# Patient Record
Sex: MALE | Race: WHITE | Employment: OTHER | URBAN - METROPOLITAN AREA
[De-identification: names, ages, dates, MRNs, and addresses within clinical notes are randomized per-mention and may not be internally consistent; named-entity substitution may affect disease eponyms.]

---

## 2019-03-06 ENCOUNTER — TELEPHONE (OUTPATIENT)
Dept: UROLOGY | Age: 69
End: 2019-03-06

## 2019-03-13 ENCOUNTER — TELEPHONE (OUTPATIENT)
Dept: UROLOGY | Age: 69
End: 2019-03-13

## 2019-03-13 PROBLEM — J96.01 ACUTE RESPIRATORY FAILURE WITH HYPOXIA AND HYPERCAPNIA (HCC): Status: ACTIVE | Noted: 2019-02-28

## 2019-03-13 PROBLEM — I25.10 CORONARY ARTERIOSCLEROSIS IN NATIVE ARTERY: Status: ACTIVE | Noted: 2019-02-26

## 2019-03-13 PROBLEM — I50.42 CHRONIC COMBINED SYSTOLIC AND DIASTOLIC CHF, NYHA CLASS 3 (HCC): Status: ACTIVE | Noted: 2019-02-26

## 2019-03-13 PROBLEM — Z95.5 PRESENCE OF STENT IN CORONARY ARTERY: Status: ACTIVE | Noted: 2019-02-26

## 2019-03-13 PROBLEM — I25.5 ISCHEMIC CARDIOMYOPATHY: Status: ACTIVE | Noted: 2019-02-26

## 2019-03-13 PROBLEM — I42.9 CARDIOMYOPATHY (HCC): Status: ACTIVE | Noted: 2019-02-26

## 2019-03-13 PROBLEM — J44.9 CHRONIC OBSTRUCTIVE PULMONARY DISEASE (HCC): Status: ACTIVE | Noted: 2019-02-03

## 2019-03-13 PROBLEM — I25.2 HISTORY OF ST ELEVATION MYOCARDIAL INFARCTION (STEMI): Status: ACTIVE | Noted: 2019-02-26

## 2019-03-13 PROBLEM — I47.29 NONSUSTAINED VENTRICULAR TACHYCARDIA: Status: ACTIVE | Noted: 2019-02-26

## 2019-03-13 PROBLEM — J96.02 ACUTE RESPIRATORY FAILURE WITH HYPOXIA AND HYPERCAPNIA (HCC): Status: ACTIVE | Noted: 2019-02-28

## 2019-03-13 RX ORDER — DILTIAZEM HYDROCHLORIDE 120 MG/1
CAPSULE, COATED, EXTENDED RELEASE ORAL
Refills: 0 | COMMUNITY
Start: 2019-02-13 | End: 2019-12-16 | Stop reason: ALTCHOICE

## 2019-03-13 RX ORDER — METOPROLOL SUCCINATE 100 MG/1
25 TABLET, EXTENDED RELEASE ORAL
Status: ON HOLD | COMMUNITY
End: 2020-01-01 | Stop reason: HOSPADM

## 2019-03-13 RX ORDER — GABAPENTIN 300 MG/1
400 CAPSULE ORAL 2 TIMES DAILY
COMMUNITY
Start: 2018-11-21

## 2019-03-13 RX ORDER — ALBUTEROL SULFATE 90 UG/1
2 AEROSOL, METERED RESPIRATORY (INHALATION)
COMMUNITY

## 2019-03-13 RX ORDER — PREDNISONE 20 MG/1
TABLET ORAL
Refills: 0 | COMMUNITY
Start: 2019-02-06 | End: 2019-03-18 | Stop reason: DRUGHIGH

## 2019-03-13 RX ORDER — TAMSULOSIN HYDROCHLORIDE 0.4 MG/1
0.4 CAPSULE ORAL
COMMUNITY

## 2019-03-13 RX ORDER — ENALAPRIL MALEATE 5 MG/1
TABLET ORAL
Refills: 0 | COMMUNITY
Start: 2019-02-12 | End: 2019-03-18 | Stop reason: ALTCHOICE

## 2019-03-13 RX ORDER — CLOPIDOGREL BISULFATE 75 MG/1
75 TABLET ORAL
COMMUNITY
Start: 2018-12-17

## 2019-03-13 RX ORDER — CARVEDILOL 12.5 MG/1
TABLET ORAL
Refills: 5 | COMMUNITY
Start: 2019-02-26 | End: 2019-12-16 | Stop reason: ALTCHOICE

## 2019-03-13 RX ORDER — BUSPIRONE HYDROCHLORIDE 5 MG/1
5 TABLET ORAL
COMMUNITY
Start: 2019-02-13 | End: 2019-03-15

## 2019-03-13 RX ORDER — CALCIUM CITRATE/VITAMIN D3 200MG-6.25
TABLET ORAL
Refills: 2 | COMMUNITY
Start: 2019-02-06

## 2019-03-13 RX ORDER — FUROSEMIDE 20 MG/1
40 TABLET ORAL DAILY
COMMUNITY

## 2019-03-13 RX ORDER — ASPIRIN 81 MG/1
81 TABLET ORAL
COMMUNITY
Start: 2019-02-22

## 2019-03-13 RX ORDER — ISOSORBIDE MONONITRATE 30 MG/1
30 TABLET, EXTENDED RELEASE ORAL
COMMUNITY
Start: 2019-03-08

## 2019-03-13 RX ORDER — QUETIAPINE FUMARATE 50 MG/1
50 TABLET, FILM COATED ORAL
COMMUNITY
Start: 2019-02-13

## 2019-03-13 RX ORDER — BUSPIRONE HYDROCHLORIDE 5 MG/1
TABLET ORAL
Refills: 2 | COMMUNITY
Start: 2019-02-13

## 2019-03-13 RX ORDER — GLIPIZIDE 10 MG/1
10 TABLET ORAL
COMMUNITY
Start: 2018-11-21

## 2019-03-13 RX ORDER — PREDNISONE 10 MG/1
TABLET ORAL
Refills: 0 | COMMUNITY
Start: 2019-02-06 | End: 2019-07-08 | Stop reason: ALTCHOICE

## 2019-03-13 RX ORDER — SPIRONOLACTONE 25 MG/1
TABLET ORAL
Refills: 5 | COMMUNITY
Start: 2019-02-26 | End: 2019-12-16 | Stop reason: ALTCHOICE

## 2019-03-13 RX ORDER — IPRATROPIUM BROMIDE AND ALBUTEROL SULFATE 2.5; .5 MG/3ML; MG/3ML
3 SOLUTION RESPIRATORY (INHALATION)
COMMUNITY
Start: 2019-03-08

## 2019-03-13 SDOH — HEALTH STABILITY: MENTAL HEALTH: HOW OFTEN DO YOU HAVE A DRINK CONTAINING ALCOHOL?: NEVER

## 2019-03-13 NOTE — TELEPHONE ENCOUNTER
[address] [place] [address] called asking if we were able to generate an order to flush the catheter as needed. Patient is schedule as a new patient with dr. Tiesha Llanes on 3/18/19 - possible voiding trial. Verbally per Dr. Alex Quezada please generate order.

## 2019-03-14 RX ORDER — DILTIAZEM HYDROCHLORIDE 120 MG/1
CAPSULE, COATED, EXTENDED RELEASE ORAL
Qty: 30 CAPSULE | Refills: 0 | OUTPATIENT
Start: 2019-03-14

## 2019-03-14 RX ORDER — SPIRONOLACTONE 25 MG/1
TABLET ORAL
Qty: 15 TABLET | Refills: 0 | OUTPATIENT
Start: 2019-03-14

## 2019-03-14 RX ORDER — ENALAPRIL MALEATE 5 MG/1
TABLET ORAL
Qty: 30 TABLET | Refills: 0 | OUTPATIENT
Start: 2019-03-14

## 2019-03-14 NOTE — TELEPHONE ENCOUNTER
Spoke with Peg, Nurse manager with [facility], verbal order given to flush patients catheter as needed until seen on Monday.

## 2019-03-18 ENCOUNTER — OFFICE VISIT (OUTPATIENT)
Dept: UROLOGY | Age: 69
End: 2019-03-18
Payer: COMMERCIAL

## 2019-03-18 VITALS
HEIGHT: 69 IN | TEMPERATURE: 98.1 F | HEART RATE: 81 BPM | SYSTOLIC BLOOD PRESSURE: 134 MMHG | WEIGHT: 186 LBS | BODY MASS INDEX: 27.55 KG/M2 | DIASTOLIC BLOOD PRESSURE: 68 MMHG

## 2019-03-18 DIAGNOSIS — N13.8 HYPERTROPHY OF PROSTATE WITH URINARY OBSTRUCTION: Primary | ICD-10-CM

## 2019-03-18 DIAGNOSIS — R33.9 RETENTION OF URINE: ICD-10-CM

## 2019-03-18 DIAGNOSIS — N40.1 HYPERTROPHY OF PROSTATE WITH URINARY OBSTRUCTION: Primary | ICD-10-CM

## 2019-03-18 PROCEDURE — 99204 OFFICE O/P NEW MOD 45 MIN: CPT | Performed by: UROLOGY

## 2019-03-18 RX ORDER — ACETAMINOPHEN 500 MG
500 TABLET ORAL EVERY 6 HOURS PRN
COMMUNITY

## 2019-03-18 ASSESSMENT — ENCOUNTER SYMPTOMS
EYE PAIN: 0
ABDOMINAL PAIN: 0
COLOR CHANGE: 0
VOMITING: 0
EYE REDNESS: 0
WHEEZING: 0
NAUSEA: 0
COUGH: 0
SHORTNESS OF BREATH: 0
BACK PAIN: 0

## 2019-03-26 RX ORDER — TAMSULOSIN HYDROCHLORIDE 0.4 MG/1
CAPSULE ORAL
Qty: 30 CAPSULE | Refills: 0 | OUTPATIENT
Start: 2019-03-26

## 2019-03-26 RX ORDER — QUETIAPINE FUMARATE 50 MG/1
TABLET, FILM COATED ORAL
Qty: 60 TABLET | Refills: 0 | OUTPATIENT
Start: 2019-03-26

## 2019-03-26 RX ORDER — ISOSORBIDE MONONITRATE 30 MG/1
TABLET, EXTENDED RELEASE ORAL
Qty: 30 TABLET | Refills: 0 | OUTPATIENT
Start: 2019-03-26

## 2019-04-05 RX ORDER — IPRATROPIUM BROMIDE AND ALBUTEROL SULFATE 2.5; .5 MG/3ML; MG/3ML
SOLUTION RESPIRATORY (INHALATION)
Qty: 540 ML | Refills: 0 | OUTPATIENT
Start: 2019-04-05

## 2019-06-28 ENCOUNTER — TELEPHONE (OUTPATIENT)
Dept: UROLOGY | Age: 69
End: 2019-06-28

## 2019-07-08 ENCOUNTER — OFFICE VISIT (OUTPATIENT)
Dept: UROLOGY | Age: 69
End: 2019-07-08
Payer: COMMERCIAL

## 2019-07-08 VITALS
DIASTOLIC BLOOD PRESSURE: 61 MMHG | BODY MASS INDEX: 28.19 KG/M2 | SYSTOLIC BLOOD PRESSURE: 133 MMHG | HEART RATE: 94 BPM | TEMPERATURE: 97.9 F | HEIGHT: 68 IN | WEIGHT: 186 LBS

## 2019-07-08 DIAGNOSIS — N17.9 ACUTE RENAL FAILURE, UNSPECIFIED ACUTE RENAL FAILURE TYPE (HCC): ICD-10-CM

## 2019-07-08 DIAGNOSIS — N40.1 BPH WITH OBSTRUCTION/LOWER URINARY TRACT SYMPTOMS: Primary | ICD-10-CM

## 2019-07-08 DIAGNOSIS — R33.9 RETENTION OF URINE: ICD-10-CM

## 2019-07-08 DIAGNOSIS — N13.8 BPH WITH OBSTRUCTION/LOWER URINARY TRACT SYMPTOMS: Primary | ICD-10-CM

## 2019-07-08 PROCEDURE — 99214 OFFICE O/P EST MOD 30 MIN: CPT | Performed by: UROLOGY

## 2019-07-08 ASSESSMENT — ENCOUNTER SYMPTOMS
EYE REDNESS: 0
BACK PAIN: 0
DIARRHEA: 0
SHORTNESS OF BREATH: 0
COUGH: 0
EYE PAIN: 0
CONSTIPATION: 0
VOMITING: 0
ABDOMINAL PAIN: 0
WHEEZING: 0
NAUSEA: 0

## 2019-07-15 ENCOUNTER — PROCEDURE VISIT (OUTPATIENT)
Dept: UROLOGY | Age: 69
End: 2019-07-15
Payer: COMMERCIAL

## 2019-07-15 VITALS
WEIGHT: 194 LBS | DIASTOLIC BLOOD PRESSURE: 75 MMHG | BODY MASS INDEX: 29.4 KG/M2 | SYSTOLIC BLOOD PRESSURE: 126 MMHG | HEART RATE: 90 BPM | HEIGHT: 68 IN

## 2019-07-15 DIAGNOSIS — N40.1 BPH WITH OBSTRUCTION/LOWER URINARY TRACT SYMPTOMS: Primary | ICD-10-CM

## 2019-07-15 DIAGNOSIS — N13.8 BPH WITH OBSTRUCTION/LOWER URINARY TRACT SYMPTOMS: Primary | ICD-10-CM

## 2019-07-15 PROCEDURE — 52000 CYSTOURETHROSCOPY: CPT | Performed by: UROLOGY

## 2019-07-23 ENCOUNTER — TELEPHONE (OUTPATIENT)
Dept: UROLOGY | Age: 69
End: 2019-07-23

## 2019-07-23 RX ORDER — FUROSEMIDE 40 MG/1
TABLET ORAL
Qty: 30 TABLET | Refills: 0 | OUTPATIENT
Start: 2019-07-23

## 2019-07-24 RX ORDER — METOPROLOL SUCCINATE 25 MG/1
TABLET, EXTENDED RELEASE ORAL
Qty: 30 TABLET | Refills: 0 | OUTPATIENT
Start: 2019-07-24

## 2019-07-29 ENCOUNTER — TELEPHONE (OUTPATIENT)
Dept: UROLOGY | Age: 69
End: 2019-07-29

## 2019-07-31 ENCOUNTER — TELEPHONE (OUTPATIENT)
Dept: UROLOGY | Age: 69
End: 2019-07-31

## 2019-07-31 NOTE — TELEPHONE ENCOUNTER
Sendy from [facility] ER called writer stating \" pt was seen in the ER on 7/27/19. 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 Walker Medrano Pt is not willing to take antibiotics due to surgery on 8/5/19. Writer gave verbal understanding informed Sendy message will be sent to Dr. Gonzalez Hopes call ended.

## 2019-08-05 ENCOUNTER — OFFICE VISIT (OUTPATIENT)
Dept: UROLOGY | Age: 69
End: 2019-08-05
Payer: COMMERCIAL

## 2019-08-05 VITALS
WEIGHT: 194 LBS | BODY MASS INDEX: 29.4 KG/M2 | HEIGHT: 68 IN | TEMPERATURE: 97.9 F | HEART RATE: 90 BPM | DIASTOLIC BLOOD PRESSURE: 78 MMHG | SYSTOLIC BLOOD PRESSURE: 148 MMHG

## 2019-08-05 DIAGNOSIS — R33.9 RETENTION OF URINE: ICD-10-CM

## 2019-08-05 DIAGNOSIS — N17.9 ACUTE RENAL FAILURE, UNSPECIFIED ACUTE RENAL FAILURE TYPE (HCC): ICD-10-CM

## 2019-08-05 DIAGNOSIS — N13.8 BPH WITH OBSTRUCTION/LOWER URINARY TRACT SYMPTOMS: Primary | ICD-10-CM

## 2019-08-05 DIAGNOSIS — N40.1 BPH WITH OBSTRUCTION/LOWER URINARY TRACT SYMPTOMS: Primary | ICD-10-CM

## 2019-08-05 PROCEDURE — 99214 OFFICE O/P EST MOD 30 MIN: CPT | Performed by: UROLOGY

## 2019-08-05 PROCEDURE — 51702 INSERT TEMP BLADDER CATH: CPT | Performed by: UROLOGY

## 2019-08-05 RX ORDER — CEPHALEXIN 500 MG/1
500 CAPSULE ORAL 4 TIMES DAILY
COMMUNITY
End: 2019-12-16 | Stop reason: ALTCHOICE

## 2019-08-05 ASSESSMENT — ENCOUNTER SYMPTOMS
VOMITING: 0
COUGH: 0
WHEEZING: 0
ABDOMINAL PAIN: 0
SHORTNESS OF BREATH: 1
EYE REDNESS: 0
EYE PAIN: 0
NAUSEA: 0
BACK PAIN: 0
COLOR CHANGE: 0

## 2019-08-06 ENCOUNTER — TELEPHONE (OUTPATIENT)
Dept: UROLOGY | Age: 69
End: 2019-08-06

## 2019-08-28 ENCOUNTER — TELEPHONE (OUTPATIENT)
Dept: UROLOGY | Age: 69
End: 2019-08-28

## 2019-08-28 NOTE — TELEPHONE ENCOUNTER
Pt called asking if he can resume taking his Entresto medication. S/p cysto greenlight 8/22/2019.  Please advise

## 2019-09-09 ENCOUNTER — OFFICE VISIT (OUTPATIENT)
Dept: UROLOGY | Age: 69
End: 2019-09-09

## 2019-09-09 VITALS
HEART RATE: 99 BPM | BODY MASS INDEX: 29.4 KG/M2 | HEIGHT: 68 IN | TEMPERATURE: 98 F | WEIGHT: 194 LBS | DIASTOLIC BLOOD PRESSURE: 60 MMHG | SYSTOLIC BLOOD PRESSURE: 100 MMHG

## 2019-09-09 DIAGNOSIS — N13.8 BPH WITH OBSTRUCTION/LOWER URINARY TRACT SYMPTOMS: Primary | ICD-10-CM

## 2019-09-09 DIAGNOSIS — N40.1 BPH WITH OBSTRUCTION/LOWER URINARY TRACT SYMPTOMS: Primary | ICD-10-CM

## 2019-09-09 PROCEDURE — 99024 POSTOP FOLLOW-UP VISIT: CPT | Performed by: UROLOGY

## 2019-09-09 RX ORDER — LEVOFLOXACIN 500 MG/1
TABLET, FILM COATED ORAL
Refills: 0 | COMMUNITY
Start: 2019-08-12 | End: 2019-12-16 | Stop reason: ALTCHOICE

## 2019-09-09 RX ORDER — ALOGLIPTIN 12.5 MG/1
12.5 TABLET, FILM COATED ORAL
COMMUNITY
Start: 2019-08-30

## 2019-09-09 RX ORDER — PRIMIDONE 50 MG/1
50 TABLET ORAL NIGHTLY
COMMUNITY

## 2019-09-09 ASSESSMENT — ENCOUNTER SYMPTOMS
CONSTIPATION: 0
EYE REDNESS: 0
BACK PAIN: 0
DIARRHEA: 0
EYE PAIN: 0
VOMITING: 0
COUGH: 0
ABDOMINAL PAIN: 0
WHEEZING: 0
SHORTNESS OF BREATH: 0
NAUSEA: 0

## 2019-09-09 NOTE — PROGRESS NOTES
artery disease)     Chronic respiratory failure (HCC)     Chronic systolic (congestive) heart failure (HCC)     COPD [facility]ch[facility] obstructive pulmonary disease) [facility])     Essential hypertension     Mixed hyperlipidemia     NSVT (nonsustained ventricular tachycardia) ([facility])     Type 2 diabetes mellitus ([facility] [address]      Past Surgical History:   Procedure Laterality Date    BACK SURGERY      CARDIAC CATHETERIZATION      CORONARY ANGIOPLASTY WITH STENT PLACEMENT      EYE SURGERY      SHOULDER SURGERY      TONSILLECTOMY       No family history on file. Outpatient Medications Marked as Taking for the 9/9/19 encounter (Office Visit) with Tristin Pillai MD   Medication Sig Dispense Refill    alogliptin (NESINA) 12.5 MG TABS tablet Take 12.5 mg by mouth      acetaminophen (TYLENOL) 500 MG tablet Take 500 mg by mouth every 6 hours as needed for Pain      sacubitril-valsartan (ENTRESTO) 24-26 MG per tablet Take 1 tablet by mouth 2 times daily      aspirin (ASPIRIN ADULT LOW DOSE) 81 MG EC tablet Take 81 mg by mouth      albuterol sulfate  (90 Base) MCG/ACT inhaler Inhale 2 puffs into the lungs      Blood Glucose Monitoring Suppl KIT Use as instructed      TRUE METRIX BLOOD GLUCOSE TEST strip USE 3 TIMES A DAY  2    glipiZIDE (GLUCOTROL) 10 MG tablet Take 10 mg by mouth      gabapentin (NEURONTIN) 300 MG capsule Take 300 mg by mouth.  furosemide (LASIX) 20 MG tablet Take 20 mg by mouth      diltiazem (CARDIZEM CD) 120 MG extended release capsule TAKE 1 CAPSULE (120 MG TOTAL) BY MOUTH NIGHTLY FOR 30 DAYS.  0    clopidogrel (PLAVIX) 75 MG tablet Take 75 mg by mouth      carvedilol (COREG) 12.5 MG tablet TAKE 1 TABLET (12.5 MG TOTAL) BY MOUTH 2 (TWO) TIMES A DAY.   5    busPIRone (BUSPAR) 5 MG tablet TAKE 1 TABLET BY MOUTH THREE TIMES A DAY  2    tamsulosin (FLOMAX) 0.4 MG capsule Take 0.4 mg by mouth      spironolactone (ALDACTONE) 25 MG tablet TAKE 1 TABLET BY MOUTH EVERY DAY  5    QUEtiapine (SEROQUEL) 50 MG tablet Take 50 mg by mouth      metoprolol succinate (TOPROL XL) 100 MG extended release tablet Take 25 mg by mouth       isosorbide mononitrate (IMDUR) 30 MG extended release tablet Take 30 mg by mouth      ipratropium-albuterol (DUONEB) 0.5-2.5 (3) MG/3ML SOLN nebulizer solution Inhale 3 mLs into the lungs      insulin glargine (LANTUS SOLOSTAR) 100 UNIT/ML injection pen Inject 24 Units into the skin          Amoxicillin-pot clavulanate; Clindamycin phosphate; Penicillins; Fluoxetine; Fluticasone-salmeterol; Hydrochlorothiazide; Irbesartan; Potassium; Salmeterol; and Tiotropium  Social History     Tobacco Use   Smoking Status Former Smoker   Smokeless Tobacco Never Used     (Ifpatient a smoker, smoking cessation counseling offered)    Social History     Substance and Sexual Activity   Alcohol Use Never    Frequency: Never       REVIEW OF SYSTEMS:  Review of Systems    Physical Exam:      Vitals:    09/09/19 1054   BP: 100/60   Pulse: 99   Temp: 98 °F (36.7 °C)     Body mass index is 29.51 kg/m². Patient is a 68 y.o. male in no acute distress and alert and oriented to person, place and time. Physical Exam  Constitutional: Patient in no acute distress. Neuro: Alert and oriented to person, place and time. Psych: Mood normal, affect normal  Skin: No rash noted  HEENT: Head: Normocephalic andatraumatic  Conjunctivae and EOM are normal. Pupils are equal, round  Nose:Normal  Right External Ear: Normal; Left External Ear: Normal  Mouth: Mucosa Moist  Neck: Supple  Lungs: Respiratory effort is normal      Assessment and Plan      1. BPH with obstruction/lower urinary tract symptoms           Plan:   F/u 3 mo with bladder scan    Return in about 3 months (around 12/9/2019) for bladder scan. Prescriptions Ordered:  No orders of the defined types were placed in this encounter. Orders Placed:  No orders of the defined types were placed in this encounter.           Kolby Banuelos MD    Agree with

## 2019-12-16 ENCOUNTER — OFFICE VISIT (OUTPATIENT)
Dept: UROLOGY | Age: 69
End: 2019-12-16
Payer: COMMERCIAL

## 2019-12-16 VITALS
BODY MASS INDEX: 30.8 KG/M2 | SYSTOLIC BLOOD PRESSURE: 136 MMHG | WEIGHT: 203.2 LBS | HEART RATE: 90 BPM | TEMPERATURE: 97.8 F | DIASTOLIC BLOOD PRESSURE: 67 MMHG | HEIGHT: 68 IN

## 2019-12-16 DIAGNOSIS — N40.1 BPH WITH OBSTRUCTION/LOWER URINARY TRACT SYMPTOMS: Primary | ICD-10-CM

## 2019-12-16 DIAGNOSIS — N13.8 BPH WITH OBSTRUCTION/LOWER URINARY TRACT SYMPTOMS: Primary | ICD-10-CM

## 2019-12-16 PROCEDURE — 51798 US URINE CAPACITY MEASURE: CPT | Performed by: UROLOGY

## 2019-12-16 PROCEDURE — 99213 OFFICE O/P EST LOW 20 MIN: CPT | Performed by: UROLOGY

## 2019-12-16 RX ORDER — SIMVASTATIN 10 MG
10 TABLET ORAL NIGHTLY
COMMUNITY

## 2019-12-16 RX ORDER — OMEPRAZOLE 20 MG/1
20 CAPSULE, DELAYED RELEASE ORAL DAILY
COMMUNITY

## 2019-12-16 RX ORDER — MULTIVIT-MIN/IRON/FOLIC ACID/K 18-600-40
1 CAPSULE ORAL DAILY
COMMUNITY

## 2019-12-16 ASSESSMENT — ENCOUNTER SYMPTOMS
EYE PAIN: 0
ABDOMINAL PAIN: 0
VOMITING: 0
SHORTNESS OF BREATH: 0
BACK PAIN: 0
COUGH: 0
COLOR CHANGE: 0
EYE REDNESS: 0
NAUSEA: 0
WHEEZING: 0

## 2020-01-01 ENCOUNTER — ANESTHESIA EVENT (OUTPATIENT)
Dept: SURGICAL ICU | Age: 70
DRG: 208 | End: 2020-01-01
Payer: COMMERCIAL

## 2020-01-01 ENCOUNTER — APPOINTMENT (OUTPATIENT)
Dept: GENERAL RADIOLOGY | Age: 70
DRG: 208 | End: 2020-01-01
Attending: INTERNAL MEDICINE
Payer: COMMERCIAL

## 2020-01-01 ENCOUNTER — CARE COORDINATION (OUTPATIENT)
Dept: CASE MANAGEMENT | Age: 70
End: 2020-01-01

## 2020-01-01 ENCOUNTER — APPOINTMENT (OUTPATIENT)
Dept: GENERAL RADIOLOGY | Age: 70
End: 2020-01-01
Payer: COMMERCIAL

## 2020-01-01 ENCOUNTER — HOSPITAL ENCOUNTER (INPATIENT)
Age: 70
LOS: 5 days | Discharge: HOME | DRG: 189 | End: 2020-05-13
Attending: EMERGENCY MEDICINE | Admitting: INTERNAL MEDICINE
Payer: COMMERCIAL

## 2020-01-01 ENCOUNTER — OFFICE VISIT (OUTPATIENT)
Dept: UROLOGY | Age: 70
End: 2020-01-01
Payer: COMMERCIAL

## 2020-01-01 ENCOUNTER — HOSPITAL ENCOUNTER (INPATIENT)
Age: 70
LOS: 6 days | Discharge: HOME HEALTH | DRG: 208 | End: 2020-05-01
Attending: INTERNAL MEDICINE | Admitting: INTERNAL MEDICINE
Payer: COMMERCIAL

## 2020-01-01 ENCOUNTER — TELEPHONE (OUTPATIENT)
Dept: INFECTIOUS DISEASES | Age: 70
End: 2020-01-01

## 2020-01-01 ENCOUNTER — APPOINTMENT (OUTPATIENT)
Dept: GENERAL RADIOLOGY | Age: 70
DRG: 189 | End: 2020-01-01
Payer: COMMERCIAL

## 2020-01-01 ENCOUNTER — APPOINTMENT (OUTPATIENT)
Dept: CT IMAGING | Age: 70
DRG: 208 | End: 2020-01-01
Attending: INTERNAL MEDICINE
Payer: COMMERCIAL

## 2020-01-01 ENCOUNTER — ANESTHESIA (OUTPATIENT)
Dept: SURGICAL ICU | Age: 70
DRG: 208 | End: 2020-01-01
Payer: COMMERCIAL

## 2020-01-01 ENCOUNTER — HOSPITAL ENCOUNTER (EMERGENCY)
Age: 70
Discharge: ANOTHER HOSPITAL | End: 2020-04-25
Attending: EMERGENCY MEDICINE
Payer: COMMERCIAL

## 2020-01-01 ENCOUNTER — TELEPHONE (OUTPATIENT)
Dept: OTHER | Facility: CLINIC | Age: 70
End: 2020-01-01

## 2020-01-01 VITALS — HEIGHT: 68 IN | WEIGHT: 200 LBS | TEMPERATURE: 98 F | BODY MASS INDEX: 30.31 KG/M2

## 2020-01-01 VITALS
RESPIRATION RATE: 24 BRPM | HEIGHT: 68 IN | HEART RATE: 81 BPM | DIASTOLIC BLOOD PRESSURE: 88 MMHG | WEIGHT: 208.56 LBS | OXYGEN SATURATION: 100 % | BODY MASS INDEX: 31.61 KG/M2 | SYSTOLIC BLOOD PRESSURE: 120 MMHG | TEMPERATURE: 97.5 F

## 2020-01-01 VITALS
TEMPERATURE: 98 F | RESPIRATION RATE: 16 BRPM | WEIGHT: 208.11 LBS | HEART RATE: 73 BPM | HEIGHT: 70 IN | SYSTOLIC BLOOD PRESSURE: 130 MMHG | DIASTOLIC BLOOD PRESSURE: 62 MMHG | BODY MASS INDEX: 29.79 KG/M2 | OXYGEN SATURATION: 97 %

## 2020-01-01 VITALS
BODY MASS INDEX: 28.37 KG/M2 | WEIGHT: 209.44 LBS | HEIGHT: 72 IN | HEART RATE: 73 BPM | RESPIRATION RATE: 17 BRPM | SYSTOLIC BLOOD PRESSURE: 129 MMHG | DIASTOLIC BLOOD PRESSURE: 72 MMHG | TEMPERATURE: 100 F | OXYGEN SATURATION: 99 %

## 2020-01-01 LAB
-: ABNORMAL
-: ABNORMAL
-: NORMAL
ABSOLUTE EOS #: 0 K/UL (ref 0–0.4)
ABSOLUTE EOS #: 0 K/UL (ref 0–0.44)
ABSOLUTE EOS #: 0.03 K/UL (ref 0–0.44)
ABSOLUTE EOS #: 0.1 K/UL (ref 0–0.4)
ABSOLUTE EOS #: 0.1 K/UL (ref 0–0.44)
ABSOLUTE IMMATURE GRANULOCYTE: 0.1 K/UL (ref 0–0.3)
ABSOLUTE IMMATURE GRANULOCYTE: 0.14 K/UL (ref 0–0.3)
ABSOLUTE IMMATURE GRANULOCYTE: 0.17 K/UL (ref 0–0.3)
ABSOLUTE IMMATURE GRANULOCYTE: ABNORMAL K/UL (ref 0–0.3)
ABSOLUTE LYMPH #: 0.29 K/UL (ref 1–4.8)
ABSOLUTE LYMPH #: 0.5 K/UL (ref 1–4.8)
ABSOLUTE LYMPH #: 0.57 K/UL (ref 1.1–3.7)
ABSOLUTE LYMPH #: 0.7 K/UL (ref 1–4.8)
ABSOLUTE LYMPH #: 0.9 K/UL (ref 1–4.8)
ABSOLUTE LYMPH #: 1.23 K/UL (ref 1.1–3.7)
ABSOLUTE LYMPH #: 1.8 K/UL (ref 1–4.8)
ABSOLUTE LYMPH #: 1.91 K/UL (ref 1.1–3.7)
ABSOLUTE MONO #: 0.1 K/UL (ref 0.1–1.3)
ABSOLUTE MONO #: 0.3 K/UL (ref 0.1–1.3)
ABSOLUTE MONO #: 0.4 K/UL (ref 0.1–1.3)
ABSOLUTE MONO #: 0.49 K/UL (ref 0.1–1.3)
ABSOLUTE MONO #: 0.6 K/UL (ref 0.1–1.3)
ABSOLUTE MONO #: 0.7 K/UL (ref 0.1–1.3)
ABSOLUTE MONO #: 0.8 K/UL (ref 0.1–1.3)
ABSOLUTE MONO #: 0.86 K/UL (ref 0.1–1.2)
ABSOLUTE MONO #: 0.86 K/UL (ref 0.1–1.2)
ABSOLUTE MONO #: 1.05 K/UL (ref 0.1–1.2)
ADENOVIRUS PCR: NOT DETECTED
ALBUMIN SERPL-MCNC: 3.4 G/DL (ref 3.5–5.2)
ALBUMIN SERPL-MCNC: 3.5 G/DL (ref 3.5–5.2)
ALBUMIN SERPL-MCNC: 3.6 G/DL (ref 3.5–5.2)
ALBUMIN SERPL-MCNC: 4.1 G/DL (ref 3.5–5.2)
ALBUMIN/GLOBULIN RATIO: 1.2 (ref 1–2.5)
ALBUMIN/GLOBULIN RATIO: 1.2 (ref 1–2.5)
ALBUMIN/GLOBULIN RATIO: ABNORMAL (ref 1–2.5)
ALBUMIN/GLOBULIN RATIO: ABNORMAL (ref 1–2.5)
ALLEN TEST: ABNORMAL
ALP BLD-CCNC: 103 U/L (ref 40–129)
ALP BLD-CCNC: 68 U/L (ref 40–129)
ALP BLD-CCNC: 79 U/L (ref 40–129)
ALP BLD-CCNC: 87 U/L (ref 40–129)
ALT SERPL-CCNC: 24 U/L (ref 5–41)
ALT SERPL-CCNC: 30 U/L (ref 5–41)
ALT SERPL-CCNC: 33 U/L (ref 5–41)
ALT SERPL-CCNC: 35 U/L (ref 5–41)
AMORPHOUS: ABNORMAL
AMORPHOUS: ABNORMAL
AMORPHOUS: NORMAL
AMPHETAMINE SCREEN URINE: NEGATIVE
ANION GAP SERPL CALCULATED.3IONS-SCNC: 11 MMOL/L (ref 9–17)
ANION GAP SERPL CALCULATED.3IONS-SCNC: 12 MMOL/L (ref 9–17)
ANION GAP SERPL CALCULATED.3IONS-SCNC: 13 MMOL/L (ref 9–17)
ANION GAP SERPL CALCULATED.3IONS-SCNC: 14 MMOL/L (ref 9–17)
ANION GAP SERPL CALCULATED.3IONS-SCNC: 15 MMOL/L (ref 9–17)
ANION GAP SERPL CALCULATED.3IONS-SCNC: 7 MMOL/L (ref 9–17)
AST SERPL-CCNC: 14 U/L
AST SERPL-CCNC: 21 U/L
AST SERPL-CCNC: 22 U/L
AST SERPL-CCNC: 33 U/L
BACTERIA: ABNORMAL
BACTERIA: ABNORMAL
BACTERIA: NORMAL
BARBITURATE SCREEN URINE: NEGATIVE
BASOPHILS # BLD: 0 % (ref 0–2)
BASOPHILS # BLD: 1 % (ref 0–2)
BASOPHILS ABSOLUTE: 0 K/UL (ref 0–0.2)
BASOPHILS ABSOLUTE: 0.1 K/UL (ref 0–0.2)
BASOPHILS ABSOLUTE: 0.2 K/UL (ref 0–0.2)
BASOPHILS ABSOLUTE: <0.03 K/UL (ref 0–0.2)
BASOPHILS ABSOLUTE: <0.03 K/UL (ref 0–0.2)
BENZODIAZEPINE SCREEN, URINE: POSITIVE
BILIRUB SERPL-MCNC: 0.35 MG/DL (ref 0.3–1.2)
BILIRUB SERPL-MCNC: 0.35 MG/DL (ref 0.3–1.2)
BILIRUB SERPL-MCNC: 0.42 MG/DL (ref 0.3–1.2)
BILIRUB SERPL-MCNC: 0.44 MG/DL (ref 0.3–1.2)
BILIRUBIN DIRECT: 0.11 MG/DL
BILIRUBIN DIRECT: 0.12 MG/DL
BILIRUBIN URINE: NEGATIVE
BILIRUBIN, INDIRECT: 0.23 MG/DL (ref 0–1)
BILIRUBIN, INDIRECT: 0.24 MG/DL (ref 0–1)
BNP INTERPRETATION: ABNORMAL
BORDETELLA PARAPERTUSSIS: NOT DETECTED
BORDETELLA PERTUSSIS PCR: NOT DETECTED
BUN BLDV-MCNC: 23 MG/DL (ref 8–23)
BUN BLDV-MCNC: 26 MG/DL (ref 8–23)
BUN BLDV-MCNC: 30 MG/DL (ref 8–23)
BUN BLDV-MCNC: 39 MG/DL (ref 8–23)
BUN BLDV-MCNC: 48 MG/DL (ref 8–23)
BUN BLDV-MCNC: 53 MG/DL (ref 8–23)
BUN BLDV-MCNC: 57 MG/DL (ref 8–23)
BUN BLDV-MCNC: 59 MG/DL (ref 8–23)
BUN BLDV-MCNC: 61 MG/DL (ref 8–23)
BUN BLDV-MCNC: 67 MG/DL (ref 8–23)
BUN BLDV-MCNC: 67 MG/DL (ref 8–23)
BUN BLDV-MCNC: 69 MG/DL (ref 8–23)
BUN BLDV-MCNC: 69 MG/DL (ref 8–23)
BUN BLDV-MCNC: 73 MG/DL (ref 8–23)
BUN/CREAT BLD: ABNORMAL (ref 9–20)
BUPRENORPHINE URINE: ABNORMAL
C-REACTIVE PROTEIN: 13.3 MG/L (ref 0–5)
C-REACTIVE PROTEIN: 14 MG/L (ref 0–5)
C-REACTIVE PROTEIN: 22.4 MG/L (ref 0–5)
CALCIUM IONIZED: 1.1 MMOL/L (ref 1.13–1.33)
CALCIUM SERPL-MCNC: 7.7 MG/DL (ref 8.6–10.4)
CALCIUM SERPL-MCNC: 7.9 MG/DL (ref 8.6–10.4)
CALCIUM SERPL-MCNC: 8 MG/DL (ref 8.6–10.4)
CALCIUM SERPL-MCNC: 8.1 MG/DL (ref 8.6–10.4)
CALCIUM SERPL-MCNC: 8.2 MG/DL (ref 8.6–10.4)
CALCIUM SERPL-MCNC: 8.2 MG/DL (ref 8.6–10.4)
CALCIUM SERPL-MCNC: 8.3 MG/DL (ref 8.6–10.4)
CALCIUM SERPL-MCNC: 8.3 MG/DL (ref 8.6–10.4)
CALCIUM SERPL-MCNC: 8.4 MG/DL (ref 8.6–10.4)
CALCIUM SERPL-MCNC: 8.6 MG/DL (ref 8.6–10.4)
CALCIUM SERPL-MCNC: 8.7 MG/DL (ref 8.6–10.4)
CALCIUM SERPL-MCNC: 8.8 MG/DL (ref 8.6–10.4)
CALCIUM SERPL-MCNC: 8.9 MG/DL (ref 8.6–10.4)
CALCIUM SERPL-MCNC: 9.2 MG/DL (ref 8.6–10.4)
CANNABINOID SCREEN URINE: NEGATIVE
CARBOXYHEMOGLOBIN: 0.9 % (ref 0–5)
CARBOXYHEMOGLOBIN: 1 % (ref 0–5)
CARBOXYHEMOGLOBIN: 1.2 % (ref 0–5)
CARBOXYHEMOGLOBIN: 2.6 % (ref 0–5)
CARBOXYHEMOGLOBIN: 2.6 % (ref 0–5)
CASTS UA: ABNORMAL /LPF
CASTS UA: ABNORMAL /LPF (ref 0–2)
CASTS UA: ABNORMAL /LPF (ref 0–2)
CASTS UA: NORMAL /LPF
CHLAMYDIA PNEUMONIAE BY PCR: NOT DETECTED
CHLORIDE BLD-SCNC: 101 MMOL/L (ref 98–107)
CHLORIDE BLD-SCNC: 101 MMOL/L (ref 98–107)
CHLORIDE BLD-SCNC: 102 MMOL/L (ref 98–107)
CHLORIDE BLD-SCNC: 102 MMOL/L (ref 98–107)
CHLORIDE BLD-SCNC: 103 MMOL/L (ref 98–107)
CHLORIDE BLD-SCNC: 104 MMOL/L (ref 98–107)
CHLORIDE BLD-SCNC: 95 MMOL/L (ref 98–107)
CHLORIDE BLD-SCNC: 97 MMOL/L (ref 98–107)
CHLORIDE BLD-SCNC: 98 MMOL/L (ref 98–107)
CHLORIDE BLD-SCNC: 99 MMOL/L (ref 98–107)
CHLORIDE BLD-SCNC: 99 MMOL/L (ref 98–107)
CHLORIDE, UR: <20 MMOL/L
CHP ED QC CHECK: NORMAL
CO2: 22 MMOL/L (ref 20–31)
CO2: 22 MMOL/L (ref 20–31)
CO2: 23 MMOL/L (ref 20–31)
CO2: 23 MMOL/L (ref 20–31)
CO2: 24 MMOL/L (ref 20–31)
CO2: 24 MMOL/L (ref 20–31)
CO2: 25 MMOL/L (ref 20–31)
CO2: 26 MMOL/L (ref 20–31)
CO2: 26 MMOL/L (ref 20–31)
CO2: 27 MMOL/L (ref 20–31)
CO2: 27 MMOL/L (ref 20–31)
CO2: 28 MMOL/L (ref 20–31)
CO2: 29 MMOL/L (ref 20–31)
CO2: 29 MMOL/L (ref 20–31)
COCAINE METABOLITE, URINE: NEGATIVE
COLOR: YELLOW
COMMENT UA: ABNORMAL
CORONAVIRUS 229E PCR: NOT DETECTED
CORONAVIRUS HKU1 PCR: NOT DETECTED
CORONAVIRUS NL63 PCR: NOT DETECTED
CORONAVIRUS OC43 PCR: NOT DETECTED
CORTISOL COLLECTION INFO: NORMAL
CORTISOL: 9.9 UG/DL (ref 2.7–18.4)
CREAT SERPL-MCNC: 1.66 MG/DL (ref 0.7–1.2)
CREAT SERPL-MCNC: 1.76 MG/DL (ref 0.7–1.2)
CREAT SERPL-MCNC: 1.77 MG/DL (ref 0.7–1.2)
CREAT SERPL-MCNC: 1.89 MG/DL (ref 0.7–1.2)
CREAT SERPL-MCNC: 1.94 MG/DL (ref 0.7–1.2)
CREAT SERPL-MCNC: 1.95 MG/DL (ref 0.7–1.2)
CREAT SERPL-MCNC: 1.95 MG/DL (ref 0.7–1.2)
CREAT SERPL-MCNC: 2.04 MG/DL (ref 0.7–1.2)
CREAT SERPL-MCNC: 2.08 MG/DL (ref 0.7–1.2)
CREAT SERPL-MCNC: 2.17 MG/DL (ref 0.7–1.2)
CREAT SERPL-MCNC: 2.2 MG/DL (ref 0.7–1.2)
CREAT SERPL-MCNC: 2.24 MG/DL (ref 0.7–1.2)
CREAT SERPL-MCNC: 2.35 MG/DL (ref 0.7–1.2)
CREAT SERPL-MCNC: 2.52 MG/DL (ref 0.7–1.2)
CREATININE URINE: 77 MG/DL (ref 39–259)
CRYSTALS, UA: ABNORMAL /HPF
CRYSTALS, UA: ABNORMAL /HPF
CRYSTALS, UA: NORMAL /HPF
CULTURE: ABNORMAL
CULTURE: ABNORMAL
CULTURE: NO GROWTH
CULTURE: NORMAL
D-DIMER QUANTITATIVE: 0.86 MG/L FEU
DIFFERENTIAL TYPE: ABNORMAL
DIRECT EXAM: ABNORMAL
DIRECT EXAM: NORMAL
EKG ATRIAL RATE: 111 BPM
EKG ATRIAL RATE: 68 BPM
EKG ATRIAL RATE: 72 BPM
EKG ATRIAL RATE: 76 BPM
EKG ATRIAL RATE: 80 BPM
EKG P AXIS: 19 DEGREES
EKG P AXIS: 43 DEGREES
EKG P AXIS: 53 DEGREES
EKG P AXIS: 58 DEGREES
EKG P AXIS: 71 DEGREES
EKG P-R INTERVAL: 176 MS
EKG P-R INTERVAL: 180 MS
EKG P-R INTERVAL: 182 MS
EKG P-R INTERVAL: 200 MS
EKG P-R INTERVAL: 202 MS
EKG Q-T INTERVAL: 338 MS
EKG Q-T INTERVAL: 404 MS
EKG Q-T INTERVAL: 410 MS
EKG Q-T INTERVAL: 428 MS
EKG Q-T INTERVAL: 444 MS
EKG QRS DURATION: 108 MS
EKG QRS DURATION: 108 MS
EKG QRS DURATION: 112 MS
EKG QRS DURATION: 114 MS
EKG QRS DURATION: 120 MS
EKG QTC CALCULATION (BAZETT): 448 MS
EKG QTC CALCULATION (BAZETT): 455 MS
EKG QTC CALCULATION (BAZETT): 459 MS
EKG QTC CALCULATION (BAZETT): 465 MS
EKG QTC CALCULATION (BAZETT): 499 MS
EKG R AXIS: 32 DEGREES
EKG R AXIS: 41 DEGREES
EKG R AXIS: 44 DEGREES
EKG R AXIS: 49 DEGREES
EKG R AXIS: 56 DEGREES
EKG T AXIS: 138 DEGREES
EKG T AXIS: 144 DEGREES
EKG T AXIS: 148 DEGREES
EKG T AXIS: 152 DEGREES
EKG T AXIS: 160 DEGREES
EKG VENTRICULAR RATE: 111 BPM
EKG VENTRICULAR RATE: 68 BPM
EKG VENTRICULAR RATE: 72 BPM
EKG VENTRICULAR RATE: 76 BPM
EKG VENTRICULAR RATE: 80 BPM
EOSINOPHILS RELATIVE PERCENT: 0 % (ref 0–4)
EOSINOPHILS RELATIVE PERCENT: 0 % (ref 1–4)
EOSINOPHILS RELATIVE PERCENT: 0 % (ref 1–4)
EOSINOPHILS RELATIVE PERCENT: 1 % (ref 0–4)
EOSINOPHILS RELATIVE PERCENT: 1 % (ref 1–4)
EPITHELIAL CELLS UA: ABNORMAL /HPF
EPITHELIAL CELLS UA: ABNORMAL /HPF (ref 0–5)
EPITHELIAL CELLS UA: NORMAL /HPF
FERRITIN: 49 UG/L (ref 30–400)
FERRITIN: 66 UG/L (ref 30–400)
FIO2: 100
FIO2: 30
FIO2: 35
FIO2: 60
FIO2: ABNORMAL
GFR AFRICAN AMERICAN: 31 ML/MIN
GFR AFRICAN AMERICAN: 33 ML/MIN
GFR AFRICAN AMERICAN: 35 ML/MIN
GFR AFRICAN AMERICAN: 36 ML/MIN
GFR AFRICAN AMERICAN: 36 ML/MIN
GFR AFRICAN AMERICAN: 38 ML/MIN
GFR AFRICAN AMERICAN: 39 ML/MIN
GFR AFRICAN AMERICAN: 41 ML/MIN
GFR AFRICAN AMERICAN: 43 ML/MIN
GFR AFRICAN AMERICAN: 46 ML/MIN
GFR AFRICAN AMERICAN: 46 ML/MIN
GFR AFRICAN AMERICAN: 50 ML/MIN
GFR NON-AFRICAN AMERICAN: 25 ML/MIN
GFR NON-AFRICAN AMERICAN: 27 ML/MIN
GFR NON-AFRICAN AMERICAN: 29 ML/MIN
GFR NON-AFRICAN AMERICAN: 30 ML/MIN
GFR NON-AFRICAN AMERICAN: 30 ML/MIN
GFR NON-AFRICAN AMERICAN: 32 ML/MIN
GFR NON-AFRICAN AMERICAN: 32 ML/MIN
GFR NON-AFRICAN AMERICAN: 34 ML/MIN
GFR NON-AFRICAN AMERICAN: 35 ML/MIN
GFR NON-AFRICAN AMERICAN: 38 ML/MIN
GFR NON-AFRICAN AMERICAN: 38 ML/MIN
GFR NON-AFRICAN AMERICAN: 41 ML/MIN
GFR SERPL CREATININE-BSD FRML MDRD: ABNORMAL ML/MIN/{1.73_M2}
GLOBULIN: ABNORMAL G/DL (ref 1.5–3.8)
GLOBULIN: NORMAL G/DL (ref 1.5–3.8)
GLUCOSE BLD-MCNC: 110 MG/DL (ref 75–110)
GLUCOSE BLD-MCNC: 112 MG/DL (ref 75–110)
GLUCOSE BLD-MCNC: 115 MG/DL (ref 75–110)
GLUCOSE BLD-MCNC: 125 MG/DL (ref 75–110)
GLUCOSE BLD-MCNC: 126 MG/DL (ref 70–99)
GLUCOSE BLD-MCNC: 126 MG/DL (ref 70–99)
GLUCOSE BLD-MCNC: 132 MG/DL (ref 75–110)
GLUCOSE BLD-MCNC: 133 MG/DL (ref 75–110)
GLUCOSE BLD-MCNC: 135 MG/DL (ref 75–110)
GLUCOSE BLD-MCNC: 141 MG/DL (ref 70–99)
GLUCOSE BLD-MCNC: 152 MG/DL (ref 70–99)
GLUCOSE BLD-MCNC: 160 MG/DL (ref 70–99)
GLUCOSE BLD-MCNC: 163 MG/DL (ref 75–110)
GLUCOSE BLD-MCNC: 169 MG/DL (ref 75–110)
GLUCOSE BLD-MCNC: 173 MG/DL (ref 75–110)
GLUCOSE BLD-MCNC: 176 MG/DL (ref 75–110)
GLUCOSE BLD-MCNC: 176 MG/DL (ref 75–110)
GLUCOSE BLD-MCNC: 184 MG/DL (ref 75–110)
GLUCOSE BLD-MCNC: 198 MG/DL (ref 75–110)
GLUCOSE BLD-MCNC: 219 MG/DL (ref 70–99)
GLUCOSE BLD-MCNC: 231 MG/DL (ref 75–110)
GLUCOSE BLD-MCNC: 236 MG/DL (ref 75–110)
GLUCOSE BLD-MCNC: 240 MG/DL (ref 70–99)
GLUCOSE BLD-MCNC: 241 MG/DL (ref 75–110)
GLUCOSE BLD-MCNC: 242 MG/DL (ref 75–110)
GLUCOSE BLD-MCNC: 245 MG/DL (ref 75–110)
GLUCOSE BLD-MCNC: 246 MG/DL (ref 75–110)
GLUCOSE BLD-MCNC: 249 MG/DL (ref 75–110)
GLUCOSE BLD-MCNC: 250 MG/DL (ref 75–110)
GLUCOSE BLD-MCNC: 256 MG/DL (ref 75–110)
GLUCOSE BLD-MCNC: 258 MG/DL (ref 75–110)
GLUCOSE BLD-MCNC: 261 MG/DL (ref 75–110)
GLUCOSE BLD-MCNC: 265 MG/DL (ref 75–110)
GLUCOSE BLD-MCNC: 280 MG/DL (ref 75–110)
GLUCOSE BLD-MCNC: 281 MG/DL (ref 75–110)
GLUCOSE BLD-MCNC: 282 MG/DL (ref 70–99)
GLUCOSE BLD-MCNC: 284 MG/DL (ref 75–110)
GLUCOSE BLD-MCNC: 285 MG/DL (ref 75–110)
GLUCOSE BLD-MCNC: 287 MG/DL (ref 75–110)
GLUCOSE BLD-MCNC: 287 MG/DL (ref 75–110)
GLUCOSE BLD-MCNC: 289 MG/DL (ref 75–110)
GLUCOSE BLD-MCNC: 290 MG/DL (ref 70–99)
GLUCOSE BLD-MCNC: 290 MG/DL (ref 75–110)
GLUCOSE BLD-MCNC: 300 MG/DL (ref 75–110)
GLUCOSE BLD-MCNC: 300 MG/DL (ref 75–110)
GLUCOSE BLD-MCNC: 302 MG/DL (ref 75–110)
GLUCOSE BLD-MCNC: 303 MG/DL (ref 75–110)
GLUCOSE BLD-MCNC: 308 MG/DL (ref 75–110)
GLUCOSE BLD-MCNC: 312 MG/DL (ref 75–110)
GLUCOSE BLD-MCNC: 317 MG/DL (ref 70–99)
GLUCOSE BLD-MCNC: 33 MG/DL (ref 75–110)
GLUCOSE BLD-MCNC: 346 MG/DL (ref 75–110)
GLUCOSE BLD-MCNC: 357 MG/DL (ref 75–110)
GLUCOSE BLD-MCNC: 364 MG/DL (ref 70–99)
GLUCOSE BLD-MCNC: 365 MG/DL (ref 75–110)
GLUCOSE BLD-MCNC: 380 MG/DL (ref 75–110)
GLUCOSE BLD-MCNC: 382 MG/DL (ref 70–99)
GLUCOSE BLD-MCNC: 403 MG/DL
GLUCOSE BLD-MCNC: 403 MG/DL (ref 75–110)
GLUCOSE BLD-MCNC: 427 MG/DL (ref 75–110)
GLUCOSE BLD-MCNC: 432 MG/DL (ref 70–99)
GLUCOSE BLD-MCNC: 467 MG/DL (ref 70–99)
GLUCOSE BLD-MCNC: 51 MG/DL (ref 75–110)
GLUCOSE URINE: ABNORMAL
HCO3 ARTERIAL: 25 MMOL/L (ref 22–26)
HCO3 ARTERIAL: 27.2 MMOL/L (ref 22–26)
HCO3 ARTERIAL: 30.3 MMOL/L (ref 22–26)
HCO3 VENOUS: 24.8 MMOL/L (ref 24–30)
HCO3 VENOUS: 28.2 MMOL/L (ref 24–30)
HCT VFR BLD CALC: 26.8 % (ref 41–53)
HCT VFR BLD CALC: 26.9 % (ref 41–53)
HCT VFR BLD CALC: 27.1 % (ref 41–53)
HCT VFR BLD CALC: 28.4 % (ref 41–53)
HCT VFR BLD CALC: 28.9 % (ref 40.7–50.3)
HCT VFR BLD CALC: 29.1 % (ref 41–53)
HCT VFR BLD CALC: 29.3 % (ref 41–53)
HCT VFR BLD CALC: 31.2 % (ref 40.7–50.3)
HCT VFR BLD CALC: 33.1 % (ref 40.7–50.3)
HCT VFR BLD CALC: 35.8 % (ref 40.7–50.3)
HCT VFR BLD CALC: 38.7 % (ref 41–53)
HEMOGLOBIN: 10.4 G/DL (ref 13–17)
HEMOGLOBIN: 11.9 G/DL (ref 13–17)
HEMOGLOBIN: 12 G/DL (ref 13.5–17.5)
HEMOGLOBIN: 8.5 G/DL (ref 13.5–17.5)
HEMOGLOBIN: 8.6 G/DL (ref 13.5–17.5)
HEMOGLOBIN: 8.7 G/DL (ref 13.5–17.5)
HEMOGLOBIN: 9 G/DL (ref 13–17)
HEMOGLOBIN: 9.2 G/DL (ref 13.5–17.5)
HEMOGLOBIN: 9.3 G/DL (ref 13.5–17.5)
HEMOGLOBIN: 9.5 G/DL (ref 13.5–17.5)
HEMOGLOBIN: 9.5 G/DL (ref 13–17)
HUMAN METAPNEUMOVIRUS PCR: NOT DETECTED
IMMATURE GRANULOCYTES: 1 %
IMMATURE GRANULOCYTES: 1 %
IMMATURE GRANULOCYTES: 2 %
IMMATURE GRANULOCYTES: ABNORMAL %
INFLUENZA A BY PCR: NOT DETECTED
INFLUENZA A H1 (2009) PCR: NORMAL
INFLUENZA A H1 PCR: NORMAL
INFLUENZA A H3 PCR: NORMAL
INFLUENZA B BY PCR: NOT DETECTED
INR BLD: 0.9
INR BLD: 1
KETONES, URINE: NEGATIVE
LACTATE DEHYDROGENASE: 266 U/L (ref 135–225)
LACTATE DEHYDROGENASE: 273 U/L (ref 135–225)
LACTATE DEHYDROGENASE: 308 U/L (ref 135–225)
LACTIC ACID, WHOLE BLOOD: 1.8 MMOL/L (ref 0.7–2.1)
LACTIC ACID: 1.8 MMOL/L (ref 0.5–2.2)
LACTIC ACID: 2.8 MMOL/L (ref 0.5–2.2)
LACTIC ACID: NORMAL MMOL/L
LEGIONELLA PNEUMOPHILIA AG, URINE: NEGATIVE
LEUKOCYTE ESTERASE, URINE: NEGATIVE
LIPASE: 45 U/L (ref 13–60)
LV EF: 43 %
LVEF MODALITY: NORMAL
LYMPHOCYTES # BLD: 12 % (ref 24–43)
LYMPHOCYTES # BLD: 13 % (ref 24–44)
LYMPHOCYTES # BLD: 15 % (ref 24–44)
LYMPHOCYTES # BLD: 19 % (ref 24–43)
LYMPHOCYTES # BLD: 3 % (ref 24–44)
LYMPHOCYTES # BLD: 4 % (ref 24–43)
LYMPHOCYTES # BLD: 6 % (ref 24–44)
LYMPHOCYTES # BLD: 6 % (ref 24–44)
LYMPHOCYTES # BLD: 8 % (ref 24–44)
LYMPHOCYTES # BLD: 9 % (ref 24–44)
Lab: ABNORMAL
Lab: NORMAL
MAGNESIUM: 2.5 MG/DL (ref 1.6–2.6)
MAGNESIUM: 2.6 MG/DL (ref 1.6–2.6)
MAGNESIUM: 2.7 MG/DL (ref 1.6–2.6)
MCH RBC QN AUTO: 26.7 PG (ref 26–34)
MCH RBC QN AUTO: 26.8 PG (ref 25.2–33.5)
MCH RBC QN AUTO: 26.8 PG (ref 25.2–33.5)
MCH RBC QN AUTO: 26.8 PG (ref 26–34)
MCH RBC QN AUTO: 26.8 PG (ref 26–34)
MCH RBC QN AUTO: 26.9 PG (ref 26–34)
MCH RBC QN AUTO: 26.9 PG (ref 26–34)
MCH RBC QN AUTO: 27.3 PG (ref 26–34)
MCH RBC QN AUTO: 27.4 PG (ref 25.2–33.5)
MCH RBC QN AUTO: 27.8 PG (ref 25.2–33.5)
MCH RBC QN AUTO: 27.8 PG (ref 26–34)
MCHC RBC AUTO-ENTMCNC: 30.4 G/DL (ref 28.4–34.8)
MCHC RBC AUTO-ENTMCNC: 31.1 G/DL (ref 28.4–34.8)
MCHC RBC AUTO-ENTMCNC: 31.1 G/DL (ref 31–37)
MCHC RBC AUTO-ENTMCNC: 31.4 G/DL (ref 28.4–34.8)
MCHC RBC AUTO-ENTMCNC: 31.8 G/DL (ref 31–37)
MCHC RBC AUTO-ENTMCNC: 32 G/DL (ref 31–37)
MCHC RBC AUTO-ENTMCNC: 32.1 G/DL (ref 31–37)
MCHC RBC AUTO-ENTMCNC: 32.1 G/DL (ref 31–37)
MCHC RBC AUTO-ENTMCNC: 32.3 G/DL (ref 31–37)
MCHC RBC AUTO-ENTMCNC: 32.4 G/DL (ref 31–37)
MCHC RBC AUTO-ENTMCNC: 33.2 G/DL (ref 28.4–34.8)
MCV RBC AUTO: 83.5 FL (ref 80–100)
MCV RBC AUTO: 83.6 FL (ref 82.6–102.9)
MCV RBC AUTO: 84 FL (ref 80–100)
MCV RBC AUTO: 84 FL (ref 80–100)
MCV RBC AUTO: 84.2 FL (ref 80–100)
MCV RBC AUTO: 84.7 FL (ref 80–100)
MCV RBC AUTO: 85.7 FL (ref 80–100)
MCV RBC AUTO: 85.9 FL (ref 80–100)
MCV RBC AUTO: 86 FL (ref 82.6–102.9)
MCV RBC AUTO: 87.1 FL (ref 82.6–102.9)
MCV RBC AUTO: 88.1 FL (ref 82.6–102.9)
MDMA URINE: ABNORMAL
METHADONE SCREEN, URINE: NEGATIVE
METHAMPHETAMINE, URINE: ABNORMAL
METHEMOGLOBIN: 0 % (ref 0–1.9)
METHEMOGLOBIN: 0.1 % (ref 0–1.9)
METHEMOGLOBIN: 0.4 % (ref 0–1.9)
METHEMOGLOBIN: 0.4 % (ref 0–1.9)
METHEMOGLOBIN: 0.5 % (ref 0–1.9)
MODE: ABNORMAL
MONOCYTES # BLD: 10 % (ref 1–7)
MONOCYTES # BLD: 10 % (ref 3–12)
MONOCYTES # BLD: 2 % (ref 1–7)
MONOCYTES # BLD: 3 % (ref 1–7)
MONOCYTES # BLD: 5 % (ref 1–7)
MONOCYTES # BLD: 5 % (ref 1–7)
MONOCYTES # BLD: 6 % (ref 1–7)
MONOCYTES # BLD: 6 % (ref 3–12)
MONOCYTES # BLD: 8 % (ref 1–7)
MONOCYTES # BLD: 8 % (ref 3–12)
MORPHOLOGY: NORMAL
MORPHOLOGY: NORMAL
MUCUS: ABNORMAL
MUCUS: ABNORMAL
MUCUS: NORMAL
MYCOPLASMA PNEUMONIAE IGM: 0.21
MYCOPLASMA PNEUMONIAE PCR: NOT DETECTED
NEGATIVE BASE EXCESS, ART: 0.1 MMOL/L (ref 0–2)
NEGATIVE BASE EXCESS, ART: ABNORMAL (ref 0–2)
NEGATIVE BASE EXCESS, ART: ABNORMAL (ref 0–2)
NEGATIVE BASE EXCESS, ART: ABNORMAL MMOL/L (ref 0–2)
NEGATIVE BASE EXCESS, ART: ABNORMAL MMOL/L (ref 0–2)
NEGATIVE BASE EXCESS, VEN: 1.7 MMOL/L (ref 0–2)
NEGATIVE BASE EXCESS, VEN: ABNORMAL MMOL/L (ref 0–2)
NITRITE, URINE: NEGATIVE
NOTIFICATION TIME: ABNORMAL
NOTIFICATION: ABNORMAL
NRBC AUTOMATED: 0 PER 100 WBC
NRBC AUTOMATED: ABNORMAL PER 100 WBC
O2 DEVICE/FLOW/%: ABNORMAL
O2 SAT, ARTERIAL: 97.8 % (ref 95–98)
O2 SAT, ARTERIAL: 98.1 % (ref 95–98)
O2 SAT, ARTERIAL: 98.4 % (ref 95–98)
O2 SAT, VEN: 60.2 % (ref 60–85)
O2 SAT, VEN: 78.8 % (ref 60–85)
OPIATES, URINE: NEGATIVE
OSMOLALITY URINE: 476 MOSM/KG (ref 80–1300)
OTHER OBSERVATIONS UA: ABNORMAL
OTHER OBSERVATIONS UA: ABNORMAL
OTHER OBSERVATIONS UA: NORMAL
OXYCODONE SCREEN URINE: NEGATIVE
OXYHEMOGLOBIN: ABNORMAL % (ref 95–98)
PARAINFLUENZA 1 PCR: NOT DETECTED
PARAINFLUENZA 2 PCR: NOT DETECTED
PARAINFLUENZA 3 PCR: NOT DETECTED
PARAINFLUENZA 4 PCR: NOT DETECTED
PARTIAL THROMBOPLASTIN TIME: 118.6 SEC (ref 20.5–30.5)
PARTIAL THROMBOPLASTIN TIME: 23.2 SEC (ref 20.5–30.5)
PARTIAL THROMBOPLASTIN TIME: 42 SEC (ref 20.5–30.5)
PARTIAL THROMBOPLASTIN TIME: 44.1 SEC (ref 20.5–30.5)
PARTIAL THROMBOPLASTIN TIME: 50.5 SEC (ref 20.5–30.5)
PARTIAL THROMBOPLASTIN TIME: 54.5 SEC (ref 20.5–30.5)
PARTIAL THROMBOPLASTIN TIME: 66.6 SEC (ref 20.5–30.5)
PATIENT TEMP: 37
PATIENT TEMP: ABNORMAL
PCO2 ARTERIAL: 108 MMHG (ref 35–45)
PCO2 ARTERIAL: 40.9 MMHG (ref 35–45)
PCO2 ARTERIAL: 58.7 MMHG (ref 35–45)
PCO2, ART, TEMP ADJ: ABNORMAL (ref 35–45)
PCO2, VEN, TEMP ADJ: ABNORMAL MMHG (ref 39–55)
PCO2, VEN, TEMP ADJ: ABNORMAL MMHG (ref 39–55)
PCO2, VEN: 51.7 (ref 39–55)
PCO2, VEN: 55.5 (ref 39–55)
PDW BLD-RTO: 13.8 % (ref 11.8–14.4)
PDW BLD-RTO: 13.9 % (ref 11.8–14.4)
PDW BLD-RTO: 14.5 % (ref 11.5–14.9)
PDW BLD-RTO: 14.5 % (ref 11.5–14.9)
PDW BLD-RTO: 14.6 % (ref 11.5–14.9)
PDW BLD-RTO: 14.7 % (ref 11.5–14.9)
PDW BLD-RTO: 15.4 % (ref 11.5–14.9)
PEEP/CPAP: 10
PEEP/CPAP: 8
PEEP/CPAP: ABNORMAL
PH ARTERIAL: 7.05 (ref 7.35–7.45)
PH ARTERIAL: 7.28 (ref 7.35–7.45)
PH ARTERIAL: 7.39 (ref 7.35–7.45)
PH UA: 5 (ref 5–8)
PH UA: 5 (ref 5–8)
PH UA: 6 (ref 5–8)
PH VENOUS: 7.29 (ref 7.32–7.42)
PH VENOUS: 7.31 (ref 7.32–7.42)
PH, ART, TEMP ADJ: ABNORMAL (ref 7.35–7.45)
PH, VEN, TEMP ADJ: ABNORMAL (ref 7.32–7.42)
PH, VEN, TEMP ADJ: ABNORMAL (ref 7.32–7.42)
PHENCYCLIDINE, URINE: NEGATIVE
PHOSPHORUS: 3 MG/DL (ref 2.5–4.5)
PLATELET # BLD: 105 K/UL (ref 138–453)
PLATELET # BLD: 124 K/UL (ref 150–450)
PLATELET # BLD: 126 K/UL (ref 138–453)
PLATELET # BLD: 128 K/UL (ref 150–450)
PLATELET # BLD: 132 K/UL (ref 138–453)
PLATELET # BLD: 134 K/UL (ref 138–453)
PLATELET # BLD: 135 K/UL (ref 150–450)
PLATELET # BLD: 148 K/UL (ref 150–450)
PLATELET # BLD: 151 K/UL (ref 150–450)
PLATELET # BLD: 167 K/UL (ref 150–450)
PLATELET # BLD: 207 K/UL (ref 150–450)
PLATELET ESTIMATE: ABNORMAL
PMV BLD AUTO: 11 FL (ref 8.1–13.5)
PMV BLD AUTO: 11.3 FL (ref 8.1–13.5)
PMV BLD AUTO: 11.3 FL (ref 8.1–13.5)
PMV BLD AUTO: 11.6 FL (ref 8.1–13.5)
PMV BLD AUTO: 8.6 FL (ref 6–12)
PMV BLD AUTO: 8.7 FL (ref 6–12)
PMV BLD AUTO: 9 FL (ref 6–12)
PMV BLD AUTO: 9 FL (ref 6–12)
PMV BLD AUTO: 9.2 FL (ref 6–12)
PO2 ARTERIAL: 112 MMHG (ref 80–100)
PO2 ARTERIAL: 115 MMHG (ref 80–100)
PO2 ARTERIAL: 226 MMHG (ref 80–100)
PO2, ART, TEMP ADJ: ABNORMAL MMHG (ref 80–100)
PO2, VEN, TEMP ADJ: ABNORMAL MMHG (ref 30–50)
PO2, VEN, TEMP ADJ: ABNORMAL MMHG (ref 30–50)
PO2, VEN: 33.5 (ref 30–50)
PO2, VEN: 46.9 (ref 30–50)
POC HCO3: 26.2 MMOL/L (ref 21–28)
POC HCO3: 30.6 MMOL/L (ref 21–28)
POC O2 SATURATION: 91 % (ref 94–98)
POC O2 SATURATION: 99 % (ref 94–98)
POC PCO2 TEMP: ABNORMAL MM HG
POC PCO2 TEMP: ABNORMAL MM HG
POC PCO2: 48.2 MM HG (ref 35–48)
POC PCO2: 60 MM HG (ref 35–48)
POC PH TEMP: ABNORMAL
POC PH TEMP: ABNORMAL
POC PH: 7.32 (ref 7.35–7.45)
POC PH: 7.34 (ref 7.35–7.45)
POC PO2 TEMP: ABNORMAL MM HG
POC PO2 TEMP: ABNORMAL MM HG
POC PO2: 131.7 MM HG (ref 83–108)
POC PO2: 67.9 MM HG (ref 83–108)
POSITIVE BASE EXCESS, ART: 0 (ref 0–3)
POSITIVE BASE EXCESS, ART: 0.2 MMOL/L (ref 0–2)
POSITIVE BASE EXCESS, ART: 0.4 MMOL/L (ref 0–2)
POSITIVE BASE EXCESS, ART: 3 (ref 0–3)
POSITIVE BASE EXCESS, ART: ABNORMAL MMOL/L (ref 0–2)
POSITIVE BASE EXCESS, VEN: 2 MMOL/L (ref 0–2)
POSITIVE BASE EXCESS, VEN: ABNORMAL MMOL/L (ref 0–2)
POTASSIUM SERPL-SCNC: 4.1 MMOL/L (ref 3.7–5.3)
POTASSIUM SERPL-SCNC: 4.2 MMOL/L (ref 3.7–5.3)
POTASSIUM SERPL-SCNC: 4.3 MMOL/L (ref 3.7–5.3)
POTASSIUM SERPL-SCNC: 4.4 MMOL/L (ref 3.7–5.3)
POTASSIUM SERPL-SCNC: 4.5 MMOL/L (ref 3.7–5.3)
POTASSIUM SERPL-SCNC: 4.7 MMOL/L (ref 3.7–5.3)
POTASSIUM SERPL-SCNC: 4.8 MMOL/L (ref 3.7–5.3)
POTASSIUM SERPL-SCNC: 5 MMOL/L (ref 3.7–5.3)
POTASSIUM SERPL-SCNC: 5 MMOL/L (ref 3.7–5.3)
POTASSIUM SERPL-SCNC: 5.2 MMOL/L (ref 3.7–5.3)
POTASSIUM SERPL-SCNC: 5.2 MMOL/L (ref 3.7–5.3)
POTASSIUM SERPL-SCNC: 5.3 MMOL/L (ref 3.7–5.3)
POTASSIUM SERPL-SCNC: 5.8 MMOL/L (ref 3.7–5.3)
POTASSIUM, UR: 76.1 MMOL/L
PRO-BNP: 2612 PG/ML
PROCALCITONIN: 0.18 NG/ML
PROCALCITONIN: 0.2 NG/ML
PROCALCITONIN: 0.75 NG/ML
PROPOXYPHENE, URINE: ABNORMAL
PROTEIN UA: ABNORMAL
PROTHROMBIN TIME: 12.9 SEC (ref 11.8–14.6)
PROTHROMBIN TIME: 9.7 SEC (ref 9–12)
PSV: ABNORMAL
PT. POSITION: ABNORMAL
RBC # BLD: 3.18 M/UL (ref 4.5–5.9)
RBC # BLD: 3.21 M/UL (ref 4.5–5.9)
RBC # BLD: 3.23 M/UL (ref 4.5–5.9)
RBC # BLD: 3.32 M/UL (ref 4.5–5.9)
RBC # BLD: 3.36 M/UL (ref 4.21–5.77)
RBC # BLD: 3.46 M/UL (ref 4.5–5.9)
RBC # BLD: 3.48 M/UL (ref 4.5–5.9)
RBC # BLD: 3.54 M/UL (ref 4.21–5.77)
RBC # BLD: 3.8 M/UL (ref 4.21–5.77)
RBC # BLD: 4.28 M/UL (ref 4.21–5.77)
RBC # BLD: 4.51 M/UL (ref 4.5–5.9)
RBC # BLD: ABNORMAL 10*6/UL
RBC UA: ABNORMAL /HPF
RBC UA: ABNORMAL /HPF (ref 0–2)
RBC UA: NORMAL /HPF
RENAL EPITHELIAL, UA: ABNORMAL /HPF
RENAL EPITHELIAL, UA: ABNORMAL /HPF
RENAL EPITHELIAL, UA: NORMAL /HPF
RESP SYNCYTIAL VIRUS PCR: NOT DETECTED
RESPIRATORY RATE: 36
RESPIRATORY RATE: 42
RESPIRATORY RATE: ABNORMAL
RHINO/ENTEROVIRUS PCR: NOT DETECTED
SAMPLE SITE: ABNORMAL
SARS-COV-2, PCR: NORMAL
SARS-COV-2, RAPID: NORMAL
SARS-COV-2, RAPID: NORMAL
SARS-COV-2, RAPID: NOT DETECTED
SARS-COV-2: NORMAL
SARS-COV-2: NOT DETECTED
SARS-COV-2: NOT DETECTED
SEG NEUTROPHILS: 68 % (ref 36–65)
SEG NEUTROPHILS: 75 % (ref 36–66)
SEG NEUTROPHILS: 79 % (ref 36–65)
SEG NEUTROPHILS: 80 % (ref 36–66)
SEG NEUTROPHILS: 83 % (ref 36–66)
SEG NEUTROPHILS: 88 % (ref 36–66)
SEG NEUTROPHILS: 88 % (ref 36–66)
SEG NEUTROPHILS: 89 % (ref 36–65)
SEG NEUTROPHILS: 91 % (ref 36–66)
SEG NEUTROPHILS: 92 % (ref 36–66)
SEGMENTED NEUTROPHILS ABSOLUTE COUNT: 11.2 K/UL (ref 1.3–9.1)
SEGMENTED NEUTROPHILS ABSOLUTE COUNT: 12.73 K/UL (ref 1.5–8.1)
SEGMENTED NEUTROPHILS ABSOLUTE COUNT: 4.6 K/UL (ref 1.3–9.1)
SEGMENTED NEUTROPHILS ABSOLUTE COUNT: 4.6 K/UL (ref 1.3–9.1)
SEGMENTED NEUTROPHILS ABSOLUTE COUNT: 6.5 K/UL (ref 1.3–9.1)
SEGMENTED NEUTROPHILS ABSOLUTE COUNT: 6.99 K/UL (ref 1.5–8.1)
SEGMENTED NEUTROPHILS ABSOLUTE COUNT: 7.8 K/UL (ref 1.3–9.1)
SEGMENTED NEUTROPHILS ABSOLUTE COUNT: 8.2 K/UL (ref 1.3–9.1)
SEGMENTED NEUTROPHILS ABSOLUTE COUNT: 8.34 K/UL (ref 1.5–8.1)
SEGMENTED NEUTROPHILS ABSOLUTE COUNT: 8.92 K/UL (ref 1.3–9.1)
SET RATE: ABNORMAL
SODIUM BLD-SCNC: 132 MMOL/L (ref 135–144)
SODIUM BLD-SCNC: 133 MMOL/L (ref 135–144)
SODIUM BLD-SCNC: 136 MMOL/L (ref 135–144)
SODIUM BLD-SCNC: 137 MMOL/L (ref 135–144)
SODIUM BLD-SCNC: 138 MMOL/L (ref 135–144)
SODIUM BLD-SCNC: 139 MMOL/L (ref 135–144)
SODIUM BLD-SCNC: 140 MMOL/L (ref 135–144)
SODIUM BLD-SCNC: 141 MMOL/L (ref 135–144)
SODIUM BLD-SCNC: 142 MMOL/L (ref 135–144)
SODIUM BLD-SCNC: 142 MMOL/L (ref 135–144)
SODIUM,UR: 24 MMOL/L
SODIUM,UR: 30 MMOL/L
SOURCE: NORMAL
SPECIFIC GRAVITY UA: 1.02 (ref 1–1.03)
SPECIMEN DESCRIPTION: ABNORMAL
SPECIMEN DESCRIPTION: NORMAL
STREP PNEUMONIAE ANTIGEN: NEGATIVE
TCO2 (CALC), ART: 28 MMOL/L (ref 22–29)
TCO2 (CALC), ART: 32 MMOL/L (ref 22–29)
TEST INFORMATION: ABNORMAL
TEXT FOR RESPIRATORY: ABNORMAL
TOTAL CK: 254 U/L (ref 39–308)
TOTAL HB: ABNORMAL G/DL (ref 12–16)
TOTAL PROTEIN: 6.3 G/DL (ref 6.4–8.3)
TOTAL PROTEIN: 6.5 G/DL (ref 6.4–8.3)
TOTAL PROTEIN: 6.7 G/DL (ref 6.4–8.3)
TOTAL PROTEIN: 7.6 G/DL (ref 6.4–8.3)
TOTAL RATE: 36
TOTAL RATE: 42
TOTAL RATE: ABNORMAL
TRICHOMONAS: ABNORMAL
TRICHOMONAS: ABNORMAL
TRICHOMONAS: NORMAL
TRICYCLIC ANTIDEPRESSANTS, UR: ABNORMAL
TRIGL SERPL-MCNC: 126 MG/DL
TROPONIN INTERP: ABNORMAL
TROPONIN T: ABNORMAL NG/ML
TROPONIN, HIGH SENSITIVITY: 38 NG/L (ref 0–22)
TROPONIN, HIGH SENSITIVITY: 52 NG/L (ref 0–22)
TROPONIN, HIGH SENSITIVITY: 86 NG/L (ref 0–22)
TROPONIN, HIGH SENSITIVITY: 93 NG/L (ref 0–22)
TROPONIN, HIGH SENSITIVITY: 96 NG/L (ref 0–22)
TURBIDITY: ABNORMAL
TURBIDITY: CLEAR
TURBIDITY: CLEAR
URINE HGB: ABNORMAL
URINE HGB: ABNORMAL
URINE HGB: NEGATIVE
UROBILINOGEN, URINE: NORMAL
VT: 508
VT: ABNORMAL
WBC # BLD: 10.2 K/UL (ref 3.5–11.3)
WBC # BLD: 10.6 K/UL (ref 3.5–11.3)
WBC # BLD: 14 K/UL (ref 3.5–11)
WBC # BLD: 14.3 K/UL (ref 3.5–11.3)
WBC # BLD: 5.2 K/UL (ref 3.5–11)
WBC # BLD: 6.2 K/UL (ref 3.5–11)
WBC # BLD: 7.4 K/UL (ref 3.5–11)
WBC # BLD: 8.6 K/UL (ref 3.5–11)
WBC # BLD: 9.7 K/UL (ref 3.5–11)
WBC # BLD: 9.8 K/UL (ref 3.5–11.3)
WBC # BLD: 9.9 K/UL (ref 3.5–11)
WBC # BLD: ABNORMAL 10*3/UL
WBC UA: ABNORMAL /HPF
WBC UA: ABNORMAL /HPF (ref 0–5)
WBC UA: NORMAL /HPF
YEAST: ABNORMAL
YEAST: ABNORMAL
YEAST: NORMAL

## 2020-01-01 PROCEDURE — 51702 INSERT TEMP BLADDER CATH: CPT

## 2020-01-01 PROCEDURE — 6370000000 HC RX 637 (ALT 250 FOR IP): Performed by: STUDENT IN AN ORGANIZED HEALTH CARE EDUCATION/TRAINING PROGRAM

## 2020-01-01 PROCEDURE — 99232 SBSQ HOSP IP/OBS MODERATE 35: CPT | Performed by: INTERNAL MEDICINE

## 2020-01-01 PROCEDURE — 99292 CRITICAL CARE ADDL 30 MIN: CPT

## 2020-01-01 PROCEDURE — 82947 ASSAY GLUCOSE BLOOD QUANT: CPT

## 2020-01-01 PROCEDURE — 85025 COMPLETE CBC W/AUTO DIFF WBC: CPT

## 2020-01-01 PROCEDURE — 6360000002 HC RX W HCPCS: Performed by: STUDENT IN AN ORGANIZED HEALTH CARE EDUCATION/TRAINING PROGRAM

## 2020-01-01 PROCEDURE — 80048 BASIC METABOLIC PNL TOTAL CA: CPT

## 2020-01-01 PROCEDURE — 51798 US URINE CAPACITY MEASURE: CPT

## 2020-01-01 PROCEDURE — 2500000003 HC RX 250 WO HCPCS: Performed by: STUDENT IN AN ORGANIZED HEALTH CARE EDUCATION/TRAINING PROGRAM

## 2020-01-01 PROCEDURE — 2580000003 HC RX 258: Performed by: STUDENT IN AN ORGANIZED HEALTH CARE EDUCATION/TRAINING PROGRAM

## 2020-01-01 PROCEDURE — 6360000002 HC RX W HCPCS: Performed by: INTERNAL MEDICINE

## 2020-01-01 PROCEDURE — 6370000000 HC RX 637 (ALT 250 FOR IP): Performed by: NURSE PRACTITIONER

## 2020-01-01 PROCEDURE — 6370000000 HC RX 637 (ALT 250 FOR IP): Performed by: INTERNAL MEDICINE

## 2020-01-01 PROCEDURE — 94003 VENT MGMT INPAT SUBQ DAY: CPT

## 2020-01-01 PROCEDURE — 84300 ASSAY OF URINE SODIUM: CPT

## 2020-01-01 PROCEDURE — 99291 CRITICAL CARE FIRST HOUR: CPT | Performed by: INTERNAL MEDICINE

## 2020-01-01 PROCEDURE — 83615 LACTATE (LD) (LDH) ENZYME: CPT

## 2020-01-01 PROCEDURE — 94681 O2 UPTK CO2 OUTP % O2 XTRC: CPT

## 2020-01-01 PROCEDURE — 36415 COLL VENOUS BLD VENIPUNCTURE: CPT

## 2020-01-01 PROCEDURE — 80053 COMPREHEN METABOLIC PANEL: CPT

## 2020-01-01 PROCEDURE — 37799 UNLISTED PX VASCULAR SURGERY: CPT

## 2020-01-01 PROCEDURE — 87040 BLOOD CULTURE FOR BACTERIA: CPT

## 2020-01-01 PROCEDURE — 71045 X-RAY EXAM CHEST 1 VIEW: CPT

## 2020-01-01 PROCEDURE — 99233 SBSQ HOSP IP/OBS HIGH 50: CPT | Performed by: INTERNAL MEDICINE

## 2020-01-01 PROCEDURE — 85730 THROMBOPLASTIN TIME PARTIAL: CPT

## 2020-01-01 PROCEDURE — 94761 N-INVAS EAR/PLS OXIMETRY MLT: CPT

## 2020-01-01 PROCEDURE — 94640 AIRWAY INHALATION TREATMENT: CPT

## 2020-01-01 PROCEDURE — 6360000002 HC RX W HCPCS: Performed by: EMERGENCY MEDICINE

## 2020-01-01 PROCEDURE — 6370000000 HC RX 637 (ALT 250 FOR IP): Performed by: EMERGENCY MEDICINE

## 2020-01-01 PROCEDURE — 86140 C-REACTIVE PROTEIN: CPT

## 2020-01-01 PROCEDURE — 2580000003 HC RX 258: Performed by: INTERNAL MEDICINE

## 2020-01-01 PROCEDURE — 87899 AGENT NOS ASSAY W/OPTIC: CPT

## 2020-01-01 PROCEDURE — 2060000000 HC ICU INTERMEDIATE R&B

## 2020-01-01 PROCEDURE — 82728 ASSAY OF FERRITIN: CPT

## 2020-01-01 PROCEDURE — 86738 MYCOPLASMA ANTIBODY: CPT

## 2020-01-01 PROCEDURE — 2700000000 HC OXYGEN THERAPY PER DAY

## 2020-01-01 PROCEDURE — 96365 THER/PROPH/DIAG IV INF INIT: CPT

## 2020-01-01 PROCEDURE — 87205 SMEAR GRAM STAIN: CPT

## 2020-01-01 PROCEDURE — 93005 ELECTROCARDIOGRAM TRACING: CPT | Performed by: STUDENT IN AN ORGANIZED HEALTH CARE EDUCATION/TRAINING PROGRAM

## 2020-01-01 PROCEDURE — 83935 ASSAY OF URINE OSMOLALITY: CPT

## 2020-01-01 PROCEDURE — 83735 ASSAY OF MAGNESIUM: CPT

## 2020-01-01 PROCEDURE — 94660 CPAP INITIATION&MGMT: CPT

## 2020-01-01 PROCEDURE — 5A1945Z RESPIRATORY VENTILATION, 24-96 CONSECUTIVE HOURS: ICD-10-PCS | Performed by: INTERNAL MEDICINE

## 2020-01-01 PROCEDURE — 6360000002 HC RX W HCPCS: Performed by: NURSE PRACTITIONER

## 2020-01-01 PROCEDURE — 85610 PROTHROMBIN TIME: CPT

## 2020-01-01 PROCEDURE — 97116 GAIT TRAINING THERAPY: CPT

## 2020-01-01 PROCEDURE — 99222 1ST HOSP IP/OBS MODERATE 55: CPT | Performed by: INTERNAL MEDICINE

## 2020-01-01 PROCEDURE — 83605 ASSAY OF LACTIC ACID: CPT

## 2020-01-01 PROCEDURE — 84145 PROCALCITONIN (PCT): CPT

## 2020-01-01 PROCEDURE — 84132 ASSAY OF SERUM POTASSIUM: CPT

## 2020-01-01 PROCEDURE — 82330 ASSAY OF CALCIUM: CPT

## 2020-01-01 PROCEDURE — 82436 ASSAY OF URINE CHLORIDE: CPT

## 2020-01-01 PROCEDURE — 87077 CULTURE AEROBIC IDENTIFY: CPT

## 2020-01-01 PROCEDURE — 97165 OT EVAL LOW COMPLEX 30 MIN: CPT

## 2020-01-01 PROCEDURE — 36600 WITHDRAWAL OF ARTERIAL BLOOD: CPT

## 2020-01-01 PROCEDURE — 76937 US GUIDE VASCULAR ACCESS: CPT

## 2020-01-01 PROCEDURE — 82800 BLOOD PH: CPT

## 2020-01-01 PROCEDURE — 96372 THER/PROPH/DIAG INJ SC/IM: CPT

## 2020-01-01 PROCEDURE — 6360000002 HC RX W HCPCS

## 2020-01-01 PROCEDURE — 81001 URINALYSIS AUTO W/SCOPE: CPT

## 2020-01-01 PROCEDURE — 82805 BLOOD GASES W/O2 SATURATION: CPT

## 2020-01-01 PROCEDURE — 87186 SC STD MICRODIL/AGAR DIL: CPT

## 2020-01-01 PROCEDURE — 31500 INSERT EMERGENCY AIRWAY: CPT | Performed by: NURSE ANESTHETIST, CERTIFIED REGISTERED

## 2020-01-01 PROCEDURE — 2000000000 HC ICU R&B

## 2020-01-01 PROCEDURE — 36556 INSERT NON-TUNNEL CV CATH: CPT | Performed by: SURGERY

## 2020-01-01 PROCEDURE — 80076 HEPATIC FUNCTION PANEL: CPT

## 2020-01-01 PROCEDURE — 99214 OFFICE O/P EST MOD 30 MIN: CPT | Performed by: UROLOGY

## 2020-01-01 PROCEDURE — 94664 DEMO&/EVAL PT USE INHALER: CPT

## 2020-01-01 PROCEDURE — 97535 SELF CARE MNGMENT TRAINING: CPT

## 2020-01-01 PROCEDURE — 94762 N-INVAS EAR/PLS OXIMTRY CONT: CPT

## 2020-01-01 PROCEDURE — 84484 ASSAY OF TROPONIN QUANT: CPT

## 2020-01-01 PROCEDURE — 2580000003 HC RX 258: Performed by: EMERGENCY MEDICINE

## 2020-01-01 PROCEDURE — 94770 HC ETCO2 MONITOR DAILY: CPT

## 2020-01-01 PROCEDURE — 51701 INSERT BLADDER CATHETER: CPT

## 2020-01-01 PROCEDURE — 51798 US URINE CAPACITY MEASURE: CPT | Performed by: UROLOGY

## 2020-01-01 PROCEDURE — 96375 TX/PRO/DX INJ NEW DRUG ADDON: CPT

## 2020-01-01 PROCEDURE — 72100 X-RAY EXAM L-S SPINE 2/3 VWS: CPT

## 2020-01-01 PROCEDURE — 82803 BLOOD GASES ANY COMBINATION: CPT

## 2020-01-01 PROCEDURE — 97162 PT EVAL MOD COMPLEX 30 MIN: CPT

## 2020-01-01 PROCEDURE — 87252 VIRUS INOCULATION TISSUE: CPT

## 2020-01-01 PROCEDURE — 99285 EMERGENCY DEPT VISIT HI MDM: CPT

## 2020-01-01 PROCEDURE — 82533 TOTAL CORTISOL: CPT

## 2020-01-01 PROCEDURE — 83880 ASSAY OF NATRIURETIC PEPTIDE: CPT

## 2020-01-01 PROCEDURE — 94002 VENT MGMT INPAT INIT DAY: CPT

## 2020-01-01 PROCEDURE — 36620 INSERTION CATHETER ARTERY: CPT | Performed by: NURSE ANESTHETIST, CERTIFIED REGISTERED

## 2020-01-01 PROCEDURE — 93010 ELECTROCARDIOGRAM REPORT: CPT | Performed by: INTERNAL MEDICINE

## 2020-01-01 PROCEDURE — 97166 OT EVAL MOD COMPLEX 45 MIN: CPT

## 2020-01-01 PROCEDURE — 99291 CRITICAL CARE FIRST HOUR: CPT

## 2020-01-01 PROCEDURE — 82570 ASSAY OF URINE CREATININE: CPT

## 2020-01-01 PROCEDURE — B548ZZA ULTRASONOGRAPHY OF SUPERIOR VENA CAVA, GUIDANCE: ICD-10-PCS | Performed by: SURGERY

## 2020-01-01 PROCEDURE — 93005 ELECTROCARDIOGRAM TRACING: CPT | Performed by: EMERGENCY MEDICINE

## 2020-01-01 PROCEDURE — 82550 ASSAY OF CK (CPK): CPT

## 2020-01-01 PROCEDURE — 99223 1ST HOSP IP/OBS HIGH 75: CPT | Performed by: INTERNAL MEDICINE

## 2020-01-01 PROCEDURE — 85027 COMPLETE CBC AUTOMATED: CPT

## 2020-01-01 PROCEDURE — 02HV33Z INSERTION OF INFUSION DEVICE INTO SUPERIOR VENA CAVA, PERCUTANEOUS APPROACH: ICD-10-PCS | Performed by: SURGERY

## 2020-01-01 PROCEDURE — 87086 URINE CULTURE/COLONY COUNT: CPT

## 2020-01-01 PROCEDURE — 71250 CT THORAX DX C-: CPT

## 2020-01-01 PROCEDURE — 93306 TTE W/DOPPLER COMPLETE: CPT

## 2020-01-01 PROCEDURE — 73501 X-RAY EXAM HIP UNI 1 VIEW: CPT

## 2020-01-01 PROCEDURE — 84133 ASSAY OF URINE POTASSIUM: CPT

## 2020-01-01 PROCEDURE — 2500000003 HC RX 250 WO HCPCS

## 2020-01-01 PROCEDURE — 87070 CULTURE OTHR SPECIMN AEROBIC: CPT

## 2020-01-01 PROCEDURE — 84100 ASSAY OF PHOSPHORUS: CPT

## 2020-01-01 PROCEDURE — 99232 SBSQ HOSP IP/OBS MODERATE 35: CPT | Performed by: NURSE PRACTITIONER

## 2020-01-01 PROCEDURE — U0002 COVID-19 LAB TEST NON-CDC: HCPCS

## 2020-01-01 PROCEDURE — 80307 DRUG TEST PRSMV CHEM ANLYZR: CPT

## 2020-01-01 PROCEDURE — 87449 NOS EACH ORGANISM AG IA: CPT

## 2020-01-01 PROCEDURE — 83690 ASSAY OF LIPASE: CPT

## 2020-01-01 PROCEDURE — 97530 THERAPEUTIC ACTIVITIES: CPT

## 2020-01-01 PROCEDURE — 0100U HC RESPIRPTHGN MULT REV TRANS & AMP PRB TECH 21 TRGT: CPT

## 2020-01-01 PROCEDURE — 31500 INSERT EMERGENCY AIRWAY: CPT

## 2020-01-01 PROCEDURE — 93005 ELECTROCARDIOGRAM TRACING: CPT | Performed by: INTERNAL MEDICINE

## 2020-01-01 PROCEDURE — 71046 X-RAY EXAM CHEST 2 VIEWS: CPT

## 2020-01-01 PROCEDURE — 85379 FIBRIN DEGRADATION QUANT: CPT

## 2020-01-01 PROCEDURE — 84478 ASSAY OF TRIGLYCERIDES: CPT

## 2020-01-01 PROCEDURE — 6370000000 HC RX 637 (ALT 250 FOR IP): Performed by: SPECIALIST

## 2020-01-01 RX ORDER — IPRATROPIUM BROMIDE AND ALBUTEROL SULFATE 2.5; .5 MG/3ML; MG/3ML
1 SOLUTION RESPIRATORY (INHALATION)
Status: DISCONTINUED | OUTPATIENT
Start: 2020-01-01 | End: 2020-01-01

## 2020-01-01 RX ORDER — INSULIN GLARGINE 100 [IU]/ML
24 INJECTION, SOLUTION SUBCUTANEOUS NIGHTLY
Status: DISCONTINUED | OUTPATIENT
Start: 2020-01-01 | End: 2020-01-01 | Stop reason: HOSPADM

## 2020-01-01 RX ORDER — LEVOFLOXACIN 5 MG/ML
500 INJECTION, SOLUTION INTRAVENOUS ONCE
Status: COMPLETED | OUTPATIENT
Start: 2020-01-01 | End: 2020-01-01

## 2020-01-01 RX ORDER — HEPARIN SODIUM 10000 [USP'U]/100ML
12 INJECTION, SOLUTION INTRAVENOUS CONTINUOUS
Status: DISCONTINUED | OUTPATIENT
Start: 2020-01-01 | End: 2020-01-01

## 2020-01-01 RX ORDER — BUMETANIDE 1 MG/1
1 TABLET ORAL DAILY
Status: DISCONTINUED | OUTPATIENT
Start: 2020-01-01 | End: 2020-01-01 | Stop reason: HOSPADM

## 2020-01-01 RX ORDER — PRIMIDONE 50 MG/1
50 TABLET ORAL NIGHTLY
Status: DISCONTINUED | OUTPATIENT
Start: 2020-01-01 | End: 2020-01-01 | Stop reason: HOSPADM

## 2020-01-01 RX ORDER — FUROSEMIDE 10 MG/ML
40 INJECTION INTRAMUSCULAR; INTRAVENOUS 2 TIMES DAILY
Status: DISCONTINUED | OUTPATIENT
Start: 2020-01-01 | End: 2020-01-01

## 2020-01-01 RX ORDER — ASPIRIN 81 MG/1
81 TABLET, CHEWABLE ORAL DAILY
Status: DISCONTINUED | OUTPATIENT
Start: 2020-01-01 | End: 2020-01-01 | Stop reason: HOSPADM

## 2020-01-01 RX ORDER — FENTANYL CITRATE 50 UG/ML
100 INJECTION, SOLUTION INTRAMUSCULAR; INTRAVENOUS ONCE
Status: COMPLETED | OUTPATIENT
Start: 2020-01-01 | End: 2020-01-01

## 2020-01-01 RX ORDER — LEVOFLOXACIN 5 MG/ML
250 INJECTION, SOLUTION INTRAVENOUS EVERY 24 HOURS
Status: DISCONTINUED | OUTPATIENT
Start: 2020-01-01 | End: 2020-01-01

## 2020-01-01 RX ORDER — SODIUM CHLORIDE 0.9 % (FLUSH) 0.9 %
10 SYRINGE (ML) INJECTION PRN
Status: DISCONTINUED | OUTPATIENT
Start: 2020-01-01 | End: 2020-01-01 | Stop reason: HOSPADM

## 2020-01-01 RX ORDER — INSULIN GLARGINE 100 [IU]/ML
50 INJECTION, SOLUTION SUBCUTANEOUS DAILY
Status: DISCONTINUED | OUTPATIENT
Start: 2020-01-01 | End: 2020-01-01

## 2020-01-01 RX ORDER — HYDRALAZINE HYDROCHLORIDE 25 MG/1
25 TABLET, FILM COATED ORAL EVERY 8 HOURS SCHEDULED
Status: DISCONTINUED | OUTPATIENT
Start: 2020-01-01 | End: 2020-01-01 | Stop reason: HOSPADM

## 2020-01-01 RX ORDER — NICOTINE POLACRILEX 4 MG
15 LOZENGE BUCCAL PRN
Status: DISCONTINUED | OUTPATIENT
Start: 2020-01-01 | End: 2020-01-01 | Stop reason: HOSPADM

## 2020-01-01 RX ORDER — FUROSEMIDE 10 MG/ML
40 INJECTION INTRAMUSCULAR; INTRAVENOUS DAILY
Status: DISCONTINUED | OUTPATIENT
Start: 2020-01-01 | End: 2020-01-01

## 2020-01-01 RX ORDER — ACETAMINOPHEN 325 MG/1
650 TABLET ORAL EVERY 6 HOURS PRN
Status: DISCONTINUED | OUTPATIENT
Start: 2020-01-01 | End: 2020-01-01 | Stop reason: HOSPADM

## 2020-01-01 RX ORDER — HYDRALAZINE HYDROCHLORIDE 25 MG/1
25 TABLET, FILM COATED ORAL EVERY 8 HOURS SCHEDULED
Status: DISCONTINUED | OUTPATIENT
Start: 2020-01-01 | End: 2020-01-01

## 2020-01-01 RX ORDER — CLOPIDOGREL BISULFATE 75 MG/1
75 TABLET ORAL DAILY
Status: DISCONTINUED | OUTPATIENT
Start: 2020-01-01 | End: 2020-01-01 | Stop reason: HOSPADM

## 2020-01-01 RX ORDER — CIPROFLOXACIN 500 MG/1
500 TABLET, FILM COATED ORAL
Qty: 5 TABLET | Refills: 0 | Status: SHIPPED | OUTPATIENT
Start: 2020-01-01 | End: 2020-01-01

## 2020-01-01 RX ORDER — GUAIFENESIN 600 MG/1
1200 TABLET, EXTENDED RELEASE ORAL 2 TIMES DAILY
Status: DISCONTINUED | OUTPATIENT
Start: 2020-01-01 | End: 2020-01-01 | Stop reason: HOSPADM

## 2020-01-01 RX ORDER — ATORVASTATIN CALCIUM 10 MG/1
10 TABLET, FILM COATED ORAL DAILY
Status: DISCONTINUED | OUTPATIENT
Start: 2020-01-01 | End: 2020-01-01 | Stop reason: HOSPADM

## 2020-01-01 RX ORDER — LORAZEPAM 2 MG/ML
0.5 INJECTION INTRAMUSCULAR ONCE
Status: COMPLETED | OUTPATIENT
Start: 2020-01-01 | End: 2020-01-01

## 2020-01-01 RX ORDER — MAGNESIUM SULFATE 1 G/100ML
1 INJECTION INTRAVENOUS PRN
Status: DISCONTINUED | OUTPATIENT
Start: 2020-01-01 | End: 2020-01-01 | Stop reason: HOSPADM

## 2020-01-01 RX ORDER — QUETIAPINE FUMARATE 25 MG/1
50 TABLET, FILM COATED ORAL NIGHTLY PRN
Status: DISCONTINUED | OUTPATIENT
Start: 2020-01-01 | End: 2020-01-01

## 2020-01-01 RX ORDER — FUROSEMIDE 10 MG/ML
40 INJECTION INTRAMUSCULAR; INTRAVENOUS ONCE
Status: COMPLETED | OUTPATIENT
Start: 2020-01-01 | End: 2020-01-01

## 2020-01-01 RX ORDER — PREDNISONE 20 MG/1
40 TABLET ORAL DAILY
Status: DISCONTINUED | OUTPATIENT
Start: 2020-01-01 | End: 2020-01-01

## 2020-01-01 RX ORDER — POTASSIUM CHLORIDE 20 MEQ/1
40 TABLET, EXTENDED RELEASE ORAL PRN
Status: DISCONTINUED | OUTPATIENT
Start: 2020-01-01 | End: 2020-01-01 | Stop reason: HOSPADM

## 2020-01-01 RX ORDER — INSULIN GLARGINE 100 [IU]/ML
40 INJECTION, SOLUTION SUBCUTANEOUS DAILY
Status: DISCONTINUED | OUTPATIENT
Start: 2020-01-01 | End: 2020-01-01

## 2020-01-01 RX ORDER — GABAPENTIN 400 MG/1
400 CAPSULE ORAL 2 TIMES DAILY
Status: DISCONTINUED | OUTPATIENT
Start: 2020-01-01 | End: 2020-01-01

## 2020-01-01 RX ORDER — INSULIN GLARGINE 100 [IU]/ML
45 INJECTION, SOLUTION SUBCUTANEOUS DAILY
Status: DISCONTINUED | OUTPATIENT
Start: 2020-01-01 | End: 2020-01-01

## 2020-01-01 RX ORDER — SODIUM POLYSTYRENE SULFONATE 4.1 MEQ/G
30 POWDER, FOR SUSPENSION ORAL; RECTAL ONCE
Status: DISCONTINUED | OUTPATIENT
Start: 2020-01-01 | End: 2020-01-01

## 2020-01-01 RX ORDER — PREDNISONE 20 MG/1
20 TABLET ORAL DAILY
Qty: 10 TABLET | Refills: 0 | Status: SHIPPED | OUTPATIENT
Start: 2020-01-01 | End: 2020-01-01

## 2020-01-01 RX ORDER — ALBUTEROL SULFATE 2.5 MG/3ML
2.5 SOLUTION RESPIRATORY (INHALATION) 2 TIMES DAILY
Status: DISCONTINUED | OUTPATIENT
Start: 2020-01-01 | End: 2020-01-01 | Stop reason: HOSPADM

## 2020-01-01 RX ORDER — DEXTROSE MONOHYDRATE 50 MG/ML
100 INJECTION, SOLUTION INTRAVENOUS PRN
Status: DISCONTINUED | OUTPATIENT
Start: 2020-01-01 | End: 2020-01-01 | Stop reason: HOSPADM

## 2020-01-01 RX ORDER — PROMETHAZINE HYDROCHLORIDE 25 MG/1
12.5 TABLET ORAL EVERY 6 HOURS PRN
Status: DISCONTINUED | OUTPATIENT
Start: 2020-01-01 | End: 2020-01-01 | Stop reason: HOSPADM

## 2020-01-01 RX ORDER — METHYLPREDNISOLONE SODIUM SUCCINATE 40 MG/ML
40 INJECTION, POWDER, LYOPHILIZED, FOR SOLUTION INTRAMUSCULAR; INTRAVENOUS EVERY 12 HOURS
Status: COMPLETED | OUTPATIENT
Start: 2020-01-01 | End: 2020-01-01

## 2020-01-01 RX ORDER — GABAPENTIN 400 MG/1
400 CAPSULE ORAL DAILY
Status: DISCONTINUED | OUTPATIENT
Start: 2020-01-01 | End: 2020-01-01 | Stop reason: HOSPADM

## 2020-01-01 RX ORDER — FENTANYL CITRATE 50 UG/ML
INJECTION, SOLUTION INTRAMUSCULAR; INTRAVENOUS
Status: COMPLETED
Start: 2020-01-01 | End: 2020-01-01

## 2020-01-01 RX ORDER — CARVEDILOL 6.25 MG/1
6.25 TABLET ORAL 2 TIMES DAILY WITH MEALS
Status: DISCONTINUED | OUTPATIENT
Start: 2020-01-01 | End: 2020-01-01

## 2020-01-01 RX ORDER — BUSPIRONE HYDROCHLORIDE 5 MG/1
5 TABLET ORAL 2 TIMES DAILY
Status: DISCONTINUED | OUTPATIENT
Start: 2020-01-01 | End: 2020-01-01 | Stop reason: HOSPADM

## 2020-01-01 RX ORDER — FENTANYL CITRATE 50 UG/ML
50 INJECTION, SOLUTION INTRAMUSCULAR; INTRAVENOUS
Status: DISCONTINUED | OUTPATIENT
Start: 2020-01-01 | End: 2020-01-01 | Stop reason: HOSPADM

## 2020-01-01 RX ORDER — IPRATROPIUM BROMIDE AND ALBUTEROL SULFATE 2.5; .5 MG/3ML; MG/3ML
1 SOLUTION RESPIRATORY (INHALATION) EVERY 4 HOURS
Status: DISCONTINUED | OUTPATIENT
Start: 2020-01-01 | End: 2020-01-01

## 2020-01-01 RX ORDER — POTASSIUM CHLORIDE 29.8 MG/ML
20 INJECTION INTRAVENOUS PRN
Status: DISCONTINUED | OUTPATIENT
Start: 2020-01-01 | End: 2020-01-01 | Stop reason: HOSPADM

## 2020-01-01 RX ORDER — FUROSEMIDE 10 MG/ML
20 INJECTION INTRAMUSCULAR; INTRAVENOUS ONCE
Status: COMPLETED | OUTPATIENT
Start: 2020-01-01 | End: 2020-01-01

## 2020-01-01 RX ORDER — NITROGLYCERIN 20 MG/100ML
5 INJECTION INTRAVENOUS CONTINUOUS
Status: DISCONTINUED | OUTPATIENT
Start: 2020-01-01 | End: 2020-01-01

## 2020-01-01 RX ORDER — POTASSIUM CHLORIDE 29.8 MG/ML
20 INJECTION INTRAVENOUS PRN
Status: DISCONTINUED | OUTPATIENT
Start: 2020-01-01 | End: 2020-01-01

## 2020-01-01 RX ORDER — DEXTROSE MONOHYDRATE 25 G/50ML
12.5 INJECTION, SOLUTION INTRAVENOUS PRN
Status: DISCONTINUED | OUTPATIENT
Start: 2020-01-01 | End: 2020-01-01 | Stop reason: HOSPADM

## 2020-01-01 RX ORDER — ISOSORBIDE MONONITRATE 30 MG/1
30 TABLET, EXTENDED RELEASE ORAL DAILY
Status: DISCONTINUED | OUTPATIENT
Start: 2020-01-01 | End: 2020-01-01 | Stop reason: HOSPADM

## 2020-01-01 RX ORDER — BUSPIRONE HYDROCHLORIDE 5 MG/1
5 TABLET ORAL 3 TIMES DAILY
Status: DISCONTINUED | OUTPATIENT
Start: 2020-01-01 | End: 2020-01-01 | Stop reason: HOSPADM

## 2020-01-01 RX ORDER — METHYLPREDNISOLONE SODIUM SUCCINATE 40 MG/ML
40 INJECTION, POWDER, LYOPHILIZED, FOR SOLUTION INTRAMUSCULAR; INTRAVENOUS EVERY 8 HOURS
Status: DISCONTINUED | OUTPATIENT
Start: 2020-01-01 | End: 2020-01-01

## 2020-01-01 RX ORDER — ECHINACEA PURPUREA EXTRACT 125 MG
1 TABLET ORAL PRN
Status: DISCONTINUED | OUTPATIENT
Start: 2020-01-01 | End: 2020-01-01 | Stop reason: HOSPADM

## 2020-01-01 RX ORDER — IPRATROPIUM BROMIDE AND ALBUTEROL SULFATE 2.5; .5 MG/3ML; MG/3ML
1 SOLUTION RESPIRATORY (INHALATION) 4 TIMES DAILY
Status: DISCONTINUED | OUTPATIENT
Start: 2020-01-01 | End: 2020-01-01 | Stop reason: HOSPADM

## 2020-01-01 RX ORDER — INSULIN GLARGINE 100 [IU]/ML
25 INJECTION, SOLUTION SUBCUTANEOUS DAILY
Status: DISCONTINUED | OUTPATIENT
Start: 2020-01-01 | End: 2020-01-01 | Stop reason: HOSPADM

## 2020-01-01 RX ORDER — HEPARIN SODIUM 1000 [USP'U]/ML
2000 INJECTION, SOLUTION INTRAVENOUS; SUBCUTANEOUS PRN
Status: DISCONTINUED | OUTPATIENT
Start: 2020-01-01 | End: 2020-01-01

## 2020-01-01 RX ORDER — HEPARIN SODIUM 1000 [USP'U]/ML
4000 INJECTION, SOLUTION INTRAVENOUS; SUBCUTANEOUS ONCE
Status: COMPLETED | OUTPATIENT
Start: 2020-01-01 | End: 2020-01-01

## 2020-01-01 RX ORDER — POTASSIUM CHLORIDE 7.45 MG/ML
10 INJECTION INTRAVENOUS PRN
Status: DISCONTINUED | OUTPATIENT
Start: 2020-01-01 | End: 2020-01-01 | Stop reason: HOSPADM

## 2020-01-01 RX ORDER — FUROSEMIDE 40 MG/1
40 TABLET ORAL DAILY
Status: DISCONTINUED | OUTPATIENT
Start: 2020-01-01 | End: 2020-01-01

## 2020-01-01 RX ORDER — SODIUM CHLORIDE 9 MG/ML
INJECTION, SOLUTION INTRAVENOUS CONTINUOUS
Status: DISCONTINUED | OUTPATIENT
Start: 2020-01-01 | End: 2020-01-01 | Stop reason: HOSPADM

## 2020-01-01 RX ORDER — ASPIRIN 81 MG/1
81 TABLET ORAL DAILY
Status: DISCONTINUED | OUTPATIENT
Start: 2020-01-01 | End: 2020-01-01

## 2020-01-01 RX ORDER — LEVOFLOXACIN 500 MG/1
250 TABLET, FILM COATED ORAL DAILY
Status: DISCONTINUED | OUTPATIENT
Start: 2020-01-01 | End: 2020-01-01 | Stop reason: HOSPADM

## 2020-01-01 RX ORDER — MINERAL OIL AND WHITE PETROLATUM 150; 830 MG/G; MG/G
OINTMENT OPHTHALMIC EVERY 4 HOURS
Status: DISCONTINUED | OUTPATIENT
Start: 2020-01-01 | End: 2020-01-01

## 2020-01-01 RX ORDER — HEPARIN SODIUM 1000 [USP'U]/ML
60 INJECTION, SOLUTION INTRAVENOUS; SUBCUTANEOUS ONCE
Status: DISCONTINUED | OUTPATIENT
Start: 2020-01-01 | End: 2020-01-01

## 2020-01-01 RX ORDER — QUETIAPINE FUMARATE 25 MG/1
25 TABLET, FILM COATED ORAL 2 TIMES DAILY
Status: DISCONTINUED | OUTPATIENT
Start: 2020-01-01 | End: 2020-01-01

## 2020-01-01 RX ORDER — CHLORHEXIDINE GLUCONATE 0.12 MG/ML
15 RINSE ORAL 2 TIMES DAILY
Status: DISCONTINUED | OUTPATIENT
Start: 2020-01-01 | End: 2020-01-01

## 2020-01-01 RX ORDER — PREDNISONE 20 MG/1
20 TABLET ORAL DAILY
Status: DISCONTINUED | OUTPATIENT
Start: 2020-01-01 | End: 2020-01-01 | Stop reason: HOSPADM

## 2020-01-01 RX ORDER — ACETAMINOPHEN 325 MG/1
650 TABLET ORAL EVERY 4 HOURS PRN
Status: DISCONTINUED | OUTPATIENT
Start: 2020-01-01 | End: 2020-01-01 | Stop reason: HOSPADM

## 2020-01-01 RX ORDER — ALBUTEROL SULFATE 2.5 MG/3ML
2.5 SOLUTION RESPIRATORY (INHALATION)
Status: DISCONTINUED | OUTPATIENT
Start: 2020-01-01 | End: 2020-01-01 | Stop reason: HOSPADM

## 2020-01-01 RX ORDER — CIPROFLOXACIN 500 MG/1
500 TABLET, FILM COATED ORAL
Status: DISCONTINUED | OUTPATIENT
Start: 2020-01-01 | End: 2020-01-01 | Stop reason: HOSPADM

## 2020-01-01 RX ORDER — ROCURONIUM BROMIDE 10 MG/ML
INJECTION, SOLUTION INTRAVENOUS
Status: COMPLETED
Start: 2020-01-01 | End: 2020-01-01

## 2020-01-01 RX ORDER — MIDAZOLAM HYDROCHLORIDE 1 MG/ML
2 INJECTION INTRAMUSCULAR; INTRAVENOUS ONCE
Status: COMPLETED | OUTPATIENT
Start: 2020-01-01 | End: 2020-01-01

## 2020-01-01 RX ORDER — SODIUM CHLORIDE 0.9 % (FLUSH) 0.9 %
10 SYRINGE (ML) INJECTION EVERY 12 HOURS SCHEDULED
Status: DISCONTINUED | OUTPATIENT
Start: 2020-01-01 | End: 2020-01-01 | Stop reason: HOSPADM

## 2020-01-01 RX ORDER — HEPARIN SODIUM 5000 [USP'U]/ML
5000 INJECTION, SOLUTION INTRAVENOUS; SUBCUTANEOUS EVERY 8 HOURS SCHEDULED
Status: DISCONTINUED | OUTPATIENT
Start: 2020-01-01 | End: 2020-01-01 | Stop reason: HOSPADM

## 2020-01-01 RX ORDER — GLIPIZIDE 5 MG/1
10 TABLET ORAL
Status: DISCONTINUED | OUTPATIENT
Start: 2020-01-01 | End: 2020-01-01 | Stop reason: HOSPADM

## 2020-01-01 RX ORDER — PROPOFOL 10 MG/ML
10 INJECTION, EMULSION INTRAVENOUS CONTINUOUS
Status: DISCONTINUED | OUTPATIENT
Start: 2020-01-01 | End: 2020-01-01

## 2020-01-01 RX ORDER — POLYETHYLENE GLYCOL 3350 17 G/17G
17 POWDER, FOR SOLUTION ORAL DAILY PRN
Status: DISCONTINUED | OUTPATIENT
Start: 2020-01-01 | End: 2020-01-01 | Stop reason: HOSPADM

## 2020-01-01 RX ORDER — IPRATROPIUM BROMIDE AND ALBUTEROL SULFATE 2.5; .5 MG/3ML; MG/3ML
1 SOLUTION RESPIRATORY (INHALATION) 4 TIMES DAILY
Status: DISCONTINUED | OUTPATIENT
Start: 2020-01-01 | End: 2020-01-01

## 2020-01-01 RX ORDER — LABETALOL HYDROCHLORIDE 5 MG/ML
10 INJECTION, SOLUTION INTRAVENOUS EVERY 4 HOURS PRN
Status: DISCONTINUED | OUTPATIENT
Start: 2020-01-01 | End: 2020-01-01 | Stop reason: HOSPADM

## 2020-01-01 RX ORDER — PANTOPRAZOLE SODIUM 40 MG/1
40 TABLET, DELAYED RELEASE ORAL
Status: DISCONTINUED | OUTPATIENT
Start: 2020-01-01 | End: 2020-01-01

## 2020-01-01 RX ORDER — FAMOTIDINE 20 MG/1
20 TABLET, FILM COATED ORAL DAILY
Status: DISCONTINUED | OUTPATIENT
Start: 2020-01-01 | End: 2020-01-01 | Stop reason: HOSPADM

## 2020-01-01 RX ORDER — BUMETANIDE 1 MG/1
1 TABLET ORAL DAILY
Qty: 30 TABLET | Refills: 3 | Status: CANCELLED | OUTPATIENT
Start: 2020-01-01

## 2020-01-01 RX ORDER — FENTANYL CITRATE 50 UG/ML
25 INJECTION, SOLUTION INTRAMUSCULAR; INTRAVENOUS
Status: DISCONTINUED | OUTPATIENT
Start: 2020-01-01 | End: 2020-01-01 | Stop reason: HOSPADM

## 2020-01-01 RX ORDER — ACETAMINOPHEN 650 MG/1
650 SUPPOSITORY RECTAL EVERY 6 HOURS PRN
Status: DISCONTINUED | OUTPATIENT
Start: 2020-01-01 | End: 2020-01-01 | Stop reason: HOSPADM

## 2020-01-01 RX ORDER — CARVEDILOL 12.5 MG/1
12.5 TABLET ORAL 2 TIMES DAILY WITH MEALS
Qty: 60 TABLET | Refills: 3 | Status: SHIPPED | OUTPATIENT
Start: 2020-01-01

## 2020-01-01 RX ORDER — ETOMIDATE 2 MG/ML
INJECTION INTRAVENOUS
Status: COMPLETED
Start: 2020-01-01 | End: 2020-01-01

## 2020-01-01 RX ORDER — GABAPENTIN 250 MG/5ML
400 SOLUTION ORAL 2 TIMES DAILY
Status: DISCONTINUED | OUTPATIENT
Start: 2020-01-01 | End: 2020-01-01

## 2020-01-01 RX ORDER — TAMSULOSIN HYDROCHLORIDE 0.4 MG/1
0.4 CAPSULE ORAL DAILY
Status: DISCONTINUED | OUTPATIENT
Start: 2020-01-01 | End: 2020-01-01

## 2020-01-01 RX ORDER — GABAPENTIN 400 MG/1
400 CAPSULE ORAL 2 TIMES DAILY
Status: DISCONTINUED | OUTPATIENT
Start: 2020-01-01 | End: 2020-01-01 | Stop reason: HOSPADM

## 2020-01-01 RX ORDER — FUROSEMIDE 10 MG/ML
20 INJECTION INTRAMUSCULAR; INTRAVENOUS DAILY
Status: DISCONTINUED | OUTPATIENT
Start: 2020-01-01 | End: 2020-01-01

## 2020-01-01 RX ORDER — PANTOPRAZOLE SODIUM 40 MG/1
40 TABLET, DELAYED RELEASE ORAL
Status: DISCONTINUED | OUTPATIENT
Start: 2020-01-01 | End: 2020-01-01 | Stop reason: HOSPADM

## 2020-01-01 RX ORDER — CARVEDILOL 6.25 MG/1
6.25 TABLET ORAL ONCE
Status: COMPLETED | OUTPATIENT
Start: 2020-01-01 | End: 2020-01-01

## 2020-01-01 RX ORDER — ONDANSETRON 2 MG/ML
4 INJECTION INTRAMUSCULAR; INTRAVENOUS EVERY 6 HOURS PRN
Status: DISCONTINUED | OUTPATIENT
Start: 2020-01-01 | End: 2020-01-01 | Stop reason: HOSPADM

## 2020-01-01 RX ORDER — SUCCINYLCHOLINE CHLORIDE 20 MG/ML
INJECTION INTRAMUSCULAR; INTRAVENOUS
Status: DISCONTINUED
Start: 2020-01-01 | End: 2020-01-01 | Stop reason: WASHOUT

## 2020-01-01 RX ORDER — LEVOFLOXACIN 5 MG/ML
500 INJECTION, SOLUTION INTRAVENOUS EVERY 24 HOURS
Status: DISCONTINUED | OUTPATIENT
Start: 2020-01-01 | End: 2020-01-01

## 2020-01-01 RX ORDER — METHYLPREDNISOLONE SODIUM SUCCINATE 40 MG/ML
20 INJECTION, POWDER, LYOPHILIZED, FOR SOLUTION INTRAMUSCULAR; INTRAVENOUS DAILY
Status: COMPLETED | OUTPATIENT
Start: 2020-01-01 | End: 2020-01-01

## 2020-01-01 RX ORDER — IPRATROPIUM BROMIDE AND ALBUTEROL SULFATE 2.5; .5 MG/3ML; MG/3ML
1 SOLUTION RESPIRATORY (INHALATION) EVERY 4 HOURS
Status: DISCONTINUED | OUTPATIENT
Start: 2020-01-01 | End: 2020-01-01 | Stop reason: HOSPADM

## 2020-01-01 RX ORDER — INSULIN GLARGINE 100 [IU]/ML
5 INJECTION, SOLUTION SUBCUTANEOUS ONCE
Status: COMPLETED | OUTPATIENT
Start: 2020-01-01 | End: 2020-01-01

## 2020-01-01 RX ORDER — FAMOTIDINE 20 MG/1
20 TABLET, FILM COATED ORAL 2 TIMES DAILY
Status: DISCONTINUED | OUTPATIENT
Start: 2020-01-01 | End: 2020-01-01

## 2020-01-01 RX ORDER — QUETIAPINE FUMARATE 25 MG/1
50 TABLET, FILM COATED ORAL EVERY 24 HOURS
Status: DISCONTINUED | OUTPATIENT
Start: 2020-01-01 | End: 2020-01-01

## 2020-01-01 RX ORDER — HEPARIN SODIUM 5000 [USP'U]/ML
5000 INJECTION, SOLUTION INTRAVENOUS; SUBCUTANEOUS EVERY 8 HOURS SCHEDULED
Status: DISCONTINUED | OUTPATIENT
Start: 2020-01-01 | End: 2020-01-01

## 2020-01-01 RX ORDER — POTASSIUM CHLORIDE 7.45 MG/ML
10 INJECTION INTRAVENOUS PRN
Status: DISCONTINUED | OUTPATIENT
Start: 2020-01-01 | End: 2020-01-01

## 2020-01-01 RX ORDER — HYDRALAZINE HYDROCHLORIDE 25 MG/1
25 TABLET, FILM COATED ORAL EVERY 8 HOURS SCHEDULED
Qty: 90 TABLET | Refills: 3 | Status: SHIPPED | OUTPATIENT
Start: 2020-01-01

## 2020-01-01 RX ORDER — ACETAMINOPHEN 325 MG/1
650 TABLET ORAL EVERY 4 HOURS PRN
Status: DISCONTINUED | OUTPATIENT
Start: 2020-01-01 | End: 2020-01-01 | Stop reason: SDUPTHER

## 2020-01-01 RX ORDER — CIPROFLOXACIN 500 MG/1
500 TABLET, FILM COATED ORAL DAILY
Qty: 5 TABLET | Refills: 0 | Status: SHIPPED | OUTPATIENT
Start: 2020-01-01 | End: 2020-01-01

## 2020-01-01 RX ORDER — PROPOFOL 10 MG/ML
INJECTION, EMULSION INTRAVENOUS
Status: COMPLETED
Start: 2020-01-01 | End: 2020-01-01

## 2020-01-01 RX ORDER — POLYVINYL ALCOHOL 14 MG/ML
1 SOLUTION/ DROPS OPHTHALMIC EVERY 4 HOURS
Status: DISCONTINUED | OUTPATIENT
Start: 2020-01-01 | End: 2020-01-01

## 2020-01-01 RX ORDER — CARVEDILOL 12.5 MG/1
12.5 TABLET ORAL 2 TIMES DAILY WITH MEALS
Status: DISCONTINUED | OUTPATIENT
Start: 2020-01-01 | End: 2020-01-01 | Stop reason: HOSPADM

## 2020-01-01 RX ORDER — PROPOFOL 10 MG/ML
INJECTION, EMULSION INTRAVENOUS
Status: DISCONTINUED
Start: 2020-01-01 | End: 2020-01-01 | Stop reason: HOSPADM

## 2020-01-01 RX ORDER — QUETIAPINE FUMARATE 25 MG/1
25 TABLET, FILM COATED ORAL 2 TIMES DAILY
Status: DISCONTINUED | OUTPATIENT
Start: 2020-01-01 | End: 2020-01-01 | Stop reason: HOSPADM

## 2020-01-01 RX ORDER — QUETIAPINE FUMARATE 25 MG/1
25 TABLET, FILM COATED ORAL NIGHTLY PRN
Status: DISCONTINUED | OUTPATIENT
Start: 2020-01-01 | End: 2020-01-01

## 2020-01-01 RX ORDER — INSULIN GLARGINE 100 [IU]/ML
20 INJECTION, SOLUTION SUBCUTANEOUS DAILY
Status: DISCONTINUED | OUTPATIENT
Start: 2020-01-01 | End: 2020-01-01

## 2020-01-01 RX ORDER — AZITHROMYCIN 250 MG/1
500 TABLET, FILM COATED ORAL DAILY
Status: DISCONTINUED | OUTPATIENT
Start: 2020-01-01 | End: 2020-01-01

## 2020-01-01 RX ORDER — FUROSEMIDE 40 MG/1
40 TABLET ORAL 2 TIMES DAILY
Status: DISCONTINUED | OUTPATIENT
Start: 2020-01-01 | End: 2020-01-01

## 2020-01-01 RX ORDER — METHYLPREDNISOLONE SODIUM SUCCINATE 125 MG/2ML
125 INJECTION, POWDER, LYOPHILIZED, FOR SOLUTION INTRAMUSCULAR; INTRAVENOUS ONCE
Status: COMPLETED | OUTPATIENT
Start: 2020-01-01 | End: 2020-01-01

## 2020-01-01 RX ORDER — HEPARIN SODIUM 1000 [USP'U]/ML
4000 INJECTION, SOLUTION INTRAVENOUS; SUBCUTANEOUS PRN
Status: DISCONTINUED | OUTPATIENT
Start: 2020-01-01 | End: 2020-01-01

## 2020-01-01 RX ORDER — ALOGLIPTIN 12.5 MG/1
12.5 TABLET, FILM COATED ORAL DAILY
Status: DISCONTINUED | OUTPATIENT
Start: 2020-01-01 | End: 2020-01-01 | Stop reason: HOSPADM

## 2020-01-01 RX ORDER — QUETIAPINE FUMARATE 50 MG/1
50 TABLET, FILM COATED ORAL 2 TIMES DAILY
Status: DISCONTINUED | OUTPATIENT
Start: 2020-01-01 | End: 2020-01-01 | Stop reason: HOSPADM

## 2020-01-01 RX ORDER — PROPOFOL 10 MG/ML
10 INJECTION, EMULSION INTRAVENOUS
Status: DISCONTINUED | OUTPATIENT
Start: 2020-01-01 | End: 2020-01-01 | Stop reason: HOSPADM

## 2020-01-01 RX ORDER — METOPROLOL SUCCINATE 25 MG/1
25 TABLET, EXTENDED RELEASE ORAL DAILY
Status: DISCONTINUED | OUTPATIENT
Start: 2020-01-01 | End: 2020-01-01

## 2020-01-01 RX ORDER — BUSPIRONE HYDROCHLORIDE 5 MG/1
5 TABLET ORAL 3 TIMES DAILY
Status: DISCONTINUED | OUTPATIENT
Start: 2020-01-01 | End: 2020-01-01

## 2020-01-01 RX ORDER — TAMSULOSIN HYDROCHLORIDE 0.4 MG/1
0.8 CAPSULE ORAL DAILY
Status: DISCONTINUED | OUTPATIENT
Start: 2020-01-01 | End: 2020-01-01 | Stop reason: HOSPADM

## 2020-01-01 RX ADMIN — CEFEPIME 2 G: 2 INJECTION, POWDER, FOR SOLUTION INTRAVENOUS at 10:48

## 2020-01-01 RX ADMIN — GABAPENTIN 400 MG: 400 CAPSULE ORAL at 09:14

## 2020-01-01 RX ADMIN — CARVEDILOL 12.5 MG: 12.5 TABLET, FILM COATED ORAL at 16:43

## 2020-01-01 RX ADMIN — INSULIN LISPRO 2 UNITS: 100 INJECTION, SOLUTION INTRAVENOUS; SUBCUTANEOUS at 16:53

## 2020-01-01 RX ADMIN — IPRATROPIUM BROMIDE AND ALBUTEROL SULFATE 1 AMPULE: 2.5; .5 SOLUTION RESPIRATORY (INHALATION) at 23:34

## 2020-01-01 RX ADMIN — GABAPENTIN 400 MG: 250 SOLUTION ORAL at 12:51

## 2020-01-01 RX ADMIN — FENTANYL CITRATE 50 MCG: 50 INJECTION, SOLUTION INTRAMUSCULAR; INTRAVENOUS at 07:04

## 2020-01-01 RX ADMIN — GUAIFENESIN 1200 MG: 600 TABLET, EXTENDED RELEASE ORAL at 22:31

## 2020-01-01 RX ADMIN — FAMOTIDINE 20 MG: 10 INJECTION INTRAVENOUS at 08:31

## 2020-01-01 RX ADMIN — IPRATROPIUM BROMIDE AND ALBUTEROL SULFATE 1 AMPULE: .5; 3 SOLUTION RESPIRATORY (INHALATION) at 19:43

## 2020-01-01 RX ADMIN — METHYLPREDNISOLONE SODIUM SUCCINATE 40 MG: 40 INJECTION, POWDER, LYOPHILIZED, FOR SOLUTION INTRAMUSCULAR; INTRAVENOUS at 05:40

## 2020-01-01 RX ADMIN — PROPOFOL 50 MCG/KG/MIN: 10 INJECTION, EMULSION INTRAVENOUS at 10:59

## 2020-01-01 RX ADMIN — HEPARIN SODIUM 16 UNITS/KG/HR: 10000 INJECTION, SOLUTION INTRAVENOUS at 22:08

## 2020-01-01 RX ADMIN — GABAPENTIN 400 MG: 400 CAPSULE ORAL at 07:56

## 2020-01-01 RX ADMIN — HEPARIN SODIUM 12 UNITS/KG/HR: 10000 INJECTION, SOLUTION INTRAVENOUS at 12:45

## 2020-01-01 RX ADMIN — IPRATROPIUM BROMIDE AND ALBUTEROL SULFATE 1 AMPULE: 2.5; .5 SOLUTION RESPIRATORY (INHALATION) at 16:12

## 2020-01-01 RX ADMIN — Medication 15 ML: at 08:49

## 2020-01-01 RX ADMIN — SACUBITRIL AND VALSARTAN 1 TABLET: 24; 26 TABLET, FILM COATED ORAL at 11:57

## 2020-01-01 RX ADMIN — HYDRALAZINE HYDROCHLORIDE 25 MG: 25 TABLET, FILM COATED ORAL at 21:50

## 2020-01-01 RX ADMIN — INSULIN LISPRO 6 UNITS: 100 INJECTION, SOLUTION INTRAVENOUS; SUBCUTANEOUS at 08:42

## 2020-01-01 RX ADMIN — Medication 25 MCG/HR: at 07:10

## 2020-01-01 RX ADMIN — BUSPIRONE HYDROCHLORIDE 5 MG: 5 TABLET ORAL at 21:50

## 2020-01-01 RX ADMIN — LEVOFLOXACIN 250 MG: 500 TABLET, FILM COATED ORAL at 07:26

## 2020-01-01 RX ADMIN — QUETIAPINE FUMARATE 50 MG: 25 TABLET ORAL at 16:41

## 2020-01-01 RX ADMIN — FUROSEMIDE 40 MG: 10 INJECTION, SOLUTION INTRAMUSCULAR; INTRAVENOUS at 08:26

## 2020-01-01 RX ADMIN — CARVEDILOL 12.5 MG: 12.5 TABLET, FILM COATED ORAL at 16:49

## 2020-01-01 RX ADMIN — GUAIFENESIN 1200 MG: 600 TABLET, EXTENDED RELEASE ORAL at 07:25

## 2020-01-01 RX ADMIN — Medication 3 MG/HR: at 14:30

## 2020-01-01 RX ADMIN — GLIPIZIDE 10 MG: 5 TABLET ORAL at 16:54

## 2020-01-01 RX ADMIN — INSULIN LISPRO 4 UNITS: 100 INJECTION, SOLUTION INTRAVENOUS; SUBCUTANEOUS at 11:52

## 2020-01-01 RX ADMIN — BUSPIRONE HYDROCHLORIDE 5 MG: 5 TABLET ORAL at 20:18

## 2020-01-01 RX ADMIN — GLIPIZIDE 10 MG: 5 TABLET ORAL at 16:53

## 2020-01-01 RX ADMIN — ALBUTEROL SULFATE 2.5 MG: 2.5 SOLUTION RESPIRATORY (INHALATION) at 03:49

## 2020-01-01 RX ADMIN — POLYVINYL ALCOHOL 1 DROP: 14 SOLUTION/ DROPS OPHTHALMIC at 04:14

## 2020-01-01 RX ADMIN — SODIUM CHLORIDE, PRESERVATIVE FREE 10 ML: 5 INJECTION INTRAVENOUS at 20:01

## 2020-01-01 RX ADMIN — PANTOPRAZOLE SODIUM 40 MG: 40 TABLET, DELAYED RELEASE ORAL at 06:15

## 2020-01-01 RX ADMIN — GLIPIZIDE 10 MG: 5 TABLET ORAL at 16:43

## 2020-01-01 RX ADMIN — INSULIN GLARGINE 40 UNITS: 100 INJECTION, SOLUTION SUBCUTANEOUS at 08:48

## 2020-01-01 RX ADMIN — INSULIN LISPRO 2 UNITS: 100 INJECTION, SOLUTION INTRAVENOUS; SUBCUTANEOUS at 07:55

## 2020-01-01 RX ADMIN — INSULIN LISPRO 9 UNITS: 100 INJECTION, SOLUTION INTRAVENOUS; SUBCUTANEOUS at 15:23

## 2020-01-01 RX ADMIN — Medication 10 MG: at 07:37

## 2020-01-01 RX ADMIN — FENTANYL CITRATE 50 MCG: 50 INJECTION, SOLUTION INTRAMUSCULAR; INTRAVENOUS at 23:20

## 2020-01-01 RX ADMIN — AZITHROMYCIN MONOHYDRATE 500 MG: 250 TABLET ORAL at 08:34

## 2020-01-01 RX ADMIN — HYDRALAZINE HYDROCHLORIDE 25 MG: 25 TABLET, FILM COATED ORAL at 06:15

## 2020-01-01 RX ADMIN — BUSPIRONE HYDROCHLORIDE 5 MG: 5 TABLET ORAL at 16:49

## 2020-01-01 RX ADMIN — FENTANYL CITRATE 50 MCG: 50 INJECTION, SOLUTION INTRAMUSCULAR; INTRAVENOUS at 13:06

## 2020-01-01 RX ADMIN — METHYLPREDNISOLONE SODIUM SUCCINATE 20 MG: 40 INJECTION, POWDER, FOR SOLUTION INTRAMUSCULAR; INTRAVENOUS at 08:35

## 2020-01-01 RX ADMIN — INSULIN LISPRO 7 UNITS: 100 INJECTION, SOLUTION INTRAVENOUS; SUBCUTANEOUS at 21:02

## 2020-01-01 RX ADMIN — Medication 10 ML: at 20:48

## 2020-01-01 RX ADMIN — FENTANYL CITRATE 50 MCG: 50 INJECTION, SOLUTION INTRAMUSCULAR; INTRAVENOUS at 21:49

## 2020-01-01 RX ADMIN — CLOPIDOGREL 75 MG: 75 TABLET, FILM COATED ORAL at 09:15

## 2020-01-01 RX ADMIN — PREDNISONE 20 MG: 20 TABLET ORAL at 08:25

## 2020-01-01 RX ADMIN — FUROSEMIDE 40 MG: 10 INJECTION, SOLUTION INTRAMUSCULAR; INTRAVENOUS at 09:16

## 2020-01-01 RX ADMIN — IPRATROPIUM BROMIDE AND ALBUTEROL SULFATE 1 AMPULE: .5; 3 SOLUTION RESPIRATORY (INHALATION) at 11:28

## 2020-01-01 RX ADMIN — CLOPIDOGREL BISULFATE 75 MG: 75 TABLET ORAL at 11:42

## 2020-01-01 RX ADMIN — INSULIN LISPRO 8 UNITS: 100 INJECTION, SOLUTION INTRAVENOUS; SUBCUTANEOUS at 11:21

## 2020-01-01 RX ADMIN — METHYLPREDNISOLONE SODIUM SUCCINATE 40 MG: 40 INJECTION, POWDER, LYOPHILIZED, FOR SOLUTION INTRAMUSCULAR; INTRAVENOUS at 20:47

## 2020-01-01 RX ADMIN — IPRATROPIUM BROMIDE AND ALBUTEROL SULFATE 1 AMPULE: 2.5; .5 SOLUTION RESPIRATORY (INHALATION) at 15:26

## 2020-01-01 RX ADMIN — FENTANYL CITRATE 50 MCG: 50 INJECTION, SOLUTION INTRAMUSCULAR; INTRAVENOUS at 04:38

## 2020-01-01 RX ADMIN — GUAIFENESIN 1200 MG: 600 TABLET, EXTENDED RELEASE ORAL at 08:24

## 2020-01-01 RX ADMIN — INSULIN LISPRO 2 UNITS: 100 INJECTION, SOLUTION INTRAVENOUS; SUBCUTANEOUS at 08:29

## 2020-01-01 RX ADMIN — GLIPIZIDE 10 MG: 5 TABLET ORAL at 14:58

## 2020-01-01 RX ADMIN — INSULIN GLARGINE 45 UNITS: 100 INJECTION, SOLUTION SUBCUTANEOUS at 07:55

## 2020-01-01 RX ADMIN — SODIUM CHLORIDE: 9 INJECTION, SOLUTION INTRAVENOUS at 07:08

## 2020-01-01 RX ADMIN — BUSPIRONE HYDROCHLORIDE 5 MG: 5 TABLET ORAL at 21:14

## 2020-01-01 RX ADMIN — METHYLPREDNISOLONE SODIUM SUCCINATE 20 MG: 40 INJECTION, POWDER, FOR SOLUTION INTRAMUSCULAR; INTRAVENOUS at 08:38

## 2020-01-01 RX ADMIN — FAMOTIDINE 20 MG: 20 TABLET, FILM COATED ORAL at 08:20

## 2020-01-01 RX ADMIN — IPRATROPIUM BROMIDE AND ALBUTEROL SULFATE 1 AMPULE: 2.5; .5 SOLUTION RESPIRATORY (INHALATION) at 10:40

## 2020-01-01 RX ADMIN — METHYLPREDNISOLONE SODIUM SUCCINATE 40 MG: 40 INJECTION, POWDER, LYOPHILIZED, FOR SOLUTION INTRAMUSCULAR; INTRAVENOUS at 20:31

## 2020-01-01 RX ADMIN — ISOSORBIDE MONONITRATE 30 MG: 30 TABLET, EXTENDED RELEASE ORAL at 09:21

## 2020-01-01 RX ADMIN — BUSPIRONE HYDROCHLORIDE 5 MG: 5 TABLET ORAL at 11:11

## 2020-01-01 RX ADMIN — GLIPIZIDE 10 MG: 5 TABLET ORAL at 05:40

## 2020-01-01 RX ADMIN — HEPARIN SODIUM 5000 UNITS: 5000 INJECTION INTRAVENOUS; SUBCUTANEOUS at 05:46

## 2020-01-01 RX ADMIN — IPRATROPIUM BROMIDE AND ALBUTEROL SULFATE 1 AMPULE: 2.5; .5 SOLUTION RESPIRATORY (INHALATION) at 19:33

## 2020-01-01 RX ADMIN — PROPOFOL 10 MCG/KG/MIN: 10 INJECTION, EMULSION INTRAVENOUS at 00:27

## 2020-01-01 RX ADMIN — SODIUM CHLORIDE, PRESERVATIVE FREE 10 ML: 5 INJECTION INTRAVENOUS at 09:05

## 2020-01-01 RX ADMIN — INSULIN GLARGINE 45 UNITS: 100 INJECTION, SOLUTION SUBCUTANEOUS at 09:15

## 2020-01-01 RX ADMIN — CEFTRIAXONE SODIUM 1 G: 1 INJECTION, POWDER, FOR SOLUTION INTRAMUSCULAR; INTRAVENOUS at 01:06

## 2020-01-01 RX ADMIN — LANSOPRAZOLE 30 MG: 15 TABLET, ORALLY DISINTEGRATING, DELAYED RELEASE ORAL at 12:48

## 2020-01-01 RX ADMIN — LEVOFLOXACIN 250 MG: 500 TABLET, FILM COATED ORAL at 11:21

## 2020-01-01 RX ADMIN — GLIPIZIDE 10 MG: 5 TABLET ORAL at 07:27

## 2020-01-01 RX ADMIN — BUSPIRONE HYDROCHLORIDE 5 MG: 5 TABLET ORAL at 11:57

## 2020-01-01 RX ADMIN — HYDRALAZINE HYDROCHLORIDE 25 MG: 25 TABLET, FILM COATED ORAL at 21:14

## 2020-01-01 RX ADMIN — MINERAL OIL AND WHITE PETROLATUM: 150; 830 OINTMENT OPHTHALMIC at 12:50

## 2020-01-01 RX ADMIN — CARVEDILOL 12.5 MG: 12.5 TABLET, FILM COATED ORAL at 16:27

## 2020-01-01 RX ADMIN — ISOSORBIDE MONONITRATE 30 MG: 30 TABLET ORAL at 07:57

## 2020-01-01 RX ADMIN — HEPARIN SODIUM 5000 UNITS: 5000 INJECTION INTRAVENOUS; SUBCUTANEOUS at 14:24

## 2020-01-01 RX ADMIN — INSULIN LISPRO 3 UNITS: 100 INJECTION, SOLUTION INTRAVENOUS; SUBCUTANEOUS at 21:14

## 2020-01-01 RX ADMIN — FUROSEMIDE 40 MG: 40 TABLET ORAL at 16:43

## 2020-01-01 RX ADMIN — QUETIAPINE FUMARATE 25 MG: 25 TABLET ORAL at 08:27

## 2020-01-01 RX ADMIN — INSULIN LISPRO 8 UNITS: 100 INJECTION, SOLUTION INTRAVENOUS; SUBCUTANEOUS at 08:48

## 2020-01-01 RX ADMIN — METHYLPREDNISOLONE SODIUM SUCCINATE 40 MG: 40 INJECTION, POWDER, LYOPHILIZED, FOR SOLUTION INTRAMUSCULAR; INTRAVENOUS at 21:21

## 2020-01-01 RX ADMIN — AZITHROMYCIN MONOHYDRATE 500 MG: 500 INJECTION, POWDER, LYOPHILIZED, FOR SOLUTION INTRAVENOUS at 02:32

## 2020-01-01 RX ADMIN — IPRATROPIUM BROMIDE AND ALBUTEROL SULFATE 1 AMPULE: 2.5; .5 SOLUTION RESPIRATORY (INHALATION) at 23:24

## 2020-01-01 RX ADMIN — HYDRALAZINE HYDROCHLORIDE 25 MG: 25 TABLET, FILM COATED ORAL at 14:34

## 2020-01-01 RX ADMIN — FUROSEMIDE 40 MG: 40 TABLET ORAL at 08:34

## 2020-01-01 RX ADMIN — IPRATROPIUM BROMIDE AND ALBUTEROL SULFATE 1 AMPULE: 2.5; .5 SOLUTION RESPIRATORY (INHALATION) at 03:52

## 2020-01-01 RX ADMIN — ENOXAPARIN SODIUM 40 MG: 40 INJECTION SUBCUTANEOUS at 08:44

## 2020-01-01 RX ADMIN — SODIUM CHLORIDE, PRESERVATIVE FREE 10 ML: 5 INJECTION INTRAVENOUS at 08:38

## 2020-01-01 RX ADMIN — QUETIAPINE FUMARATE 25 MG: 25 TABLET ORAL at 09:29

## 2020-01-01 RX ADMIN — BUSPIRONE HYDROCHLORIDE 5 MG: 5 TABLET ORAL at 20:46

## 2020-01-01 RX ADMIN — MINERAL OIL AND WHITE PETROLATUM: 150; 830 OINTMENT OPHTHALMIC at 08:33

## 2020-01-01 RX ADMIN — AZITHROMYCIN 500 MG: 500 INJECTION, POWDER, LYOPHILIZED, FOR SOLUTION INTRAVENOUS at 02:21

## 2020-01-01 RX ADMIN — LANSOPRAZOLE 30 MG: 15 TABLET, ORALLY DISINTEGRATING, DELAYED RELEASE ORAL at 06:59

## 2020-01-01 RX ADMIN — INSULIN GLARGINE 24 UNITS: 100 INJECTION, SOLUTION SUBCUTANEOUS at 21:13

## 2020-01-01 RX ADMIN — MIDAZOLAM 2 MG: 1 INJECTION INTRAMUSCULAR; INTRAVENOUS at 23:50

## 2020-01-01 RX ADMIN — METHYLPREDNISOLONE SODIUM SUCCINATE 40 MG: 40 INJECTION, POWDER, LYOPHILIZED, FOR SOLUTION INTRAMUSCULAR; INTRAVENOUS at 11:19

## 2020-01-01 RX ADMIN — PROPOFOL 50 MCG/KG/MIN: 10 INJECTION, EMULSION INTRAVENOUS at 04:40

## 2020-01-01 RX ADMIN — IPRATROPIUM BROMIDE AND ALBUTEROL SULFATE 1 AMPULE: .5; 3 SOLUTION RESPIRATORY (INHALATION) at 11:44

## 2020-01-01 RX ADMIN — ALBUTEROL SULFATE 2.5 MG: 2.5 SOLUTION RESPIRATORY (INHALATION) at 03:25

## 2020-01-01 RX ADMIN — BUSPIRONE HYDROCHLORIDE 5 MG: 5 TABLET ORAL at 20:00

## 2020-01-01 RX ADMIN — SODIUM CHLORIDE, PRESERVATIVE FREE 10 ML: 5 INJECTION INTRAVENOUS at 08:43

## 2020-01-01 RX ADMIN — FAMOTIDINE 20 MG: 10 INJECTION INTRAVENOUS at 20:18

## 2020-01-01 RX ADMIN — FUROSEMIDE 40 MG: 10 INJECTION, SOLUTION INTRAMUSCULAR; INTRAVENOUS at 19:24

## 2020-01-01 RX ADMIN — IPRATROPIUM BROMIDE AND ALBUTEROL SULFATE 1 AMPULE: .5; 3 SOLUTION RESPIRATORY (INHALATION) at 12:16

## 2020-01-01 RX ADMIN — ASPIRIN 81 MG: 81 TABLET, CHEWABLE ORAL at 08:41

## 2020-01-01 RX ADMIN — BUSPIRONE HYDROCHLORIDE 5 MG: 5 TABLET ORAL at 14:04

## 2020-01-01 RX ADMIN — BUSPIRONE HYDROCHLORIDE 5 MG: 5 TABLET ORAL at 09:21

## 2020-01-01 RX ADMIN — Medication 15 ML: at 08:44

## 2020-01-01 RX ADMIN — PREDNISONE 20 MG: 20 TABLET ORAL at 07:27

## 2020-01-01 RX ADMIN — INSULIN GLARGINE 5 UNITS: 100 INJECTION, SOLUTION SUBCUTANEOUS at 11:35

## 2020-01-01 RX ADMIN — BUSPIRONE HYDROCHLORIDE 5 MG: 5 TABLET ORAL at 14:10

## 2020-01-01 RX ADMIN — METHYLPREDNISOLONE SODIUM SUCCINATE 40 MG: 40 INJECTION, POWDER, LYOPHILIZED, FOR SOLUTION INTRAMUSCULAR; INTRAVENOUS at 03:29

## 2020-01-01 RX ADMIN — INSULIN LISPRO 3 UNITS: 100 INJECTION, SOLUTION INTRAVENOUS; SUBCUTANEOUS at 20:52

## 2020-01-01 RX ADMIN — HYDRALAZINE HYDROCHLORIDE 25 MG: 25 TABLET, FILM COATED ORAL at 20:00

## 2020-01-01 RX ADMIN — FUROSEMIDE 20 MG: 10 INJECTION, SOLUTION INTRAMUSCULAR; INTRAVENOUS at 11:08

## 2020-01-01 RX ADMIN — TAMSULOSIN HYDROCHLORIDE 0.8 MG: 0.4 CAPSULE ORAL at 09:21

## 2020-01-01 RX ADMIN — CLOPIDOGREL 75 MG: 75 TABLET, FILM COATED ORAL at 10:06

## 2020-01-01 RX ADMIN — HYDRALAZINE HYDROCHLORIDE 25 MG: 25 TABLET, FILM COATED ORAL at 05:12

## 2020-01-01 RX ADMIN — BUSPIRONE HYDROCHLORIDE 5 MG: 5 TABLET ORAL at 20:30

## 2020-01-01 RX ADMIN — METHYLPREDNISOLONE SODIUM SUCCINATE 40 MG: 40 INJECTION, POWDER, FOR SOLUTION INTRAMUSCULAR; INTRAVENOUS at 16:53

## 2020-01-01 RX ADMIN — Medication 10 ML: at 07:31

## 2020-01-01 RX ADMIN — POLYVINYL ALCOHOL 1 DROP: 14 SOLUTION/ DROPS OPHTHALMIC at 08:43

## 2020-01-01 RX ADMIN — SALINE NASAL SPRAY 1 SPRAY: 1.5 SOLUTION NASAL at 13:21

## 2020-01-01 RX ADMIN — ALOGLIPTIN 12.5 MG: 12.5 TABLET, FILM COATED ORAL at 08:34

## 2020-01-01 RX ADMIN — IPRATROPIUM BROMIDE AND ALBUTEROL SULFATE 1 AMPULE: .5; 3 SOLUTION RESPIRATORY (INHALATION) at 09:02

## 2020-01-01 RX ADMIN — PRIMIDONE 50 MG: 50 TABLET ORAL at 23:10

## 2020-01-01 RX ADMIN — HYDRALAZINE HYDROCHLORIDE 25 MG: 25 TABLET, FILM COATED ORAL at 16:49

## 2020-01-01 RX ADMIN — SODIUM CHLORIDE, PRESERVATIVE FREE 10 ML: 5 INJECTION INTRAVENOUS at 22:30

## 2020-01-01 RX ADMIN — Medication 10 ML: at 07:37

## 2020-01-01 RX ADMIN — INSULIN LISPRO 6 UNITS: 100 INJECTION, SOLUTION INTRAVENOUS; SUBCUTANEOUS at 17:51

## 2020-01-01 RX ADMIN — QUETIAPINE FUMARATE 25 MG: 25 TABLET ORAL at 21:26

## 2020-01-01 RX ADMIN — BUMETANIDE 1 MG: 1 TABLET ORAL at 07:25

## 2020-01-01 RX ADMIN — SACUBITRIL AND VALSARTAN 1 TABLET: 24; 26 TABLET, FILM COATED ORAL at 21:55

## 2020-01-01 RX ADMIN — METOPROLOL TARTRATE 12.5 MG: 25 TABLET ORAL at 22:30

## 2020-01-01 RX ADMIN — QUETIAPINE FUMARATE 50 MG: 50 TABLET ORAL at 21:25

## 2020-01-01 RX ADMIN — HEPARIN SODIUM 5000 UNITS: 5000 INJECTION INTRAVENOUS; SUBCUTANEOUS at 07:10

## 2020-01-01 RX ADMIN — PRIMIDONE 50 MG: 50 TABLET ORAL at 20:50

## 2020-01-01 RX ADMIN — IPRATROPIUM BROMIDE AND ALBUTEROL SULFATE 1 AMPULE: 2.5; .5 SOLUTION RESPIRATORY (INHALATION) at 07:00

## 2020-01-01 RX ADMIN — IPRATROPIUM BROMIDE AND ALBUTEROL SULFATE 1 AMPULE: .5; 3 SOLUTION RESPIRATORY (INHALATION) at 21:25

## 2020-01-01 RX ADMIN — NITROGLYCERIN 20 MCG/MIN: 20 INJECTION INTRAVENOUS at 12:15

## 2020-01-01 RX ADMIN — Medication 5 MG/HR: at 04:38

## 2020-01-01 RX ADMIN — HYDRALAZINE HYDROCHLORIDE 25 MG: 25 TABLET, FILM COATED ORAL at 14:24

## 2020-01-01 RX ADMIN — CARVEDILOL 6.25 MG: 6.25 TABLET, FILM COATED ORAL at 09:47

## 2020-01-01 RX ADMIN — HYDRALAZINE HYDROCHLORIDE 25 MG: 25 TABLET, FILM COATED ORAL at 05:17

## 2020-01-01 RX ADMIN — INSULIN LISPRO 3 UNITS: 100 INJECTION, SOLUTION INTRAVENOUS; SUBCUTANEOUS at 20:10

## 2020-01-01 RX ADMIN — QUETIAPINE FUMARATE 25 MG: 25 TABLET ORAL at 08:19

## 2020-01-01 RX ADMIN — INSULIN GLARGINE 40 UNITS: 100 INJECTION, SOLUTION SUBCUTANEOUS at 08:37

## 2020-01-01 RX ADMIN — ALBUTEROL SULFATE 2.5 MG: 2.5 SOLUTION RESPIRATORY (INHALATION) at 03:55

## 2020-01-01 RX ADMIN — GLIPIZIDE 10 MG: 5 TABLET ORAL at 06:15

## 2020-01-01 RX ADMIN — BUSPIRONE HYDROCHLORIDE 5 MG: 5 TABLET ORAL at 12:25

## 2020-01-01 RX ADMIN — IPRATROPIUM BROMIDE AND ALBUTEROL SULFATE 1 AMPULE: .5; 3 SOLUTION RESPIRATORY (INHALATION) at 08:10

## 2020-01-01 RX ADMIN — FUROSEMIDE 40 MG: 10 INJECTION, SOLUTION INTRAMUSCULAR; INTRAVENOUS at 17:15

## 2020-01-01 RX ADMIN — HEPARIN SODIUM 5000 UNITS: 5000 INJECTION INTRAVENOUS; SUBCUTANEOUS at 17:19

## 2020-01-01 RX ADMIN — IPRATROPIUM BROMIDE AND ALBUTEROL SULFATE 1 AMPULE: 2.5; .5 SOLUTION RESPIRATORY (INHALATION) at 04:01

## 2020-01-01 RX ADMIN — GUAIFENESIN 1200 MG: 600 TABLET, EXTENDED RELEASE ORAL at 11:57

## 2020-01-01 RX ADMIN — IPRATROPIUM BROMIDE AND ALBUTEROL SULFATE 1 AMPULE: .5; 3 SOLUTION RESPIRATORY (INHALATION) at 07:47

## 2020-01-01 RX ADMIN — BUSPIRONE HYDROCHLORIDE 5 MG: 5 TABLET ORAL at 21:25

## 2020-01-01 RX ADMIN — FENTANYL CITRATE 50 MCG: 50 INJECTION, SOLUTION INTRAMUSCULAR; INTRAVENOUS at 00:46

## 2020-01-01 RX ADMIN — METHYLPREDNISOLONE SODIUM SUCCINATE 40 MG: 40 INJECTION, POWDER, LYOPHILIZED, FOR SOLUTION INTRAMUSCULAR; INTRAVENOUS at 11:46

## 2020-01-01 RX ADMIN — QUETIAPINE FUMARATE 25 MG: 50 TABLET ORAL at 08:41

## 2020-01-01 RX ADMIN — SODIUM CHLORIDE, PRESERVATIVE FREE 10 ML: 5 INJECTION INTRAVENOUS at 20:18

## 2020-01-01 RX ADMIN — DOCUSATE SODIUM 100 MG: 50 LIQUID ORAL at 08:32

## 2020-01-01 RX ADMIN — IPRATROPIUM BROMIDE AND ALBUTEROL SULFATE 1 AMPULE: 2.5; .5 SOLUTION RESPIRATORY (INHALATION) at 03:06

## 2020-01-01 RX ADMIN — GABAPENTIN 400 MG: 250 SOLUTION ORAL at 22:30

## 2020-01-01 RX ADMIN — Medication 10 ML: at 21:25

## 2020-01-01 RX ADMIN — INSULIN LISPRO 18 UNITS: 100 INJECTION, SOLUTION INTRAVENOUS; SUBCUTANEOUS at 05:36

## 2020-01-01 RX ADMIN — FAMOTIDINE 20 MG: 10 INJECTION INTRAVENOUS at 20:13

## 2020-01-01 RX ADMIN — INSULIN GLARGINE 45 UNITS: 100 INJECTION, SOLUTION SUBCUTANEOUS at 09:16

## 2020-01-01 RX ADMIN — ENOXAPARIN SODIUM 40 MG: 40 INJECTION SUBCUTANEOUS at 08:24

## 2020-01-01 RX ADMIN — QUETIAPINE FUMARATE 25 MG: 25 TABLET ORAL at 20:15

## 2020-01-01 RX ADMIN — TAMSULOSIN HYDROCHLORIDE 0.4 MG: 0.4 CAPSULE ORAL at 08:35

## 2020-01-01 RX ADMIN — IPRATROPIUM BROMIDE AND ALBUTEROL SULFATE 1 AMPULE: .5; 3 SOLUTION RESPIRATORY (INHALATION) at 16:28

## 2020-01-01 RX ADMIN — ATORVASTATIN CALCIUM 10 MG: 10 TABLET, FILM COATED ORAL at 09:21

## 2020-01-01 RX ADMIN — GABAPENTIN 400 MG: 400 CAPSULE ORAL at 21:50

## 2020-01-01 RX ADMIN — ATORVASTATIN CALCIUM 10 MG: 10 TABLET, FILM COATED ORAL at 07:25

## 2020-01-01 RX ADMIN — CEFEPIME 2 G: 2 INJECTION, POWDER, FOR SOLUTION INTRAVENOUS at 11:39

## 2020-01-01 RX ADMIN — Medication 81 MG: at 08:32

## 2020-01-01 RX ADMIN — IPRATROPIUM BROMIDE AND ALBUTEROL SULFATE 1 AMPULE: 2.5; .5 SOLUTION RESPIRATORY (INHALATION) at 11:11

## 2020-01-01 RX ADMIN — PROPOFOL 50 MCG/KG/MIN: 10 INJECTION, EMULSION INTRAVENOUS at 07:17

## 2020-01-01 RX ADMIN — SODIUM CHLORIDE, PRESERVATIVE FREE 10 ML: 5 INJECTION INTRAVENOUS at 09:13

## 2020-01-01 RX ADMIN — QUETIAPINE FUMARATE 50 MG: 50 TABLET ORAL at 09:21

## 2020-01-01 RX ADMIN — BUSPIRONE HYDROCHLORIDE 5 MG: 5 TABLET ORAL at 14:34

## 2020-01-01 RX ADMIN — IPRATROPIUM BROMIDE AND ALBUTEROL SULFATE 1 AMPULE: .5; 3 SOLUTION RESPIRATORY (INHALATION) at 15:14

## 2020-01-01 RX ADMIN — QUETIAPINE FUMARATE 25 MG: 25 TABLET ORAL at 07:56

## 2020-01-01 RX ADMIN — DOCUSATE SODIUM 100 MG: 50 LIQUID ORAL at 08:40

## 2020-01-01 RX ADMIN — INSULIN LISPRO 2 UNITS: 100 INJECTION, SOLUTION INTRAVENOUS; SUBCUTANEOUS at 20:43

## 2020-01-01 RX ADMIN — HEPARIN SODIUM 5000 UNITS: 5000 INJECTION INTRAVENOUS; SUBCUTANEOUS at 05:17

## 2020-01-01 RX ADMIN — IPRATROPIUM BROMIDE AND ALBUTEROL SULFATE 1 AMPULE: .5; 3 SOLUTION RESPIRATORY (INHALATION) at 12:06

## 2020-01-01 RX ADMIN — IPRATROPIUM BROMIDE AND ALBUTEROL SULFATE 1 AMPULE: 2.5; .5 SOLUTION RESPIRATORY (INHALATION) at 14:45

## 2020-01-01 RX ADMIN — GABAPENTIN 400 MG: 400 CAPSULE ORAL at 09:22

## 2020-01-01 RX ADMIN — INSULIN LISPRO 18 UNITS: 100 INJECTION, SOLUTION INTRAVENOUS; SUBCUTANEOUS at 17:04

## 2020-01-01 RX ADMIN — IPRATROPIUM BROMIDE AND ALBUTEROL SULFATE 1 AMPULE: .5; 3 SOLUTION RESPIRATORY (INHALATION) at 19:49

## 2020-01-01 RX ADMIN — FAMOTIDINE 20 MG: 10 INJECTION INTRAVENOUS at 08:53

## 2020-01-01 RX ADMIN — PANTOPRAZOLE SODIUM 40 MG: 40 TABLET, DELAYED RELEASE ORAL at 05:40

## 2020-01-01 RX ADMIN — CARVEDILOL 12.5 MG: 12.5 TABLET, FILM COATED ORAL at 09:21

## 2020-01-01 RX ADMIN — GLIPIZIDE 10 MG: 5 TABLET ORAL at 16:49

## 2020-01-01 RX ADMIN — FAMOTIDINE 20 MG: 20 TABLET, FILM COATED ORAL at 07:57

## 2020-01-01 RX ADMIN — GABAPENTIN 400 MG: 250 SOLUTION ORAL at 08:32

## 2020-01-01 RX ADMIN — ISOSORBIDE MONONITRATE 30 MG: 30 TABLET, EXTENDED RELEASE ORAL at 07:25

## 2020-01-01 RX ADMIN — CARVEDILOL 6.25 MG: 6.25 TABLET, FILM COATED ORAL at 08:24

## 2020-01-01 RX ADMIN — SACUBITRIL AND VALSARTAN 1 TABLET: 24; 26 TABLET, FILM COATED ORAL at 13:09

## 2020-01-01 RX ADMIN — ALBUTEROL SULFATE 2.5 MG: 2.5 SOLUTION RESPIRATORY (INHALATION) at 23:27

## 2020-01-01 RX ADMIN — FENTANYL CITRATE 50 MCG: 50 INJECTION, SOLUTION INTRAMUSCULAR; INTRAVENOUS at 08:57

## 2020-01-01 RX ADMIN — HEPARIN SODIUM 5000 UNITS: 5000 INJECTION INTRAVENOUS; SUBCUTANEOUS at 14:17

## 2020-01-01 RX ADMIN — MINERAL OIL AND WHITE PETROLATUM: 150; 830 OINTMENT OPHTHALMIC at 16:21

## 2020-01-01 RX ADMIN — IPRATROPIUM BROMIDE AND ALBUTEROL SULFATE 1 AMPULE: 2.5; .5 SOLUTION RESPIRATORY (INHALATION) at 19:41

## 2020-01-01 RX ADMIN — GABAPENTIN 400 MG: 400 CAPSULE ORAL at 21:14

## 2020-01-01 RX ADMIN — ALBUTEROL SULFATE 2.5 MG: 2.5 SOLUTION RESPIRATORY (INHALATION) at 03:30

## 2020-01-01 RX ADMIN — PRIMIDONE 50 MG: 50 TABLET ORAL at 21:55

## 2020-01-01 RX ADMIN — ENOXAPARIN SODIUM 40 MG: 40 INJECTION SUBCUTANEOUS at 08:48

## 2020-01-01 RX ADMIN — Medication 10 ML: at 09:25

## 2020-01-01 RX ADMIN — CARVEDILOL 12.5 MG: 12.5 TABLET, FILM COATED ORAL at 07:57

## 2020-01-01 RX ADMIN — CARVEDILOL 12.5 MG: 12.5 TABLET, FILM COATED ORAL at 08:42

## 2020-01-01 RX ADMIN — IPRATROPIUM BROMIDE AND ALBUTEROL SULFATE 1 AMPULE: .5; 3 SOLUTION RESPIRATORY (INHALATION) at 15:45

## 2020-01-01 RX ADMIN — SODIUM CHLORIDE, PRESERVATIVE FREE 10 ML: 5 INJECTION INTRAVENOUS at 21:01

## 2020-01-01 RX ADMIN — INSULIN LISPRO 6 UNITS: 100 INJECTION, SOLUTION INTRAVENOUS; SUBCUTANEOUS at 11:53

## 2020-01-01 RX ADMIN — HYDRALAZINE HYDROCHLORIDE 25 MG: 25 TABLET, FILM COATED ORAL at 21:25

## 2020-01-01 RX ADMIN — LORAZEPAM 0.5 MG: 2 INJECTION INTRAMUSCULAR; INTRAVENOUS at 17:45

## 2020-01-01 RX ADMIN — INSULIN LISPRO 8 UNITS: 100 INJECTION, SOLUTION INTRAVENOUS; SUBCUTANEOUS at 16:27

## 2020-01-01 RX ADMIN — METOPROLOL SUCCINATE 25 MG: 25 TABLET, FILM COATED, EXTENDED RELEASE ORAL at 08:32

## 2020-01-01 RX ADMIN — ISOSORBIDE MONONITRATE 30 MG: 30 TABLET, EXTENDED RELEASE ORAL at 08:41

## 2020-01-01 RX ADMIN — IPRATROPIUM BROMIDE AND ALBUTEROL SULFATE 1 AMPULE: 2.5; .5 SOLUTION RESPIRATORY (INHALATION) at 11:18

## 2020-01-01 RX ADMIN — CARVEDILOL 6.25 MG: 6.25 TABLET, FILM COATED ORAL at 10:26

## 2020-01-01 RX ADMIN — CARVEDILOL 12.5 MG: 12.5 TABLET, FILM COATED ORAL at 09:15

## 2020-01-01 RX ADMIN — INSULIN LISPRO 8 UNITS: 100 INJECTION, SOLUTION INTRAVENOUS; SUBCUTANEOUS at 11:31

## 2020-01-01 RX ADMIN — SACUBITRIL AND VALSARTAN 1 TABLET: 24; 26 TABLET, FILM COATED ORAL at 21:25

## 2020-01-01 RX ADMIN — HEPARIN SODIUM 5000 UNITS: 5000 INJECTION INTRAVENOUS; SUBCUTANEOUS at 21:14

## 2020-01-01 RX ADMIN — ISOSORBIDE MONONITRATE 30 MG: 30 TABLET, EXTENDED RELEASE ORAL at 08:25

## 2020-01-01 RX ADMIN — BUSPIRONE HYDROCHLORIDE 5 MG: 5 TABLET ORAL at 12:01

## 2020-01-01 RX ADMIN — CEFEPIME 2 G: 2 INJECTION, POWDER, FOR SOLUTION INTRAVENOUS at 23:48

## 2020-01-01 RX ADMIN — CARVEDILOL 6.25 MG: 6.25 TABLET, FILM COATED ORAL at 08:19

## 2020-01-01 RX ADMIN — FENTANYL CITRATE 50 MCG: 50 INJECTION, SOLUTION INTRAMUSCULAR; INTRAVENOUS at 18:41

## 2020-01-01 RX ADMIN — INSULIN LISPRO 10 UNITS: 100 INJECTION, SOLUTION INTRAVENOUS; SUBCUTANEOUS at 12:25

## 2020-01-01 RX ADMIN — IPRATROPIUM BROMIDE AND ALBUTEROL SULFATE 1 AMPULE: .5; 3 SOLUTION RESPIRATORY (INHALATION) at 12:32

## 2020-01-01 RX ADMIN — IPRATROPIUM BROMIDE AND ALBUTEROL SULFATE 1 AMPULE: .5; 3 SOLUTION RESPIRATORY (INHALATION) at 19:23

## 2020-01-01 RX ADMIN — GLIPIZIDE 10 MG: 5 TABLET ORAL at 08:26

## 2020-01-01 RX ADMIN — POLYVINYL ALCOHOL 1 DROP: 14 SOLUTION/ DROPS OPHTHALMIC at 09:26

## 2020-01-01 RX ADMIN — CLOPIDOGREL 75 MG: 75 TABLET, FILM COATED ORAL at 08:32

## 2020-01-01 RX ADMIN — IPRATROPIUM BROMIDE AND ALBUTEROL SULFATE 1 AMPULE: 2.5; .5 SOLUTION RESPIRATORY (INHALATION) at 19:04

## 2020-01-01 RX ADMIN — SACUBITRIL AND VALSARTAN 1 TABLET: 24; 26 TABLET, FILM COATED ORAL at 12:25

## 2020-01-01 RX ADMIN — ASPIRIN 81 MG: 81 TABLET, CHEWABLE ORAL at 07:56

## 2020-01-01 RX ADMIN — INSULIN LISPRO 4 UNITS: 100 INJECTION, SOLUTION INTRAVENOUS; SUBCUTANEOUS at 11:58

## 2020-01-01 RX ADMIN — ASPIRIN 81 MG: 81 TABLET, CHEWABLE ORAL at 08:32

## 2020-01-01 RX ADMIN — CARVEDILOL 12.5 MG: 12.5 TABLET, FILM COATED ORAL at 17:48

## 2020-01-01 RX ADMIN — INSULIN LISPRO 1 UNITS: 100 INJECTION, SOLUTION INTRAVENOUS; SUBCUTANEOUS at 09:24

## 2020-01-01 RX ADMIN — HEPARIN SODIUM 16 UNITS/KG/HR: 10000 INJECTION, SOLUTION INTRAVENOUS at 04:53

## 2020-01-01 RX ADMIN — QUETIAPINE FUMARATE 50 MG: 50 TABLET ORAL at 20:30

## 2020-01-01 RX ADMIN — CARVEDILOL 12.5 MG: 12.5 TABLET, FILM COATED ORAL at 07:26

## 2020-01-01 RX ADMIN — SODIUM CHLORIDE, PRESERVATIVE FREE 10 ML: 5 INJECTION INTRAVENOUS at 07:58

## 2020-01-01 RX ADMIN — ALBUTEROL SULFATE 2.5 MG: 2.5 SOLUTION RESPIRATORY (INHALATION) at 23:00

## 2020-01-01 RX ADMIN — HEPARIN SODIUM 5000 UNITS: 5000 INJECTION INTRAVENOUS; SUBCUTANEOUS at 21:55

## 2020-01-01 RX ADMIN — CLOPIDOGREL BISULFATE 75 MG: 75 TABLET ORAL at 11:33

## 2020-01-01 RX ADMIN — GLIPIZIDE 10 MG: 5 TABLET ORAL at 05:12

## 2020-01-01 RX ADMIN — Medication 10 ML: at 20:31

## 2020-01-01 RX ADMIN — CEFEPIME 2 G: 2 INJECTION, POWDER, FOR SOLUTION INTRAVENOUS at 00:15

## 2020-01-01 RX ADMIN — FUROSEMIDE 40 MG: 10 INJECTION, SOLUTION INTRAMUSCULAR; INTRAVENOUS at 07:56

## 2020-01-01 RX ADMIN — HEPARIN SODIUM 4000 UNITS: 1000 INJECTION INTRAVENOUS; SUBCUTANEOUS at 12:45

## 2020-01-01 RX ADMIN — IPRATROPIUM BROMIDE AND ALBUTEROL SULFATE 1 AMPULE: .5; 3 SOLUTION RESPIRATORY (INHALATION) at 09:05

## 2020-01-01 RX ADMIN — QUETIAPINE FUMARATE 50 MG: 50 TABLET ORAL at 21:51

## 2020-01-01 RX ADMIN — IPRATROPIUM BROMIDE AND ALBUTEROL SULFATE 1 AMPULE: 2.5; .5 SOLUTION RESPIRATORY (INHALATION) at 11:09

## 2020-01-01 RX ADMIN — IPRATROPIUM BROMIDE AND ALBUTEROL SULFATE 1 AMPULE: 2.5; .5 SOLUTION RESPIRATORY (INHALATION) at 15:22

## 2020-01-01 RX ADMIN — IPRATROPIUM BROMIDE AND ALBUTEROL SULFATE 1 AMPULE: 2.5; .5 SOLUTION RESPIRATORY (INHALATION) at 14:36

## 2020-01-01 RX ADMIN — SODIUM ZIRCONIUM CYCLOSILICATE 10 G: 5 POWDER, FOR SUSPENSION ORAL at 20:47

## 2020-01-01 RX ADMIN — BUSPIRONE HYDROCHLORIDE 5 MG: 5 TABLET ORAL at 08:24

## 2020-01-01 RX ADMIN — FENTANYL CITRATE 50 MCG: 50 INJECTION, SOLUTION INTRAMUSCULAR; INTRAVENOUS at 09:07

## 2020-01-01 RX ADMIN — ATORVASTATIN CALCIUM 10 MG: 10 TABLET, FILM COATED ORAL at 08:32

## 2020-01-01 RX ADMIN — QUETIAPINE FUMARATE 50 MG: 50 TABLET ORAL at 08:34

## 2020-01-01 RX ADMIN — ASPIRIN 81 MG: 81 TABLET, CHEWABLE ORAL at 08:37

## 2020-01-01 RX ADMIN — GLIPIZIDE 10 MG: 5 TABLET ORAL at 16:00

## 2020-01-01 RX ADMIN — IPRATROPIUM BROMIDE AND ALBUTEROL SULFATE 1 AMPULE: .5; 3 SOLUTION RESPIRATORY (INHALATION) at 15:24

## 2020-01-01 RX ADMIN — IPRATROPIUM BROMIDE AND ALBUTEROL SULFATE 1 AMPULE: 2.5; .5 SOLUTION RESPIRATORY (INHALATION) at 15:04

## 2020-01-01 RX ADMIN — TAMSULOSIN HYDROCHLORIDE 0.4 MG: 0.4 CAPSULE ORAL at 08:41

## 2020-01-01 RX ADMIN — FENTANYL CITRATE 50 MCG: 50 INJECTION, SOLUTION INTRAMUSCULAR; INTRAVENOUS at 16:14

## 2020-01-01 RX ADMIN — FUROSEMIDE 40 MG: 10 INJECTION, SOLUTION INTRAMUSCULAR; INTRAVENOUS at 18:17

## 2020-01-01 RX ADMIN — HEPARIN SODIUM 5000 UNITS: 5000 INJECTION INTRAVENOUS; SUBCUTANEOUS at 14:02

## 2020-01-01 RX ADMIN — CEFEPIME 2 G: 2 INJECTION, POWDER, FOR SOLUTION INTRAVENOUS at 12:21

## 2020-01-01 RX ADMIN — PANTOPRAZOLE SODIUM 40 MG: 40 TABLET, DELAYED RELEASE ORAL at 06:05

## 2020-01-01 RX ADMIN — INSULIN LISPRO 5 UNITS: 100 INJECTION, SOLUTION INTRAVENOUS; SUBCUTANEOUS at 20:32

## 2020-01-01 RX ADMIN — BUSPIRONE HYDROCHLORIDE 5 MG: 5 TABLET ORAL at 07:27

## 2020-01-01 RX ADMIN — FENTANYL CITRATE 50 MCG: 50 INJECTION, SOLUTION INTRAMUSCULAR; INTRAVENOUS at 00:35

## 2020-01-01 RX ADMIN — POLYVINYL ALCOHOL 1 DROP: 14 SOLUTION/ DROPS OPHTHALMIC at 14:23

## 2020-01-01 RX ADMIN — CLOPIDOGREL BISULFATE 75 MG: 75 TABLET ORAL at 12:25

## 2020-01-01 RX ADMIN — IPRATROPIUM BROMIDE AND ALBUTEROL SULFATE 1 AMPULE: .5; 3 SOLUTION RESPIRATORY (INHALATION) at 20:22

## 2020-01-01 RX ADMIN — IPRATROPIUM BROMIDE AND ALBUTEROL SULFATE 1 AMPULE: .5; 3 SOLUTION RESPIRATORY (INHALATION) at 16:23

## 2020-01-01 RX ADMIN — ACETAMINOPHEN 650 MG: 325 TABLET, FILM COATED ORAL at 11:53

## 2020-01-01 RX ADMIN — FENTANYL CITRATE 50 MCG: 50 INJECTION, SOLUTION INTRAMUSCULAR; INTRAVENOUS at 19:56

## 2020-01-01 RX ADMIN — HEPARIN SODIUM 16 UNITS/KG/HR: 10000 INJECTION, SOLUTION INTRAVENOUS at 11:36

## 2020-01-01 RX ADMIN — PRIMIDONE 50 MG: 50 TABLET ORAL at 21:25

## 2020-01-01 RX ADMIN — INSULIN LISPRO 12 UNITS: 100 INJECTION, SOLUTION INTRAVENOUS; SUBCUTANEOUS at 12:45

## 2020-01-01 RX ADMIN — GABAPENTIN 400 MG: 400 CAPSULE ORAL at 08:32

## 2020-01-01 RX ADMIN — GABAPENTIN 400 MG: 400 CAPSULE ORAL at 20:00

## 2020-01-01 RX ADMIN — NITROGLYCERIN 90 MCG/MIN: 20 INJECTION INTRAVENOUS at 09:35

## 2020-01-01 RX ADMIN — INSULIN LISPRO 8 UNITS: 100 INJECTION, SOLUTION INTRAVENOUS; SUBCUTANEOUS at 11:53

## 2020-01-01 RX ADMIN — CARVEDILOL 12.5 MG: 12.5 TABLET, FILM COATED ORAL at 08:34

## 2020-01-01 RX ADMIN — ETOMIDATE: 2 INJECTION, SOLUTION INTRAVENOUS at 05:20

## 2020-01-01 RX ADMIN — POLYVINYL ALCOHOL 1 DROP: 14 SOLUTION/ DROPS OPHTHALMIC at 17:53

## 2020-01-01 RX ADMIN — INSULIN GLARGINE 24 UNITS: 100 INJECTION, SOLUTION SUBCUTANEOUS at 20:42

## 2020-01-01 RX ADMIN — PANTOPRAZOLE SODIUM 40 MG: 40 TABLET, DELAYED RELEASE ORAL at 05:12

## 2020-01-01 RX ADMIN — HYDRALAZINE HYDROCHLORIDE 25 MG: 25 TABLET, FILM COATED ORAL at 14:02

## 2020-01-01 RX ADMIN — ALBUTEROL SULFATE 2.5 MG: 2.5 SOLUTION RESPIRATORY (INHALATION) at 00:03

## 2020-01-01 RX ADMIN — CARVEDILOL 12.5 MG: 12.5 TABLET, FILM COATED ORAL at 16:53

## 2020-01-01 RX ADMIN — Medication 10 ML: at 08:30

## 2020-01-01 RX ADMIN — INSULIN LISPRO 15 UNITS: 100 INJECTION, SOLUTION INTRAVENOUS; SUBCUTANEOUS at 09:32

## 2020-01-01 RX ADMIN — CLOPIDOGREL 75 MG: 75 TABLET, FILM COATED ORAL at 16:19

## 2020-01-01 RX ADMIN — INSULIN LISPRO 6 UNITS: 100 INJECTION, SOLUTION INTRAVENOUS; SUBCUTANEOUS at 16:43

## 2020-01-01 RX ADMIN — AZITHROMYCIN MONOHYDRATE 500 MG: 250 TABLET ORAL at 11:19

## 2020-01-01 RX ADMIN — ROCURONIUM BROMIDE: 10 INJECTION INTRAVENOUS at 05:20

## 2020-01-01 RX ADMIN — QUETIAPINE FUMARATE 50 MG: 50 TABLET ORAL at 07:27

## 2020-01-01 RX ADMIN — INSULIN LISPRO 9 UNITS: 100 INJECTION, SOLUTION INTRAVENOUS; SUBCUTANEOUS at 18:08

## 2020-01-01 RX ADMIN — GABAPENTIN 400 MG: 400 CAPSULE ORAL at 08:25

## 2020-01-01 RX ADMIN — ASPIRIN 81 MG: 81 TABLET, CHEWABLE ORAL at 09:15

## 2020-01-01 RX ADMIN — ISOSORBIDE MONONITRATE 30 MG: 30 TABLET ORAL at 09:17

## 2020-01-01 RX ADMIN — INSULIN LISPRO 6 UNITS: 100 INJECTION, SOLUTION INTRAVENOUS; SUBCUTANEOUS at 01:10

## 2020-01-01 RX ADMIN — ENOXAPARIN SODIUM 40 MG: 40 INJECTION SUBCUTANEOUS at 09:14

## 2020-01-01 RX ADMIN — HEPARIN SODIUM 5000 UNITS: 5000 INJECTION INTRAVENOUS; SUBCUTANEOUS at 06:20

## 2020-01-01 RX ADMIN — CLOPIDOGREL BISULFATE 75 MG: 75 TABLET ORAL at 11:21

## 2020-01-01 RX ADMIN — PRIMIDONE 50 MG: 50 TABLET ORAL at 20:31

## 2020-01-01 RX ADMIN — INSULIN GLARGINE 24 UNITS: 100 INJECTION, SOLUTION SUBCUTANEOUS at 22:30

## 2020-01-01 RX ADMIN — FENTANYL CITRATE 50 MCG: 50 INJECTION, SOLUTION INTRAMUSCULAR; INTRAVENOUS at 06:54

## 2020-01-01 RX ADMIN — HYDRALAZINE HYDROCHLORIDE 25 MG: 25 TABLET, FILM COATED ORAL at 20:31

## 2020-01-01 RX ADMIN — FUROSEMIDE 40 MG: 40 TABLET ORAL at 08:41

## 2020-01-01 RX ADMIN — HYDRALAZINE HYDROCHLORIDE 25 MG: 25 TABLET, FILM COATED ORAL at 22:31

## 2020-01-01 RX ADMIN — FENTANYL CITRATE 50 MCG: 50 INJECTION, SOLUTION INTRAMUSCULAR; INTRAVENOUS at 13:14

## 2020-01-01 RX ADMIN — DOCUSATE SODIUM 100 MG: 50 LIQUID ORAL at 10:06

## 2020-01-01 RX ADMIN — CARVEDILOL 12.5 MG: 12.5 TABLET, FILM COATED ORAL at 15:44

## 2020-01-01 RX ADMIN — ASPIRIN 81 MG 81 MG: 81 TABLET ORAL at 21:25

## 2020-01-01 RX ADMIN — DEXTROSE MONOHYDRATE 12.5 G: 25 INJECTION, SOLUTION INTRAVENOUS at 11:53

## 2020-01-01 RX ADMIN — LORAZEPAM 0.5 MG: 2 INJECTION INTRAMUSCULAR; INTRAVENOUS at 08:35

## 2020-01-01 RX ADMIN — CARVEDILOL 6.25 MG: 6.25 TABLET, FILM COATED ORAL at 17:14

## 2020-01-01 RX ADMIN — INSULIN LISPRO 8 UNITS: 100 INJECTION, SOLUTION INTRAVENOUS; SUBCUTANEOUS at 09:20

## 2020-01-01 RX ADMIN — SACUBITRIL AND VALSARTAN 1 TABLET: 24; 26 TABLET, FILM COATED ORAL at 11:44

## 2020-01-01 RX ADMIN — HYDRALAZINE HYDROCHLORIDE 25 MG: 25 TABLET, FILM COATED ORAL at 05:40

## 2020-01-01 RX ADMIN — GABAPENTIN 400 MG: 400 CAPSULE ORAL at 20:18

## 2020-01-01 RX ADMIN — PREDNISONE 40 MG: 20 TABLET ORAL at 08:19

## 2020-01-01 RX ADMIN — ACETAMINOPHEN 650 MG: 650 SUPPOSITORY RECTAL at 00:05

## 2020-01-01 RX ADMIN — INSULIN GLARGINE 20 UNITS: 100 INJECTION, SOLUTION SUBCUTANEOUS at 09:34

## 2020-01-01 RX ADMIN — ACETAMINOPHEN 650 MG: 325 TABLET ORAL at 11:57

## 2020-01-01 RX ADMIN — CEFEPIME 2 G: 2 INJECTION, POWDER, FOR SOLUTION INTRAVENOUS at 11:36

## 2020-01-01 RX ADMIN — SODIUM ZIRCONIUM CYCLOSILICATE 10 G: 5 POWDER, FOR SUSPENSION ORAL at 15:24

## 2020-01-01 RX ADMIN — FENTANYL CITRATE 100 MCG: 50 INJECTION INTRAMUSCULAR; INTRAVENOUS at 01:09

## 2020-01-01 RX ADMIN — INSULIN LISPRO 6 UNITS: 100 INJECTION, SOLUTION INTRAVENOUS; SUBCUTANEOUS at 18:00

## 2020-01-01 RX ADMIN — FUROSEMIDE 40 MG: 10 INJECTION, SOLUTION INTRAMUSCULAR; INTRAVENOUS at 08:35

## 2020-01-01 RX ADMIN — FUROSEMIDE 20 MG: 10 INJECTION, SOLUTION INTRAMUSCULAR; INTRAVENOUS at 09:08

## 2020-01-01 RX ADMIN — CLOPIDOGREL 75 MG: 75 TABLET, FILM COATED ORAL at 07:57

## 2020-01-01 RX ADMIN — BUSPIRONE HYDROCHLORIDE 5 MG: 5 TABLET ORAL at 22:31

## 2020-01-01 RX ADMIN — QUETIAPINE FUMARATE 25 MG: 25 TABLET ORAL at 20:00

## 2020-01-01 RX ADMIN — IPRATROPIUM BROMIDE AND ALBUTEROL SULFATE 1 AMPULE: 2.5; .5 SOLUTION RESPIRATORY (INHALATION) at 23:31

## 2020-01-01 RX ADMIN — INSULIN LISPRO 8 UNITS: 100 INJECTION, SOLUTION INTRAVENOUS; SUBCUTANEOUS at 16:50

## 2020-01-01 RX ADMIN — ALBUTEROL SULFATE 2.5 MG: 2.5 SOLUTION RESPIRATORY (INHALATION) at 03:21

## 2020-01-01 RX ADMIN — IPRATROPIUM BROMIDE AND ALBUTEROL SULFATE 1 AMPULE: .5; 3 SOLUTION RESPIRATORY (INHALATION) at 08:47

## 2020-01-01 RX ADMIN — FENTANYL CITRATE 50 MCG: 50 INJECTION, SOLUTION INTRAMUSCULAR; INTRAVENOUS at 09:59

## 2020-01-01 RX ADMIN — METHYLPREDNISOLONE SODIUM SUCCINATE 125 MG: 125 INJECTION, POWDER, FOR SOLUTION INTRAMUSCULAR; INTRAVENOUS at 11:58

## 2020-01-01 RX ADMIN — FAMOTIDINE 20 MG: 20 TABLET, FILM COATED ORAL at 17:11

## 2020-01-01 RX ADMIN — ISOSORBIDE MONONITRATE 30 MG: 30 TABLET, EXTENDED RELEASE ORAL at 08:34

## 2020-01-01 RX ADMIN — QUETIAPINE FUMARATE 25 MG: 25 TABLET ORAL at 20:18

## 2020-01-01 RX ADMIN — SACUBITRIL AND VALSARTAN 1 TABLET: 24; 26 TABLET, FILM COATED ORAL at 20:31

## 2020-01-01 RX ADMIN — IPRATROPIUM BROMIDE AND ALBUTEROL SULFATE 1 AMPULE: 2.5; .5 SOLUTION RESPIRATORY (INHALATION) at 07:05

## 2020-01-01 RX ADMIN — BUSPIRONE HYDROCHLORIDE 5 MG: 5 TABLET ORAL at 09:00

## 2020-01-01 RX ADMIN — TAMSULOSIN HYDROCHLORIDE 0.8 MG: 0.4 CAPSULE ORAL at 08:25

## 2020-01-01 RX ADMIN — CLOPIDOGREL 75 MG: 75 TABLET, FILM COATED ORAL at 12:04

## 2020-01-01 RX ADMIN — QUETIAPINE FUMARATE 25 MG: 25 TABLET ORAL at 09:14

## 2020-01-01 RX ADMIN — CLOPIDOGREL BISULFATE 75 MG: 75 TABLET ORAL at 11:53

## 2020-01-01 RX ADMIN — ALOGLIPTIN 12.5 MG: 12.5 TABLET, FILM COATED ORAL at 08:25

## 2020-01-01 RX ADMIN — CEFEPIME 2 G: 2 INJECTION, POWDER, FOR SOLUTION INTRAVENOUS at 23:22

## 2020-01-01 RX ADMIN — METOPROLOL TARTRATE 12.5 MG: 25 TABLET ORAL at 08:32

## 2020-01-01 RX ADMIN — DOCUSATE SODIUM 100 MG: 50 LIQUID ORAL at 08:33

## 2020-01-01 RX ADMIN — IPRATROPIUM BROMIDE AND ALBUTEROL SULFATE 1 AMPULE: 2.5; .5 SOLUTION RESPIRATORY (INHALATION) at 04:08

## 2020-01-01 RX ADMIN — ALOGLIPTIN 12.5 MG: 12.5 TABLET, FILM COATED ORAL at 08:41

## 2020-01-01 RX ADMIN — ACETAMINOPHEN 650 MG: 325 TABLET ORAL at 07:00

## 2020-01-01 RX ADMIN — INSULIN LISPRO 1 UNITS: 100 INJECTION, SOLUTION INTRAVENOUS; SUBCUTANEOUS at 16:45

## 2020-01-01 RX ADMIN — HEPARIN SODIUM 2000 UNITS: 1000 INJECTION, SOLUTION INTRAVENOUS; SUBCUTANEOUS at 01:38

## 2020-01-01 RX ADMIN — LORAZEPAM 0.5 MG: 2 INJECTION INTRAMUSCULAR; INTRAVENOUS at 09:08

## 2020-01-01 RX ADMIN — SODIUM ZIRCONIUM CYCLOSILICATE 10 G: 5 POWDER, FOR SUSPENSION ORAL at 10:36

## 2020-01-01 RX ADMIN — INSULIN LISPRO 2 UNITS: 100 INJECTION, SOLUTION INTRAVENOUS; SUBCUTANEOUS at 07:28

## 2020-01-01 RX ADMIN — FENTANYL CITRATE 50 MCG: 50 INJECTION, SOLUTION INTRAMUSCULAR; INTRAVENOUS at 03:09

## 2020-01-01 RX ADMIN — INSULIN LISPRO 4 UNITS: 100 INJECTION, SOLUTION INTRAVENOUS; SUBCUTANEOUS at 16:55

## 2020-01-01 RX ADMIN — IPRATROPIUM BROMIDE AND ALBUTEROL SULFATE 1 AMPULE: .5; 3 SOLUTION RESPIRATORY (INHALATION) at 07:56

## 2020-01-01 RX ADMIN — IPRATROPIUM BROMIDE AND ALBUTEROL SULFATE 1 AMPULE: .5; 3 SOLUTION RESPIRATORY (INHALATION) at 12:40

## 2020-01-01 RX ADMIN — METOPROLOL TARTRATE 12.5 MG: 25 TABLET ORAL at 21:01

## 2020-01-01 RX ADMIN — GABAPENTIN 400 MG: 400 CAPSULE ORAL at 07:26

## 2020-01-01 RX ADMIN — ATORVASTATIN CALCIUM 10 MG: 10 TABLET, FILM COATED ORAL at 08:24

## 2020-01-01 RX ADMIN — SODIUM CHLORIDE, PRESERVATIVE FREE 10 ML: 5 INJECTION INTRAVENOUS at 21:14

## 2020-01-01 RX ADMIN — PANTOPRAZOLE SODIUM 40 MG: 40 TABLET, DELAYED RELEASE ORAL at 05:33

## 2020-01-01 RX ADMIN — SACUBITRIL AND VALSARTAN 1 TABLET: 24; 26 TABLET, FILM COATED ORAL at 11:33

## 2020-01-01 RX ADMIN — GUAIFENESIN 1200 MG: 600 TABLET, EXTENDED RELEASE ORAL at 21:25

## 2020-01-01 RX ADMIN — IPRATROPIUM BROMIDE AND ALBUTEROL SULFATE 1 AMPULE: .5; 3 SOLUTION RESPIRATORY (INHALATION) at 16:47

## 2020-01-01 RX ADMIN — IPRATROPIUM BROMIDE AND ALBUTEROL SULFATE 1 AMPULE: .5; 3 SOLUTION RESPIRATORY (INHALATION) at 19:56

## 2020-01-01 RX ADMIN — METHYLPREDNISOLONE SODIUM SUCCINATE 40 MG: 40 INJECTION, POWDER, LYOPHILIZED, FOR SOLUTION INTRAMUSCULAR; INTRAVENOUS at 04:27

## 2020-01-01 RX ADMIN — TAMSULOSIN HYDROCHLORIDE 0.8 MG: 0.4 CAPSULE ORAL at 07:27

## 2020-01-01 RX ADMIN — CARVEDILOL 12.5 MG: 12.5 TABLET, FILM COATED ORAL at 08:25

## 2020-01-01 RX ADMIN — IPRATROPIUM BROMIDE AND ALBUTEROL SULFATE 1 AMPULE: 2.5; .5 SOLUTION RESPIRATORY (INHALATION) at 03:41

## 2020-01-01 RX ADMIN — ALBUTEROL SULFATE 2.5 MG: 2.5 SOLUTION RESPIRATORY (INHALATION) at 00:38

## 2020-01-01 RX ADMIN — CEFEPIME 2 G: 2 INJECTION, POWDER, FOR SOLUTION INTRAVENOUS at 11:11

## 2020-01-01 RX ADMIN — FENTANYL CITRATE 50 MCG: 50 INJECTION, SOLUTION INTRAMUSCULAR; INTRAVENOUS at 04:00

## 2020-01-01 RX ADMIN — INSULIN LISPRO 6 UNITS: 100 INJECTION, SOLUTION INTRAVENOUS; SUBCUTANEOUS at 08:25

## 2020-01-01 RX ADMIN — IPRATROPIUM BROMIDE AND ALBUTEROL SULFATE 1 AMPULE: 2.5; .5 SOLUTION RESPIRATORY (INHALATION) at 00:32

## 2020-01-01 RX ADMIN — IPRATROPIUM BROMIDE AND ALBUTEROL SULFATE 1 AMPULE: 2.5; .5 SOLUTION RESPIRATORY (INHALATION) at 20:04

## 2020-01-01 RX ADMIN — CEFEPIME 2 G: 2 INJECTION, POWDER, FOR SOLUTION INTRAVENOUS at 23:11

## 2020-01-01 RX ADMIN — IPRATROPIUM BROMIDE AND ALBUTEROL SULFATE 1 AMPULE: .5; 3 SOLUTION RESPIRATORY (INHALATION) at 08:11

## 2020-01-01 RX ADMIN — QUETIAPINE FUMARATE 50 MG: 50 TABLET ORAL at 08:25

## 2020-01-01 RX ADMIN — ALOGLIPTIN 12.5 MG: 12.5 TABLET, FILM COATED ORAL at 09:21

## 2020-01-01 RX ADMIN — PREDNISONE 40 MG: 20 TABLET ORAL at 09:26

## 2020-01-01 RX ADMIN — ASPIRIN 81 MG 81 MG: 81 TABLET ORAL at 20:29

## 2020-01-01 RX ADMIN — ASPIRIN 81 MG: 81 TABLET, CHEWABLE ORAL at 08:20

## 2020-01-01 RX ADMIN — IPRATROPIUM BROMIDE AND ALBUTEROL SULFATE 1 AMPULE: .5; 3 SOLUTION RESPIRATORY (INHALATION) at 13:45

## 2020-01-01 RX ADMIN — HYDRALAZINE HYDROCHLORIDE 25 MG: 25 TABLET, FILM COATED ORAL at 14:17

## 2020-01-01 RX ADMIN — Medication 10 ML: at 22:31

## 2020-01-01 RX ADMIN — ENOXAPARIN SODIUM 40 MG: 40 INJECTION SUBCUTANEOUS at 07:28

## 2020-01-01 RX ADMIN — INSULIN GLARGINE 24 UNITS: 100 INJECTION, SOLUTION SUBCUTANEOUS at 20:32

## 2020-01-01 RX ADMIN — IPRATROPIUM BROMIDE AND ALBUTEROL SULFATE 1 AMPULE: 2.5; .5 SOLUTION RESPIRATORY (INHALATION) at 08:15

## 2020-01-01 RX ADMIN — QUETIAPINE FUMARATE 50 MG: 50 TABLET ORAL at 22:31

## 2020-01-01 RX ADMIN — INSULIN LISPRO 1 UNITS: 100 INJECTION, SOLUTION INTRAVENOUS; SUBCUTANEOUS at 22:30

## 2020-01-01 RX ADMIN — ASPIRIN 81 MG 81 MG: 81 TABLET ORAL at 22:31

## 2020-01-01 RX ADMIN — BUSPIRONE HYDROCHLORIDE 5 MG: 5 TABLET ORAL at 14:58

## 2020-01-01 RX ADMIN — FUROSEMIDE 40 MG: 10 INJECTION, SOLUTION INTRAMUSCULAR; INTRAVENOUS at 08:53

## 2020-01-01 RX ADMIN — Medication 10 ML: at 08:44

## 2020-01-01 RX ADMIN — FAMOTIDINE 20 MG: 20 TABLET, FILM COATED ORAL at 09:14

## 2020-01-01 RX ADMIN — INSULIN GLARGINE 40 UNITS: 100 INJECTION, SOLUTION SUBCUTANEOUS at 09:08

## 2020-01-01 RX ADMIN — IPRATROPIUM BROMIDE AND ALBUTEROL SULFATE 1 AMPULE: 2.5; .5 SOLUTION RESPIRATORY (INHALATION) at 07:10

## 2020-01-01 RX ADMIN — GABAPENTIN 400 MG: 400 CAPSULE ORAL at 08:40

## 2020-01-01 RX ADMIN — INSULIN LISPRO 4 UNITS: 100 INJECTION, SOLUTION INTRAVENOUS; SUBCUTANEOUS at 16:27

## 2020-01-01 RX ADMIN — ALOGLIPTIN 12.5 MG: 12.5 TABLET, FILM COATED ORAL at 07:25

## 2020-01-01 RX ADMIN — GABAPENTIN 400 MG: 400 CAPSULE ORAL at 08:34

## 2020-01-01 RX ADMIN — GABAPENTIN 400 MG: 400 CAPSULE ORAL at 20:46

## 2020-01-01 RX ADMIN — FAMOTIDINE 20 MG: 10 INJECTION INTRAVENOUS at 12:24

## 2020-01-01 RX ADMIN — BUSPIRONE HYDROCHLORIDE 5 MG: 5 TABLET ORAL at 11:39

## 2020-01-01 RX ADMIN — METHYLPREDNISOLONE SODIUM SUCCINATE 40 MG: 40 INJECTION, POWDER, FOR SOLUTION INTRAMUSCULAR; INTRAVENOUS at 04:36

## 2020-01-01 RX ADMIN — ALBUTEROL SULFATE 2.5 MG: 2.5 SOLUTION RESPIRATORY (INHALATION) at 23:32

## 2020-01-01 RX ADMIN — SODIUM CHLORIDE, PRESERVATIVE FREE 10 ML: 5 INJECTION INTRAVENOUS at 09:25

## 2020-01-01 RX ADMIN — PREDNISONE 40 MG: 20 TABLET ORAL at 08:32

## 2020-01-01 RX ADMIN — NITROGLYCERIN 80 MCG/MIN: 20 INJECTION INTRAVENOUS at 22:26

## 2020-01-01 RX ADMIN — INSULIN GLARGINE 24 UNITS: 100 INJECTION, SOLUTION SUBCUTANEOUS at 20:52

## 2020-01-01 RX ADMIN — INSULIN LISPRO 2 UNITS: 100 INJECTION, SOLUTION INTRAVENOUS; SUBCUTANEOUS at 20:39

## 2020-01-01 RX ADMIN — HYDRALAZINE HYDROCHLORIDE 25 MG: 25 TABLET, FILM COATED ORAL at 05:45

## 2020-01-01 RX ADMIN — HYDRALAZINE HYDROCHLORIDE 25 MG: 25 TABLET, FILM COATED ORAL at 14:58

## 2020-01-01 RX ADMIN — ASPIRIN 81 MG 81 MG: 81 TABLET ORAL at 20:50

## 2020-01-01 RX ADMIN — Medication 10 ML: at 21:17

## 2020-01-01 RX ADMIN — HYDRALAZINE HYDROCHLORIDE 25 MG: 25 TABLET, FILM COATED ORAL at 05:33

## 2020-01-01 RX ADMIN — ISOSORBIDE MONONITRATE 30 MG: 30 TABLET ORAL at 10:44

## 2020-01-01 RX ADMIN — LEVOFLOXACIN 500 MG: 5 INJECTION, SOLUTION INTRAVENOUS at 11:39

## 2020-01-01 RX ADMIN — HYDRALAZINE HYDROCHLORIDE 25 MG: 25 TABLET, FILM COATED ORAL at 14:04

## 2020-01-01 RX ADMIN — Medication 10 ML: at 11:29

## 2020-01-01 RX ADMIN — HEPARIN SODIUM 5000 UNITS: 5000 INJECTION INTRAVENOUS; SUBCUTANEOUS at 20:00

## 2020-01-01 RX ADMIN — IPRATROPIUM BROMIDE AND ALBUTEROL SULFATE 1 AMPULE: 2.5; .5 SOLUTION RESPIRATORY (INHALATION) at 10:38

## 2020-01-01 RX ADMIN — HEPARIN SODIUM 16 UNITS/KG/HR: 10000 INJECTION, SOLUTION INTRAVENOUS at 01:57

## 2020-01-01 RX ADMIN — ENOXAPARIN SODIUM 40 MG: 40 INJECTION SUBCUTANEOUS at 16:50

## 2020-01-01 RX ADMIN — SACUBITRIL AND VALSARTAN 1 TABLET: 24; 26 TABLET, FILM COATED ORAL at 20:50

## 2020-01-01 RX ADMIN — IPRATROPIUM BROMIDE AND ALBUTEROL SULFATE 1 AMPULE: 2.5; .5 SOLUTION RESPIRATORY (INHALATION) at 21:20

## 2020-01-01 RX ADMIN — SACUBITRIL AND VALSARTAN 1 TABLET: 24; 26 TABLET, FILM COATED ORAL at 23:10

## 2020-01-01 RX ADMIN — AZITHROMYCIN MONOHYDRATE 500 MG: 500 INJECTION, POWDER, LYOPHILIZED, FOR SOLUTION INTRAVENOUS at 03:39

## 2020-01-01 RX ADMIN — CARVEDILOL 12.5 MG: 12.5 TABLET, FILM COATED ORAL at 16:54

## 2020-01-01 RX ADMIN — IPRATROPIUM BROMIDE AND ALBUTEROL SULFATE 1 AMPULE: 2.5; .5 SOLUTION RESPIRATORY (INHALATION) at 23:48

## 2020-01-01 RX ADMIN — QUETIAPINE FUMARATE 50 MG: 50 TABLET ORAL at 20:46

## 2020-01-01 ASSESSMENT — PAIN SCALES - GENERAL
PAINLEVEL_OUTOF10: 0
PAINLEVEL_OUTOF10: 0
PAINLEVEL_OUTOF10: 7
PAINLEVEL_OUTOF10: 0
PAINLEVEL_OUTOF10: 6
PAINLEVEL_OUTOF10: 0
PAINLEVEL_OUTOF10: 3
PAINLEVEL_OUTOF10: 0
PAINLEVEL_OUTOF10: 5
PAINLEVEL_OUTOF10: 0
PAINLEVEL_OUTOF10: 0
PAINLEVEL_OUTOF10: 7
PAINLEVEL_OUTOF10: 7
PAINLEVEL_OUTOF10: 2
PAINLEVEL_OUTOF10: 5
PAINLEVEL_OUTOF10: 0
PAINLEVEL_OUTOF10: 7
PAINLEVEL_OUTOF10: 0
PAINLEVEL_OUTOF10: 7
PAINLEVEL_OUTOF10: 0
PAINLEVEL_OUTOF10: 8
PAINLEVEL_OUTOF10: 7
PAINLEVEL_OUTOF10: 7
PAINLEVEL_OUTOF10: 0
PAINLEVEL_OUTOF10: 7
PAINLEVEL_OUTOF10: 0
PAINLEVEL_OUTOF10: 7

## 2020-01-01 ASSESSMENT — PULMONARY FUNCTION TESTS
PIF_VALUE: 19
PIF_VALUE: 21
PIF_VALUE: 25
PIF_VALUE: 12
PIF_VALUE: 13
PIF_VALUE: 24
PIF_VALUE: 8
PIF_VALUE: 25
PIF_VALUE: 33
PIF_VALUE: 13
PIF_VALUE: 23
PIF_VALUE: 12
PIF_VALUE: 31
PIF_VALUE: 12
PIF_VALUE: 15
PIF_VALUE: 24
PIF_VALUE: 26
PIF_VALUE: 25
PIF_VALUE: 6
PIF_VALUE: 38
PIF_VALUE: 24
PIF_VALUE: 25
PIF_VALUE: 14
PIF_VALUE: 30
PIF_VALUE: 23
PIF_VALUE: 23
PIF_VALUE: 36
PIF_VALUE: 20
PIF_VALUE: 15

## 2020-01-01 ASSESSMENT — ENCOUNTER SYMPTOMS
SHORTNESS OF BREATH: 1
RHINORRHEA: 0
SHORTNESS OF BREATH: 1
GASTROINTESTINAL NEGATIVE: 1
COUGH: 0
NAUSEA: 0
VOMITING: 0
SORE THROAT: 0
ABDOMINAL PAIN: 0
COLOR CHANGE: 0
SHORTNESS OF BREATH: 1
EYE DISCHARGE: 0
EYE PAIN: 0
CHEST TIGHTNESS: 0
SORE THROAT: 0
GASTROINTESTINAL NEGATIVE: 1
EYE PAIN: 0
VOICE CHANGE: 1
WHEEZING: 0
SHORTNESS OF BREATH: 1
WHEEZING: 0
ABDOMINAL PAIN: 0
NAUSEA: 0
VOMITING: 0
DIARRHEA: 0
NAUSEA: 0
EYE REDNESS: 0
ALLERGIC/IMMUNOLOGIC NEGATIVE: 1
SHORTNESS OF BREATH: 1
EYE PAIN: 0
CONSTIPATION: 0
BACK PAIN: 0
GASTROINTESTINAL NEGATIVE: 1
GASTROINTESTINAL NEGATIVE: 1
COLOR CHANGE: 0
DIARRHEA: 0
FACIAL SWELLING: 0
TROUBLE SWALLOWING: 0
RHINORRHEA: 0
SHORTNESS OF BREATH: 1
EYE REDNESS: 0
BACK PAIN: 0
SINUS PRESSURE: 0
EYE ITCHING: 0
ABDOMINAL PAIN: 0
CONSTIPATION: 1
RESPIRATORY NEGATIVE: 1
COUGH: 1
SHORTNESS OF BREATH: 1
BLOOD IN STOOL: 0
EYE DISCHARGE: 0
GASTROINTESTINAL NEGATIVE: 1
VOMITING: 0

## 2020-04-25 PROBLEM — Z20.822 SUSPECTED COVID-19 VIRUS INFECTION: Status: ACTIVE | Noted: 2020-01-01

## 2020-04-25 PROBLEM — J96.00 ACUTE RESPIRATORY FAILURE (HCC): Status: ACTIVE | Noted: 2020-01-01

## 2020-04-25 NOTE — PROGRESS NOTES
65 yom arrived ER via ambulance nasally intubated with unknown size tube and position >28cm. Vent setting initially APRV Phigh 30, Plow 10, Thigh and low 2.0, FiO2 100%, Ppeak 33, Pmean 22, Vt 513, RR 42, EtCO2 70 - 80. BS: extremely dimished. ABG's      Ref. Range 4/25/2020 00:00   Methemoglobin Latest Ref Range: 0.0 - 1.9 % 0.4   Carboxyhemoglobin Latest Ref Range: 0 - 5 % 0.9   Rito Test Unknown PASS   FIO2 Unknown 100   Oxyhemoglobin Latest Ref Range: 95.0 - 98.0 % NOT REPORTED   Total Hb Latest Ref Range: 12.0 - 16.0 g/dl NOT REPORTED   NOTIFICATION Unknown NOT REPORTED   pH, Arterial Latest Ref Range: 7.350 - 7.450  7.055 (LL)   pH, Art, Temp Adj Latest Ref Range: 7.350 - 7.450  NOT REPORTED   pCO2, Art, Temp Adj Latest Ref Range: 35.0 - 45.0  NOT REPORTED   pCO2, Arterial Latest Ref Range: 35.0 - 45.0 mmHg 108.0 (HH)   pO2, Art, Temp Adj Latest Ref Range: 80.0 - 100.0 mmHg NOT REPORTED   pO2, Arterial Latest Ref Range: 80.0 - 100.0 mmHg 226.0 (H)   HCO3, Arterial Latest Ref Range: 22.0 - 26.0 mmol/L 30.3 (H)   Positive Base Excess, Art Latest Ref Range: 0.0 - 2.0 mmol/L NOT REPORTED   Negative Base Excess, Art Latest Ref Range: 0.0 - 2.0 mmol/L 0.1   O2 Sat, Arterial Latest Ref Range: 95 - 98 % 98.4 (H)   Pt Temp Unknown 37   Sample Site Unknown Left Radial Artery   Set Rate Unknown N/A   O2 Device/Flow/% Unknown VENTILATOR   Mode Unknown APRV   Peep/Cpap Unknown 10   VT Unknown PENDING   PSV Unknown NOT REPORTED   Text for Respiratory Unknown SEE PROGRESS NOTES FOR VENT SETTINGS. RESULTS GIVEN TO DR. Eron Castillo. Respiratory Rate Unknown 42   Total Rate Unknown 42     CXR showed Nasotracheal tube below the clavicles:   Endotracheal tube in place, the tip is 9 cm above the annie, 3.5 cm below   thoracic inlet.  Enteric tube in place, tip in stomach.  Extensive airspace   opacity right lower lobe.  Cardiomediastinal silhouette within normal limits.    Implanted cardiac device in place at the left chest wall.  No

## 2020-04-25 NOTE — CONSULTS
outpatient wound care:No    Chief complaint/reason for consultation:   · Concern for COVID infection      History of Present Illness:   Amaury Domingo is a 65y.o.-year-old  male who was initially admitted on 4/25/2020. Patient seen at the request of Dr. Marianne Garcia:    Rene Navarrete has multiple medical problems including multivessel coronary artery disease not amenable to revascularization, LV systolic dysfunction, ischemic cardiomyopathy [ejection fraction 25%] status post ICD placement, essential hypertension, chronic kidney disease, hypertension, BPH with chronic indwelling William catheter, diabetes mellitus type 2 and COPD among other medical problems  The patient has chronic respiratory dysfunction and is on chronic steroids as well as low-dose erythromycin. He presented at [facility] and he was intubated nasally. It was not clear what events had preceded his respiratory distress at home. When EMS arrived the patient was reportedly tripoding in severe respiratory distress and he was given magnesium Solu-Medrol and terbutaline. He did not improve and he was nasally intubated in route. He was later taken to [facility] where a chest x-ray suggested right lower lobe pneumonia. He was started on Rocephin and azithromycin and there was concern for coronavirus infection and the patient had COVID testing sent and the patient was transferred here to the [facility] ICU. Patient admitted because of concerns with COVID 19.    CURRENT EVALUATION : 4/25/2020  Vitals:    04/25/20 1200 04/25/20 1300 04/25/20 1349 04/25/20 1400   BP: (!) 90/50 (!) 103/54  (!) 106/55   Pulse: 66 67 67 71   Temp: 97.7 °F (36.5 °C)      TempSrc: Axillary      SpO2: 96% 98% 98%    Weight:       Height:           Afebrile  VS stable    Patient receiving supplemental oxygen.   02 sat 96%  RR      % FIO2: 35  PEEP:  8    QTc:     (Definition of High Risk Comorbid Conditions: Asthma, COPD, Heart Failure of any GLUCOSE 382 04/25/2020     SARS-CoV-2 Not Detected  Not Detected Final 04/25/2020  5:45  [address]         Cultures:  Urine:  ·   Blood:  Culture, Blood 1 [[id number]]    Order Status: Sent Specimen: Blood    Culture, Blood 2 [[id number]]    Order Status: No result Specimen: Blood      ·   Sputum :  Culture, Respiratory [[id number]] (Abnormal) Collected: 04/25/20 1016   Order Status: Completed Specimen: Tracheal Aspirate Updated: 04/25/20 1221    Specimen Description . TRACHEAL ASPIRATE    Special Requests NOT REPORTED    Direct Exam < 10 EPITHELIAL CELLS/LPF     <10 NEUTROPHILS/LPF     RARE BUDDING YEAST CELLS SEENAbnormal     Culture PENDING   Culture, Virus, Respiratory [[id number]] Collected: 04/25/20 1015   Order Status: Completed Specimen: Tracheal Aspirate Updated: 04/25/20 1022    Specimen Description . TRACHEAL ASPIRATE    Special Requests NOT REPORTED    Culture TEST NOT PERFORMED   RACHAEL V 1020 04/25/2020      ·   Wound:       Imaging Studies:   ONE XRAY VIEW OF THE CHEST 4/25/2020 6:57 am     FINDINGS:   Improving aeration of the lungs compared to the prior exam, specifically in the right lower lung. Supporting devices as above. New right IJ line in good position. ONE XRAY VIEW OF THE CHEST 4/25/2020 1:14 am     FINDINGS:   Support devices as described. Extensive airspace opacity right lower lobe. Medical Decision Making-Other:     Note:  · Labs, medications, radiologic studies were reviewed with personal review of films  · Moderate Large amounts of data were reviewed  · Discussed with nursing Staff, Discharge planner  · Infection Control and Prevention measures reviewed  · All prior entries were reviewed  · Administer medications as ordered  · Prognosis: Guarded  · Discharge planning reviewed  · Follow up as outpatient. Thank you for allowing us to participate in the care of this patient. Please call with questions.     Capri Samayoa MD  Pager: [phone] - Office: [phone]

## 2020-04-25 NOTE — H&P
home, will hold Lasix for now. Will repeat CXR in am, and start lasix if needed - aim to keep the patient dry. Will start on normal saline at 10-30 mL/h, with total fluids including IV fluids, tube feeds and all drips to a total of 75 mL/h.  10. Tube feeds (bolus feeds) started, dietitian consulted. Appreciate recs  11. Given extensive cardiac history and severe systolic and diastolic dysfunction and uptrending trops, will consult cardiology. 12. Patient's Toprol-XL 25, Neurontin 400 mg, ASA 81, Plavix 75 resumed. 13. Heparin SC 5000 units 3 times daily. The critical care team assigned to the patient will be following up the patient in the intensive care unit. I have discussed the current plan with the critical care attending. The above mentioned assessment and plan will be reviewed again in detail by the critical care attending at bedside, and can be further changed or modified accordingly by the attending physician.          Isaac Ayon MD  PGY-3, Internal Medicine  [facility]

## 2020-04-25 NOTE — PROGRESS NOTES
Pt to unit, and placed on vent as charted, suctioned pt , no secretions , BS diminished. Ambu at bedside, pt tolerated transport.

## 2020-04-25 NOTE — ED PROVIDER NOTES
[facility] ED  Emergency Department Encounter  Emergency Medicine Attending Note     Pt Name: Mami Cabello  MRN: [id number]  Gabrielgfchalo 1950   Date of evaluation: 4/24/2020  PCP:  Michael Guevara MD    CHIEF COMPLAINT       Chief Complaint   Patient presents with    Respiratory Distress       HISTORY OF PRESENT ILLNESS  (Location/Symptom, Timing/Onset, Context/Setting, Quality, Duration, Modifying Factors, Severity.)      Mami Cabello is a 65 y.o. male who presents with respiratory distress. History is limited as patient was intubated prior to arrival.  Nursing staff did speak to family, but they stated that he has a long history and significant lung dysfunction and heart dysfunction. They do not know if he is been ill over the last few days. However they did state that he has been on long-term steroids and low-dose erythromycin and was found to have high blood sugars, and they thought it was related to that. However EMS states the patient was initially tripoding. In severe respiratory distress. They had given him magnesium, Solu-Medrol, and terbutaline. However patient did not improve, therefore they did nasally intubate patient in route, they did not give any sedative medications for the nasal intubation. PAST MEDICAL / SURGICAL / SOCIAL / FAMILY HISTORY     Past Medical History:  has a past medical history of Anxiety, CAD (coronary artery disease), Chronic respiratory failure (HCC), Chronic systolic (congestive) heart failure (HCC), COPD (chronic obstructive pulmonary disease) ([address] Essential hypertension, Mixed hyperlipidemia, NSVT (nonsustained ventricular tachycardia) ([address] and Type 2 diabetes mellitus ([address] Past Surgical History:  has a past surgical history that includes back surgery; Tonsillectomy; Cardiac catheterization; Coronary angioplasty with stent; shoulder surgery; and Eye surgery.      Allergies:  Amoxicillin-pot clavulanate; Clindamycin phosphate; Penicillins; Fluoxetine; Fluticasone-salmeterol; Hydrochlorothiazide; Irbesartan; Potassium; Salmeterol; and Tiotropium     Home Meds:   Prior to Visit Medications    Medication Sig Taking? Authorizing Provider   Vitamin D, Cholecalciferol, 50 MCG (2000 UT) CAPS Take 1 capsule by mouth daily  Historical Provider, MD   omeprazole (PRILOSEC) 20 MG delayed release capsule Take 20 mg by mouth daily  Historical Provider, MD   simvastatin (ZOCOR) 10 MG tablet Take 10 mg by mouth nightly  Historical Provider, MD   alogliptin (NESINA) 12.5 MG TABS tablet Take 12.5 mg by mouth  Historical Provider, MD   primidone (MYSOLINE) 50 MG tablet Take 50 mg by mouth nightly   Historical Provider, MD   acetaminophen (TYLENOL) 500 MG tablet Take 500 mg by mouth every 6 hours as needed for Pain  Historical Provider, MD   sacubitril-valsartan (ENTRESTO) 24-26 MG per tablet Take 1 tablet by mouth 2 times daily  Historical Provider, MD   aspirin (ASPIRIN ADULT LOW DOSE) 81 MG EC tablet Take 81 mg by mouth  Historical Provider, MD   albuterol sulfate  (90 Base) MCG/ACT inhaler Inhale 2 puffs into the lungs  Historical Provider, MD   Blood Glucose Monitoring Suppl KIT Use as instructed  Historical Provider, MD   TRUE METRIX BLOOD GLUCOSE TEST strip USE 3 TIMES A DAY  Historical Provider, MD   glipiZIDE (GLUCOTROL) 10 MG tablet Take 10 mg by mouth 2 times daily (before meals)   Historical Provider, MD   gabapentin (NEURONTIN) 300 MG capsule Take 400 mg by mouth 2 times daily.    Historical Provider, MD   furosemide (LASIX) 20 MG tablet Take 40 mg by mouth daily   Historical Provider, MD   clopidogrel (PLAVIX) 75 MG tablet Take 75 mg by mouth  Historical Provider, MD   busPIRone (BUSPAR) 5 MG tablet TAKE 1 TABLET BY MOUTH THREE TIMES A DAY  Historical Provider, MD   tamsulosin (FLOMAX) 0.4 MG capsule Take 0.4 mg by mouth  Historical Provider, MD   QUEtiapine (SEROQUEL) 50 MG tablet Take 50 mg by mouth  Historical Provider, MD   metoprolol succinate (TOPROL XL) 100 MG extended release tablet Take 25 mg by mouth   Historical Provider, MD   isosorbide mononitrate (IMDUR) 30 MG extended release tablet Take 30 mg by mouth  Historical Provider, MD   ipratropium-albuterol (DUONEB) 0.5-2.5 (3) MG/3ML SOLN nebulizer solution Inhale 3 mLs into the lungs  Historical Provider, MD   insulin glargine (LANTUS SOLOSTAR) 100 UNIT/ML injection pen Inject 24 Units into the skin   Historical Provider, MD     Please note that medications prescribed at discharge will auto-populate into this medication list when note is refreshed. Please look at prescription date andprescriber to clarify. Family History:family history is not on file. Social History: He reports that he has quit smoking. He has never used smokeless tobacco. He reports that he does not drink alcohol or use drugs. He has no history on file for sexual activity. REVIEW OF SYSTEMS    (2-9 systems for level 4, 10 or more for level 5)      Review of Systems   Unable to perform ROS: Intubated       PHYSICAL EXAM   (up to 7 for level 4, 8 or more for level 5)      Initial Vitals   ED Triage Vitals   BP Temp Temp Source Pulse Resp SpO2 Height Weight   04/25/20 0000 04/25/20 0004 04/25/20 0004 04/25/20 0000 04/25/20 0000 04/25/20 0000 04/25/20 0004 04/24/20 2353   (!) 187/73 100 °F (37.8 °C) Rectal 113 28 100 % 6' (1.829 m) 210 lb (95.3 kg)       Physical Exam  Vitals signs and nursing note reviewed. Constitutional:       Comments: Patient is nasally intubated, he is obtunded and grabbing at things, but eyes are open   HENT:      Head: Normocephalic and atraumatic. Nose:      Comments: Nasal tube in place in the nares     Mouth/Throat:      Comments: Surgical mask   Eyes:      Conjunctiva/sclera: Conjunctivae normal.      Pupils: Pupils are equal, round, and reactive to light. Cardiovascular:      Rate and Rhythm: Normal rate and regular rhythm. Heart sounds: No murmur. No friction rub. No gallop. Pulmonary:      Comments: Patient is nasally intubated, has decreased air entry and tight lung sounds throughout. Does have some crackles on the right side with junky lung sounds. Abdominal:      Comments: Patient has a protuberant abdomen, but does not appear to have any tenderness. No palpable mass. There is a ventral wall defect on the right. Musculoskeletal:         General: No swelling, tenderness or deformity. Skin:     General: Skin is warm. Capillary Refill: Capillary refill takes less than 2 seconds. Findings: No bruising or erythema. Neurological:      Comments: Patient has nasal ET tube in place he has eyes open, is grasping around with no purposeful movement but is moving all of his extremities initially. DIFFERENTIAL DIAGNOSIS/IMPRESSION     DDX: Respiratory distress, concern for possible pneumonia versus CoVID-19    Impression: 65 y.o. male who presents with acute respiratory distress. History is limited, however per chart review does have a history of CHF and some form of pulmonary dysfunction. He was tripoding and in severe respiratory distress per EMS. He initially was on CPAP did improve, and was nasally intubated without any sedation prior to arrival.  He did receive magnesium, terbutaline, and Solu-Medrol. My exam, patient is still mildly awake. He is hypertensive. Has decreased tight lung sounds, but does have some crackles in the right side. Patient has a temp of 100 Fahrenheit rectally. No signs of trauma. Patient was initially on ET tube with viral filter in line with bag-valve-mask. Viral filter was given to EMS in the ambulance bay and placed prior to rolling through department to the room. Patient was immediately switched to ventilator in the room. Given his decreased lung compliance, he was placed on APRV mode. High suspicion of possible CoVID-19 given patient's initial presentation however limited history.   Also concern for possible sepsis with (>2)   Temp > 38.3C or < 36C   HR > 90   RR > 20   WBC > 12 or < 4 or >10% bands  SIRS (>2) and confirmed or suspected source of infection = Sepsis  Is Sepsis due to likely bacterial infection?: likely viral with possible superimposed bacterial pneumonia, SUSPECT CoVID        Sepsis Identified:  Date: 4/25/20 Time: 0100  Sepsis Orders:  ·  CBC: Yes  ·  CMP: Yes  ·  PT/PTT: Yes  ·  Blood Cultures x2: Yes  ·  Urinalysis and Urine Culture: Yes  ·  Lactate: Yes  ·  Broad Spectrum Antibiotics Given (within 3 hours of sepsis identification,  after blood cultures):  Yes    (If unable to obtain IV access for IV antibiotics within 3 hours of  identification of sepsis, IM antibiotics is acceptable.)  ·             If lactate >2.0 MUST repeat within 6 hours    If elevated, is elevated lactate from a likely infectious source?: No it is secondary to respiratory distress. IV Fluid Bolus:  Is lactate > 4.0:  No    PROCEDURES:  None   Intubation performed by EMS prior to arrival.      CONSULTS:  None    CRITICAL CARE:  There was significant risk of life threatening deterioration of patient's condition requiring my direct management. Critical care time 90 minutes, excluding any documented procedures. FINAL IMPRESSION      1. Acute respiratory distress    2. Pneumonia due to organism    3. Suspected COVID-19 virus infection    4. Respiratory acidosis        DISPOSITION / PLAN     DISPOSITION Decision To Transfer 04/25/2020 01:29:50 AM      PATIENT REFERRED TO:  No follow-up provider specified.     DISCHARGE MEDICATIONS:  New Prescriptions    No medications on file       Gisella Tello MD  Emergency Medicine Attending      (Please note that portions of this note were completed with a voice recognition program.  Efforts were made to edit the dictations but occasionallywords are mis-transcribed.)          Gisella Tello MD  04/25/20 5015

## 2020-04-25 NOTE — ED NOTES
Mode of arrival (squad #, walk in, police, etc) : [facility] complaint(s): Respiratory Distress        Arrival Note (brief scenario, treatment PTA, etc). : Pt brought in by [facility] after receiving a call for respiratory distress. Family states pt only has 20 % of lung function. Squad report pt was initally placed on CPAP. Squad report pt not responding to CPAP. Pt was intubated en route by Squad. Nasally intubated in the right nare. Squad report pt was given 125 Solu-Medrol, magnesium, and terbutaline. C= \"Have you ever felt that you should Cut down on your drinking? \"  No  A= \"Have people Annoyed you by criticizing your drinking? \"  No  G= \"Have you ever felt bad or Guilty about your drinking? \"  No  E= \"Have you ever had a drink as an Eye-opener first thing in the morning to steady your nerves or to help a hangover? \"  No      Deferred []      Reason for deferring: N/A    *If yes to two or more: probable alcohol abuse. Suellen Mckay RN  04/25/20 0953

## 2020-04-25 NOTE — PROGRESS NOTES
Patient handoff was done at 1750. Patient was transferred smoothly at [address].      Electronically signed by Lluvia Canas RN on 4/25/2020 at 6:59 PM

## 2020-04-25 NOTE — PROGRESS NOTES
Critical care team - Resident sign-out to Critical Care MICU      Date and time: 4/25/2020 3:49 PM  Patient's name:  Sol Chambers Record Number: [id number]  Patient's account/billing number: [de-identified]  Patient's YOB: 1950  Age: 65 y.o. Date of Admission: 4/25/2020  4:51 AM  Length of stay during current admission: 0    Primary Care Physician: Yomi Wallace MD    Code Status: Full Code    Mode of physician to physician communication:        [x] Via telephone   [] In person     Date and time of sign-out: 4/25/2020 3:49 PM    Accepting MICU resident: Dr. Lucila Fish    Accepting team's attending: Dr. Lucila Fish    Patient's current Covid ICU Bed:  1026     Patient's assigned bed on MICU:  126        [] Med-Surg Monitored [] Step-down       [] MICU       [] Psych floor     Reason for ICU admission:     Covid suspect, respiratory failure requiring mechanical ventilation. ICU course summary:     History was obtained from chart review and unobtainable from patient due to mental status and intubated and sedated. Dale Dumont is a 65 y.o. M w/ Past medical history significant for ischemic cardiomyopathy s/p ICD placement 9/25/19, essential hypertension, NSVT, CKD, COPD, hypertension, BPH with prior history of indwelling William's catheter, severe multivessel coronary artery disease not amendable to revascularization, severe left ventricular systolic dysfunction due to ischemic cardiomyopathy-LVEF 25%-NYHA class II at baseline-s/p implantable cardioverter-defibrillator 9/25/19, type 2 diabetes on long-term insulin takes 35 units twice daily and oral hypoglycemic agents, CKD baseline creatinine 1.5- 1.8.     It seems that the patient initially presented to [facility], intubated nasally prior to arrival to [facility] by EMS. It appears that the family stated that patient has a long and significant history of lung dysfunction and heart dysfunction/disease.   It is not known if the patient had been ill for the past 3 days or not. However they did report the patient has been on long-term steroids and low-dose erythromycin and was running high blood sugars which they thought were related to that.     Per EMS report on the chart review it appears the patient was initially tripoding, and in severe respiratory distress, and EMS gave him magnesium, Solu-Medrol, terbutaline. However his condition did not improve and thereafter he was nasally intubated in route. In the ED at [facility], patient did not receive any fluids, however did receive 5 units of subcu insulin. Had a chest x-ray done that showed concerns for right lower lobe pneumonia. Received a dose of Rocephin and azithromycin, and met sepsis criteria. Lactic acid initially was 2.8- but came down to 1.8 per charts. He had COVID test sent and patient was sent to [facility] ICU. On arrival to [facility] ER, patient was responsive and was grasping around with nonpurposeful movements but was able to move all of his extremities initially     The echo from 6/15/19 showed severely reduced LVEF, global hypokinesis severe, septal akinesis. Estimated LVEF 25-30%. Grade 1 diastolic dysfunction. Mildly reduced right ventricular systolic function. Left atrium mildly dilated, right atrium mildly dilated. Mild mitral regurg mild tricuspid valve regurg. Procedures during patient's ICU stay:     Right Internal Jugular Line placement.       Current Vitals:     /67   Pulse 79   Temp 97.7 °F (36.5 °C) (Axillary)   Ht 5' 10\" (1.778 m)   Wt 224 lb 13.9 oz (102 kg)   SpO2 100%   BMI 32.27 kg/m²       Cultures:     Blood cultures:                 [] None drawn      [x] Negative             []  Positive (Details:  )  Urine Culture:                   [] None drawn      [x] Negative             []  Positive (Details:  )  Sputum Culture:               [] None drawn       [] Negative             [x]  Positive (Details: Rare Yeast ) Consults:     1. Gen Surgery  2. Infectious Disease    Assessment:     Patient Active Problem List    Diagnosis Date Noted    Acute respiratory failure ([facility] 04/25/2020    Suspected COVID-19 virus infection 04/25/2020    Acute respiratory failure with hypoxia and hypercapnia (HCC) 02/28/2019    Cardiomyopathy ([facility] 02/26/2019    Chronic combined systolic and diastolic CHF, NYHA class 3 ([facility] 02/26/2019    Coronary arteriosclerosis in native artery 02/26/2019    History of ST elevation myocardial infarction (STEMI) 02/26/2019    Ischemic cardiomyopathy 02/26/2019    History of Nonsustained ventricular tachycardia ([facility] 02/26/2019    Presence of stent in coronary artery 02/26/2019    Chronic obstructive pulmonary disease ([facility] 02/03/2019     Plan:    1. S/P Endotrach intubation in [facility] ICU for acute resp failure due to COPD and Right lobe pneumonia. Continue zithromax and rocephin. Covid-19 negative. 2. NSTEMI with cardiology on board. Continue heparin, cardiology on board. 3. Lantus 20 units daily . And will start on high-dose insulin sliding scale for hyperglycemia. POCT blood glucose checks every 6 hourly. Takes 35 units twice daily at home.   mg, Toprol-XL 25 with holding parameters. 4. Will repeat an EKG, and follow-up on troponin every 4 hourly x2.  5. Patient has history of combined systolic and diastolic dysfunction, and takes Lasix 40 mg p.o. daily at home, will hold Lasix for now. Will repeat CXR in am, and start lasix if needed - aim to keep the patient dry. Will start on normal saline at 10-30 mL/h, with total fluids including IV fluids, tube feeds and all drips to a total of 75 mL/h.  6. Tube feeds (bolus feeds) started, dietitian consulted. Appreciate recs  7. Given extensive cardiac history and severe systolic and diastolic dysfunction and uptrending trops, will consult cardiology. 8. Patient's Toprol-XL 25, Neurontin 400 mg, ASA 81, Plavix 75 resumed.     13.  Heparin SC 5000

## 2020-04-25 NOTE — CARE COORDINATION
Case Management Initial Discharge Plan  Katelyn Dwyer,             Met with:family member brother Abel Bernard over the phone to discuss discharge plans. Information verified: address, contacts, phone number, , insurance Yes  Emergency Contact/Next of Kin name & number: Monalisa Jeffries [phone]  PCP: Any Guzman MD  Date of last visit:     Insurance Provider: Mallie Elite    Discharge Planning    Living Arrangements:  Family Members(brother)   Support Systems:  Family Members    Home has 2 stories  2 stairs to climb to get into front door, 1 flight stairs to climb to reach second floor  Location of bedroom/bathroom in home main    Patient able to perform ADL's:Independent    Current Services (outpatient & in home) none  DME equipment: O2  DME provider:       Potential Assistance Needed:  [address], Durable Medical Equipment    Patient agreeable to home care: Yes or No  Freedom of choice provided:  n/a    Prior SNF/Rehab Placement and Facility: no  Agreeable to SNF/Rehab: Yes or No  [place] of choice provided: n/a   Evaluation: no    Expected Discharge date:     Patient expects to be discharged to: Follow Up Appointment: Best Day/ Time:      Transportation provider: family  Transportation arrangements needed for discharge: No    Readmission Risk              Risk of Unplanned Readmission:        31             Does patient have a readmission risk score greater than 14?: Yes  If yes, follow-up appointment must be made within 7 days of discharge.      Goals of Care: return to prior level of function      Discharge Plan: Covid R/O, intubated, FiO2 35%, fentanyl/propofol, from home with brother, has home O2          Electronically signed by Migel Kwon RN on 20 at 10:59 AM EDT

## 2020-04-26 PROBLEM — J96.21 ACUTE ON CHRONIC RESPIRATORY FAILURE WITH HYPOXIA AND HYPERCAPNIA (HCC): Status: ACTIVE | Noted: 2020-01-01

## 2020-04-26 PROBLEM — Z20.822 COVID-19 VIRUS NOT DETECTED: Status: ACTIVE | Noted: 2020-01-01

## 2020-04-26 PROBLEM — N18.9 ACUTE KIDNEY INJURY SUPERIMPOSED ON CKD (HCC): Status: ACTIVE | Noted: 2020-01-01

## 2020-04-26 PROBLEM — J96.22 ACUTE ON CHRONIC RESPIRATORY FAILURE WITH HYPOXIA AND HYPERCAPNIA (HCC): Status: ACTIVE | Noted: 2020-01-01

## 2020-04-26 PROBLEM — N17.9 ACUTE KIDNEY INJURY SUPERIMPOSED ON CKD (HCC): Status: ACTIVE | Noted: 2020-01-01

## 2020-04-26 NOTE — PROGRESS NOTES
[facility]  Progress Note    Marycarmen Dasilva  MRN: [id number]  Date: 4/26/2020    Reason for F/U :   Acute respiratory failure, pneumonia    Impression :   1. Acute respiratory failure status post extubation 4/26/2020  2. COVID-19 testing negative  3. Chronic obstructive pulmonary disease with concern for acute exacerbation  4. Elevated troponin concerning for NSTEMI  5. Multivessel coronary artery disease  6. Ischemic cardiomyopathy  7. Combined systolic and diastolic heart failure  8. Right lower lobe atelectasis  9. Acute kidney injury on chronic kidney disease    Recommendations:   · Legionella and pneumococcal antigen testing negative. · The patient remains on azithromycin for possible exacerbation of COPD  · The patient is medical treatment for the NSTEMI including heparin drip  · The patient is getting diuretic therapy  · The renal parameters are worsening today  · The patient will have an echocardiogram done    Infection Control Recommendations:   Universal precautions    Discharge Planning:   Estimated Length of IV antimicrobials: 5 days of azithromycin  Patient will need Midline Catheter Insertion/ PICC line Insertion: No  [facility],  LTAC: Undetermined  Patient willneed outpatient wound care: No    MedicalDecision making / Summary of Stay:   Venessa Luu has multiple medical problems including multivessel coronary artery disease not amenable to revascularization, LV systolic dysfunction, ischemic cardiomyopathy [ejection fraction 25%] status post ICD placement, essential hypertension, chronic kidney disease, hypertension, BPH with chronic indwelling William catheter, diabetes mellitus type 2 and COPD among other medical problems  The patient has chronic respiratory dysfunction and is on chronic steroids as well as low-dose erythromycin.     He presented at [facility] and he was intubated nasally.   It was not clear what events had preceded his Lymphadenopathy:      Cervical: No cervical adenopathy. Skin:     General: Skin is warm and dry. Neurological:      Mental Status: He is alert and oriented to person, place, and time. Laboratory data:   I have independently reviewed the followinglabs:  CBC with Differential:   Recent Labs     04/24/20  2351 04/25/20  1248 04/26/20  0426   WBC 14.0* 9.8 14.3*   HGB 12.0* 9.5* 10.4*   HCT 38.7* 31.2* 33.1*    126* 134*   LYMPHOPCT 13*  --  4*   MONOPCT 5  --  6     BMP:   Recent Labs     04/25/20  0731 04/26/20  0425    132*   K 5.3 4.7   CL 97* 95*   CO2 26 23   BUN 30* 53*   CREATININE 1.95* 2.24*   MG 2.7* 2.6     Hepatic Function Panel:   Recent Labs     04/25/20  0731 04/26/20  0425   PROT 6.7 6.3*   LABALBU 3.6 3.4*   BILIDIR 0.12 0.11   IBILI 0.23 0.24   BILITOT 0.35 0.35   ALKPHOS 87 79   ALT 33 30   AST 21 22     No results for input(s): [facility] in the last 72 hours. Lab Results   Component Value Date    CRP 14.0 (H) 04/25/2020     No results found for: Jacquelyn Blackmonodi 1237     04/24/20  2351 04/25/20  0731   PROCAL 0.18* 0.75*       Imaging Studies:   No new imaging    Cultures:     Culture, Respiratory [[id number]] (Abnormal) Collected: 04/25/20 1016   Order Status: Completed Specimen: Tracheal Aspirate Updated: 04/26/20 1153    Specimen Description . TRACHEAL ASPIRATE    Special Requests NOT REPORTED    Direct Exam < 10 EPITHELIAL CELLS/LPF     <10 NEUTROPHILS/LPF     RARE BUDDING YEAST CELLS SEENAbnormal     Culture CULTURE IN PROGRESS     NORMAL RESPIRATORY AJIT MODERATE GROWTH     Respiratory Virus PCR Panel [[id number]] Collected: 04/26/20 0920   Order Status: Completed Specimen: Nasopharyngeal Swab Updated: 04/26/20 1039    Specimen Description . NASOPHARYNGEAL SWAB    Adenovirus PCR Not Detected    Coronavirus 229E PCR Not Detected    Comment: Coronoviruses detected by this panel are those associated with the clinical common cold.    This test will not detect 2019-SARS-COV-2 or other novel coronaviruses. Coronavirus HKU1 PCR Not Detected    Comment: Coronoviruses detected by this panel are those associated with the clinical common cold. This test will not detect 2019-SARS-COV-2 or other novel coronaviruses. Coronavirus NL63 PCR Not Detected    Comment: Coronoviruses detected by this panel are those associated with the clinical common cold. This test will not detect 2019-SARS-COV-2 or other novel coronaviruses. Coronavirus OC43 PCR Not Detected    Comment: Coronoviruses detected by this panel are those associated with the clinical common cold. This test will not detect 2019-SARS-COV-2 or other novel coronaviruses. Human Metapneumovirus PCR Not Detected    Rhino/Enterovirus PCR Not Detected    Influenza A by PCR Not Detected    Influenza A H1 PCR NOT REPORTED    Influenza A H1 (2009) PCR NOT REPORTED    Influenza A H3 PCR NOT REPORTED    Influenza B by PCR Not Detected    Parainfluenza 1 PCR Not Detected    Parainfluenza 2 PCR Not Detected    Parainfluenza 3 PCR Not Detected    Parainfluenza 4 PCR Not Detected    Resp Syncytial Virus PCR Not Detected    Bordetella Parapertussis Not Detected    B Pertussis by PCR Not Detected    Chlamydia pneumoniae By PCR Not Detected    Mycoplasma pneumo by PCR Not Detected    Comment: Performed by multiplexed nucleic acid assay. Strep Pneumoniae Antigen [[id number]] Collected: 04/25/20 0919   Order Status: Completed Updated: 04/25/20 2256    Source . URINE    Strep pneumo Ag NEGATIVE    Comment: Strep pneumoniae antigen not detected      Legionella antigen, urine [[id number]] Collected: 04/25/20 0919   Order Status: Completed Updated: 04/25/20 2255    Legionella Pneumophilia Ag, Urine NEGATIVE    Comment: L. pneumophila serogroup 1 antigen not detected.    A negative result does not exclude infection with Leginella pnemophila serogroup 1 nor does   it rule out other microbial-caused respiratory

## 2020-04-26 NOTE — CONSULTS
Medications:    Prior to Admission medications    Medication Sig Start Date End Date Taking? Authorizing Provider   Vitamin D, Cholecalciferol, 50 MCG (2000 UT) CAPS Take 1 capsule by mouth daily    Historical Provider, MD   omeprazole (PRILOSEC) 20 MG delayed release capsule Take 20 mg by mouth daily    Historical Provider, MD   simvastatin (ZOCOR) 10 MG tablet Take 10 mg by mouth nightly    Historical Provider, MD   alogliptin (NESINA) 12.5 MG TABS tablet Take 12.5 mg by mouth 8/30/19   Historical Provider, MD   primidone (MYSOLINE) 50 MG tablet Take 50 mg by mouth nightly     Historical Provider, MD   acetaminophen (TYLENOL) 500 MG tablet Take 500 mg by mouth every 6 hours as needed for Pain    Historical Provider, MD   sacubitril-valsartan (ENTRESTO) 24-26 MG per tablet Take 1 tablet by mouth 2 times daily    Historical Provider, MD   aspirin (ASPIRIN ADULT LOW DOSE) 81 MG EC tablet Take 81 mg by mouth 2/22/19   Historical Provider, MD   albuterol sulfate  (90 Base) MCG/ACT inhaler Inhale 2 puffs into the lungs    Historical Provider, MD   Blood Glucose Monitoring Suppl KIT Use as instructed 2/6/19   Historical Provider, MD   TRUE METRIX BLOOD GLUCOSE TEST strip USE 3 TIMES A DAY 2/6/19   Historical Provider, MD   glipiZIDE (GLUCOTROL) 10 MG tablet Take 10 mg by mouth 2 times daily (before meals)  11/21/18   Historical Provider, MD   gabapentin (NEURONTIN) 300 MG capsule Take 400 mg by mouth 2 times daily.   11/21/18   Historical Provider, MD   furosemide (LASIX) 20 MG tablet Take 40 mg by mouth daily     Historical Provider, MD   clopidogrel (PLAVIX) 75 MG tablet Take 75 mg by mouth 12/17/18   Historical Provider, MD   busPIRone (BUSPAR) 5 MG tablet TAKE 1 TABLET BY MOUTH THREE TIMES A DAY 2/13/19   Historical Provider, MD   tamsulosin (FLOMAX) 0.4 MG capsule Take 0.4 mg by mouth    Historical Provider, MD   QUEtiapine (SEROQUEL) 50 MG tablet Take 50 mg by mouth 2/13/19   Historical Provider, MD mcg/kg/min, Intravenous, Continuous  polyethylene glycol (GLYCOLAX) packet 17 g, 17 g, Oral, Daily PRN  docusate (COLACE) 50 MG/5ML liquid 100 mg, 100 mg, Oral, Daily  magnesium sulfate 1 g in dextrose 5% 100 mL IVPB, 1 g, Intravenous, PRN  potassium chloride (KLOR-CON M) extended release tablet 40 mEq, 40 mEq, Oral, PRN **OR** potassium bicarb-citric acid (EFFER-K) effervescent tablet 40 mEq, 40 mEq, Oral, PRN **OR** [DISCONTINUED] potassium chloride 10 mEq/100 mL IVPB (Peripheral Line), 10 mEq, Intravenous, PRN  acetaminophen (TYLENOL) tablet 650 mg, 650 mg, Oral, Q6H PRN **OR** acetaminophen (TYLENOL) suppository 650 mg, 650 mg, Rectal, Q6H PRN  polyvinyl alcohol (LIQUIFILM TEARS) 1.4 % ophthalmic solution 1 drop, 1 drop, Both Eyes, Q4H **AND** lubrifresh P.M. (artificial tears) ophthalmic ointment, , Both Eyes, Q4H  azithromycin (ZITHROMAX) 500 mg in dextrose 5% 250 mL IVPB, 500 mg, Intravenous, Q24H  predniSONE (DELTASONE) tablet 40 mg, 40 mg, Oral, Daily  aspirin chewable tablet 81 mg, 81 mg, Oral, Daily  metoprolol tartrate (LOPRESSOR) tablet 12.5 mg, 12.5 mg, Per NG tube, BID  lansoprazole (PREVACID SOLUTAB) disintegrating tablet 30 mg, 30 mg, Per NG tube, QAM AC  heparin (porcine) injection 4,000 Units, 4,000 Units, Intravenous, PRN  heparin (porcine) injection 2,000 Units, 2,000 Units, Intravenous, PRN  heparin 25,000 units in dextrose 5% 250 mL infusion, 12 Units/kg/hr, Intravenous, Continuous    Allergies:  Amoxicillin-pot clavulanate; Clindamycin phosphate; Penicillins; Fluoxetine; Fluticasone-salmeterol; Hydrochlorothiazide; Irbesartan; Potassium; Salmeterol; and Tiotropium    Social History:   reports that he has quit smoking. He has never used smokeless tobacco. He reports that he does not drink alcohol or use drugs. Family History: family history is not on file.      REVIEW OF SYSTEMS:      Unable to obtain because  the patient is intubated    PHYSICAL EXAM:      BP (!) 114/56   Pulse 76   Temp cardiomyopathy    History of Nonsustained ventricular tachycardia (HCC)    Acute respiratory failure (HCC)  Resolved Problems:    * No resolved hospital problems. *        IMPRESSION:    1. Acute respiratory failure with hypoxemia/hypercarbia due to COPD exacerbation/right lower lobe pneumonia and acute on chronic systolic heart failure. 2. Acute on chronic systolic heart failure. 3. Non-STEMI likely type II. Cannot rule out type I  4. Covid 19 is ruled out  5. Acute kidney injury on chronic kidney disease  Chronic systolic heart failure s/p AICD (implantable cardioverter-defibrillator), single, in [facility] for primary prevention. 6. Coronary artery disease s/p stents in the past  7. History of Severe multivessel CAD not amenable to revascularization prior patient cardiologist notes  8. Anemia  9. Hyponatremia  10. Leukocytosis    RECOMMENDATIONS:  1. Continue aspirin/statin/Plavix/beta-blocker and heparin GTT   2. Will obtain an echocardiogram  3. Interrogate the AICD  4. Recommend increasing IV Lasix to 40 mg twice a day. Monitor his I&O and kidney function. Recommend consulting nephrology service. 5. Medical management per primary  6. Further recommendation after echocardiogram.    Discussed with patient and Nurse. Anil Henning M.D. Fellow, [address] [place] [address]      Attending Cardiologist Addendum: I have reviewed and performed the history, physical, subjective, objective, assessment, and plan with the resident/fellow and agree with the note. I performed the history and physical personally. I have made changes to the note above as needed. Thank you for allowing me to participate in the care of this patient, please do not hesitate to call if you have any questions. Nate Orr, [id number] [facility]  [facility]  [phone]

## 2020-04-26 NOTE — PLAN OF CARE
Problem: Restraint Use - Nonviolent/Non-Self-Destructive Behavior:  Goal: Absence of restraint-related injury  Description: Absence of restraint-related injury  Outcome: Met This Shift     Problem: OXYGENATION/RESPIRATORY FUNCTION  Goal: Patient will maintain patent airway  4/26/2020 0703 by Jovita Farias RN  Outcome: Ongoing  4/25/2020 2033 by Hortencia Ring RCP  Outcome: Ongoing  Goal: Patient will achieve/maintain normal respiratory rate/effort  Description: Respiratory rate and effort will be within normal limits for the patient  4/26/2020 0703 by Jovita Farias RN  Outcome: Ongoing  4/25/2020 2033 by Hortencia Ring RCP  Outcome: Ongoing     Problem: MECHANICAL VENTILATION  Goal: Patient will maintain patent airway  4/26/2020 0703 by Jovita Farias RN  Outcome: Ongoing  4/25/2020 2033 by Hortencia Ring RCP  Outcome: Ongoing  Goal: Oral health is maintained or improved  4/26/2020 0703 by Jovita Farias RN  Outcome: Ongoing  4/25/2020 2033 by Hortencia Ring RCP  Outcome: Ongoing  Goal: ET tube will be managed safely  4/26/2020 0703 by Jovita Farias RN  Outcome: Ongoing  4/25/2020 2033 by Hortencia Ring RCP  Outcome: Ongoing  Goal: Ability to express needs and understand communication  4/26/2020 0703 by Jovita Farias RN  Outcome: Ongoing  4/25/2020 2033 by Hortencia Ring RCP  Outcome: Ongoing  Goal: Mobility/activity is maintained at optimum level for patient  4/26/2020 0703 by Jovita Farias RN  Outcome: Ongoing  4/25/2020 2033 by Hortencia Ring RCP  Outcome: Ongoing     Problem: SKIN INTEGRITY  Goal: Skin integrity is maintained or improved  4/26/2020 0703 by Jovita Farias RN  Outcome: Ongoing  4/25/2020 2033 by Hortencia Ring RCP  Outcome: Ongoing     Problem: Nutrition  Goal: Optimal nutrition therapy  Outcome: Ongoing     Problem: Falls - Risk of:  Goal: Will remain free from falls  Description: Will remain free from falls  Outcome: Ongoing  Goal: Absence of physical injury  Description: Absence of physical injury  Outcome: Ongoing     Problem: Restraint Use - Nonviolent/Non-Self-Destructive Behavior:  Goal: Absence of restraint indications  Description: Absence of restraint indications  Outcome: Not Met This Shift

## 2020-04-26 NOTE — PROGRESS NOTES
symmetrical, trachea midline, no adenopathy, thyroid symmetric, no jvd skin normal  Respiratory: Bilateral breath sounds present. Mild crackles present. Cardiovascular: regular rate and rhythm, normal S1, S2, no murmur noted and 2+ pulses throughout  Abdomen: soft, nontender, nondistended, no masses or organomegaly  NEUROLOGIC: Intubated. Following commands. Moves all extremities. Extremities:  peripheral pulses normal, no clubbing or cyanosis. Bilateral pedal edema present.   SKIN: normal coloration and turgor    MEDICATIONS:  Scheduled Meds:   insulin lispro  0-18 Units Subcutaneous Q4H    insulin glargine  40 Units Subcutaneous Daily    [START ON 4/27/2020] furosemide  40 mg Intravenous Daily    furosemide  20 mg Intravenous Once    clopidogrel  75 mg Oral Daily    atorvastatin  10 mg Oral Daily    sodium chloride flush  10 mL Intravenous 2 times per day    ipratropium-albuterol  1 ampule Inhalation 4x daily    chlorhexidine  15 mL Mouth/Throat BID    docusate  100 mg Oral Daily    polyvinyl alcohol  1 drop Both Eyes Q4H    And    artificial tears   Both Eyes Q4H    azithromycin  500 mg Intravenous Q24H    predniSONE  40 mg Oral Daily    aspirin  81 mg Oral Daily    metoprolol tartrate  12.5 mg Per NG tube BID    lansoprazole  30 mg Per NG tube QAM AC     Continuous Infusions:   dextrose      sodium chloride 10 mL/hr at 04/26/20 0849    propofol Stopped (04/25/20 1554)    heparin (porcine) 16 Units/kg/hr (04/26/20 0935)     PRN Meds:   fentanNYL, 25 mcg, Q1H PRN    Or  fentanNYL, 50 mcg, Q1H PRN  glucose, 15 g, PRN  dextrose, 12.5 g, PRN  glucagon (rDNA), 1 mg, PRN  dextrose, 100 mL/hr, PRN  sodium chloride flush, 10 mL, PRN  potassium chloride, 10 mEq, PRN  potassium chloride, 20 mEq, PRN  promethazine, 12.5 mg, Q6H PRN    Or  ondansetron, 4 mg, Q6H PRN  polyethylene glycol, 17 g, Daily PRN  magnesium sulfate, 1 g, PRN  potassium chloride, 40 mEq, PRN    Or  potassium alternative oral replacement, 40 mEq, PRN  acetaminophen, 650 mg, Q6H PRN    Or  acetaminophen, 650 mg, Q6H PRN  heparin (porcine), 4,000 Units, PRN  heparin (porcine), 2,000 Units, PRN        SUPPORT DEVICES: [x] Ventilator [] BIPAP  [] Nasal Cannula [] Room Air    VENT SETTINGS (Comprehensive) (if applicable): PRVC mode, FiO2 30%, PEEP 5, Respiratory Rate 20, Tidal Volume 550    Lab Results   Component Value Date    PHART 7.275 04/25/2020    PDD3HHO 58.7 04/25/2020    PO2ART 115.0 04/25/2020    GEW1LGA 27.2 04/25/2020    SBX9MNX 28 04/26/2020    P0MAJTVF 98.1 04/25/2020    FIO2 35.0 04/26/2020     Lactic Acid:   Lab Results   Component Value Date    LACTA NOT REPORTED 04/25/2020    LACTA 1.8 04/25/2020    LACTA 2.8 04/24/2020     DATA:  Complete Blood Count:   Recent Labs     04/24/20  2351 04/25/20  1248 04/26/20  0426   WBC 14.0* 9.8 14.3*   HGB 12.0* 9.5* 10.4*   MCV 85.9 88.1 87.1    126* 134*   RBC 4.51 3.54* 3.80*   HCT 38.7* 31.2* 33.1*   MCH 26.7 26.8 27.4   MCHC 31.1 30.4 31.4   RDW 15.4* 13.9 13.9   MPV 9.0 11.3 11.3        PT/INR:    Lab Results   Component Value Date    PROTIME 9.7 04/25/2020    INR 0.9 04/25/2020     PTT:    Lab Results   Component Value Date    APTT 54.5 04/26/2020       Basal Metabolic Profile:   Recent Labs     04/24/20  2351 04/25/20  0731 04/26/20  0425    136 132*   K 5.2 5.3 4.7   BUN 23 30* 53*   CREATININE 1.66* 1.95* 2.24*   CL 97* 97* 95*   CO2 27 26 23      Magnesium:   Lab Results   Component Value Date    MG 2.6 04/26/2020    MG 2.7 04/25/2020     Phosphorus:   Lab Results   Component Value Date    PHOS 3.0 04/25/2020     S. Calcium:  Recent Labs     04/26/20  0425   CALCIUM 8.2*     S. Ionized Calcium:No results for input(s): IONCA in the last 72 hours.       Urinalysis:   Lab Results   Component Value Date    NITRU NEGATIVE 04/25/2020    COLORU YELLOW 04/25/2020    PHUR 5.0 04/25/2020    WBCUA 0 TO 2 04/25/2020    RBCUA 2 TO 5 04/25/2020    MUCUS NOT REPORTED 04/25/2020

## 2020-04-26 NOTE — PLAN OF CARE
Ongoing     Problem: Nutrition  Goal: Optimal nutrition therapy  4/26/2020 1037 by Tao Duong RN  Outcome: Ongoing  4/26/2020 0703 by Renu Foss RN  Outcome: Ongoing     Problem: Restraint Use - Nonviolent/Non-Self-Destructive Behavior:  Goal: Absence of restraint-related injury  Description: Absence of restraint-related injury  4/26/2020 1037 by Tao Duong RN  Outcome: Ongoing  4/26/2020 0703 by Renu Foss RN  Outcome: Met This Shift     Problem: Falls - Risk of:  Goal: Will remain free from falls  Description: Will remain free from falls  4/26/2020 1037 by Tao Duong RN  Outcome: Ongoing  4/26/2020 0703 by Renu Foss RN  Outcome: Ongoing  Goal: Absence of physical injury  Description: Absence of physical injury  4/26/2020 1037 by Tao Duong RN  Outcome: Ongoing  4/26/2020 0703 by Reun Foss RN  Outcome: Ongoing

## 2020-04-26 NOTE — PLAN OF CARE
INTEGRITY  Goal: Skin integrity is maintained or improved  4/26/2020 0852 by Shree Harrison RCP  Outcome: Ongoing  4/26/2020 0703 by Haresh Maki RN  Outcome: Ongoing  4/25/2020 2033 by Brittany Alegria RCP  Outcome: Ongoing

## 2020-04-27 NOTE — PROGRESS NOTES
Critical Care Team - Daily Progress Note      Date and time: 4/27/2020 12:47 PM  Patient's name:  Juancho Bhagat Record Number: [id number]  Patient's account/billing number: [de-identified]  Patient's YOB: 1950  Age: 65 y.o.   Date of Admission: 4/25/2020  4:51 AM  Length of stay during current admission: 2      Primary Care Physician: Alessandro Brown MD  ICU Attending Physician: Dr. Beverly Samuels    Code Status: Full Code    Reason for ICU admission: Acute hypoxic respiratory failure    SUBJECTIVE:     OVERNIGHT EVENTS:         Pt was extubated yesterday  Was on NIV since extubation except for a short period on 3L    This AM, patient is tachycardic and tachypneic with BP in 180s  Increased requirement of FiO2 to 60% from 40%  Appears anxious and improved with fentanyl and Ativan  UO 2080 last 24 hrs (improved from 800 mL the previous day)  Will continue to get diuresis    AWAKE & FOLLOWING COMMANDS:  [] No   [x] Yes    CURRENT VENTILATION STATUS:     [] Ventilator  [x] BIPAP  [] Nasal Cannula [] Room Air      DIARRHEA:                [x] No                [] Yes  (C. Difficile status: [] positive                                                                                                                       [] negative                                                                                                                     [] pending)    VASOPRESSORS:  [x] No    [] Yes    If yes -   [] Levophed       [] Dopamine     [] Vasopressin       [] Dobutamine  [] Phenylephrine         [] Epinephrine    CENTRAL LINES:     [] No   [x] Yes   (Date of Insertion: 4/25/2020)           If yes -     [x] Right IJ     [] Left IJ [] Right Femoral [] Left Femoral                   [] Right Subclavian [] Left Subclavian       VELIZ'S CATHETER:   [] No   [x] Yes  (Date of Insertion: 4/25/2020)     URINE OUTPUT:            [x] Good   [] Low              [] Anuric    OBJECTIVE:     VITAL SIGNS:  BP (!) 169/98 UROBILINOGEN Normal 04/25/2020    BILIRUBINUR NEGATIVE 04/25/2020    GLUCOSEU 3+ 04/25/2020    KETUA NEGATIVE 04/25/2020    AMORPHOUS NOT REPORTED 04/25/2020       CARDIAC ENZYMES: No results for input(s): CKMB, CKMBINDEX, TROPONINI in the last 72 hours. Invalid input(s): CKTOTAL;3  BNP: No results for input(s): BNP in the last 72 hours. LFTS  Recent Labs     04/24/20  2351 04/25/20  0731 04/26/20  0425   ALKPHOS 103 87 79   ALT 35 33 30   AST 33 21 22   BILITOT 0.42 0.35 0.35   BILIDIR  --  0.12 0.11   LABALBU 4.1 3.6 3.4*       AMYLASE/LIPASE/AMMONIA  Recent Labs     04/25/20  0731   LIPASE 45       Last 3 Blood Glucose:   Recent Labs     04/24/20  2351 04/25/20  0219 04/25/20  0731 04/26/20  0425 04/27/20  0455   GLUCOSE 467* 403 382* 432* 240*      HgBA1c:  No results found for: LABA1C      TSH:  No results found for: TSH  ANEMIA STUDIES  Recent Labs     04/25/20  0731   FERRITIN 49     Cultures during this admission:     Blood cultures:                 [] None drawn      [] Negative             []  Positive (Details:  )  Urine Culture:                   [] None drawn      [] Negative             []  Positive (Details:  )  Sputum Culture:               [] None drawn       [] Negative             []  Positive (Details:  )   Endotracheal aspirate:     [] None drawn       [] Negative             []  Positive (Details:  )     ASSESSMENT:     Principal Problem:    Acute on chronic respiratory failure with hypoxia and hypercapnia (HCC)  Active Problems:    Chronic obstructive pulmonary disease (HCC)    Cardiomyopathy (HCC)    Chronic combined systolic and diastolic CHF, NYHA class 3 (formerly [facility])    History of ST elevation myocardial infarction (STEMI)    Ischemic cardiomyopathy    History of Nonsustained ventricular tachycardia ([facility] [address]    COVID-19 virus not detected    Acute kidney injury superimposed on CKD ([facility] [address]    Acute respiratory failure with hypoxia ([facility] [address]  Resolved Problems:    * No resolved hospital problems.

## 2020-04-27 NOTE — PROGRESS NOTES
Physical Therapy  DATE: 2020    NAME: Mary Jane Workman  MRN: [id number]   : 1950    Patient not seen this date for Physical Therapy due to:  [] Blood transfusion in progress  [] Hemodialysis  []  Patient Declined  [] Spine Precautions   [] Strict Bedrest  [] Surgery/ Procedure  [] Testing      [x] Other NIV, very anxious d/t resp issues. Will check . [] PT being discontinued at this time. Patient independent. No further needs. [] PT being discontinued at this time as the patient has been transferred to palliative care. No further needs.     Lonia Fraction, PT

## 2020-04-27 NOTE — PLAN OF CARE
Problem: Respiratory  Intervention: Respiratory assessment  Note: BRONCHOSPASM/BRONCHOCONSTRICTION     [x]         IMPROVE AERATION/BREATH SOUNDS  [x]   ADMINISTER BRONCHODILATOR THERAPY AS APPROPRIATE  [x]   ASSESS BREATH SOUNDS  []   IMPLEMENT AEROSOL/MDI PROTOCOL  [x]   PATIENT EDUCATION AS NEEDED

## 2020-04-27 NOTE — CARE COORDINATION
Extubated yesterday on Bipap continuous, fio2 60%, on heparin and ntg gtt, aicd interrogated, goal is home with brother, has home O2, monitor for SNF needs

## 2020-04-27 NOTE — PLAN OF CARE
Problem: OXYGENATION/RESPIRATORY FUNCTION  Goal: Patient will maintain patent airway  Outcome: Ongoing  Goal: Patient will achieve/maintain normal respiratory rate/effort  Description: Respiratory rate and effort will be within normal limits for the patient  Outcome: Ongoing     Problem: SKIN INTEGRITY  Goal: Skin integrity is maintained or improved  Outcome: Ongoing     Problem: Nutrition  Goal: Optimal nutrition therapy  Outcome: Ongoing     Problem: Falls - Risk of:  Goal: Will remain free from falls  Description: Will remain free from falls  Outcome: Ongoing  Goal: Absence of physical injury  Description: Absence of physical injury  Outcome: Ongoing

## 2020-04-27 NOTE — PROGRESS NOTES
well as low-dose erythromycin.     He presented at [facility] and he was intubated nasally. It was not clear what events had preceded his respiratory distress at home. When EMS arrived the patient was reportedly tripoding in severe respiratory distress and he was given magnesium Solu-Medrol and terbutaline. He did not improve and he was nasally intubated in route. He was later taken to [facility] where a chest x-ray suggested right lower lobe pneumonia. He was started on Rocephin and azithromycin and there was concern for coronavirus infection and the patient had COVID testing negative    2020-extubated    2020-patient has been agitated and has had uncontrolled high blood pressure started on nitroglycerin drip. Current evaluation:2020    BP (!) 169/98   Pulse 105   Temp 98.5 °F (36.9 °C) (Axillary)   Resp 25   Ht 5' 10\" (1.778 m)   Wt 231 lb 11.3 oz (105.1 kg)   SpO2 99%   BMI 33.25 kg/m²     Temperature Range: Temp: 98.5 °F (36.9 °C) Temp  Av.2 °F (36.8 °C)  Min: 97.9 °F (36.6 °C)  Max: 98.5 °F (36.9 °C)  The patient is seen and evaluated at bedside he is awake and alert and he does appear somewhat anxious. He reports some shortness of breath and he is tachypneic at the time of my evaluation. The care was discussed with the nurse at bedside who does reports that the patient was even more anxious earlier in the day and is currently improving    Review of Systems   Constitutional: Negative. Respiratory: Positive for shortness of breath. Cardiovascular: Negative. Gastrointestinal: Negative. Genitourinary: Negative. Musculoskeletal: Negative. Neurological: Negative. Psychiatric/Behavioral: The patient is nervous/anxious. Physical Examination :     Physical Exam  Constitutional:       Appearance: He is well-developed. HENT:      Head: Normocephalic and atraumatic.    Neck:      Musculoskeletal: Normal range of motion and neck supple. Cardiovascular:      Rate and Rhythm: Normal rate. Heart sounds: Normal heart sounds. Pulmonary:      Effort: Pulmonary effort is normal.      Breath sounds: Rales present. Abdominal:      General: Bowel sounds are normal.      Palpations: Abdomen is soft. There is no mass. Tenderness: There is no abdominal tenderness. Musculoskeletal: Normal range of motion. Skin:     General: Skin is warm and dry. Neurological:      Mental Status: He is alert and oriented to person, place, and time. Laboratory data:   I have independently reviewed the followinglabs:  CBC with Differential:   Recent Labs     04/24/20  2351 04/25/20  1248 04/26/20  0426   WBC 14.0* 9.8 14.3*   HGB 12.0* 9.5* 10.4*   HCT 38.7* 31.2* 33.1*    126* 134*   LYMPHOPCT 13*  --  4*   MONOPCT 5  --  6     BMP:   Recent Labs     04/26/20  0425 04/27/20  0455   * 133*   K 4.7 4.3   CL 95* 97*   CO2 23 24   BUN 53* 48*   CREATININE 2.24* 1.77*   MG 2.6 2.6     Hepatic Function Panel:   Recent Labs     04/25/20  0731 04/26/20  0425   PROT 6.7 6.3*   LABALBU 3.6 3.4*   BILIDIR 0.12 0.11   IBILI 0.23 0.24   BILITOT 0.35 0.35   ALKPHOS 87 79   ALT 33 30   AST 21 22     No results for input(s): [facility] in the last 72 hours. Lab Results   Component Value Date    CRP 14.0 (H) 04/25/2020     No results found for: Jacquelyn Blackmonodi 1237     04/24/20  2351 04/25/20  0731   PROCAL 0.18* 0.75*       Imaging Studies:   CXR pending    Cultures:     Culture, Respiratory [[id number]] (Abnormal) Collected: 04/25/20 1016   Order Status: Completed Specimen: Tracheal Aspirate Updated: 04/26/20 1153    Specimen Description . TRACHEAL ASPIRATE    Special Requests NOT REPORTED    Direct Exam < 10 EPITHELIAL CELLS/LPF     <10 NEUTROPHILS/LPF     RARE BUDDING YEAST CELLS SEENAbnormal     Culture CULTURE IN PROGRESS     NORMAL RESPIRATORY AJIT MODERATE GROWTH     Respiratory Virus PCR Panel [[id number]] Collected: 04/26/20 0920 Order Status: Completed Specimen: Nasopharyngeal Swab Updated: 04/26/20 1039    Specimen Description . NASOPHARYNGEAL SWAB    Adenovirus PCR Not Detected    Coronavirus 229E PCR Not Detected    Comment: Coronoviruses detected by this panel are those associated with the clinical common cold. This test will not detect 2019-SARS-COV-2 or other novel coronaviruses. Coronavirus HKU1 PCR Not Detected    Comment: Coronoviruses detected by this panel are those associated with the clinical common cold. This test will not detect 2019-SARS-COV-2 or other novel coronaviruses. Coronavirus NL63 PCR Not Detected    Comment: Coronoviruses detected by this panel are those associated with the clinical common cold. This test will not detect 2019-SARS-COV-2 or other novel coronaviruses. Coronavirus OC43 PCR Not Detected    Comment: Coronoviruses detected by this panel are those associated with the clinical common cold. This test will not detect 2019-SARS-COV-2 or other novel coronaviruses. Human Metapneumovirus PCR Not Detected    Rhino/Enterovirus PCR Not Detected    Influenza A by PCR Not Detected    Influenza A H1 PCR NOT REPORTED    Influenza A H1 (2009) PCR NOT REPORTED    Influenza A H3 PCR NOT REPORTED    Influenza B by PCR Not Detected    Parainfluenza 1 PCR Not Detected    Parainfluenza 2 PCR Not Detected    Parainfluenza 3 PCR Not Detected    Parainfluenza 4 PCR Not Detected    Resp Syncytial Virus PCR Not Detected    Bordetella Parapertussis Not Detected    B Pertussis by PCR Not Detected    Chlamydia pneumoniae By PCR Not Detected    Mycoplasma pneumo by PCR Not Detected    Comment: Performed by multiplexed nucleic acid assay. Strep Pneumoniae Antigen [[id number]] Collected: 04/25/20 0919   Order Status: Completed Updated: 04/25/20 5356    Source . URINE    Strep pneumo Ag NEGATIVE    Comment: Strep pneumoniae antigen not detected      Legionella antigen, urine [[id number]] Collected: 04/25/20 0919   Order Status: Completed Updated: 04/25/20 1074    Legionella Pneumophilia Ag, Urine NEGATIVE    Comment: L. pneumophila serogroup 1 antigen not detected. A negative result does not exclude infection with Leginella pnemophila serogroup 1 nor does   it rule out other microbial-caused respiratory infections of disease caused by other   serogroups of Legionella pneumophila. Medications:      [Held by provider] carvedilol  6.25 mg Oral BID WC    insulin lispro  0-12 Units Subcutaneous TID WC    insulin lispro  0-6 Units Subcutaneous Nightly    cefepime  2 g Intravenous Q12H    furosemide  40 mg Intravenous BID    famotidine (PEPCID) injection  20 mg Intravenous BID    [START ON 4/28/2020] methylPREDNISolone  20 mg Intravenous Daily    insulin glargine  40 Units Subcutaneous Daily    albuterol  2.5 mg Nebulization BID    clopidogrel  75 mg Oral Daily    atorvastatin  10 mg Oral Daily    sodium chloride flush  10 mL Intravenous 2 times per day    ipratropium-albuterol  1 ampule Inhalation 4x daily    docusate  100 mg Oral Daily    aspirin  81 mg Oral Daily           [facility]  Danyell Avila MD  [employer] messaging  OFFICE: [phone]      Electronically signed by Danyell Avila MD on 4/27/2020 at 5:56 PM  Thank you for allowing us to participate in the care of this patient. Please call with questions. This note iscreated with the assistance of a speech recognition program.  While intending to generate a document that actually reflects the content of the visit, the document can still have some errors including those of syntax andsound a like substitutions which may escape proof reading. In such instances, actual meaning can be extrapolated by contextual diversion.

## 2020-04-27 NOTE — PROGRESS NOTES
30.0 - 34.9 Obese Class I    Nutrition Interventions:   Add ONS to meal trays. Encourage oral intake as tolerated/as appropriate. Monitor need for nutrition support.    Continued Inpatient Monitoring, Education Not Indicated    Nutrition Evaluation:   · Evaluation: Progressing toward goals   · Goals: meet greater than 75% of nutrition needs    · Monitoring: Meal Intake, Supplement Intake, Diet Tolerance, I&O, Weight, Pertinent Labs      Electronically signed by Dominique Alonzo RD, LD on 4/27/20 at 3:18 PM EDT    Contact Number: [phone]

## 2020-04-28 NOTE — FLOWSHEET NOTE
Kate Garcia has been awake throughout the day, gradually stronger and breathing with less effort, he was able to use the [facility] with min. Assist. And he has been able to speak with his sister Kassandra Hdz, and also I updated her by phone at the bedside.

## 2020-04-28 NOTE — PROGRESS NOTES
Critical Care Team - Daily Progress Note      Date and time: 4/28/2020 9:28 AM  Patient's name:  Sanjay Myles Record Number: [id number]  Patient's account/billing number: [de-identified]  Patient's YOB: 1950  Age: 65 y.o. Date of Admission: 4/25/2020  4:51 AM  Length of stay during current admission: 3      Primary Care Physician: Tommie Lynn MD  ICU Attending Physician: Dr. Kb Marroquin    Code Status: Full Code    Reason for ICU admission: Acute hypoxic respiratory failure    SUBJECTIVE:     OVERNIGHT EVENTS:         Pt seen and examined bedside  Looks better than yesterday  Was able to sustain on 3L NC oxygen for 3-4 hours last night  On BiPAP since 1 AM. FiO2 50%  UO 3400 mL. Creat and BUN slightly uptrending  Arterial BP in 170s-180s  HR in 80s.  RR 20  Requesting for anxiety medications    AWAKE & FOLLOWING COMMANDS:  [] No   [x] Yes    CURRENT VENTILATION STATUS:     [] Ventilator  [x] BIPAP  [] Nasal Cannula [] Room Air      DIARRHEA:                [x] No                [] Yes  (C. Difficile status: [] positive                                                                                                                       [] negative                                                                                                                     [] pending)    VASOPRESSORS:  [x] No    [] Yes    If yes -   [] Levophed       [] Dopamine     [] Vasopressin       [] Dobutamine  [] Phenylephrine         [] Epinephrine    CENTRAL LINES:     [] No   [x] Yes   (Date of Insertion: 4/25/2020)           If yes -     [x] Right IJ     [] Left IJ [] Right Femoral [] Left Femoral                   [] Right Subclavian [] Left Subclavian       VELIZ'S CATHETER:   [] No   [x] Yes  (Date of Insertion: 4/25/2020)     URINE OUTPUT:            [x] Good   [] Low              [] Anuric    OBJECTIVE:     VITAL SIGNS:  /73   Pulse 74   Temp 97.9 °F (36.6 °C) (Axillary)   Resp 20   Ht 5' 10\" (1.778 m) Meds:   busPIRone  5 mg Oral BID    QUEtiapine  25 mg Oral BID    insulin glargine  5 Units Subcutaneous Once    [START ON 4/29/2020] insulin glargine  45 Units Subcutaneous Daily    carvedilol  6.25 mg Oral BID     insulin lispro  0-12 Units Subcutaneous TID     insulin lispro  0-6 Units Subcutaneous Nightly    cefepime  2 g Intravenous Q12H    furosemide  40 mg Intravenous BID    famotidine (PEPCID) injection  20 mg Intravenous BID    methylPREDNISolone  20 mg Intravenous Daily    albuterol  2.5 mg Nebulization BID    clopidogrel  75 mg Oral Daily    atorvastatin  10 mg Oral Daily    sodium chloride flush  10 mL Intravenous 2 times per day    ipratropium-albuterol  1 ampule Inhalation 4x daily    docusate  100 mg Oral Daily    aspirin  81 mg Oral Daily     Continuous Infusions:   nitroGLYCERIN 90 mcg/min (04/28/20 0914)    dextrose      sodium chloride 10 mL/hr at 04/26/20 0849     PRN Meds:   labetalol, 10 mg, Q4H PRN  fentanNYL, 25 mcg, Q1H PRN    Or  fentanNYL, 50 mcg, Q1H PRN  albuterol, 2.5 mg, As Directed RT PRN  glucose, 15 g, PRN  dextrose, 12.5 g, PRN  glucagon (rDNA), 1 mg, PRN  dextrose, 100 mL/hr, PRN  sodium chloride flush, 10 mL, PRN  potassium chloride, 10 mEq, PRN  potassium chloride, 20 mEq, PRN  promethazine, 12.5 mg, Q6H PRN    Or  ondansetron, 4 mg, Q6H PRN  polyethylene glycol, 17 g, Daily PRN  magnesium sulfate, 1 g, PRN  potassium chloride, 40 mEq, PRN    Or  potassium alternative oral replacement, 40 mEq, PRN  acetaminophen, 650 mg, Q6H PRN    Or  acetaminophen, 650 mg, Q6H PRN        SUPPORT DEVICES: [] Ventilator [x] BIPAP  [x] Nasal Cannula [] Room Air    Lab Results   Component Value Date    PHART 7.275 04/25/2020    DUV7RSW 58.7 04/25/2020    PO2ART 115.0 04/25/2020    DWZ1ZSG 27.2 04/25/2020    ZWB4KML 28 04/26/2020    W7VZRCZZ 98.1 04/25/2020    FIO2 35.0 04/26/2020     Lactic Acid:   Lab Results   Component Value Date    LACTA NOT REPORTED 04/25/2020 LACTA 1.8 04/25/2020    LACTA 2.8 04/24/2020     DATA:  Complete Blood Count:   Recent Labs     04/25/20  1248 04/26/20  0426 04/28/20  0518   WBC 9.8 14.3* 10.6   HGB 9.5* 10.4* 9.0*   MCV 88.1 87.1 86.0   * 134* 132*   RBC 3.54* 3.80* 3.36*   HCT 31.2* 33.1* 28.9*   MCH 26.8 27.4 26.8   MCHC 30.4 31.4 31.1   RDW 13.9 13.9 13.9   MPV 11.3 11.3 11.0        PT/INR:    Lab Results   Component Value Date    PROTIME 9.7 04/25/2020    INR 0.9 04/25/2020     PTT:    Lab Results   Component Value Date    APTT 118.6 04/28/2020       Basal Metabolic Profile:   Recent Labs     04/26/20  0425 04/27/20  0455 04/28/20  0518   * 133* 137   K 4.7 4.3 4.7   BUN 53* 48* 57*   CREATININE 2.24* 1.77* 2.04*   CL 95* 97* 99   CO2 23 24 27      Magnesium:   Lab Results   Component Value Date    MG 2.5 04/28/2020    MG 2.6 04/27/2020    MG 2.6 04/26/2020     Phosphorus:   Lab Results   Component Value Date    PHOS 3.0 04/25/2020     S. Calcium:  Recent Labs     04/28/20  0518   CALCIUM 8.4*     S. Ionized Calcium:No results for input(s): IONCA in the last 72 hours. Urinalysis:   Lab Results   Component Value Date    NITRU NEGATIVE 04/25/2020    COLORU YELLOW 04/25/2020    PHUR 5.0 04/25/2020    WBCUA 0 TO 2 04/25/2020    RBCUA 2 TO 5 04/25/2020    MUCUS NOT REPORTED 04/25/2020    TRICHOMONAS NOT REPORTED 04/25/2020    YEAST NOT REPORTED 04/25/2020    BACTERIA FEW 04/25/2020    SPECGRAV 1.025 04/25/2020    LEUKOCYTESUR NEGATIVE 04/25/2020    UROBILINOGEN Normal 04/25/2020    BILIRUBINUR NEGATIVE 04/25/2020    GLUCOSEU 3+ 04/25/2020    KETUA NEGATIVE 04/25/2020    AMORPHOUS NOT REPORTED 04/25/2020       CARDIAC ENZYMES: No results for input(s): CKMB, CKMBINDEX, TROPONINI in the last 72 hours. Invalid input(s): CKTOTAL;3  BNP: No results for input(s): BNP in the last 72 hours.     LFTS  Recent Labs     04/26/20  0425   ALKPHOS 79   ALT 30   AST 22   BILITOT 0.35   BILIDIR 0.11   LABALBU 3.4* patient contact, management of life support systems, review of data including imaging and labs, discussions with other team members and physicians at least 27   Min so far today, excluding procedures. Treatment plan Discussed with nursing staff in detail , all questions answered . Electronically signed by Ava Ren MD on   4/28/20 at 1:25 PM EDT    Please note that this chart was generated using voice recognition Dragon dictation software. Although every effort was made to ensure the accuracy of this automated transcription, some errors in transcription may have occurred.

## 2020-04-28 NOTE — PROGRESS NOTES
Physical Therapy  DATE: 2020    NAME: Dirk Barrera  MRN: [id number]   : 1950    Patient not seen this date for Physical Therapy due to:  [] Blood transfusion in progress  [] Hemodialysis  []  Patient Declined  [] Spine Precautions   [] Strict Bedrest  [] Surgery/ Procedure  [] Testing      [x] Other RESP issues. On NIV, elevated BP       [] PT being discontinued at this time. Patient independent. No further needs. [] PT being discontinued at this time as the patient has been transferred to palliative care. No further needs.     Rigo Neumann, PT

## 2020-04-28 NOTE — PROGRESS NOTES
Occupational Therapy    Occupational Therapy Not Seen Note    DATE: 2020  Name: Loni Schlatter  : 1950  MRN: [id number]    Patient not available for Occupational Therapy due to:    Pt not appropriate for OT eval this date due to respiratory issues, pt on NIV, and has elevated BP       Next Scheduled Treatment: 2020    Electronically signed by JACOBO Davis on 2020 at 11:52 AM

## 2020-04-28 NOTE — PROGRESS NOTES
[facility]  Progress Note    Haley Sanchez  MRN: [id number]  Date: 4/28/2020    Reason for F/U :   Acute respiratory failure, pneumonia    Impression :   1. Acute respiratory failure status post extubation 4/26/2020  · COVID-19 testing negative  2. Chronic obstructive pulmonary disease with concern for acute exacerbation  · Pseudomonas aeruginosa isolated from the sputum  3. Elevated troponin concerning for NSTEMI  4. Multivessel coronary artery disease  5. Ischemic cardiomyopathy  6. Combined systolic and diastolic heart failure  7. Right lower lobe atelectasis  8. Acute kidney injury on chronic kidney disease  · Improving  9. Uncontrolled hypertension  10. Thrombocytopenia    Recommendations:   · Continue intravenous antimicrobial therapy with cefepime for the pseudomonas aeruginosa isolated in the sputum   · The patient's overall clinical status has improved today though he is still requiring high flow O2 by nasal cannula.   · Chest x-ray seems to suggest continued CHF though the left-sided effusions are improved  · Continue diuretic therapy and will follow his clinical progress    Infection Control Recommendations:   Universal precautions    Discharge Planning:   Estimated Length of IV antimicrobials: 5 days of cefepime through May 1, 2020  Patient will need Midline Catheter Insertion/ PICC line Insertion: No  Patient will need: Home IV , [facility],  SNF,  LTAC: Undetermined  Patient willneed outpatient wound care: No    MedicalDecision making / Summary of Stay:   Candido Escobedo has multiple medical problems including multivessel coronary artery disease not amenable to revascularization, LV systolic dysfunction, ischemic cardiomyopathy [ejection fraction 25%] status post ICD placement, essential hypertension, chronic kidney disease, hypertension, BPH with chronic indwelling William catheter, diabetes mellitus type 2 and COPD among other medical problems  The patient has chronic respiratory dysfunction Rosmery Ny TRACHEAL ASPIRATE    Special Requests NOT REPORTED    Direct Exam < 10 EPITHELIAL CELLS/LPF     <10 NEUTROPHILS/LPF     RARE BUDDING YEAST CELLS SEENAbnormal     Culture PSEUDOMONAS AERUGINOSA LIGHT GROWTHAbnormal      NORMAL RESPIRATORY AJIT MODERATE GROWTH   Pseudomonas aeruginosa (1)     Antibiotic Interpretation NIC Status    amikacin   Final     NOT REPORTED   ceFAZolin   Final     NOT REPORTED   cefepime Sensitive  Final     2  SUSCEPTIBLE   ciprofloxacin Sensitive  Final     <=0.25  SUSCEPTIBLE   gentamicin Sensitive  Final     <=1  SUSCEPTIBLE   meropenem   Final     NOT REPORTED   tigecycline   Final     NOT REPORTED   tobramycin Sensitive  Final     <=1  SUSCEPTIBLE   piperacillin-tazobactam Sensitive  Final     8  SUSCEPTIBLE       Respiratory Virus PCR Panel [[id number]] Collected: 04/26/20 0920   Order Status: Completed Specimen: Nasopharyngeal Swab Updated: 04/26/20 1039    Specimen Description . NASOPHARYNGEAL SWAB    Adenovirus PCR Not Detected    Coronavirus 229E PCR Not Detected    Comment: Coronoviruses detected by this panel are those associated with the clinical common cold. This test will not detect 2019-SARS-COV-2 or other novel coronaviruses. Coronavirus HKU1 PCR Not Detected    Comment: Coronoviruses detected by this panel are those associated with the clinical common cold. This test will not detect 2019-SARS-COV-2 or other novel coronaviruses. Coronavirus NL63 PCR Not Detected    Comment: Coronoviruses detected by this panel are those associated with the clinical common cold. This test will not detect 2019-SARS-COV-2 or other novel coronaviruses. Coronavirus OC43 PCR Not Detected    Comment: Coronoviruses detected by this panel are those associated with the clinical common cold. This test will not detect 2019-SARS-COV-2 or other novel coronaviruses.         Human Metapneumovirus PCR Not Detected    Rhino/Enterovirus PCR Not Detected    Influenza A by PCR Not

## 2020-04-29 NOTE — PROGRESS NOTES
Critical care team - Resident sign-out to medicine service      Date and time: 4/29/2020 1:59 PM  Patient's name:  Nohelia Lino Record Number: [id number]  Patient's account/billing number: [de-identified]  Patient's YOB: 1950  Age: 65 y.o. Date of Admission: 4/25/2020  4:51 AM  Length of stay during current admission: 4    Primary Care Physician: Francesco Mac MD    Code Status: Full Code    Mode of physician to physician communication:        [] Via telephone   [x] In person     Date and time of sign-out: 4/29/2020 1:59 PM    Accepting Internal Medicine resident: Dr. Saulo Owens    Accepting Medicine team: IM Team     Accepting team's attending: Dr. Arti Chadwick    Patient's current ICU Bed:  126     Patient's assigned bed on floor:  403        [] Med-Surg Monitored [] Step-down       [] Psychiatry ICU       [] Psych floor     Reason for ICU admission:     Acute hypoxic respiratory failure    ICU course summary:     Asher Newton is a 65 y.o. M w/ Past medical history significant for ischemic cardiomyopathy s/p ICD placement 9/25/19, essential hypertension, NSVT, CKD, COPD, hypertension, BPH with prior history of indwelling William's catheter, severe multivessel coronary artery disease not amendable to revascularization, severe left ventricular systolic dysfunction due to ischemic cardiomyopathy-LVEF 25%-NYHA class II at baseline-s/p implantable cardioverter-defibrillator 9/25/19, type 2 diabetes on long-term insulin takes 35 units twice daily and oral hypoglycemic agents, CKD baseline creatinine 1.5- 1.8.     Per EMS report on the chart review it appears the patient was initially tripoding, and in severe respiratory distress, and EMS gave him magnesium, Solu-Medrol, terbutaline. However his condition did not improve and thereafter he was nasally intubated in route. In the ED at [facility], patient did not receive any fluids, however did receive 5 units of subcu insulin.   Had a chest x-ray done that sugars TID AC & HS  6. Hypertension. Increase Coreg 12.5 BID. DC nitro drip and resume Imdur 30 mg  7. Buspar 5 mg BID and Seroquel 25 mg BID for anxiety  8. Heparin SC for DVT prophylaxis  9. Pepcid for GI prophylaxis  10. Diabetic diet as tolerated. 11. Central line removed. Will remove baig today          Above mentioned assessment and plan was discussed by me with the admitting medicine resident. The medicine team assigned to the patient by medicine admitting resident will be following up the patient from now onwards on the floor. Casandra Leslie M.D.   Critical care resident,  Department of Internal Medicine/ Critical care  [facility]             4/29/2020, 1:59 PM

## 2020-04-29 NOTE — PROGRESS NOTES
ICU PATIENT TRANSFER NOTE        Patient:  Isabelle Al  YOB: 1950    MRN: [id number]     Acct: [de-identified]     Admit date: 4/25/2020    Code Status:-   Full code    Reason for ICU Admission:-   Acute hypoxic respiratory failure    SUPPORT DEVICES: [] Ventilator [] BIPAP  [x] Nasal Cannula (high flow) [] Room Air    Consultations:- [x] Cardiology [] Nephrology  [] Hemo onco  [] GI                               [x] ID [] ENT  [] Rheum [] Endo    NUTRITION:  [] NPO [] Tube Feeding [] TPN  [x] PO    Central Lines:- [x] No   [] Yes      Pt seen and Chart reviewed. ICU COURSE :-     Isabelle Al is a 75-year-old male with PMH of   · essential HTN  · NSVT  · CKD (baseline 1.5-1.8)  · COPD  · DM type II  · severe multivessel CAD not amenable to revascularization  · severe left ventricular systolic dysfunction due to ICM with EF 20 to 25% s/p ICD placement 9/25/2019    presented with severe respiratory distress. Per EMS report, it appears the patient was initially tripoding and in severe respiratory distress. Was given magnesium, Solu-Medrol, terbutaline. However his condition did not improve and thereafter he was nasally intubated enroute. In [facility] ED, chest x-ray showed concerns for RLL pneumonia. Received 1 dose each of Rocephin and azithromycin. Lactic acid 2.8. With concern for COVID-19, transferred to [place] ICU. COVID 19 tested negative. Patient extubated on 4/26/2020, currently on 3 L nasal cannula which is his home O2. Respiratory culture showed mild Pseudomonas growth, was started on cefepime for 5 days per ID, though with low suspicion for pneumonia. Patient vitally and hemodynamically stable enough to be transferred out of ICU to stepdown unit.       Physical Exam:  Vitals: BP (!) 144/65   Pulse 73   Temp 97.9 °F (36.6 °C) (Oral)   Resp 20   Ht 5' 10\" (1.778 m)   Wt 195 lb 8.8 oz (88.7 kg)   SpO2 95%   BMI 28.06 kg/m²   24 hour Continuous Infusions:   dextrose      sodium chloride 10 mL/hr at 04/26/20 0849     PRN Meds:sodium chloride, labetalol, fentanNYL **OR** fentanNYL, albuterol, glucose, dextrose, glucagon (rDNA), dextrose, sodium chloride flush, potassium chloride, potassium chloride, promethazine **OR** ondansetron, polyethylene glycol, magnesium sulfate, potassium chloride **OR** potassium alternative oral replacement **OR** [DISCONTINUED] potassium chloride, acetaminophen **OR** acetaminophen    Objective:    CBC:   Recent Labs     04/28/20  0518   WBC 10.6   HGB 9.0*   *     BMP:    Recent Labs     04/27/20  0455 04/28/20  0518 04/29/20  0407   * 137 141   K 4.3 4.7 4.2   CL 97* 99 99   CO2 24 27 29   BUN 48* 57* 59*   CREATININE 1.77* 2.04* 1.89*   GLUCOSE 240* 219* 126*     Calcium:  Recent Labs     04/29/20  0407   CALCIUM 8.8     Magnesium:  Recent Labs     04/29/20  0407   MG 2.6     Glucose:  Recent Labs     04/29/20  0831 04/29/20  1146 04/29/20  1620   POCGLU 112* 246* 249*       Assessment:  Patient Active Problem List   Diagnosis    [facility]    Acute respiratory failure with hypoxia and hypercapnia (HCC)    Cardiomyopathy ([facility]    Chronic combined systolic and diastolic CHF, NYHA class 3 ([facility]    Coronary arteriosclerosis in native artery    History of ST elevation myocardial infarction (STEMI)    Ischemic cardiomyopathy    History of Nonsustained ventricular tachycardia (HCC)    Presence of stent in coronary artery    Acute on chronic respiratory failure with hypoxia and hypercapnia (HCC)    Suspected COVID-19 virus infection    COVID-19 virus not detected    Acute kidney injury superimposed on CKD ([facility]    Acute respiratory failure with hypoxia (HCC)    Acute on chronic systolic congestive heart failure (HCC)    Acute cardiogenic pulmonary edema (HCC)         Plan:    1.  Acute on chronic hypoxic hypercapnic respiratory failure due to #2 and #3: s/p and taken ros and hpi , including pertinent history and exam findings,  with the resident. I have reviewed the key elements of all parts of the encounter with the resident. I agree with the assessment, plan and orders as documented by the resident.       Electronically signed by Yani Stein MD

## 2020-04-29 NOTE — PROGRESS NOTES
Pharmacy Note     Renal Dose Adjustment    Jorge Luis Ricketts is a 65 y.o. male. Pharmacist assessment of renally cleared medications. Recent Labs     04/28/20  0518 04/29/20  0407   BUN 57* 59*       Recent Labs     04/28/20  0518 04/29/20  0407   CREATININE 2.04* 1.89*       Estimated Creatinine Clearance: 40 mL/min (A) (based on SCr of 1.89 mg/dL (H)).   Estimated CrCl using Ideal Body Weight: 36.5 mL/min (based on IBW 73 kg)    Height:   Ht Readings from Last 1 Encounters:   04/25/20 5' 10\" (1.778 m)     Weight:  Wt Readings from Last 1 Encounters:   04/29/20 195 lb 8.8 oz (88.7 kg)       The following medication dose has been adjusted based upon renal function per P&T Guidelines:             Pepcid IV BID to PO Daily    Lucinda Bhardwaj PharmD BCPS  4/29/2020 10:09 AM

## 2020-04-29 NOTE — PROGRESS NOTES
Critical Care Team - Daily Progress Note      Date and time: 4/29/2020 8:04 AM  Patient's name:  Abby Najera Record Number: [id number]  Patient's account/billing number: [de-identified]  Patient's YOB: 1950  Age: 65 y.o. Date of Admission: 4/25/2020  4:51 AM  Length of stay during current admission: 4      Primary Care Physician: Jena Astorga MD  ICU Attending Physician: Dr. Jeanie Connell    Code Status: Full Code    Reason for ICU admission: Acute hypoxic respiratory failure    SUBJECTIVE:     OVERNIGHT EVENTS:         Pt seen and examined bedside  Appears comfortable on NC oxygen 3L  Used HF oxygen overnight until 4 AM and transitioned to 3L NC  He got up to use bathroom yesterday, felt fine  Tolerating diet  SBP 150s-160s  HR and RR stable  UO 3890 last 224 hrs.  On Nitro drip weaning off, currently on 15  BUN stable and creat improving  CXR markedly improved    AWAKE & FOLLOWING COMMANDS:  [] No   [x] Yes    CURRENT VENTILATION STATUS:     [] Ventilator  [] BIPAP  [x] Nasal Cannula [] Room Air      DIARRHEA:                [x] No                [] Yes  (C. Difficile status: [] positive                                                                                                                       [] negative                                                                                                                     [] pending)    VASOPRESSORS:  [x] No    [] Yes    If yes -   [] Levophed       [] Dopamine     [] Vasopressin       [] Dobutamine  [] Phenylephrine         [] Epinephrine    CENTRAL LINES:     [x] No   [] Yes   (Date of Insertion:)        Removed 4/28/20   If yes -     [] Right IJ     [] Left IJ [] Right Femoral [] Left Femoral                   [] Right Subclavian [] Left Subclavian       VELIZ'S CATHETER:   [] No   [x] Yes  (Date of Insertion: 4/25/2020)     URINE OUTPUT:            [x] Good   [] Low              [] Anuric    OBJECTIVE:     VITAL SIGNS:  /81   Pulse peripheral pulses normal, no clubbing or cyanosis. Bilateral pedal edema present.   SKIN: normal coloration and turgor    MEDICATIONS:  Scheduled Meds:   busPIRone  5 mg Oral BID    QUEtiapine  25 mg Oral BID    insulin glargine  45 Units Subcutaneous Daily    gabapentin  400 mg Oral BID    heparin (porcine)  5,000 Units Subcutaneous 3 times per day    carvedilol  6.25 mg Oral BID WC    insulin lispro  0-12 Units Subcutaneous TID WC    insulin lispro  0-6 Units Subcutaneous Nightly    cefepime  2 g Intravenous Q12H    furosemide  40 mg Intravenous BID    famotidine (PEPCID) injection  20 mg Intravenous BID    methylPREDNISolone  20 mg Intravenous Daily    albuterol  2.5 mg Nebulization BID    clopidogrel  75 mg Oral Daily    atorvastatin  10 mg Oral Daily    sodium chloride flush  10 mL Intravenous 2 times per day    ipratropium-albuterol  1 ampule Inhalation 4x daily    docusate  100 mg Oral Daily    aspirin  81 mg Oral Daily     Continuous Infusions:   nitroGLYCERIN 15 mcg/min (04/29/20 0700)    dextrose      sodium chloride 10 mL/hr at 04/26/20 0849     PRN Meds:   sodium chloride, 1 spray, PRN  labetalol, 10 mg, Q4H PRN  fentanNYL, 25 mcg, Q1H PRN    Or  fentanNYL, 50 mcg, Q1H PRN  albuterol, 2.5 mg, As Directed RT PRN  glucose, 15 g, PRN  dextrose, 12.5 g, PRN  glucagon (rDNA), 1 mg, PRN  dextrose, 100 mL/hr, PRN  sodium chloride flush, 10 mL, PRN  potassium chloride, 10 mEq, PRN  potassium chloride, 20 mEq, PRN  promethazine, 12.5 mg, Q6H PRN    Or  ondansetron, 4 mg, Q6H PRN  polyethylene glycol, 17 g, Daily PRN  magnesium sulfate, 1 g, PRN  potassium chloride, 40 mEq, PRN    Or  potassium alternative oral replacement, 40 mEq, PRN  acetaminophen, 650 mg, Q6H PRN    Or  acetaminophen, 650 mg, Q6H PRN       SUPPORT DEVICES: [] Ventilator [] BIPAP  [x] Nasal Cannula [] Room Air    Lab Results   Component Value Date    PHART 7.275 04/25/2020    FQK0ALI 58.7 04/25/2020    PO2ART 115.0 in the last 72 hours. AMYLASE/LIPASE/AMMONIA  No results for input(s): AMYLASE, LIPASE, AMMONIA in the last 72 hours. Last 3 Blood Glucose:   Recent Labs     04/27/20  0455 04/28/20  0518 04/29/20  0407   GLUCOSE 240* 219* 126*      HgBA1c:  No results found for: LABA1C      TSH:  No results found for: TSH  ANEMIA STUDIES  No results for input(s): LABIRON, TIBC, FERRITIN, CTXKPNRL17, FOLATE, OCCULTBLD in the last 72 hours. Cultures during this admission:     Blood cultures:                 [] None drawn      [] Negative             []  Positive (Details:  )  Urine Culture:                   [] None drawn      [] Negative             []  Positive (Details:  )  Sputum Culture:               [] None drawn       [] Negative             [x]  Positive (Details: Pseudomonas light growth)   Endotracheal aspirate:     [] None drawn       [] Negative             []  Positive (Details:  )     ASSESSMENT:     Principal Problem:    Acute on chronic respiratory failure with hypoxia and hypercapnia (HCC)  Active Problems:    Chronic obstructive pulmonary disease (HCC)    Cardiomyopathy (HCC)    Chronic combined systolic and diastolic CHF, NYHA class 3 (HCC)    History of ST elevation myocardial infarction (STEMI)    Ischemic cardiomyopathy    History of Nonsustained ventricular tachycardia ([facility] [address]    COVID-19 virus not detected    Acute kidney injury superimposed on CKD ([facility] [address]    Acute respiratory failure with hypoxia (HCC)    Acute on chronic systolic congestive heart failure ([facility] [address]    Acute cardiogenic pulmonary edema (HCC)  Resolved Problems:    * No resolved hospital problems. *    PLAN:     1. Acute on chronic hypoxic hypercapnic respiratory failure  Secondary to acute on chronic systolic and diastolic CHF  COPD with RLL infiltrate. Low suspicion for pneumonia    Extubated 4/26/2020 1 PM. Currently on 3L NC oxygen, uses the same at home  CXR markedly improved. Stop Nitro drip and continue Lasix 40 mg IV BID today.  BID PO time caring for this patient with life threatening, unstable organ failure, including direct patient contact, management of life support systems, review of data including imaging and labs, discussions with other team members and physicians at least 27   Min so far today, excluding procedures. Treatment plan Discussed with nursing staff in detail , all questions answered . Electronically signed by Kate Barboza MD on   4/29/20 at 5:13 PM EDT    Please note that this chart was generated using voice recognition Dragon dictation software. Although every effort was made to ensure the accuracy of this automated transcription, some errors in transcription may have occurred.

## 2020-04-29 NOTE — PROGRESS NOTES
4. 22 4.47   46 - 49 2.40 2.57 2.76 2.94 3.14 3.33 3.54 3.75 46 - 49 2.70 2.92 3.14 3.37 3.61 3.85 4.10 4.36   50 - 53 2.31 2.48 2.66 2.85 3.04 3.24 3.45 3.66 50 - 53 2.58 2.80 3.02 3.25 3.49 3.73 3.98 4.24   54 - 57 2.21 2.38 2.57 2.75 2.95 3.14 3.35 3.56 54 - 57 2.46 2.67 2.89 3.12 3.36 3.60 3.85 4.11   58 - 61 2.10 2.28 2.46 2.65 2.84 3.04 3.24 3.45 58 - 61 2.32 2.54 2.76 2.99 3.23 3.47 3.72 3.98   62 - 65 1.99 2.17 2.35 2.54 2.73 2.93 3.13 3.34 62 - 65 2.19 2.40 2.62 2.85 3.09 3.33 3.58 3.84   66 - 69 1.88 2.05 2.23 2.42 2.61 2.81 3.02 3.23 66 - 69 2.04 2.26 2.48 2.71 2.95 3.19 3.44 3.70   70+ 1.82 1.99 2.17 2.36 2.55 2.75 2.95 3.16 70+ 1.97 2.19 2.41 2.64 2.87 3.12 3.37 3.62             Predicted Peak Expiratory Flow Rate                                       Height (in)  Female       Height (in) Male           Age 64 63 56 61 58 73 78 74 Age            21 344 357 372 387 402 417 432 446  60 62 64 66 68 70 72 74 76   25 337 352 366 381 396 411 426 441 25 447 476 [phone] [phone]   30 329 344 359 374 389 404 419 434 30 437 466 494 523 [phone] 667   35 322 337 351 366 381 396 411 426 35 426 455 484 512 541 [phone]   40 314 329 344 359 374 389 404 419 40 416 445 473 [phone] 617 647   45 307 322 336 351 366 381 396 411 45 405 434 463 491 520 549 577 606 636   50 299 314 329 344 359 374 389 404 50 395 424 452 481 510 538 567 596 625   55 292 307 321 336 351 366 381 396 55 384 413 442 470 499 528 556 585 615   60 284 299 314 329 344 359 374 389 60 374 403 431 460 489 517 546 575 605   65 277 292 306 321 336 351 366 381 65 363 392 421 449 478 507 535 564 594   70 269 284 299 314 329 344 359 374 70 353 382 410 439 468 [phone] 583   75 261 274 289 305 319 334 348 364 75 344 372 400 429 458 487 515 544 573   80 253 266 282 296 312 327 342 356 80 335 362 390 419 448 476 [phone]           [x]   PATIENT EDUCATION AS NEEDED

## 2020-04-29 NOTE — PROGRESS NOTES
Occupational Therapy Not Seen Note    DATE: 2020  Name: Katie Madera  : 1950  MRN: [id number]    Patient not available for Occupational Therapy due to: Other: eating breakfast on first attempt, pt coughing. On return, pt reports not feeling well.     Next Scheduled Treatment: Check PM as able or 2020    Electronically signed by JACOBO Garcia on 2020 at 10:02 AM

## 2020-04-30 NOTE — PLAN OF CARE
[]  Infiltrates and/or consolidation  []  Unavailable []  Mucus Plugging and or lobar consolidation  []  Unavailable   Cough []  Strong, productive cough []  Weak productive cough []  No cough or weak non-productive cough   WINSOME PARK  10:28 AM                            FEMALE                                  MALE                            FEV1 Predicted Normal Values                        FEV1 Predicted Normal Values          Age                                     Height in Feet and Inches       Age                                     Height in Feet and Inches       4' 11\" 5' 1\" 5' 3\" 5' 5\" 5' 7\" 5' 9\" 5' 11\" 6' 1\"  4' 11\" 5' 1\" 5' 3\" 5' 5\" 5' 7\" 5' 9\" 5' 11\" 6' 1\"   42 - 45 2.49 2.66 2.84 3.03 3.22 3.42 3.62 3.83 42 - 45 2.82 3.03 3.26 3.49 3.72 3.96 4.22 4.47   46 - 49 2.40 2.57 2.76 2.94 3.14 3.33 3.54 3.75 46 - 49 2.70 2.92 3.14 3.37 3.61 3.85 4.10 4.36   50 - 53 2.31 2.48 2.66 2.85 3.04 3.24 3.45 3.66 50 - 53 2.58 2.80 3.02 3.25 3.49 3.73 3.98 4.24   54 - 57 2.21 2.38 2.57 2.75 2.95 3.14 3.35 3.56 54 - 57 2.46 2.67 2.89 3.12 3.36 3.60 3.85 4.11   58 - 61 2.10 2.28 2.46 2.65 2.84 3.04 3.24 3.45 58 - 61 2.32 2.54 2.76 2.99 3.23 3.47 3.72 3.98   62 - 65 1.99 2.17 2.35 2.54 2.73 2.93 3.13 3.34 62 - 65 2.19 2.40 2.62 2.85 3.09 3.33 3.58 3.84   66 - 69 1.88 2.05 2.23 2.42 2.61 2.81 3.02 3.23 66 - 69 2.04 2.26 2.48 2.71 2.95 3.19 3.44 3.70   70+ 1.82 1.99 2.17 2.36 2.55 2.75 2.95 3.16 70+ 1.97 2.19 2.41 2.64 2.87 3.12 3.37 3.62             Predicted Peak Expiratory Flow Rate                                       Height (in)  Female       Height (in) Male           Age 58 63 61 63 56 77 78 74 Age            20 985 788 656 660 047 619 959 275  13 46 04 49 65 70 72 74 76   25 337 352 366 381 396 411 426 441 25 447 476 505 533 562 [phone] 677   30 329 344 359 374 389 404 419 434 30 437 466 494 523 552 580 609 638 667   35 322 337 351 366 381 396 411 426 35 426 455 484 512 541 [phone]   40 314 329 344

## 2020-04-30 NOTE — PROGRESS NOTES
BRONCHOSPASM/BRONCHOCONSTRICTION     [x]         IMPROVE AERATION/BREATH SOUNDS  [x]   ADMINISTER BRONCHODILATOR THERAPY AS APPROPRIATE  [x]   ASSESS BREATH SOUNDS  [x]   IMPLEMENT AEROSOL/MDI PROTOCOL  [x]   PATIENT EDUCATION AS NEEDED    NON INVASIVE VENTILATION  PROVIDE OPTIMAL VENTILATION/ACCEPTABLE SP02  IMPLEMENT NON INVASIVE VENTILATION PROTOCOL  ASSESSMENT SKIN INTEGRITY  PATIENT EDUCATION AS NEEDED  BIPAP AS NEEDED    PROVIDE ADEQUATE OXYGENATION WITH ACCEPTABLE SP02/ABG'S    [x]  IDENTIFY APPROPRIATE OXYGEN THERAPY  [x]   MONITOR SP02/ABG'S AS NEEDED   [x]   PATIENT EDUCATION AS NEEDED

## 2020-04-30 NOTE — PROGRESS NOTES
[facility]  Internal Medicine Teaching Residency Program  Inpatient Daily Progress Note  ______________________________________________________________________________    Patient: Juma Duong  YOB: 1950   [place] Lights: [de-identified]     Room: [address] [place] [address]  Admit date: 4/25/2020  Today's date: 04/30/20  Number of days in the hospital: 5    SUBJECTIVE     Admitting Diagnosis: Acute on chronic respiratory failure with hypoxia and hypercapnia (HCC)    CC: Severe respiratory distress    Patient seen and examined bedside. Labs and chart reviewed. No acute overnight events. Sleeping comfortably in bed. Arousable on verbal commands. Oriented x3. Cooperative with exam.  Follows commands. Saturating well on high flow nasal cannula. Intermittently using BiPAP. He uses home oxygen 3 L. On IV 40 Mg Lasix twice daily. Excellent diuresis. Creatinine slightly worsened 1.94. Afebrile. No leukocytosis. Continuing on IV cefepime. Vital and hemodynamically stable. 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     Per EMS report, it appears the patient was initially tripoding and in severe respiratory distress.   Was given magnesium, Solu-Medrol, Cooperative. Follows commands.     Medications:  Scheduled Medications:    ipratropium-albuterol  1 ampule Inhalation Q4H    isosorbide mononitrate  30 mg Oral Daily    carvedilol  12.5 mg Oral BID     famotidine  20 mg Oral Daily    hydrALAZINE  25 mg Oral 3 times per day    busPIRone  5 mg Oral BID    QUEtiapine  25 mg Oral BID    insulin glargine  45 Units Subcutaneous Daily    gabapentin  400 mg Oral BID    heparin (porcine)  5,000 Units Subcutaneous 3 times per day    insulin lispro  0-12 Units Subcutaneous TID     insulin lispro  0-6 Units Subcutaneous Nightly    cefepime  2 g Intravenous Q12H    furosemide  40 mg Intravenous BID    albuterol  2.5 mg Nebulization BID    clopidogrel  75 mg Oral Daily    atorvastatin  10 mg Oral Daily    sodium chloride flush  10 mL Intravenous 2 times per day    docusate  100 mg Oral Daily    aspirin  81 mg Oral Daily     Continuous Infusions:    dextrose      sodium chloride 10 mL/hr at 04/26/20 0849     PRN Medicationssodium chloride, 1 spray, PRN  labetalol, 10 mg, Q4H PRN  fentanNYL, 25 mcg, Q1H PRN    Or  fentanNYL, 50 mcg, Q1H PRN  albuterol, 2.5 mg, As Directed RT PRN  glucose, 15 g, PRN  dextrose, 12.5 g, PRN  glucagon (rDNA), 1 mg, PRN  dextrose, 100 mL/hr, PRN  sodium chloride flush, 10 mL, PRN  potassium chloride, 10 mEq, PRN  potassium chloride, 20 mEq, PRN  promethazine, 12.5 mg, Q6H PRN    Or  ondansetron, 4 mg, Q6H PRN  polyethylene glycol, 17 g, Daily PRN  magnesium sulfate, 1 g, PRN  potassium chloride, 40 mEq, PRN    Or  potassium alternative oral replacement, 40 mEq, PRN  acetaminophen, 650 mg, Q6H PRN    Or  acetaminophen, 650 mg, Q6H PRN        Diagnostic Labs:  CBC:   Recent Labs     04/28/20 0518 04/30/20  0519   WBC 10.6 10.2   RBC 3.36* 4.28   HGB 9.0* 11.9*   HCT 28.9* 35.8*   MCV 86.0 83.6   RDW 13.9 13.8   * 105*     BMP:   Recent Labs     04/28/20 0518 04/29/20  0407 04/30/20  0519    141 140   K 4.7 4.2 4.4 ([facility] [address]    Acute cardiogenic pulmonary edema (HCC)  Resolved Problems:    * No resolved hospital problems. *    1. Acute on chronic hypoxic hypercapnic respiratory failure due to #2 and #3: s/p intubation on 4/25/2020, extubated on 4/26/2020. This a.m., patient is still requiring high flow nasal cannula O2 25 L. Wean off high flow as tolerated. Goal to wean down to his baseline requirement, 3 L nasal cannula O2.  2. Acute on chronic systolic diastolic CHF: Off nitro drip. Switch IV Lasix to oral 40 mg twice daily. Continue Imdur, Coreg 12.5 mg twice daily. Daily weights. Strict I's and O's. Fluid restriction. 3. COPD with RLL infiltrate: Low suspicion for pneumonia. Respiratory cultures showed mild Pseudomonas growth. Continue cefepime for total 5 days, stop date 5/1/2020.  s/p 5 days of steroids. Breathing treatments. Increase frequency of DuoNeb to every 4 hours. RT aerosol protocol. 4. COVID-19 tested negative. 5. s/p AICD for primary prevention: AICD interrogation. 6. CAD s/p stents: Continue aspirin, Plavix and statin. 7. NSTEMI: s/p heparin drip for 48 hours. Continue aspirin, Plavix, statin, beta-blocker. Cardiology following. 8. LILIANA on CKD: Likely cardiorenal.  Improving. Baseline creatinine 1.5-1.8. Creatinine today 1.94. Follow-up BMP tomorrow a.m. avoid ACEi's, ARB's, Entresto. 9. Diabetes mellitus type 2: Blood glucose this a.m. 152. Continue Lantus 45 units daily and change medium dose to low-dose ICS. Hypoglycemia protocol. 10. Essential hypertension: Continue Coreg 12.5 mg twice daily, Imdur 30 mg daily, Lasix 40 mg oral twice daily, hydralazine 25 mg oral 3 times daily. 11. h/o anxiety, claustrophobia: Continue home dose BuSpar 5 mg twice daily and Seroquel 25 mg twice daily.     DVT prophylaxis: Heparin 5000 units subcutaneous 3 times daily. GI prophylaxis: None indicated. Diet: Diabetic diet     PT/OT/SW: PT and OT consulted. Disposition: Stepdown unit. Remains inpatient.

## 2020-04-30 NOTE — PROGRESS NOTES
[facility]  Progress Note                   Date:   4/30/2020  Patient name: Constantino Rossi  Date of admission:  4/25/2020  4:51 AM  MRN:   [id number]  YOB: 1950  PCP: Angelic Gonzalez MD    Reason for Admission: Acute respiratory failure ([facility] [address] [J96.00]    Subjective:       Clinical Changes /Abnormalities:  Patient seen and examined in bed room after receiving a respiratory treatment. Patient was switch from high flow oxygen to NC 4 L. Able to care on conversation. Denies chest pain.   SR on tele HR 70  Awaiting results of echocardiogram.    Review of Systems    Medications:   Scheduled Meds:   ipratropium-albuterol  1 ampule Inhalation Q4H    furosemide  40 mg Oral BID    [START ON 5/1/2020] insulin glargine  45 Units Subcutaneous Daily    insulin lispro  0-6 Units Subcutaneous TID WC    insulin lispro  0-3 Units Subcutaneous Nightly    isosorbide mononitrate  30 mg Oral Daily    carvedilol  12.5 mg Oral BID WC    famotidine  20 mg Oral Daily    hydrALAZINE  25 mg Oral 3 times per day    busPIRone  5 mg Oral BID    QUEtiapine  25 mg Oral BID    gabapentin  400 mg Oral BID    heparin (porcine)  5,000 Units Subcutaneous 3 times per day    cefepime  2 g Intravenous Q12H    albuterol  2.5 mg Nebulization BID    clopidogrel  75 mg Oral Daily    atorvastatin  10 mg Oral Daily    sodium chloride flush  10 mL Intravenous 2 times per day    docusate  100 mg Oral Daily    aspirin  81 mg Oral Daily     Continuous Infusions:   dextrose      sodium chloride 10 mL/hr at 04/26/20 0849     CBC:   Recent Labs     04/28/20  0518 04/30/20  0519   WBC 10.6 10.2   HGB 9.0* 11.9*   * 105*     BMP:    Recent Labs     04/28/20  0518 04/29/20  0407 04/30/20  0519    141 140   K 4.7 4.2 4.4   CL 99 99 97*   CO2 27 29 28   BUN 57* 59* 67*   CREATININE 2.04* 1.89* 1.94*   GLUCOSE 219* 126* 152*     Hepatic:No results for input(s): AST, ALT, ALB, BILITOT, ALKPHOS in the last 72

## 2020-04-30 NOTE — PROGRESS NOTES
intake/output:    Intake/Output Summary (Last 24 hours) at 4/30/2020 1344  Last data filed at 4/30/2020 1128  Gross per 24 hour   Intake 807 ml   Output 3075 ml   Net -2268 ml     Last 3 weights: Wt Readings from Last 3 Encounters:   04/30/20 207 lb 0.2 oz (93.9 kg)   04/25/20 209 lb 7 oz (95 kg)   12/16/19 203 lb 3.2 oz (92.2 kg)       General appearance: alert and cooperative with exam  Physical Examination:   General appearance - alert, well appearing, and in no distress, overweight and acyanotic, in no respiratory distress  Mental status - alert, oriented to person, place, and time  Eyes - pupils equal and reactive, extraocular eye movements intact, sclera anicteric  Ears - right ear normal, left ear normal  Nose - normal and patent, no erythema, discharge or polyps  Mouth - mucous membranes moist, pharynx normal without lesions and large tongue  Neck - supple, no significant adenopathy, short and thick neck  Chest -mild tachypnea, no retractions no cyanosis, chest movement is bilaterally symmetrical, normal resonance on percussion, bilateral air entry is distant and reduced no expiratory wheezing no rhonchi and no crackles  Heart - normal rate, regular rhythm, normal S1, S2, no murmurs, rubs, clicks or gallops  Abdomen - soft, nontender, nondistended, no masses or organomegaly  Neurological - alert, oriented, normal speech, no focal findings or movement disorder noted}  Extremities - peripheral pulses normal, no pedal edema, no clubbing or cyanosis  Skin - normal coloration and turgor, no rashes, no suspicious skin lesions noted     Diet:  DIET DENTAL SOFT; Carb Control: 4 carb choices (60 gms)/meal; Low Sodium (2 GM);  Daily Fluid Restriction: 1800 ml    Medications:Current Inpatient    Scheduled Meds:   furosemide  40 mg Oral BID    [START ON 5/1/2020] insulin glargine  45 Units Subcutaneous Daily    insulin lispro  0-6 Units Subcutaneous TID WC    insulin lispro  0-3 Units Subcutaneous Nightly   

## 2020-05-01 NOTE — PROGRESS NOTES
98   CO2 29 28 29   BUN 59* 67* 69*   CREATININE 1.89* 1.94* 2.35*   GLUCOSE 126* 152* 141*     Hepatic:No results for input(s): AST, ALT, ALB, BILITOT, ALKPHOS in the last 72 hours. Troponin: No results for input(s): TROPHS in the last 72 hours. BNP: No results for input(s): BNP in the last 72 hours. Lipids: No results for input(s): CHOL, HDL in the last 72 hours. Invalid input(s): LDLCALCU  INR: No results for input(s): INR in the last 72 hours. DIAGNOSTIC DATA  ECHO 4/27/2020  Summary  Left ventricle is normal in size. Global left ventricular systolic function  is mildly reduced. Calculated ejection fraction is 43 % by Mandujano's method  Mild mitral regurgitation. Left atrium is mildly dilated. ECHO: 6/2019  · Severely reduced left ventricular ejection fraction. · Global hypokinesis, severe. Septal akinesis. · The estimated left ventricular ejection fraction is 25 - 30%  · Grade I (impaired relaxation) left ventricular diastolic dysfunction  · Mildly reduced right ventricular systolic function  · Left atrium is mildly dilated, Right atrium is mildly dilated  · Mild mitral regurgitation. · Mild tricuspid valve regurgitation     CATH: 3/4/19  · 1. Severe multivessel coronary artery disease that is not amenable to   percutaneous revascularization. The patient does not have disease   satisfactory for surgical revascularization. · 2. Severe left ventricular systolic dysfunction in a global fashion with   an estimated ejection fraction of 25%. · 3. Mild mitral regurgitation. Recommendations:   1.  Recommend continued medical therapy. Tatianna Quintana will be transferred back   to [facility] so that he may be under the care of his   pulmonologist.  2.  Optimization of cardiac medications for treatment of underlying   coronary disease and congestive heart failure.   3.  Given extent of patient's underlying pulmonary disease and cardiac   issues Palliative  care and/or hospice would be a

## 2020-05-01 NOTE — CARE COORDINATION
Discharge order noted. Call to [address] [place] [address], spoke to [place], notified of discharge. Awaiting attending signature. 1 - AVS with completed JACINTO and home care order faxed to [address] [place] [address].     Discharge [address] Case Management Department  Written by: Mily Albert RN    Patient Name: Katelyn Dwyer  Attending Provider: Charo Rey MD  Admit Date: 2020  4:51 AM  MRN: [id number]  Account: [de-identified]                     : 1950  Discharge Date:  20       Disposition: home with Garth Allen RN

## 2020-05-01 NOTE — PROGRESS NOTES
Nightly    ipratropium-albuterol  1 ampule Inhalation 4x daily    isosorbide mononitrate  30 mg Oral Daily    carvedilol  12.5 mg Oral BID WC    famotidine  20 mg Oral Daily    hydrALAZINE  25 mg Oral 3 times per day    busPIRone  5 mg Oral BID    QUEtiapine  25 mg Oral BID    gabapentin  400 mg Oral BID    heparin (porcine)  5,000 Units Subcutaneous 3 times per day    cefepime  2 g Intravenous Q12H    albuterol  2.5 mg Nebulization BID    clopidogrel  75 mg Oral Daily    atorvastatin  10 mg Oral Daily    sodium chloride flush  10 mL Intravenous 2 times per day    docusate  100 mg Oral Daily    aspirin  81 mg Oral Daily     Continuous Infusions:    dextrose      sodium chloride 10 mL/hr at 04/26/20 0849     PRN Medicationssodium chloride, 1 spray, PRN  labetalol, 10 mg, Q4H PRN  fentanNYL, 25 mcg, Q1H PRN    Or  fentanNYL, 50 mcg, Q1H PRN  albuterol, 2.5 mg, As Directed RT PRN  glucose, 15 g, PRN  dextrose, 12.5 g, PRN  glucagon (rDNA), 1 mg, PRN  dextrose, 100 mL/hr, PRN  sodium chloride flush, 10 mL, PRN  potassium chloride, 10 mEq, PRN  potassium chloride, 20 mEq, PRN  promethazine, 12.5 mg, Q6H PRN    Or  ondansetron, 4 mg, Q6H PRN  polyethylene glycol, 17 g, Daily PRN  magnesium sulfate, 1 g, PRN  potassium chloride, 40 mEq, PRN    Or  potassium alternative oral replacement, 40 mEq, PRN  acetaminophen, 650 mg, Q6H PRN    Or  acetaminophen, 650 mg, Q6H PRN        Diagnostic Labs:  CBC:   Recent Labs     04/30/20 0519   WBC 10.2   RBC 4.28   HGB 11.9*   HCT 35.8*   MCV 83.6   RDW 13.8   *     BMP:   Recent Labs     04/29/20  0407 04/30/20  0519 05/01/20  0414    140 142   K 4.2 4.4 4.1   CL 99 97* 98   CO2 29 28 29   BUN 59* 67* 69*   CREATININE 1.89* 1.94* 2.35*     BNP: No results for input(s): BNP in the last 72 hours. PT/INR: No results for input(s): PROTIME, INR in the last 72 hours. APTT: No results for input(s): APTT in the last 72 hours.   CARDIAC ENZYMES: No results for

## 2020-05-01 NOTE — PROGRESS NOTES
Pt called out and stated that he felt dizzy and lightheaded. Vitals obtained. Glucose: 51. Lunch delivered to room. Pt given apple juice. Will continue to monitor. Electronically signed by Anabell Plata RN on 5/1/2020 at 11:33 AM     1156: Glucose reheck: 33. 12.5 g of 50% dextrose given IV. Pt given crackers and peanut butter. Will continue to monitor.      Electronically signed by Anabell Plata RN on 5/1/2020 at 11:56 AM

## 2020-05-01 NOTE — PROGRESS NOTES
Physical Therapy    Facility/Department: [facility] 4A STEPDOWN  Initial Assessment    NAME: Loni Schlatter  : 1950  MRN: [id number]    Date of Service: 2020   Per chart Loni Schlatter is a 65 y.o. male who presents with respiratory distress. History is limited as patient was intubated prior to arrival.  Nursing staff did speak to family, but they stated that he has a long history and significant lung dysfunction and heart dysfunction. They do not know if he is been ill over the last few days. However they did state that he has been on long-term steroids and low-dose erythromycin and was found to have high blood sugars, and they thought it was related to that. However EMS states the patient was initially tripoding. In severe respiratory distress. They had given him magnesium, Solu-Medrol, and terbutaline. However patient did not improve, therefore they did nasally intubate patient in route, they did not give any sedative medications for the nasal intubation. Discharge Recommendations:  Patient would benefit from continued therapy after discharge   PT Equipment Recommendations  Equipment Needed: No    Assessment   Body structures, Functions, Activity limitations: Decreased functional mobility ; Decreased endurance;Decreased strength;Decreased posture;Decreased safe awareness;Decreased balance  Assessment: Pt currently completes bed mobility with CGA, functional transfers with CGA, and ambulates 20ft with no AD and CGA. Pt displays decreased endurance and decreased balance placing her at increased risk for falls. Pt would benefit from continued skilled physical therapy to increase strength, balance, endurance, and independence with functional mobility.    Prognosis: Good  Decision Making: Medium Complexity  PT Education: Goals;PT Role;Plan of Care;General Safety;Gait Training;Transfer Training;Functional Mobility Training  REQUIRES PT FOLLOW UP: Yes  Activity Tolerance  Activity Tolerance: Patient limited by

## 2020-05-01 NOTE — PROGRESS NOTES
Detected    Comment: Coronoviruses detected by this panel are those associated with the clinical common cold. This test will not detect 2019-SARS-COV-2 or other novel coronaviruses. Coronavirus HKU1 PCR Not Detected    Comment: Coronoviruses detected by this panel are those associated with the clinical common cold. This test will not detect 2019-SARS-COV-2 or other novel coronaviruses. Coronavirus NL63 PCR Not Detected    Comment: Coronoviruses detected by this panel are those associated with the clinical common cold. This test will not detect 2019-SARS-COV-2 or other novel coronaviruses. Coronavirus OC43 PCR Not Detected    Comment: Coronoviruses detected by this panel are those associated with the clinical common cold. This test will not detect 2019-SARS-COV-2 or other novel coronaviruses. Human Metapneumovirus PCR Not Detected    Rhino/Enterovirus PCR Not Detected    Influenza A by PCR Not Detected    Influenza A H1 PCR NOT REPORTED    Influenza A H1 (2009) PCR NOT REPORTED    Influenza A H3 PCR NOT REPORTED    Influenza B by PCR Not Detected    Parainfluenza 1 PCR Not Detected    Parainfluenza 2 PCR Not Detected    Parainfluenza 3 PCR Not Detected    Parainfluenza 4 PCR Not Detected    Resp Syncytial Virus PCR Not Detected    Bordetella Parapertussis Not Detected    B Pertussis by PCR Not Detected    Chlamydia pneumoniae By PCR Not Detected    Mycoplasma pneumo by PCR Not Detected    Comment: Performed by multiplexed nucleic acid assay. Strep Pneumoniae Antigen [[id number]] Collected: 04/25/20 0919   Order Status: Completed Updated: 04/25/20 2256    Source . URINE    Strep pneumo Ag NEGATIVE    Comment: Strep pneumoniae antigen not detected      Legionella antigen, urine [[id number]] Collected: 04/25/20 0919   Order Status: Completed Updated: 04/25/20 2255    Legionella Pneumophilia Ag, Urine NEGATIVE    Comment: L. pneumophila serogroup 1 antigen not detected.    A negative

## 2020-05-01 NOTE — PROGRESS NOTES
Occupational Therapy   Occupational Therapy Initial Assessment  Date: 2020   Patient Name: Rina Hummel  MRN: [id number]     : 1950    Date of Service: 2020    Discharge Recommendations:    Further therapy recommended at discharge. Continue to assess     Assessment   Performance deficits / Impairments: Decreased functional mobility ; Decreased strength;Decreased endurance;Decreased ADL status; Decreased high-level IADLs  Treatment Diagnosis: respiratory distress   Prognosis: Good  Decision Making: Medium Complexity  Patient Education: pt ed on POC, purpose of eval, importance of movement, safety during functional transfers/functional mobility, benefits of therapy and being out of bed. good return   REQUIRES OT FOLLOW UP: Yes  Activity Tolerance  Activity Tolerance: Patient Tolerated treatment well  Safety Devices  Safety Devices in place: Yes  Type of devices: Call light within reach; Left in chair  Restraints  Initially in place: No         Patient Diagnosis(es): There were no encounter diagnoses. has a past medical history of Anxiety, CAD (coronary artery disease), Chronic respiratory failure (HCC), Chronic systolic (congestive) heart failure ([facility] [address] COPD (chronic obstructive pulmonary disease) ([facility] [address] [facility] Essential hypertension, Mixed hyperlipidemia, NSVT (nonsustained ventricular tachycardia) ([facility] [address] LONA (obstructive sleep apnea), and Type 2 diabetes mellitus ([facility] [address] has a past surgical history that includes back surgery; Tonsillectomy; Cardiac catheterization; Coronary angioplasty with stent; shoulder surgery; and Eye surgery. Treatment Diagnosis: respiratory distress       Restrictions  Restrictions/Precautions  Restrictions/Precautions: Fall Risk  Required Braces or Orthoses?: No  Position Activity Restriction  Other position/activity restrictions: O2 at baseline.      Subjective   General  Patient assessed for rehabilitation services?: Yes  Family / Caregiver Present: No  Diagnosis: respiratory distress   General Comment  Comments: RN ok'd for therapy this morning.  Pt agreeable to participate in session and cooperative/pleasant throughout   Patient Currently in Pain: Denies    Oxygen Therapy  O2 Device: Nasal cannula  O2 Flow Rate (L/min): 3 L/min    Social/Functional History  Social/Functional History  Lives With: Family(brother and sister in law )  Type of Home: House  Home Layout: Two level, Able to Live on Main level with bedroom/bathroom  Home Access: Stairs to enter without rails  Entrance Stairs - Number of Steps: 2  Bathroom Shower/Tub: Tub/Shower unit  Bathroom Toilet: Standard  Bathroom Equipment: Shower chair, Hand-held shower  Home Equipment: Oxygen, Rolling walker, 4 wheeled walker, Cane(pt reported being on 3L home O2. pt reported no use of DME at baseline )  Receives Help From: Family  ADL Assistance: Independent(pt reported occasionally requiring assistance for socks and shoes )  Homemaking Assistance: Needs assistance  Homemaking Responsibilities: Yes(pt reported cleaning room but brother and sister in law complete all other IADLs )  Meal Prep Responsibility: No  Laundry Responsibility: No  Cleaning Responsibility: Secondary  Ambulation Assistance: Independent  Transfer Assistance: Independent  Active : Yes  Occupation: Retired     Objective   Vision: Impaired  Vision Exceptions: Wears glasses for reading  Hearing: Exceptions to [employer]  Hearing Exceptions: Bilateral hearing aid    Orientation  Overall Orientation Status: Within Functional Limits  Observation/Palpation  Posture: Fair(Bilaterally protracted shoulders; FHP; forward flexed posture. )  Balance  Sitting Balance: Modified independent (~8 minutes on EOB and in chair )  Standing Balance: Contact guard assistance  Standing Balance  Time: ~3 minutes   Activity: pt completed functional mobility within hospital room in order to transfer into chair   Comment: pt with no LOB, but reported feeling slightly unsteady on feet

## 2020-05-01 NOTE — DISCHARGE SUMMARY
sent to the [facility] ICU at [facility].      In the ICU he was COVID negative and was extubated on 4/26/2020. Respiratory cultures grew pseudomonas so patient was started on cefepime x 5 days. On the floor he continued to improve being transitioned from HFNC to 3 L NC. His creatinine was at baseline however did increase slightly on the morning of discharge likely due to contraction alkalosis with over diuresis. He was instructed to hold his diuretics for 2 days post discharge and hold Entresto 2 days post discharge as well with BMP in 3 days. Cardiology evaluated the patient and recommended Coreg 12.5 mg BID with meals, Hydralazine 25 mg TID and close Cardiology follow up with his Cardiologist. He was discharged home with home care. Procedures/ Significant Interventions:    Intubation/Extubation  CT Chest.     Consults:     Consults:     Final Specialist Recommendations/Findings:   IP CONSULT TO INFECTIOUS DISEASES  IP CONSULT TO CARDIOLOGY  IP CONSULT TO CASE MANAGEMENT  IP CONSULT TO DIETITIAN  IP CONSULT TO DIETITIAN  IP CONSULT TO CARDIOLOGY  IP CONSULT TO IV TEAM  IP CONSULT TO HOME CARE NEEDS      Any Hospital Acquired Infections: none    Discharge Functional Status:  stable    DISCHARGE PLAN     Disposition: home    Patient Instructions:   Current Discharge Medication List      START taking these medications    Details   !! ciprofloxacin (CIPRO) 500 MG tablet Take 1 tablet by mouth every 24 hours for 5 days  Qty: 5 tablet, Refills: 0      hydrALAZINE (APRESOLINE) 25 MG tablet Take 1 tablet by mouth every 8 hours  Qty: 90 tablet, Refills: 3      carvedilol (COREG) 12.5 MG tablet Take 1 tablet by mouth 2 times daily (with meals)  Qty: 60 tablet, Refills: 3      !! ciprofloxacin (CIPRO) 500 MG tablet Take 1 tablet by mouth daily for 5 days  Qty: 5 tablet, Refills: 0       !! - Potential duplicate medications found. Please discuss with provider.       CONTINUE these medications which have NOT CHANGED    Details   Vitamin D, Cholecalciferol, 50 MCG (2000 UT) CAPS Take 1 capsule by mouth daily      omeprazole (PRILOSEC) 20 MG delayed release capsule Take 20 mg by mouth daily      simvastatin (ZOCOR) 10 MG tablet Take 10 mg by mouth nightly      alogliptin (NESINA) 12.5 MG TABS tablet Take 12.5 mg by mouth      acetaminophen (TYLENOL) 500 MG tablet Take 500 mg by mouth every 6 hours as needed for Pain      sacubitril-valsartan (ENTRESTO) 24-26 MG per tablet Take 1 tablet by mouth 2 times daily      aspirin (ASPIRIN ADULT LOW DOSE) 81 MG EC tablet Take 81 mg by mouth      albuterol sulfate  (90 Base) MCG/ACT inhaler Inhale 2 puffs into the lungs      glipiZIDE (GLUCOTROL) 10 MG tablet Take 10 mg by mouth 2 times daily (before meals)       gabapentin (NEURONTIN) 300 MG capsule Take 400 mg by mouth 2 times daily.        furosemide (LASIX) 20 MG tablet Take 40 mg by mouth daily       clopidogrel (PLAVIX) 75 MG tablet Take 75 mg by mouth      busPIRone (BUSPAR) 5 MG tablet TAKE 1 TABLET BY MOUTH THREE TIMES A DAY  Refills: 2      tamsulosin (FLOMAX) 0.4 MG capsule Take 0.4 mg by mouth      QUEtiapine (SEROQUEL) 50 MG tablet Take 50 mg by mouth      ipratropium-albuterol (DUONEB) 0.5-2.5 (3) MG/3ML SOLN nebulizer solution Inhale 3 mLs into the lungs      insulin glargine (LANTUS SOLOSTAR) 100 UNIT/ML injection pen Inject 24 Units into the skin       primidone (MYSOLINE) 50 MG tablet Take 50 mg by mouth nightly       Blood Glucose Monitoring Suppl KIT Use as instructed      TRUE METRIX BLOOD GLUCOSE TEST strip USE 3 TIMES A DAY  Refills: 2      isosorbide mononitrate (IMDUR) 30 MG extended release tablet Take 30 mg by mouth         STOP taking these medications       metoprolol succinate (TOPROL XL) 100 MG extended release tablet Comments:   Reason for Stopping:               Activity: activity as tolerated    Diet: diabetic diet    Follow-up:    Clemente Monroy MD  [address] [place] [address] [place] [address]  [phone]    Schedule an appointment as soon as possible for a visit in 1 week      [address] [address]  [phone]    In 2 weeks      [facility]  [address] [facility]  [place] [address] [address]  [phone]          Patient Instructions: Follow up with PCP in 1 week. Call for appointment. Follow up with ID in 1 week. Call for appointment. Follow up with Pulmonology. Call for appointment. BMP in 3 days to follow renal function. Please hold Lasix home dose and Entresto for 2 days due to contraction Alkalosis, resume on 5/3/2020. Start Cipro 500 mg daily for 5 days per ID recommendations. Start Coreg 12.5 mg twice daily with meals per Cardiology recommendations. Start Hydralazine 25 mg three times daily per cardiology recommendations. Please return to the emergency department immediately for new or worsening concerns. Follow up labs: BMP in 3 days. Follow up imaging: none. Note that over 30 minutes was spent in preparing discharge papers, discussing discharge with patient, medication review, etc.      Hernan Bentley DO  Internal Medicine Resident, PGY-1  [address] [facility];  [place], [place]  5/5/2020, 4:47 PM

## 2020-05-01 NOTE — PLAN OF CARE
60-69%  []  Unable []  Above 50-59%  []  Unable []  Above 35-49%  []  Unable []  Less than 35%  []  Unable              BILLY TORRES MICHAEL  7:58 AM                            FEMALE                                  MALE                            FEV1 Predicted Normal Values                        FEV1 Predicted Normal Values          Age                                     Height in Feet and Inches       Age                                     Height in Feet and Inches       4' 11\" 5' 1\" 5' 3\" 5' 5\" 5' 7\" 5' 9\" 5' 11\" 6' 1\"  4' 11\" 5' 1\" 5' 3\" 5' 5\" 5' 7\" 5' 9\" 5' 11\" 6' 1\"   43 - 45 2.49 2.66 2.84 3.03 3.22 3.42 3.62 3.83 42 - 45 2.82 3.03 3.26 3.49 3.72 3.96 4.22 4.47   46 - 49 2.40 2.57 2.76 2.94 3.14 3.33 3.54 3.75 46 - 49 2.70 2.92 3.14 3.37 3.61 3.85 4.10 4.36   50 - 53 2.31 2.48 2.66 2.85 3.04 3.24 3.45 3.66 50 - 53 2.58 2.80 3.02 3.25 3.49 3.73 3.98 4.24   54 - 57 2.21 2.38 2.57 2.75 2.95 3.14 3.35 3.56 54 - 57 2.46 2.67 2.89 3.12 3.36 3.60 3.85 4.11   58 - 61 2.10 2.28 2.46 2.65 2.84 3.04 3.24 3.45 58 - 61 2.32 2.54 2.76 2.99 3.23 3.47 3.72 3.98   62 - 65 1.99 2.17 2.35 2.54 2.73 2.93 3.13 3.34 62 - 65 2.19 2.40 2.62 2.85 3.09 3.33 3.58 3.84   66 - 69 1.88 2.05 2.23 2.42 2.61 2.81 3.02 3.23 66 - 69 2.04 2.26 2.48 2.71 2.95 3.19 3.44 3.70   70+ 1.82 1.99 2.17 2.36 2.55 2.75 2.95 3.16 70+ 1.97 2.19 2.41 2.64 2.87 3.12 3.37 3.62             Predicted Peak Expiratory Flow Rate                                       Height (in)  Female       Height (in) Male           Age 04 28 54 40 28 77 78 74 Age            21 344 357 372 387 402 417 432 446  60 62 64 66 68 70 72 74 76   25 337 352 366 381 396 411 426 441 25 447 476 505 533 562 591 [phone]   30 329 344 359 374 389 404 419 434 30 437 466 494 523 552 [phone] 667   35 322 337 351 366 381 396 411 426 35 426 455 484 512 541 [phone]   40 314 329 344 359 374 389 404 419 40 416 445 473 [phone] 647   45 307 322 336 351 366 381 396 411 [phone] 513 396 974 940 174 10 901 710 424 050 534 341 278 508 017   29 156 613 834 184 527 107 315 081 34 712 808 645 288 695 071 864 641 387   62 637 780 913 173 748 311 131 299 18 784 434 [phone] 363   95 219 505 632 506 785 379 421 690 53 339 734 528 513 542 348 [phone]

## 2020-05-02 NOTE — CARE COORDINATION
[facility] Transitions Initial Follow Up Call    Call within 2 business days of discharge: Yes    Patient: Asher Newton Patient : 1950   MRN: [id number]  Reason for Admission: Acute kidney injury superimposed on CKD/Covid-19 Monitoring  Discharge Date: 20 RARS: Readmission Risk Score: 29      Last Discharge [facility]       Complaint Diagnosis Description Type Department Provider    20  Acute kidney injury superimposed on CKD [facility]) Admission (Discharged) Romero Gilmore MD           Spoke with: [address]: [facility]    Attempted to contact Estella Katz for initial outreach, but his brother Lisandro Knight answered the phone. He said that Estella Katz was sleeping. Lisandro Knight stated that Estella Katz seemed to be doing \"ok\". He said that his breathing seems to be back to baseline and he has a decrease in cough  Lisandro Knight said that they obtained the new medications and are aware of the start dates on the Entresto and Lasix and the to stop metoprolol. They will be calling PCP on Monday to make follow up and to have lab work done on Monday. [facility] to start. Patient contacted regarding Suzette Candelario. Care Transition Nurse/ Ambulatory Care Manager contacted the family by telephone to perform post discharge assessment. Verified name and  with family as identifiers. Provided introduction to self, and explanation of the CTN/ACM role, and reason for call due to risk factors for infection and/or exposure to COVID-19. Symptoms reviewed with family who verbalized the following symptoms: cough, shortness of breath and no worsening symptoms. Due to no new or worsening symptoms encounter was not routed to provider for escalation. Patient has following risk factors of: heart failure, acute respiratory failure and diabetes.  CTN/ACM reviewed discharge instructions, medical action plan and red flags such as increased shortness of breath, increasing fever and signs of decompensation with family who verbalized understanding. Discussed exposure protocols and quarantine with CDC Guidelines What to do if you are sick with coronavirus disease 2019.  Family was given an opportunity for questions and concerns. The family agrees to contact the [facility] exposure line [phone], [facility] department [address]: ([phone]) and PCP office for questions related to their healthcare. CTN/ACM provided contact information for future needs. Reviewed and educated family on any new and changed medications related to discharge diagnosis     Patient/family/caregiver given information for [employer] and agrees to enroll no  Patient does not have email    Plan for follow-up call in 5-7 days based on severity of symptoms and risk factors.             Care Transitions 24 Hour Call    Do you have any ongoing symptoms?:  No  Do you have a copy of your discharge instructions?:  Yes  Do you have all of your prescriptions and are they filled?:  Yes  Have you been contacted by a [address]?:  No  Have you scheduled your follow up appointment?:  No  Were you discharged with any Home Care or Post Acute Services:  Yes  Post Acute Services:  Home Health (Comment: [facility])  Do you feel like you have everything you need to keep you well at home?:  Yes  Care Transitions Interventions         Follow Up  Future Appointments   Date Time Provider Shaggy Thomson   6/29/2020 10:20 AM Oral MD Wendy [address] [place] [address]       Shreya Lugo RN

## 2020-05-05 PROBLEM — J18.9 PNEUMONIA: Status: ACTIVE | Noted: 2020-01-01

## 2020-05-08 PROBLEM — J44.1 COPD EXACERBATION (HCC): Status: ACTIVE | Noted: 2020-01-01

## 2020-05-08 NOTE — ED NOTES
Pt utilized call light stating \"I need something to eat! \"- relayed to Ismael Frances RN  05/08/20 3731

## 2020-05-08 NOTE — ED PROVIDER NOTES
[address]  eMERGENCY dEPARTMENT eNCOUnter      Pt Name: Mary Jane Workman  MRN: [id number]  [place] 1950  Date of evaluation: 5/8/20      CHIEF COMPLAINT       Chief Complaint   Patient presents with    Shortness of Breath     \"Years but really really bad starting at 4 or 5 this morning\"    Cough     \"Years\"         HISTORY OF PRESENT ILLNESS    Mary Jane Workman is a 65 y.o. male who presents complaining of shortness of breath. Patient states that he has been feeling short of breath for some time. Patient was hospitalized for days about 2 weeks ago. Patient states that he was on the high flow oxygen at that time. Patient's not sure if he got steroids or not. Patient ended up testing negative for COVID-19. Patient states that he went home and today started having severe shortness of breath again. Patient denies chest pain. Patient has had a cough that is been constant for the last 2 weeks. Patient denies fevers. Patient has no abdominal symptoms. Patient denies pain or swelling in the legs. REVIEW OF SYSTEMS       Review of Systems   Constitutional: Negative for activity change, appetite change, chills, diaphoresis and fever. HENT: Negative for congestion, ear pain, facial swelling, nosebleeds, rhinorrhea, sinus pressure, sore throat and trouble swallowing. Eyes: Negative for pain, discharge and redness. Respiratory: Positive for cough and shortness of breath. Negative for chest tightness and wheezing. Cardiovascular: Negative for chest pain, palpitations and leg swelling. Gastrointestinal: Negative for abdominal pain, blood in stool, constipation, diarrhea, nausea and vomiting. Genitourinary: Negative for difficulty urinating, dysuria, flank pain, frequency, genital sores and hematuria. Musculoskeletal: Negative for arthralgias, back pain, gait problem, joint swelling, myalgias and neck pain. Skin: Negative for color change, pallor, rash and wound.    Neurological: Negative for MCG (2000 UT) CAPS    Take 1 capsule by mouth daily       ALLERGIES     is allergic to amoxicillin-pot clavulanate; clindamycin phosphate; penicillins; fluoxetine; fluticasone-salmeterol; hydrochlorothiazide; irbesartan; salmeterol; and tiotropium. SOCIAL HISTORY      reports that he has quit smoking. He has never used smokeless tobacco. He reports that he does not drink alcohol or use drugs. PHYSICAL EXAM     INITIAL VITALS: /60   Pulse 83   Temp 98.1 °F (36.7 °C) (Oral)   Resp 12   Ht 5' 8.75\" (1.746 m)   Wt 208 lb (94.3 kg)   SpO2 100%   BMI 30.94 kg/m²      Physical Exam  Vitals signs and nursing note reviewed. Constitutional:       General: He is not in acute distress. Appearance: He is well-developed. He is not diaphoretic. HENT:      Head: Normocephalic and atraumatic. Eyes:      General: No scleral icterus. Right eye: No discharge. Left eye: No discharge. Conjunctiva/sclera: Conjunctivae normal.      Pupils: Pupils are equal, round, and reactive to light. Cardiovascular:      Rate and Rhythm: Normal rate and regular rhythm. Heart sounds: Normal heart sounds. No murmur. No friction rub. No gallop. Pulmonary:      Effort: Tachypnea, accessory muscle usage, respiratory distress and retractions present. Breath sounds: Decreased breath sounds present. No wheezing or rales. Chest:      Chest wall: No tenderness. Abdominal:      General: Bowel sounds are normal. There is no distension. Palpations: Abdomen is soft. There is no mass. Tenderness: There is no abdominal tenderness. There is no guarding or rebound. Musculoskeletal: Normal range of motion. General: No tenderness. Skin:     General: Skin is warm and dry. Coloration: Skin is not pale. Findings: No erythema or rash. Neurological:      Mental Status: He is alert and oriented to person, place, and time. Cranial Nerves: No cranial nerve deficit.

## 2020-05-08 NOTE — PROGRESS NOTES
Pt states he feels SOB. Vitals stable pulse ox 98% on 4 liters nasal cannula. RN contacted Dr. Gee Real, bipap ordered PRN.

## 2020-05-08 NOTE — ED NOTES
Bed: E  Expected date:   Expected time:   Means of arrival:   Comments:  ARCHANA Corbin  05/08/20 0308

## 2020-05-08 NOTE — PROGRESS NOTES
Dr. Tara Hamlin updated on patient after BIPAP placed. PT states he is anxious and does not want RN to leave the room. Pt tachycardic and tachypneic. 1 time dose of IV ativan. See orders.

## 2020-05-08 NOTE — PROGRESS NOTES
Pt transferred via stretcher from ER, connected to bedside monitor and tele monitor. Fall precautions in place, pt oriented to room, call light in reach. Assessment completed.

## 2020-05-08 NOTE — H&P
hypertension     History of Nonsustained ventricular tachycardia ([facility] 2/26/2019    Added automatically from request for surgery [id number]    History of ST elevation myocardial infarction (STEMI) 2/26/2019    2014  IW STEMI    Ischemic cardiomyopathy 2/26/2019    Mixed hyperlipidemia     NSVT (nonsustained ventricular tachycardia) (HCC)     LONA (obstructive sleep apnea)     probable lona, did not complete outpatient sleep study as ordered    Pneumonia 5/5/2020    Suspected COVID-19 virus infection 4/25/2020    Type 2 diabetes mellitus ([facility] [address]         Past Surgical History:     Past Surgical History:   Procedure Laterality Date    BACK SURGERY      CARDIAC CATHETERIZATION      CORONARY ANGIOPLASTY WITH STENT PLACEMENT      EYE SURGERY      SHOULDER SURGERY      TONSILLECTOMY         Social History:     Social History     Tobacco Use    Smoking status: Former Smoker    Smokeless tobacco: Never Used   Substance Use Topics    Alcohol use: Never     Frequency: Never    Drug use: Never       Family History:     History reviewed. No pertinent family history. Current Medications:     Medication Administration Record reviewed.  sodium chloride flush  10 mL Intravenous 2 times per day    enoxaparin  40 mg Subcutaneous Daily        Allergies: Allergies   Allergen Reactions    Amoxicillin-Pot Clavulanate Rash     Pt states he got short of breath as well. He did receive Rocephin 2/28/19 without any reaction.  Clindamycin Phosphate Rash     Pt states he got short of breath as well. He did receive Rocephin 2/28/19 without any reaction.  Penicillins Hives    Fluoxetine     Fluticasone-Salmeterol     Hydrochlorothiazide     Irbesartan     Salmeterol     Tiotropium        Review of Systems:     Gen: No changes in weight or appetite. No fevers, chills, night sweats, or fatigue. Skin: No jaundice, rashes, lesions, pruritis, bruising, or hair/nail changes.   HEENT/Neck: No dizzy/lightheaded, no recent head injury/trauma, no changes in vision or hearing. Cardiovascular: Denies chest pain at rest or with exertion. No dyspnea with exertion. No PND, murmurs, palpitations, cold extremities, or edema. Respiratory: ++ difficulty breathing, NO orthopnea, wheezing, new cough, or sputum production. No complaints of hemoptysis. GI: No abdominal pain, nausea, vomiting, diarrhea, or constipation. No dysphagia. Denies melena or hematochezia. : Denies dysuria, hematuria, nocturia, oliguria, incontinence, frequency, or flank pain. Heme/Onc: Denies history easy bleeding, easy bruising, or recent transfusions. Endocrine: No changes in body/hair pigmentation, polyuria, polydipsia, or polyphagia. Neurological: No paresthesias, involuntary movements, new headaches, new onset incoordination, new significant loss of memory or concentration, or recent loss of consciousness. Msklt: No new arthralgias or myalgias. No new weakness, stiffness, or difficulty walking. Psychological: No history of depression or anxiety. ? Physical Exam:     Vitals were reviewed  /61   Pulse 86   Temp 98 °F (36.7 °C) (Oral)   Resp 21   Ht 5' 8\" (1.727 m)   Wt 208 lb 5.4 oz (94.5 kg)   SpO2 100%   BMI 31.68 kg/m²   General: Patient appears nontoxic. AO x3. HEENT: Head is normal cephalic atraumatic. Hearing is grossly intact. Posterior pharynx is moist. No thrush. Neck: Supple. Thyroid non enlarged. No carotid bruits. Lungs: CTAb. No rales, rhonchi, or wheezing. Heart: Regular rate and rhythm. No murmur appreciated. Abdomen: Soft. Bowel sound in all 4 quadrants. Nontender. Nondistended. Extremities: No edema. Pulses +2/4 dorsal pedally. Neurological: Nonfocal. CN grossly intact. No lateralizing lesion. Current Labs and readiologic studies were reviewed. ?      ASSESSMENT / PLAN:         COPD exac - steroid, BD  DM - resume home meds, SSI  CHF- resule home meds  NIPPV - to help reduce work of breathing?     Electronically signed by Irwin Faye on 05/08/20 at 3:06 PM

## 2020-05-09 NOTE — CONSULTS
elevation myocardial infarction (STEMI), Ischemic cardiomyopathy, Mixed hyperlipidemia, NSVT (nonsustained ventricular tachycardia) ([facility] [address] LONA (obstructive sleep apnea), Pneumonia, Suspected COVID-19 virus infection, and Type 2 diabetes mellitus ([facility] PSH:   has a past surgical history that includes back surgery; Tonsillectomy; Cardiac catheterization; Coronary angioplasty with stent; shoulder surgery; and Eye surgery. Allergies: Allergies   Allergen Reactions    Amoxicillin-Pot Clavulanate Rash     Pt states he got short of breath as well. He did receive Rocephin 2/28/19 without any reaction.  Clindamycin Phosphate Rash     Pt states he got short of breath as well. He did receive Rocephin 2/28/19 without any reaction.  Penicillins Hives    Fluoxetine     Fluticasone-Salmeterol     Hydrochlorothiazide     Irbesartan     Salmeterol     Tiotropium         Home Meds:    Prior to Admission medications    Medication Sig Start Date End Date Taking?  Authorizing Provider   hydrALAZINE (APRESOLINE) 25 MG tablet Take 1 tablet by mouth every 8 hours 5/1/20  Yes Eric Arnold DO   carvedilol (COREG) 12.5 MG tablet Take 1 tablet by mouth 2 times daily (with meals) 5/1/20  Yes Eric Arnold DO   Vitamin D, Cholecalciferol, 50 MCG (2000 UT) CAPS Take 1 capsule by mouth daily   Yes Historical Provider, MD   omeprazole (PRILOSEC) 20 MG delayed release capsule Take 20 mg by mouth daily   Yes Historical Provider, MD   simvastatin (ZOCOR) 10 MG tablet Take 10 mg by mouth nightly   Yes Historical Provider, MD   alogliptin (NESINA) 12.5 MG TABS tablet Take 12.5 mg by mouth 8/30/19  Yes Historical Provider, MD   primidone (MYSOLINE) 50 MG tablet Take 50 mg by mouth nightly    Yes Historical Provider, MD   acetaminophen (TYLENOL) 500 MG tablet Take 500 mg by mouth every 6 hours as needed for Pain   Yes Historical Provider, MD   sacubitril-valsartan (ENTRESTO) 24-26 MG per tablet Take 1 tablet by mouth 2 times Physically abused: None     Forced sexual activity: None   Other Topics Concern    None   Social History Narrative    None       Family History:  History reviewed. No pertinent family history. Review of Systems:      · Constitutional: no weight loss or gain, no fever or chills. · Eyes: No new visual changes. · ENT: No new hearing loss. · Cardiovascular: see HPI. · Respiratory: No cough. No hemoptysis. · Gastrointestinal: No abdominal pain, no blood in stools. · Genitourinary: No hematuria or increased frequency. · Musculoskeletal:  No new gait disturbance. · Integumentary: No rash or pruritis. · Neurological: No headache. No seizures or recent CVA/TIA. · Psychiatric: No anxiety or depression. · Hematologic/Lymphatic: No abnormal bruising or bleeding. · Allergic/Immunologic: No new allergies. Physical Exam   Vital Signs: /60   Pulse 88   Temp 97.7 °F (36.5 °C) (Oral)   Resp 18   Ht 5' 8\" (1.727 m)   Wt 210 lb 8.6 oz (95.5 kg)   SpO2 100%   BMI 32.01 kg/m²  O2 Flow Rate (L/min): 5 L/min     Admission Weight: 208 lb (94.3 kg)     General appearance: Awake, Alert, well developed, well nourished, pleasant. Cooperative. BiPAP is in place currently. Head: Normocephalic, atraumatic. Neck: no JVD, no carotid bruits, no thyromegaly. Chest:   Diminished air entry with scattered rhonchi bilaterally. No chest wall tenderness. No wheezing. Cardiac:  Distant heart tones. Regular rate and rhythm, no S3 or S4 gallop, no murmur or rubs or clicks. Abdomen: Soft, non-tender. Extremities: no cyanosis or clubbing or leg edema. No calf tenderness. Pulses:  Bilaterally symmetrical and intact radial pulses. Neurologic:  Able to move all 4 extremities. No gross focal deficits. Skin:  No rashes. Warm and dry. EKG:     Ordered.       Labs:      CBC:   Recent Labs     05/08/20  1158 05/09/20  0445   WBC 9.9 5.2   HGB 9.2* 9.3*   HCT 28.4* 29.1*   MCV 85.7 83.5   PLT

## 2020-05-09 NOTE — PROGRESS NOTES
A1ETALWU 98.1 04/25/2020    FIO2 NOT REPORTED 05/09/2020     Radiology:  5/8/2020      Impression:  · Acute on chronic hypoxic and hypercarbic respiratory failure  · Acute exacerbation of COPD  · Tracheo-bronchitis, SARS-COV-2 negative  · Obesity/suspected obstructive sleep apnea  · History of heavy smoking, quit 1999, 40 pack-year smoking  · CHF  · Hyperkalemia/LILIANA    Recommendations:  · Z-Zan  · IV solu-medrol  · Albuterol and Ipratropium Q 4 hours and prn  · Consult nephrology  · Consult cardiology  · X-ray chest in am  · Labs: CBC and BMP in am  · BiPAP, PRN and while asleep  · Sleep studies as an outpatient  · 3 liters/min via nasal cannula  · DVT prophylaxis with low molecular weight heparin  · Will follow with you    Susan Yousif MD, [facility]  Pulmonary Critical Care and Sleep Medicine,  [facility]  Cell: [phone]  Office: [phone]

## 2020-05-09 NOTE — CONSULTS
05/08/2020     Last 3 CK, CKMB, Troponin: @LABRCNT(CKTOTAL:3,CKMB:3,TROPONINI:3)       URINE:)No results found for: Anjali Gonzalez    Radiology:   CXR  Apparent resolution of previously seen suspected pneumothorax.  Questionable   mild pulmonary vascular congestion on today's study.  No definite focal   abnormality appreciated.  Mild CHF cannot be excluded.  Follow-up chest x-ray   suggested. Assessment:  1. Hyperkalemia. 2. LILIANA on CKD3  3. Anemia  4. COPD exacerbation   5. BPH with urinary retention  6. L hip pain    Plan: Will start Lokelma TID X 3 doses  Recheck K in the after noon   Recommend tight glucose control  Renal function is improving toward baseline  Check bladder scan, may need a baig catheter if continues to have high PVR  Check L hip and lumbar vertebral X rays  Change diet to Low K  Thank you for the consultation. Please do not hesitate to call with questions.       Electronically signed by Uvaldo Alejandro MD  on 5/9/2020 at 10:51 AM

## 2020-05-09 NOTE — DISCHARGE INSTR - COC
Continuity of Care Form    Patient Name: Steffi Carr   :  1950  MRN:  [id number]    Admit date:  2020  Discharge date:  20    Code Status Order: Full Code   Advance Directives:   [facility] Documentation     Date/Time Healthcare Directive Type of Healthcare Directive Copy in [address] Agent's Name Healthcare Agent's Phone Number    20 1521  Yes, patient has an advance directive for healthcare treatment  Durable power of  for health care  No, copy requested from family  Next of kin  Jayden Gibbs, Pt's mother  --          Admitting Physician:  Jase Cabrera MD  PCP: Ijeoma Earl MD    Discharging Nurse:   [address] Unit/Room#: 2123/2123-01  Discharging Unit Phone Number: [phone]    Emergency Contact:   Extended Emergency Contact Information  Primary Emergency Contact: [address], [address] Phone: [phone]  Work Phone: [phone]  Mobile Phone: [phone]  Relation: Brother/Sister  Secondary Emergency Contact: Leela Andrade  Mobile Phone: [phone]  Relation: Parent    Past Surgical History:  Past Surgical History:   Procedure Laterality Date    BACK SURGERY      CARDIAC CATHETERIZATION      CORONARY ANGIOPLASTY WITH STENT PLACEMENT      EYE SURGERY      SHOULDER SURGERY      TONSILLECTOMY         Immunization History: There is no immunization history on file for this patient.     Active Problems:  Patient Active Problem List   Diagnosis Code    Chronic obstructive pulmonary disease (HCC) J44.9    Acute respiratory failure with hypoxia and hypercapnia (HCC) J96.01, J96.02    Cardiomyopathy (HCC) I42.9    Chronic combined systolic and diastolic CHF, NYHA class 3 ([facility]) I50.42    Coronary arteriosclerosis in native artery I25.10    History of ST elevation myocardial infarction (STEMI) I25.2    Ischemic cardiomyopathy I25.5    History of Nonsustained ventricular tachycardia (HCC) I47.2    Presence of stent in coronary artery Z95.5    Acute on chronic respiratory failure with hypoxia and hypercapnia ([facility]) J96.21, J96.22    Suspected COVID-19 virus infection R68.89    COVID-19 virus not detected [id number]    Acute kidney injury superimposed on CKD ([facility]) N17.9, N18.9    Acute respiratory failure with hypoxia ([facility]) J96.01    Acute on chronic systolic congestive heart failure ([facility]) I50.23    Acute cardiogenic pulmonary edema ([facility]) I50.1    Pneumonia J18.9    COPD exacerbation ([facility]) J44.1       Isolation/Infection:   Isolation          No Isolation        Patient Infection Status     Infection Onset Added Last Indicated Last Indicated By Review Planned Expiration Resolved Resolved By    None active    Resolved    COVID-19 Rule Out 05/08/20 05/08/20 05/08/20 COVID-19 (Ordered)   05/08/20 Rule-Out Test Resulted    COVID-19 Rule Out 04/24/20 04/24/20 04/25/20 COVID-19 (Ordered)   04/25/20 Rule-Out Test Resulted          Nurse Assessment:  Last Vital Signs: BP (!) 119/58   Pulse 86   Temp 97.5 °F (36.4 °C) (Oral)   Resp 24   Ht 5' 8\" (1.727 m)   Wt 210 lb 8.6 oz (95.5 kg)   SpO2 98%   BMI 32.01 kg/m²     Last documented pain score (0-10 scale): Pain Level: 0  Last Weight:   Wt Readings from Last 1 Encounters:   05/09/20 210 lb 8.6 oz (95.5 kg)     Mental Status:  oriented and alert    IV Access:  - None    Nursing Mobility/ADLs:  Walking   Assisted  Transfer  Assisted  Bathing  Assisted  Dressing  Assisted  Toileting  Independent  Feeding  independent   Med Admin  Assisted  Med Delivery   whole    Wound Care Documentation and Therapy:        Elimination:  Continence:   · Bowel: Yes  · Bladder: Yes  Urinary Catheter: None   Colostomy/Ileostomy/Ileal Conduit: No       Date of Last BM: 5/13/20    Intake/Output Summary (Last 24 hours) at 5/9/2020 1523  Last data filed at 5/9/2020 0603  Gross per 24 hour   Intake 100 ml   Output 1025 ml   Net -925 ml     I/O last 3 completed shifts:   In: 100 [P.O.:100]  [facility] 1025 [facility]    Safety Concerns: At Risk for Falls    Impairments/Disabilities:      Vision and Hearing    Nutrition Therapy:  Current Nutrition Therapy:   - Oral Diet:  Cardiac    Routes of Feeding: Oral  Liquids: Thin Liquids  Daily Fluid Restriction: no  Last Modified Barium Swallow with Video (Video Swallowing Test): not done    Treatments at the Time of Hospital Discharge:   Respiratory Treatments: breathing tx    Oxygen Therapy:  is on oxygen at 3 L/min per nasal cannula. Ventilator:    - No ventilator support    Rehab Therapies: Physical Therapy and Occupational Therapy  Weight Bearing Status/Restrictions: No weight bearing restirctions  Other Medical Equipment (for information only, NOT a DME order):  walker  Other Treatments: Skilled Nursing Assessment; Medication Education and Monitor    Home health care agency's  to evaluate patient two weeks prior to discharge from home health to determine post-discharge services. Patient's personal belongings (please select all that are sent with patient):  Glasses    RN SIGNATURE:  Electronically signed by Yasir Tao RN on 5/13/20 at 3:23 PM EDT    CASE MANAGEMENT/SOCIAL WORK SECTION    Inpatient Status Date: 5/8/20    Readmission Risk Assessment Score:  Readmission Risk              Risk of Unplanned Readmission:        29           Discharging to Facility/ [address] home health care  Phone [phone]  · Fax [phone]        / signature: Electronically signed by Isaac Antonio RN on 5/9/20 at 3:23 PM EDT    PHYSICIAN SECTION    Prognosis: Good    Condition at Discharge: Stable    Rehab Potential (if transferring to Rehab): Good    Recommended Labs or Other Treatments After Discharge:     Physician Certification: I certify the above information and transfer of Bhumi Black  is necessary for the continuing treatment of the diagnosis listed and that he requires [address] for greater 30 days.      Update

## 2020-05-09 NOTE — PLAN OF CARE
Patient alert and oriented, call light in reach. Patient onhome O2 and bipap as needed. Patient pain tolerable with repositioning this shift. Patient on insulin lantus and sliding scale.      Problem: Discharge Planning:  Goal: Participates in care planning  Description: Participates in care planning  5/9/2020 1644 by Omar Barrera RN  Outcome: Ongoing     Problem: Fluid Volume - Imbalance:  Goal: Absence of imbalanced fluid volume signs and symptoms  Description: Absence of imbalanced fluid volume signs and symptoms  5/9/2020 1644 by Omar Barrera RN  Outcome: Ongoing     Problem: Pain:  Goal: Pain level will decrease  Description: Pain level will decrease  5/9/2020 1644 by Omar Barrera RN  Outcome: Ongoing     Problem: Serum Glucose Level - Abnormal:  Goal: Ability to maintain appropriate glucose levels will improve to within specified parameters  Description: Ability to maintain appropriate glucose levels will improve to within specified parameters  5/9/2020 1644 by Omar Barrera RN  Outcome: Ongoing     Problem: Cardiac:  Goal: Hemodynamic stability will improve  Description: Hemodynamic stability will improve  5/9/2020 1644 by Omar Barrera RN  Outcome: Ongoing     Problem: Coping:  Goal: Ability to cope will improve  Description: Ability to cope will improve  5/9/2020 1644 by Omar Barrera RN  Outcome: Ongoing     Problem: Respiratory:  Goal: Ability to maintain a clear airway will improve  Description: Ability to maintain a clear airway will improve  5/9/2020 1644 by Omar Barrera RN  Outcome: Ongoing     Problem: Falls - Risk of:  Goal: Will remain free from falls  Description: Will remain free from falls  5/9/2020 1644 by Omar Barrera RN  Outcome: Ongoing

## 2020-05-09 NOTE — CARE COORDINATION
Writer notified, Hemanth Harris, from [address] to follow for services.     Writer Faxed Ref[employer]ral.

## 2020-05-09 NOTE — CARE COORDINATION
Nephro/Pulmonary following.  BIPAP, Ashlie will need signed/completed, Will follow for any resp needs//KB                 Electronically signed by: Rory Redding RN on 5/9/2020 at 3:15 PM

## 2020-05-10 NOTE — PROGRESS NOTES
Pulmonary Critical Care Progress Note  Alexandria Iraheta MD     Patient seen for the follow up of Acute on chronic hypoxic and hypercarbic respiratory failure, acute exacerbation of COPD,History of smoking, CHF/Diabetes mellitus    Subjective:  He is sitting up in the bedside chair. His shortness of breath not much change. He denies any chest pain. He still has productive cough of clear yellow sputum. He is tolerating orals very well. Examination:  Vitals: /73   Pulse 78   Temp 98.1 °F (36.7 °C) (Oral)   Resp 20   Ht 5' 8\" (1.727 m)   Wt 216 lb 7.9 oz (98.2 kg)   SpO2 100%   BMI 32.92 kg/m²   General appearance:  alert and cooperative with exam  Neck: No JVD  Lungs: Moderate air exchange, no wheezing, occasional basal crackles  Heart: regular rate and rhythm, S1, S2 normal, no gallop  Abdomen: Soft, non tender, + BS  Extremities: no cyanosis or clubbing.  No significant edema    LABs:  CBC:   Recent Labs     05/09/20  0445 05/10/20  0428   WBC 5.2 9.7   HGB 9.3* 8.5*   HCT 29.1* 26.8*   * 148*     BMP:   Recent Labs     05/09/20  0445 05/09/20  1213 05/10/20  0428     --  136   K 5.8* 5.0 5.0   CO2 22  --  22   BUN 39*  --  61*   CREATININE 1.76*  --  2.52*   LABGLOM 38*  --  25*   GLUCOSE 282*  --  364*     LIVER PROFILE:  Recent Labs     05/08/20  1158   AST 14   ALT 24   LABALBU 3.5     ABG:  Lab Results   Component Value Date    PHART 7.275 04/25/2020    PZX8DHN 58.7 04/25/2020    PO2ART 115.0 04/25/2020    QZN8DJN 27.2 04/25/2020    YVF2MOS 28 04/26/2020    X3CJXHWW 98.1 04/25/2020    FIO2 NOT REPORTED 05/09/2020       Lab Results   Component Value Date    POCPH 7.343 04/26/2020    PHART 7.275 04/25/2020    POCPCO2 48.2 04/26/2020    UIN9BTG 58.7 04/25/2020    POCPO2 131.7 04/26/2020    PO2ART 115.0 04/25/2020    POCHCO3 26.2 04/26/2020    KWI4MLB 27.2 04/25/2020    NBEA NOT REPORTED 04/26/2020    PBEA 0 04/26/2020    IEV4PDP 28 04/26/2020    ZLQL2BVN 99 04/26/2020    H0RDZPYF 98.1

## 2020-05-10 NOTE — PROGRESS NOTES
Pharmacy Note     Renal Dose Adjustment    Ildefonso Grewal is a 65 y.o. male. Pharmacist assessment of renally cleared medications. Recent Labs     05/09/20  0445 05/10/20  0428   BUN 39* 61*       Recent Labs     05/09/20  0445 05/10/20  0428   CREATININE 1.76* 2.52*       Estimated Creatinine Clearance: 30 mL/min (A) (based on SCr of 2.52 mg/dL (H)).       Height:   Ht Readings from Last 1 Encounters:   05/08/20 5' 8\" (1.727 m)     Weight:  Wt Readings from Last 1 Encounters:   05/10/20 216 lb 7.9 oz (98.2 kg)       The following medication dose has been adjusted based upon renal function per P&T Guidelines:             Levofloxacin adjusted to 500mg ONCE followed by 250mg q24    -Darryle Luz PharmD, BCPS 5/10/2020 10:49 AM

## 2020-05-10 NOTE — PLAN OF CARE
Problem: Discharge Planning:  Goal: Participates in care planning  Description: Participates in care planning  Outcome: Ongoing  Goal: Discharged to appropriate level of care  Description: Discharged to appropriate level of care  Outcome: Ongoing     Problem: Fluid Volume - Imbalance:  Goal: Absence of imbalanced fluid volume signs and symptoms  Description: Absence of imbalanced fluid volume signs and symptoms  Outcome: Ongoing     Problem: Pain:  Goal: Pain level will decrease  Description: Pain level will decrease  Outcome: Ongoing  Goal: Recognizes and communicates pain  Description: Recognizes and communicates pain  Outcome: Ongoing  Goal: Control of acute pain  Description: Control of acute pain  Outcome: Ongoing  Goal: Control of chronic pain  Description: Control of chronic pain  Outcome: Ongoing     Problem: Serum Glucose Level - Abnormal:  Goal: Ability to maintain appropriate glucose levels will improve to within specified parameters  Description: Ability to maintain appropriate glucose levels will improve to within specified parameters  Outcome: Ongoing     Problem: Activity:  Goal: Fatigue will decrease  Description: Fatigue will decrease  Outcome: Ongoing  Note: Pt up to chair for half the shift. Fatigue has decreased.       Problem: Cardiac:  Goal: Hemodynamic stability will improve  Description: Hemodynamic stability will improve  Outcome: Ongoing     Problem: Coping:  Goal: Level of anxiety will decrease  Description: Level of anxiety will decrease  Outcome: Ongoing  Goal: Ability to cope will improve  Description: Ability to cope will improve  Outcome: Ongoing  Goal: Ability to establish a method of communication will improve  Description: Ability to establish a method of communication will improve  Outcome: Ongoing     Problem: Respiratory:  Goal: Ability to maintain a clear airway will improve  Description: Ability to maintain a clear airway will improve  Outcome: Ongoing     Problem: Falls - Risk of:  Goal: Will remain free from falls  Description: Will remain free from falls  Outcome: Ongoing  Note: Patient remained free from falls. Call light within reach and bed in lowest position. Patient oriented to room and surroundings.        Goal: Absence of physical injury  Description: Absence of physical injury  Outcome: Ongoing

## 2020-05-10 NOTE — PROGRESS NOTES
Nephrology progress  Note    Reason for Consult:  Hyperkalemia  Requesting Physician:  Irwin Faye MD    Interval history:  Potassium improved to 5  Creatinine increased from 1.76-2.52  Had elevated post void residuals yesterday required William catheter placement  Patient feels significantly better but continues to have some shortness of breath    HISTORY OF PRESENT ILLNESS:    The patient is a 65 y.o. male who presents with SOB, has Hx of COPD, has recent hopspitaliztion, tested negative for COVID 19  Has Hx of CKD3 with baseline Cr about 1.6, he was found this AM to have elevated K of 5.8, with a glu level of 282. He was more concerned about his complaints  of Left hip and leg pain, along with numbness. Yesterday he had  about 1000 ml urine after a stright cath    His Cr was elevated at 2.35 about a week ago, Cr has been improving gradually towards baseline     Prior to Admission medications    Medication Sig Start Date End Date Taking?  Authorizing Provider   hydrALAZINE (APRESOLINE) 25 MG tablet Take 1 tablet by mouth every 8 hours 5/1/20  Yes Dejah Ferrari DO   carvedilol (COREG) 12.5 MG tablet Take 1 tablet by mouth 2 times daily (with meals) 5/1/20  Yes Dejah Ferrari DO   Vitamin D, Cholecalciferol, 50 MCG (2000 UT) CAPS Take 1 capsule by mouth daily   Yes Historical Provider, MD   omeprazole (PRILOSEC) 20 MG delayed release capsule Take 20 mg by mouth daily   Yes Historical Provider, MD   simvastatin (ZOCOR) 10 MG tablet Take 10 mg by mouth nightly   Yes Historical Provider, MD   alogliptin (NESINA) 12.5 MG TABS tablet Take 12.5 mg by mouth 8/30/19  Yes Historical Provider, MD   primidone (MYSOLINE) 50 MG tablet Take 50 mg by mouth nightly    Yes Historical Provider, MD   acetaminophen (TYLENOL) 500 MG tablet Take 500 mg by mouth every 6 hours as needed for Pain   Yes Historical Provider, MD   sacubitril-valsartan (ENTRESTO) 24-26 MG per tablet Take 1 tablet by mouth 2 times daily   Yes Historical

## 2020-05-10 NOTE — PROGRESS NOTES
465 ms    P Axis 19 degrees    R Axis 44 degrees    T Axis 148 degrees   POC Glucose Fingerstick    Collection Time: 05/09/20  7:32 PM   Result Value Ref Range    POC Glucose 290 (H) 75 - 110 mg/dL   CBC with DIFF    Collection Time: 05/10/20  4:28 AM   Result Value Ref Range    WBC 9.7 3.5 - 11.0 k/uL    RBC 3.18 (L) 4.5 - 5.9 m/uL    Hemoglobin 8.5 (L) 13.5 - 17.5 g/dL    Hematocrit 26.8 (L) 41 - 53 %    MCV 84.2 80 - 100 fL    MCH 26.8 26 - 34 pg    MCHC 31.8 31 - 37 g/dL    RDW 14.7 11.5 - 14.9 %    Platelets 265 (L) 029 - 450 k/uL    MPV 9.2 6.0 - 12.0 fL    NRBC Automated NOT REPORTED per 100 WBC    Differential Type NOT REPORTED     Immature Granulocytes NOT REPORTED 0 %    Absolute Immature Granulocyte NOT REPORTED 0.00 - 0.30 k/uL    WBC Morphology NOT REPORTED     RBC Morphology NOT REPORTED     Platelet Estimate NOT REPORTED     Seg Neutrophils 92 (H) 36 - 66 %    Lymphocytes 3 (L) 24 - 44 %    Monocytes 5 1 - 7 %    Eosinophils % 0 0 - 4 %    Basophils 0 0 - 2 %    Segs Absolute 8.92 1.3 - 9.1 k/uL    Absolute Lymph # 0.29 (L) 1.0 - 4.8 k/uL    Absolute Mono # 0.49 0.1 - 1.3 k/uL    Absolute Eos # 0.00 0.0 - 0.4 k/uL    Basophils Absolute 0.00 0.0 - 0.2 k/uL    Morphology Normal    Basic Metabolic Panel w/ Reflex to MG    Collection Time: 05/10/20  4:28 AM   Result Value Ref Range    Glucose 364 (H) 70 - 99 mg/dL    BUN 61 (H) 8 - 23 mg/dL    CREATININE 2.52 (H) 0.70 - 1.20 mg/dL    Bun/Cre Ratio NOT REPORTED 9 - 20    Calcium 8.0 (L) 8.6 - 10.4 mg/dL    Sodium 136 135 - 144 mmol/L    Potassium 5.0 3.7 - 5.3 mmol/L    Chloride 101 98 - 107 mmol/L    CO2 22 20 - 31 mmol/L    Anion Gap 13 9 - 17 mmol/L    GFR Non-African American 25 (L) >60 mL/min    GFR  31 (L) >60 mL/min    GFR Comment          GFR Staging NOT REPORTED    Procalcitonin    Collection Time: 05/10/20  4:28 AM   Result Value Ref Range    Procalcitonin 0.20 (H) <0.09 ng/mL   POC Glucose Fingerstick    Collection Time: 05/10/20

## 2020-05-11 NOTE — PROGRESS NOTES
Writer spoke to Dr. Salvador Youssef, patient is ok to be transferred out of intermediate bed to a PCU bed as the patient still needs to be monitored on telemetry.

## 2020-05-11 NOTE — PROGRESS NOTES
(94.4 kg)   04/25/20 209 lb 7 oz (95 kg)       BMI: Body mass index is 33.09 kg/m². INPUT/OUTPUT:          Intake/Output Summary (Last 24 hours) at 5/11/2020 1109  Last data filed at 5/11/2020 0920  Gross per 24 hour   Intake 340 ml   Output 1775 ml   Net -1435 ml         Telemetry shows sinus rhythm. EXAM:     General appearance: awake, alert. Pleasant. Bipap in place. Neck: No JVD or thyromegaly  Chest: Reduced air entry bibasilarly  Cardiac: Regular rate and rhythm. No significant murmur or gallop or rubs. Extremities: no cyanosis, no clubbing, no calf tenderness, no leg edema. Skin:  warm and dry. Neuro:  Able to move all 4 extremities. Abnormal EKG revealing sinus rhythm, right bundle branch block, T-wave abnormalities suggestive of lateral wall ischemia. ASSESSMENT:    Shortness of breath due to acute exacerbation of COPD.     Atherosclerotic coronary artery disease with severe multivessel disease -not amenable for PCI or CABG on cardiac catheterization March 2019.       Chronic systolic heart failure with no significant fluid overload clinically.     Ischemic cardiomyopathy with severe LV systolic dysfunction with LVEF 25% on cardiac catheterization March 2019 and with improved LVEF 43% on most recent 2D echocardiogram April 2020.       Status post [address]. No shocks. Abnormal EKG revealing sinus rhythm, right bundle branch block, T-wave abnormalities suggestive of lateral wall ischemia.     Chronic kidney disease stage 3.     Other problems as charted. REC/PLAN:    Continues to improve. Continue on all current cardiac medications as ordered. Will follow. Discussed with the nurse at bedside. PLEASE NOTE:  This progress note was completed using a voice transcription system. Every effort was made to ensure accuracy. However, inadvertent computerized transcription errors may be present.

## 2020-05-11 NOTE — PROGRESS NOTES
Physical Therapy    Facility/Department: [facility] PROGRESSIVE CARE  Initial Assessment    NAME: Chad Montez  : 1950  MRN: [id number]    Date of Service: 2020    Discharge Recommendations:  Patient would benefit from continued therapy after discharge   PT Equipment Recommendations  Equipment Needed: No    Assessment   Body structures, Functions, Activity limitations: Decreased functional mobility ; Decreased endurance;Decreased strength;Decreased posture;Decreased safe awareness;Decreased balance  Assessment: Pt displays decreased endurance and decreased balance placing him at increased risk for falls. Pt would benefit from continued skilled physical therapy to increase strength, balance, endurance, and independence with functional mobility. Treatment Diagnosis: impaired mobility  Specific instructions for Next Treatment: advance as tolerated w/ gait distance and start exercise program  Prognosis: Good  Decision Making: Medium Complexity  History: admitted due to C/O SOB  Exam: ROM, MMT, balance and mobility assessments  Clinical Presentation: min for bed mobility and sit > stand; mod x 1 stand> sit and taking 4 lateral side steps using w walker w/ mod x 1 and O2 at 3 L, FALL RISK  PT Education: Goals;PT Role;Plan of Care  Barriers to Learning: none  REQUIRES PT FOLLOW UP: Yes  Activity Tolerance  Activity Tolerance: Patient limited by fatigue;Patient limited by endurance       Patient Diagnosis(es): The encounter diagnosis was COPD exacerbation ([address]      has a past medical history of Acute cardiogenic pulmonary edema ([address] Acute kidney injury superimposed on CKD ([address] Acute on chronic respiratory failure with hypoxia and hypercapnia (HCC), Acute on chronic systolic congestive heart failure (HCC), Acute respiratory failure with hypoxia (HCC), Acute respiratory failure with hypoxia and hypercapnia ([address] Anxiety, CAD (coronary artery disease), Cardiomyopathy ([address] Chronic combined systolic and diastolic CHF, and cooperative. Pt requesting to sit up in a recliner chair if C-PAP mask can be positioned nearby.   Pain Screening  Patient Currently in Pain: Denies  Vital Signs  Patient Currently in Pain: Denies       Orientation  Orientation  Overall Orientation Status: Within Functional Limits(place: [facility],  month:  May, year: 2020,  Racheal Byers, birthdate:  [birth year] name:  Bhumi Black,  age:  65)  Social/Functional History  Social/Functional History  Lives With: Family(brother and sister in law )  Type of Home: House  Home Layout: Two level, Able to Live on Main level with bedroom/bathroom, Laundry in basement  Home Access: Stairs to enter with rails  Entrance Stairs - Number of Steps: 2 steps w/ grab bars inside the door jam on both sides  Entrance Stairs - Rails: Both  Bathroom Shower/Tub: Tub/Shower unit, Shower chair without back, Curtain  Bathroom Toilet: Standard(has sink next to it  and window on opposite side )  Bathroom Equipment: Shower chair, Hand-held shower, Grab bars in [address]: Oxygen, Rolling walker, 4 wheeled walker, Cane, Long-handled shoehorn(pt reported being on 4 L home O2, has nebulizer, plans to get hospital bed and lift chair in the future)  Receives Help From: Family  ADL Assistance: Independent(pt reported occasionally requiring assistance for socks and shoes )  Homemaking Assistance: Needs assistance(states brother-in-law is primary, states that they order food out a lot, brother does the laundry as it is in the basement)  Homemaking Responsibilities: Yes(pt reported cleaning room but brother and sister in law complete all other IADLs )  Meal Prep Responsibility: No  Laundry Responsibility: No  Cleaning Responsibility: Secondary  Ambulation Assistance: Independent(no device, uses O2 at 4 L, limited distances due to breathing capacity)  Transfer Assistance: Independent  Active : Yes  Mode of Transportation: Car, Truck  Occupation: Retired  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  Additional Comments: brother is home to assist, sister-in-law works outside the home (currently working from home due to Zenaida)  Cognition        Objective     Observation/Palpation  Observation: O2 at 3 L, , peripheral IV left forearm, urinary catheter, continuous heart monitor    AROM RLE (degrees)  RLE AROM: WFL  AROM LLE (degrees)  LLE AROM : WFL  AROM RUE (degrees)  RUE General AROM: see OT for UE assessment  AROM LUE (degrees)  LUE General AROM: see OT for UE assessment  Strength RLE  Comment: hip flexors 3+/5 otherwise grossly  4-/5  Strength LLE  Comment: hip flexors 3+/5 otherwise grossly  4-/5  Strength RUE  Comment: see OT for UE assessment  Strength LUE  Comment: see OT for UE assessment     Sensation  Overall Sensation Status: Impaired(C/O neuropathy bilateral feet)  Bed mobility  Rolling to Left: Minimal assistance  Supine to Sit: Minimal assistance  Scooting: Minimal assistance  Comment: HOB elevated and pt used rail; dangled at the EOB w/ SBA x 10 minutes- worked on pursed lip breathing while dangling  Transfers  Sit to Stand: Minimal Assistance  Stand to sit: Moderate Assistance(therapist provided lowering assist)  Bed to Chair: Moderate assistance(used w walker)  Comment: needs verbal cuing for technique and safety- NEEDS TO BACK COMPLETELY BEFORE SITTING DOWN AND REACH BACK FOR RECLINER HANDLES  Ambulation  Ambulation?: Yes  More Ambulation?: Yes  Ambulation 1  Surface: level tile  Device: Rolling Walker  Other Apparatus: O2(3 L)  Assistance:  Moderate assistance  Distance: pt took 4 lateral side steps towards the chair  Comments: needs verbal cuing for technique and safety     Balance  Sitting - Static: Good;-  Sitting - Dynamic: Fair;+  Standing - Static: Fair(used w walker)  Standing - Dynamic: Fair;-(used w walker)        Plan   Plan  Times per week: 5-7 treatments/ week  Times per day: (5-7 treatments/ week)  Specific instructions for Next Treatment:

## 2020-05-12 NOTE — PROGRESS NOTES
[address]   Occupational Therapy Evaluation  Date: 20  Patient Name: Janine Horvath       Room:   MRN: [id number]  Account: [de-identified]   : 1950  (71 y.o.) Gender: male     Discharge Recommendations:  Further Occupational  Therapy is recommended upon facility discharge. Referring Practitioner: Dr Jessica Phillips   Diagnosis: COPD        Treatment Diagnosis: Altered Tolerance to care tasks related to Cardio-Pulmonary issues   Past Medical History:  has a past medical history of Acute cardiogenic pulmonary edema ([address] Acute kidney injury superimposed on CKD ([address] Acute on chronic respiratory failure with hypoxia and hypercapnia (HCC), Acute on chronic systolic congestive heart failure ([address] Acute respiratory failure with hypoxia ([address] Acute respiratory failure with hypoxia and hypercapnia ([address] Anxiety, CAD (coronary artery disease), Cardiomyopathy ([address] Chronic combined systolic and diastolic CHF, NYHA class 3 ([address] Chronic obstructive pulmonary disease ([address] Chronic respiratory failure (HCC), Chronic systolic (congestive) heart failure ([address] COPD (chronic obstructive pulmonary disease) ([address] Coronary arteriosclerosis in native artery, COVID-19 virus not detected, Essential hypertension, History of Nonsustained ventricular tachycardia ([address] History of ST elevation myocardial infarction (STEMI), Ischemic cardiomyopathy, Mixed hyperlipidemia, NSVT (nonsustained ventricular tachycardia) ([address] LONA (obstructive sleep apnea), Pneumonia, Suspected COVID-19 virus infection, and Type 2 diabetes mellitus ([address] Past Surgical History:   has a past surgical history that includes back surgery; Tonsillectomy; Cardiac catheterization; Coronary angioplasty with stent; shoulder surgery; and Eye surgery. Restrictions  Restrictions/Precautions: Fall Risk  Implants present? : Pacemaker, Metal implants(AICD.  Cardiac Stents)  Other position/activity restrictions: O2 used at baseline.  Impaired Cardiac Ejection Fraction   Required Braces or Orthoses?: No     Vitals  Temp: 98.3 °F (36.8 °C)  Pulse: 78  Resp: 20  BP: 115/62  Height: 5' 8\" (172.7 cm)  Weight: 208 lb 8.9 oz (94.6 kg)  BMI (Calculated): 31.8  Oxygen Therapy  SpO2: 98 %  Pulse Oximeter Device Mode: Continuous  Pulse Oximeter Device Location: Finger  O2 Device: Nasal cannula  Skin Protection for O2 Device: Yes  FiO2 : 35 %  O2 Flow Rate (L/min): 3 L/min  Blood Gas  Performed?: No  Level of Consciousness: Alert    Subjective  Subjective: Alert, Very Talkative   Overall Orientation Status: Within Functional Limits  Vision  Vision: Impaired  Vision Exceptions: Wears glasses for reading  Hearing  Hearing: Exceptions to [facility]  Hearing Exceptions: Bilateral hearing aid, Hard of hearing/hearing concerns  Social/Functional History  Lives With: Family(brother and siser-in-law )  Type of Home: House  Home Layout: Two level, Able to Live on Main level with bedroom/bathroom, Laundry in basement  Home Access: Stairs to enter with rails  Entrance Stairs - Number of Steps: 2 steps w/ grab bars inside the door jam on both sides  Entrance Stairs - Rails: Both  Bathroom Shower/Tub: Tub/Shower unit, Shower chair without back, Curtain  Bathroom Toilet: Standard  Bathroom Equipment: Shower chair, Hand-held shower, Grab bars in shower  Bathroom Accessibility: Accessible  Home Equipment: Oxygen, Rolling walker, 4 wheeled walker, Cane, Long-handled shoehorn  Receives Help From: Family  ADL Assistance: Independent  Homemaking Assistance: Needs assistance(Family is primary )  Homemaking Responsibilities: Yes(Cleans his own area )  Meal Prep Responsibility: No  Laundry Responsibility: No  Cleaning Responsibility: Secondary  Ambulation Assistance: Independent(no device, uses O2 at 4 L, limited distances due to breathing capacity)  Transfer Assistance: Independent  Active : Yes  Mode of Transportation: Car, Truck  Occupation: Retired  Type of occupation:  - long and short haul -  retired  Additional Comments: brother is home to assist, sister-in-law works outside the home (currently working from home due to Zenaida)       Objective      Cognition  Overall Cognitive Status: WFL  Following Commands: Follows one step commands with increased time  Attention Span: Attends with cues to redirect, Difficulty dividing attention  Memory: Decreased recall of precautions  Safety Judgement: Decreased awareness of need for assistance, Decreased awareness of need for safety       ADL  Feeding: Modified independent , Setup, Increased time to complete  Grooming: Minimal assistance(Hair care, shaving )  UE Bathing: Supervision  LE Bathing: Moderate assistance  UE Dressing: Supervision  LE Dressing: Moderate assistance  Toileting: Moderate assistance    UE Function  Hand Dominance  Hand Dominance: Right        LUE Strength  Gross LUE Strength: WFL  L Hand General: 4/5     LUE Tone: Normotonic     LUE AROM (degrees)  LUE AROM : WFL  LUE General AROM: He continues to limit his shoulder range after his AICD placement      Left Hand AROM (degrees)  Left Hand AROM: WFL  RUE Strength  Gross RUE Strength: WFL  R Hand General: 4/5      RUE Tone: Normotonic     RUE AROM (degrees)  RUE AROM : WFL     Right Hand AROM (degrees)  Right Hand AROM: WFL    Fine Motor Skills  Coordination  Movements Are Fluid And Coordinated: Yes                           Mobility          Balance  Sitting Balance: Modified independent   Standing Balance: Contact guard assistance                     Assessment  Performance deficits / Impairments: Decreased functional mobility , Decreased ADL status, Decreased strength, Decreased safe awareness, Decreased endurance  Treatment Diagnosis: Altered Tolerance to care tasks related to Cardio-Pulmonary issues   Prognosis: Good  Decision Making: Medium Complexity  History:  Moderate chart review   Exam: 5 areas of altered performance   Assistance /

## 2020-05-12 NOTE — PROGRESS NOTES
Non- 30 (L) >60 mL/min    GFR  36 (L) >60 mL/min    GFR Comment          GFR Staging NOT REPORTED    POC Glucose Fingerstick    Collection Time: 20  7:02 AM   Result Value Ref Range    POC Glucose 261 (H) 75 - 110 mg/dL   POC Glucose Fingerstick    Collection Time: 20 11:11 AM   Result Value Ref Range    POC Glucose 303 (H) 75 - 110 mg/dL       Pulse Ox: SpO2  Av.8 %  Min: 95 %  Max: 99 %    Supplemental O2: O2 Flow Rate (L/min): 3.5 L/min     Current Meds:    predniSONE  20 mg Oral Daily    levoFLOXacin  250 mg Oral Daily    guaiFENesin  1,200 mg Oral BID    gabapentin  400 mg Oral Daily    tamsulosin  0.8 mg Oral Daily    sodium chloride flush  10 mL Intravenous 2 times per day    enoxaparin  40 mg Subcutaneous Daily    ipratropium-albuterol  1 ampule Inhalation Q4H    hydrALAZINE  25 mg Oral 3 times per day    carvedilol  12.5 mg Oral BID WC    pantoprazole  40 mg Oral QAM AC    atorvastatin  10 mg Oral Daily    alogliptin  12.5 mg Oral Daily    primidone  50 mg Oral Nightly    sacubitril-valsartan  1 tablet Oral BID    aspirin  81 mg Oral Daily    glipiZIDE  10 mg Oral BID AC    clopidogrel  75 mg Oral Daily    busPIRone  5 mg Oral TID    QUEtiapine  50 mg Oral BID    isosorbide mononitrate  30 mg Oral Daily    insulin glargine  24 Units Subcutaneous Nightly    insulin lispro  0-12 Units Subcutaneous TID WC    insulin lispro  0-6 Units Subcutaneous Nightly            VITAL SIGNS:    BP (!) 118/52   Pulse 79   Temp 97.4 °F (36.3 °C) (Oral)   Resp 20   Ht 5' 8\" (1.727 m)   Wt 208 lb 8.9 oz (94.6 kg)   SpO2 97%   BMI 31.71 kg/m²  3.5 L/min      Admit Weight:  208 lb (94.3 kg)    Last 3 weights: Wt Readings from Last 3 Encounters:   20 208 lb 8.9 oz (94.6 kg)   20 208 lb 1.8 oz (94.4 kg)   20 209 lb 7 oz (95 kg)       BMI: Body mass index is 31.71 kg/m².      INPUT/OUTPUT:          Intake/Output Summary (Last 24 hours) at 5/12/2020 1259  Last data filed at 5/12/2020 0601  Gross per 24 hour   Intake 840 ml   Output 1300 ml   Net -460 ml         Telemetry shows sinus rhythm. EXAM:     General appearance: awake, alert. Sitting on chair eating lunch  Neck: No JVD or thyromegaly  Chest: Reduced air entry bibasilarly  Cardiac: Regular rate and rhythm. No significant murmur or gallop or rubs. Extremities: no cyanosis, no clubbing, no calf tenderness, no leg edema. Skin:  warm and dry. Neuro:  Able to move all 4 extremities. Abnormal EKG revealing sinus rhythm, right bundle branch block, T-wave abnormalities suggestive of lateral wall ischemia. ASSESSMENT:    Shortness of breath due to acute exacerbation of COPD.     Atherosclerotic coronary artery disease with severe multivessel disease -not amenable for PCI or CABG on cardiac catheterization March 2019.       Chronic systolic heart failure with no significant fluid overload clinically.     Ischemic cardiomyopathy with severe LV systolic dysfunction with LVEF 25% on cardiac catheterization March 2019 and with improved LVEF 43% on most recent 2D echocardiogram April 2020.       Status post [address]. No shocks. Abnormal EKG revealing sinus rhythm, right bundle branch block, T-wave abnormalities suggestive of lateral wall ischemia.     Chronic kidney disease stage 3.     Other problems as charted. REC/PLAN:    Continues to improve very slowly. Continue  current cardiac medications as ordered. Stable from cardiac standpoint. PLEASE NOTE:  This progress note was completed using a voice transcription system. Every effort was made to ensure accuracy. However, inadvertent computerized transcription errors may be present.

## 2020-05-12 NOTE — PROGRESS NOTES
PULMONARY PROGRESS NOTE:      Interval History:COPD, atelectasis, PNA    Shortness of Breath: yes   Cough: yes   Sputum: no          Hemoptysis: no  Chest Pain: no  Fever: no                   Swelling Feet: no  Headache: no                                           Nausea, Emesis, Abdominal Pain: no  Diarrhea: no         Constipation: no    Events since last visit: Did not wear bipap overnight     PAST MEDICAL HISTORY:      Scheduled Meds:   predniSONE  20 mg Oral Daily    levoFLOXacin  250 mg Oral Daily    guaiFENesin  1,200 mg Oral BID    gabapentin  400 mg Oral Daily    tamsulosin  0.8 mg Oral Daily    sodium chloride flush  10 mL Intravenous 2 times per day    enoxaparin  40 mg Subcutaneous Daily    ipratropium-albuterol  1 ampule Inhalation Q4H    hydrALAZINE  25 mg Oral 3 times per day    carvedilol  12.5 mg Oral BID WC    pantoprazole  40 mg Oral QAM AC    atorvastatin  10 mg Oral Daily    alogliptin  12.5 mg Oral Daily    primidone  50 mg Oral Nightly    sacubitril-valsartan  1 tablet Oral BID    aspirin  81 mg Oral Daily    glipiZIDE  10 mg Oral BID AC    clopidogrel  75 mg Oral Daily    busPIRone  5 mg Oral TID    QUEtiapine  50 mg Oral BID    isosorbide mononitrate  30 mg Oral Daily    insulin glargine  24 Units Subcutaneous Nightly    insulin lispro  0-12 Units Subcutaneous TID WC    insulin lispro  0-6 Units Subcutaneous Nightly     Continuous Infusions:   dextrose       PRN Meds:sodium chloride flush, acetaminophen, glucose, dextrose, glucagon (rDNA), dextrose        PHYSICAL EXAMINATION:  BP (!) 118/52   Pulse 79   Temp 97.4 °F (36.3 °C) (Oral)   Resp 20   Ht 5' 8\" (1.727 m)   Wt 208 lb 8.9 oz (94.6 kg)   SpO2 97%   BMI 31.71 kg/m²     General : Awake, alert, oriented to time, place, and person  Neck - supple, no lymphadenopathy, JVD not raised  Heart - regular rhythm, S1 and S2 normal; no additional sounds heard  Lungs - poor Air Entry bilaterally; breath sounds :

## 2020-05-12 NOTE — PROGRESS NOTES
Medications   Medication Dose Route Frequency Provider Last Rate Last Dose    predniSONE (DELTASONE) tablet 20 mg  20 mg Oral Daily Chiquita Perone, APRN - CNP   20 mg at 05/12/20 0825    levoFLOXacin (LEVAQUIN) tablet 250 mg  250 mg Oral Daily Chiquita Perone, APRN - CNP   250 mg at 05/12/20 1121    guaiFENesin (MUCINEX) extended release tablet 1,200 mg  1,200 mg Oral BID Chiquita Perone, APRN - CNP   1,200 mg at 05/12/20 7777    gabapentin (NEURONTIN) capsule 400 mg  400 mg Oral Daily Norma Pablo MD   400 mg at 05/12/20 0825    tamsulosin (FLOMAX) capsule 0.8 mg  0.8 mg Oral Daily Norma Pablo MD   0.8 mg at 05/12/20 0825    sodium chloride flush 0.9 % injection 10 mL  10 mL Intravenous 2 times per day Irwin Faye MD   10 mL at 05/12/20 1129    sodium chloride flush 0.9 % injection 10 mL  10 mL Intravenous PRN Irwin Faye MD        acetaminophen (TYLENOL) tablet 650 mg  650 mg Oral Q4H PRN Irwin Faye MD   650 mg at 05/11/20 1153    enoxaparin (LOVENOX) injection 40 mg  40 mg Subcutaneous Daily Irwin Faye MD   40 mg at 05/12/20 0824    ipratropium-albuterol (DUONEB) nebulizer solution 1 ampule  1 ampule Inhalation Q4H Irwin Faye MD   1 ampule at 05/12/20 1436    hydrALAZINE (APRESOLINE) tablet 25 mg  25 mg Oral 3 times per day Irwin Faye MD   25 mg at 05/12/20 1404    carvedilol (COREG) tablet 12.5 mg  12.5 mg Oral BID WC Irwin Faye MD   12.5 mg at 05/12/20 1643    pantoprazole (PROTONIX) tablet 40 mg  40 mg Oral QAM AC Irwin Faye MD   40 mg at 05/12/20 8885    atorvastatin (LIPITOR) tablet 10 mg  10 mg Oral Daily Irwin Faye MD   10 mg at 05/12/20 0824    alogliptin (NESINA) tablet 12.5 mg  12.5 mg Oral Daily Irwin Faye MD   12.5 mg at 05/12/20 0825    primidone (MYSOLINE) tablet 50 mg  50 mg Oral Nightly Irwin Faye MD   50 mg at 05/11/20 2310    sacubitril-valsartan (ENTRESTO) 24-26 MG per tablet 1 tablet  1 tablet Oral BID Irwin Faye MD   1 tablet at 05/12/20 1309    aspirin chewable tablet 81 mg  81 mg Oral Daily Cuauhtemoc Dasilva MD   81 mg at 05/11/20 2231    glipiZIDE (GLUCOTROL) tablet 10 mg  10 mg Oral BID AC Cuauhtemoc Dasilva MD   10 mg at 05/12/20 1643    clopidogrel (PLAVIX) tablet 75 mg  75 mg Oral Daily Cuauhtemoc Dasilva MD   75 mg at 05/12/20 1121    busPIRone (BUSPAR) tablet 5 mg  5 mg Oral TID Cuauhtemoc Dasilva MD   5 mg at 05/12/20 1404    QUEtiapine (SEROQUEL) tablet 50 mg  50 mg Oral BID Cuauhtemoc Dasilva MD   50 mg at 05/12/20 0825    isosorbide mononitrate (IMDUR) extended release tablet 30 mg  30 mg Oral Daily Cuauhtemoc Dasilva MD   30 mg at 05/12/20 0825    insulin glargine (LANTUS) injection vial 24 Units  24 Units Subcutaneous Nightly Cuauhtemoc Dasilva MD   24 Units at 05/11/20 2230    insulin lispro (HUMALOG) injection vial 0-12 Units  0-12 Units Subcutaneous TID WC Cuauhtemoc Dasilva MD   6 Units at 05/12/20 1643    insulin lispro (HUMALOG) injection vial 0-6 Units  0-6 Units Subcutaneous Nightly Cuauhtemoc Dasilva MD   1 Units at 05/11/20 2230    glucose (GLUTOSE) 40 % oral gel 15 g  15 g Oral PRN Cuauhtemoc Dasilva MD        dextrose 50 % IV solution  12.5 g Intravenous PRN Cuauhtemoc Dasilva MD        glucagon (rDNA) injection 1 mg  1 mg Intramuscular PRN Cuauhtemoc Dasilva MD        dextrose 5 % solution  100 mL/hr Intravenous PRN Cuauhtemoc Dasilva MD              Allergies   Allergen Reactions    Amoxicillin-Pot Clavulanate Rash     Pt states he got short of breath as well. He did receive Rocephin 2/28/19 without any reaction.  Clindamycin Phosphate Rash     Pt states he got short of breath as well. He did receive Rocephin 2/28/19 without any reaction.     Penicillins Hives    Fluoxetine     Fluticasone-Salmeterol     Hydrochlorothiazide     Irbesartan     Salmeterol     Tiotropium        Social History     Socioeconomic History    Marital status:      Spouse name: Not on file    Number of children: Not on file    Years of education: Not on file    Highest education level: Not on file   Occupational History    Not on file   Social Needs    Financial resource strain: Not on file    Food insecurity     Worry: Not on file     Inability: Not on file    Transportation needs     Medical: Not on file     Non-medical: Not on file   Tobacco Use    Smoking status: Former Smoker    Smokeless tobacco: Never Used   Substance and Sexual Activity    Alcohol use: Never     Frequency: Never    Drug use: Never    Sexual activity: Not on file   Lifestyle    Physical activity     Days per week: Not on file     Minutes per session: Not on file    Stress: Not on file   Relationships    Social connections     Talks on phone: Not on file     Gets together: Not on file     Attends Oriental orthodox service: Not on file     Active member of club or organization: Not on file     Attends meetings of clubs or organizations: Not on file     Relationship status: Not on file    Intimate partner violence     Fear of current or ex partner: Not on file     Emotionally abused: Not on file     Physically abused: Not on file     Forced sexual activity: Not on file   Other Topics Concern    Not on file   Social History Narrative    Not on file       History reviewed. No pertinent family history. REVIEW OF SYSTEMS     All other ROS is negative. OBJECTIVE      Vitals:    05/12/20 1643   BP:    Pulse: 80   Resp:    Temp:    SpO2:      Wt Readings from Last 3 Encounters:   05/12/20 208 lb 8.9 oz (94.6 kg)   05/01/20 208 lb 1.8 oz (94.4 kg)   04/25/20 209 lb 7 oz (95 kg)     I/O last 3 completed shifts: In: 840 [P.O.:840]  Out: 1300 [Urine:1300]  Body mass index is 31.71 kg/m². PHYSICAL EXAM      GENERAL APPEARANCE:Awake, alert, in no acute distress. SKIN: warm and dry, no rash or erythema  EYES: conjunctivae normal and sclera anicteric  NECK:  JVD Absent. PULMONARY: Symmetrical and diminished BS with poor air entry. CADRDIOVASCULAR: S1 and S2 audible. ABDOMEN: soft nontender, bowel sounds present.   EXTREMITIES: no cyanosis, clubbing or

## 2020-05-13 PROBLEM — J96.21 ACUTE ON CHRONIC RESPIRATORY FAILURE WITH HYPOXIA (HCC): Status: ACTIVE | Noted: 2020-01-01

## 2020-05-13 PROBLEM — J44.9 COPD (CHRONIC OBSTRUCTIVE PULMONARY DISEASE) (HCC): Status: ACTIVE | Noted: 2020-01-01

## 2020-05-13 NOTE — CARE COORDINATION
[facility] Encounter Date/Time: 2020 Jay Cutler Account: [de-identified]    MRN: [id number]    Patient: Fei Ortiz Serial #: [id number]      ENCOUNTER          Patient Class: I Private Enc? No Unit RM BD: [facility] PROG    Hospital Service: Intermediate   ADM DX: COPD exacerbation ([address] *   ADM Provider: Mary Romero MD   Procedure:     ATT Provider: Mary Romero MD   REF Provider:        PATIENT                 Name: Mary Jane Workman : 1950 (65 yrs)   [facility] Sex[facility]   City: [place] [address]         Marital Status:    Employer: NOT EMPLOYED         Gnosticist: Unknown   Primary Care Provider: Danette Deluna MD         Primary Phone: [phone]   EMERGENCY CONTACT   Contact Name Legal Guardian? Relationship to Patient Home Phone Work Phone   1. Kneia Royston  2. [de-identified], Leela A      Brother/Sister  Parent [phone]            GUARANTOR            Guarantor: Mary Jane Workman     : 1950   Address: [facility] Sex: [place]     [place] [address]     Relation to Patient: Self       Home Phone: [phone]   Guarantor ID: [id number]       Work Phone:     Guarantor Employer: NOT EMPLOYED         Status: RETIRED      COVERAGE        PRIMARY INSURANCE   Payor: [employer] Plan: [employer]   Payor Address: [facility], [address] [place] [address]       Group Number: [id number] Insurance Type: INDEMNITY   Subscriber Name: Olivia Sherlyn : 1950   Subscriber ID: [id number] Pat. Rel. to Sub: Self   SECONDARY INSURANCE   Payor:   Plan:     Payor Address:  ,           Group Number:   Insurance Type:     Subscriber Name:   Subscriber :     Subscriber ID:   Pat.  Rel. to Sub:                 After Visit Summary   Mary Jane Workman  MRN: [id number]      2020 - 2020   [address]    [phone]    Care Plan Once You Return Home     Do         these medications from any pharmacy with your printed medication list below. Care Plan Once You Return Home   Do   Read   Go             Your Visit     Here you will find information about your visit, including the reason for your visit. Please take this sheet with you when you visit your doctor or other health care provider in the future. It will help determine the best possible medical care for you at that time. If you have any questions once you leave the hospital, please call the department phone number listed below. In the event of an emergency, call 911 or go to the nearest Emergency Department. Preventive Care      Date Due   Hepatitis C screening is recommended for all adults regardless of risk factors born between [facility] at least once (lifetime) who have never been tested. 1950   Cholesterol Screening 02/14/1958   Tetanus Combination Vaccine (1 - Tdap) 02/14/1967   Shingles Vaccine (1 of 2) 02/14/1998   Colonoscopy 02/14/1998   Medicare Annual Wellness Visit 06/23/2019   Potassium monitoring 05/12/2021   Creatinine monitoring 05/12/2021          Follow Up Information and Future Appointments     Follow Up Information and Future Appointments          Follow up with Mervin Rowell MD   Specialty: Internal Medicine  [address] [place] [address]   [phone]           Follow up with Aristides Goldstein MD   Specialty: Pulmonology  [address] [address]   [phone]           Follow up with Eric Bland MD   Specialty: Nephrology  [address] [place] [address] 36.   [phone] VV Special Use Case with Edmar Dean MD   Monday May 18, 2020 1:00 PM  [facility] [address]  [facility] [address]   [phone]    [id number] Office Visit with Anil Babcock MD   Monday Jun 29, 2020 10:20 AM   Please arrive 15 minutes prior to appointment, bring photo ID and insurance card.   [facility] - [address] [place] [address] [place] [address]   [phone]    You are allergic to the following     You are allergic to the following   Allergen Reactions   Amoxicillin-Pot Clavulanate Rash   Pt states he got short of breath as well. He did receive Rocephin 2/28/19 without any reaction. Clindamycin Phosphate Rash   Pt states he got short of breath as well. He did receive Rocephin 2/28/19 without any reaction. Penicillins Hives       Fluoxetine Not Noted       Fluticasone-Salmeterol Not Noted       Hydrochlorothiazide Not Noted       Irbesartan Not Noted       Salmeterol Not Noted       Tiotropium Not Noted   Your Latest Vitals        Blood Pressure 124/67      BMI 31.71      Weight 208 lb 8.9 oz      Height 5' 8\"      Temperature (Oral) 97.5 °F      Pulse 81      Respiration 20      Oxygen Saturation 100%      BSA 2.13 m²   Daily Medication List (This medication list can be shared with any healthcare provider who is helping you manage your medications)     There are NEW medications for you. START taking them after you leave the hospital     There are NEW medications for you. START taking them after you leave the hospital     Next Dose Due AM NOON PM NIGHT    predniSONE 20 MG tablet   Commonly known as: DELTASONE   Take 1 tablet by mouth daily for 10 days   Start taking on:  May 14, 2020         These are medications you told us you were taking at home, CONTINUE taking them after you leave the hospital     These are medications you told us you were taking at home, CONTINUE taking them after you leave the hospital     Next Dose Due AM NOON PM NIGHT    acetaminophen 500 MG tablet   Commonly known as: TYLENOL   Take 500 mg by mouth every 6 hours as needed for Pain  As needed         albuterol sulfate  (90 Base) MCG/ACT inhaler   Inhale 2 puffs into the lungs          alogliptin 12.5 MG Tabs tablet   Commonly known as: NESINA   Take 12.5 mg by mouth          Aspirin Adult Low Dose 81 MG EC tablet   Generic drug: aspirin   Take 81 mg by mouth          Blood Glucose Monitoring Suppl Kit   Use as instructed          busPIRone 5 MG tablet   Commonly known as: BUSPAR   TAKE 1 TABLET BY MOUTH THREE TIMES A DAY          carvedilol 12.5 MG tablet   Commonly known as: COREG   Take 1 tablet by mouth 2 times daily (with meals)          clopidogrel 75 MG tablet   Commonly known as: PLAVIX   Take 75 mg by mouth          Entresto 24-26 MG per tablet   Generic drug: sacubitril-valsartan   Take 1 tablet by mouth 2 times daily          furosemide 20 MG tablet   Commonly known as: LASIX   Take 40 mg by mouth daily          gabapentin 300 MG capsule   Commonly known as: NEURONTIN   Take 400 mg by mouth 2 times daily. glipiZIDE 10 MG tablet   Commonly known as: GLUCOTROL   Take 10 mg by mouth 2 times daily (before meals)          hydrALAZINE 25 MG tablet   Commonly known as: APRESOLINE   Take 1 tablet by mouth every 8 hours          ipratropium-albuterol 0.5-2.5 (3) MG/3ML Soln nebulizer solution   Commonly known as: DUONEB   Inhale 3 mLs into the lungs          isosorbide mononitrate 30 MG extended release tablet   Commonly known as: IMDUR   Take 30 mg by mouth          Lantus SoloStar 100 UNIT/ML injection pen   Generic drug: insulin glargine   Inject 24 Units into the skin          omeprazole 20 MG delayed release capsule   Commonly known as: PRILOSEC   Take 20 mg by mouth daily          primidone 50 MG tablet   Commonly known as:  MYSOLINE   Take 50 mg by mouth nightly          QUEtiapine 50 MG tablet   Commonly known as: SEROQUEL   Take 50 mg by mouth          simvastatin 10 MG tablet   Commonly known as: ZOCOR   Take 10 mg by mouth nightly          tamsulosin 0.4 MG capsule   Commonly known as: FLOMAX   Take 0.4 mg by mouth          True Metrix Blood Glucose Test strip   Generic drug: blood glucose test strips   USE 3 TIMES A DAY          Vitamin D (Cholecalciferol) 50 MCG (2000 UT) Caps   Take 1 capsule by mouth daily                Where to

## 2020-05-13 NOTE — PLAN OF CARE
Problem: Discharge Planning:  Goal: Participates in care planning  Description: Participates in care planning  Outcome: Ongoing     Problem: Fluid Volume - Imbalance:  Goal: Absence of imbalanced fluid volume signs and symptoms  Description: Absence of imbalanced fluid volume signs and symptoms  Outcome: Ongoing     Problem: Pain:  Goal: Control of acute pain  Description: Control of acute pain  Outcome: Ongoing     Problem: Pain:  Goal: Control of chronic pain  Description: Control of chronic pain  Outcome: Ongoing     Problem: Serum Glucose Level - Abnormal:  Goal: Ability to maintain appropriate glucose levels will improve to within specified parameters  Description: Ability to maintain appropriate glucose levels will improve to within specified parameters  Outcome: Ongoing     Problem:  Activity:  Goal: Fatigue will decrease  Description: Fatigue will decrease  Outcome: Ongoing     Problem: Coping:  Goal: Ability to establish a method of communication will improve  Description: Ability to establish a method of communication will improve  Outcome: Ongoing

## 2020-05-13 NOTE — PROGRESS NOTES
[facility] CARDIOLOGY Progress Note    5/13/2020 11:22 AM      Subjective:   No chest pain. No acute events overnight. LABS:     Recent Results (from the past 24 hour(s))   POC Glucose Fingerstick    Collection Time: 05/12/20  4:24 PM   Result Value Ref Range    POC Glucose 281 (H) 75 - 110 mg/dL   POC Glucose Fingerstick    Collection Time: 05/12/20  7:18 PM   Result Value Ref Range    POC Glucose 241 (H) 75 - 110 mg/dL   Arterial Blood Gases    Collection Time: 05/13/20  5:10 AM   Result Value Ref Range    pH, Arterial 7.395 7.350 - 7.450    pCO2, Arterial 40.9 35.0 - 45.0 mmHg    pO2, Arterial 112.0 (H) 80.0 - 100.0 mmHg    HCO3, Arterial 25.0 22.0 - 26.0 mmol/L    Positive Base Excess, Art 0.2 0.0 - 2.0 mmol/L    Negative Base Excess, Art NOT REPORTED 0.0 - 2.0 mmol/L    O2 Sat, Arterial 97.8 95 - 98 %    Total Hb NOT REPORTED 12.0 - 16.0 g/dl    Oxyhemoglobin NOT REPORTED 95.0 - 98.0 %    Carboxyhemoglobin 1.2 0 - 5 %    Methemoglobin 0.5 0.0 - 1.9 %    Pt Temp NOT REPORTED     pH, Art, Temp Adj NOT REPORTED 7.350 - 7.450    pCO2, Art, Temp Adj NOT REPORTED 35.0 - 45.0    pO2, Art, Temp Adj NOT REPORTED 80.0 - 100.0 mmHg    O2 Device/Flow/% Cannula     Respiratory Rate PENDING     Rito Test PASS     Sample Site Right Radial Artery     Pt.  Position NOT REPORTED     Mode NOT REPORTED     Set Rate NOT REPORTED     Total Rate NOT REPORTED     VT NOT REPORTED     FIO2 NOT REPORTED     Peep/Cpap NOT REPORTED     PSV NOT REPORTED     Text for Respiratory RESULTS TO RN     NOTIFICATION NOT REPORTED     NOTIFICATION TIME NOT REPORTED    POC Glucose Fingerstick    Collection Time: 05/13/20  6:52 AM   Result Value Ref Range    POC Glucose 163 (H) 75 - 110 mg/dL   CBC with DIFF    Collection Time: 05/13/20  6:54 AM   Result Value Ref Range    WBC 6.2 3.5 - 11.0 k/uL    RBC 3.23 (L) 4.5 - 5.9 m/uL    Hemoglobin 8.7 (L) 13.5 - 17.5 g/dL    Hematocrit 27.1 (L) 41 - 53 %    MCV 84.0 80 - 100 fL

## 2020-05-13 NOTE — DISCHARGE SUMMARY
Systolic (67FFU), [facility]:966 , Min:103 , Max:127     Pulse Range: Pulse  Av.5  Min: 63  Max: 116  Respiration Range: Resp  Av.4  Min: 12  Max: 35  Current Pulse Ox: Temp: 97.5 °F (36.4 °C)  24HR Pulse Ox Range: Temp  Av °F (36.7 °C)  Min: 97.4 °F (36.3 °C)  Max: 100 °F (37.8 °C)  Oxygen Amount and Delivery: O2 Device: Nasal cannula  O2 Flow Rate (L/min): 3 L/min    Wt Readings from Last 3 Encounters:   20 208 lb 8.9 oz (94.6 kg)   20 208 lb 1.8 oz (94.4 kg)   20 209 lb 7 oz (95 kg)     I/O (24 Hours)    Intake/Output Summary (Last 24 hours) at 2020 1403  Last data filed at 2020 1336  Gross per 24 hour   Intake --   Output 2550 ml   Net -2550 ml       Exam   afebrile  General Appearance - Awake, alert, oriented, in no acute distress  HEENT -  normocephalic, atraumatic. Neck - Supple, trachea midline  Lungs -  Fair air entry bilaterally, breath sounds vesicular, 100% on 3 liters  Cardiovascular - Heart sounds are normal. Regular rate and rhythm.   Abdomen - soft, nontender, nondistended, no masses or organomegaly  Neurologic -  There are no focal motor or sensory deficits  Skin - no bruising or bleeding  Extremities - no cyanosis, clubbing or edema    Meds     Current Facility-Administered Medications:     bumetanide (BUMEX) tablet 1 mg, 1 mg, Oral, Daily, Leartis Fothergill, MD, 1 mg at 20 07    predniSONE (DELTASONE) tablet 20 mg, 20 mg, Oral, Daily, Jesus [place], APRN - CNP, 20 mg at 20 07    levoFLOXacin (LEVAQUIN) tablet 250 mg, 250 mg, Oral, Daily, Jesus [place], APRN - CNP, 250 mg at 20 0726    guaiFENesin (MUCINEX) extended release tablet 1,200 mg, 1,200 mg, Oral, BID, Jesus [place], APRN - CNP, 1,200 mg at 20 07    gabapentin (NEURONTIN) capsule 400 mg, 400 mg, Oral, Daily, Norma Pablo MD, 400 mg at 20 07    tamsulosin (FLOMAX) capsule 0.8 mg, 0.8 mg, Oral, Daily, Norma Pablo MD, 0.8 mg at 20 07    sodium chloride flush 0.9 % injection 10 mL, 10 mL, Intravenous, 2 times per day, Irwin Faye MD, 10 mL at 05/13/20 0731    sodium chloride flush 0.9 % injection 10 mL, 10 mL, Intravenous, PRN, Irwin Faye MD    acetaminophen (TYLENOL) tablet 650 mg, 650 mg, Oral, Q4H PRN, Irwin Faye MD, 650 mg at 05/11/20 1153    enoxaparin (LOVENOX) injection 40 mg, 40 mg, Subcutaneous, Daily, Irwin Faye MD, 40 mg at 05/13/20 0728    ipratropium-albuterol (DUONEB) nebulizer solution 1 ampule, 1 ampule, Inhalation, Q4H, Irwin Faye MD, 1 ampule at 05/13/20 1038    hydrALAZINE (APRESOLINE) tablet 25 mg, 25 mg, Oral, 3 times per day, Irwin Faye MD, 25 mg at 05/13/20 0533    carvedilol (COREG) tablet 12.5 mg, 12.5 mg, Oral, BID WC, Irwin Faye MD, 12.5 mg at 05/13/20 0726    pantoprazole (PROTONIX) tablet 40 mg, 40 mg, Oral, QAM AC, Irwin Faye MD, 40 mg at 05/13/20 0533    atorvastatin (LIPITOR) tablet 10 mg, 10 mg, Oral, Daily, Irwin Faye MD, 10 mg at 05/13/20 0725    alogliptin (NESINA) tablet 12.5 mg, 12.5 mg, Oral, Daily, Irwin Faye MD, 12.5 mg at 05/13/20 0725    primidone (MYSOLINE) tablet 50 mg, 50 mg, Oral, Nightly, Irwin Faye MD, 50 mg at 05/12/20 2125    sacubitril-valsartan (ENTRESTO) 24-26 MG per tablet 1 tablet, 1 tablet, Oral, BID, Irwin Faye MD, 1 tablet at 05/13/20 1133    aspirin chewable tablet 81 mg, 81 mg, Oral, Daily, Irwin Faye MD, 81 mg at 05/12/20 2125    glipiZIDE (GLUCOTROL) tablet 10 mg, 10 mg, Oral, BID AC, Irwin Faye MD, 10 mg at 05/13/20 3837    clopidogrel (PLAVIX) tablet 75 mg, 75 mg, Oral, Daily, Irwin Faye MD, 75 mg at 05/13/20 1133    busPIRone (BUSPAR) tablet 5 mg, 5 mg, Oral, TID, Irwin Faye MD, 5 mg at 05/13/20 0727    QUEtiapine (SEROQUEL) tablet 50 mg, 50 mg, Oral, BID, Irwin Faye MD, 50 mg at 05/13/20 0727    isosorbide mononitrate (IMDUR) extended release tablet 30 mg, 30 mg, Oral, Daily, Irwin Faye MD, 30 mg at 05/13/20 0725    insulin glargine (LANTUS) injection vial

## 2020-05-13 NOTE — CARE COORDINATION
ONGOING DISCHARGE PLAN:    Spoke with patient regarding discharge plan and patient confirms that plan is still to discharge to home with vns   Faxed discharge paper work to the [facility] [employer] paperwork to [facility]    Will continue to follow for additional discharge needs.     Electronically signed by Scott Brown RN on 5/13/2020 at 3:08 PM

## 2020-05-14 NOTE — CARE COORDINATION
alogliptin 12.5 MG Tabs tablet   Commonly known as: NESINA   Take 12.5 mg by mouth          Aspirin Adult Low Dose 81 MG EC tablet   Generic drug: aspirin   Take 81 mg by mouth          Blood Glucose Monitoring Suppl Kit   Use as instructed          busPIRone 5 MG tablet   Commonly known as: BUSPAR   TAKE 1 TABLET BY MOUTH THREE TIMES A DAY          carvedilol 12.5 MG tablet   Commonly known as: COREG   Take 1 tablet by mouth 2 times daily (with meals)          clopidogrel 75 MG tablet   Commonly known as: PLAVIX   Take 75 mg by mouth          Entresto 24-26 MG per tablet   Generic drug: sacubitril-valsartan   Take 1 tablet by mouth 2 times daily          furosemide 20 MG tablet   Commonly known as: LASIX   Take 40 mg by mouth daily          gabapentin 300 MG capsule   Commonly known as: NEURONTIN   Take 400 mg by mouth 2 times daily. glipiZIDE 10 MG tablet   Commonly known as: GLUCOTROL   Take 10 mg by mouth 2 times daily (before meals)          hydrALAZINE 25 MG tablet   Commonly known as: APRESOLINE   Take 1 tablet by mouth every 8 hours          ipratropium-albuterol 0.5-2.5 (3) MG/3ML Soln nebulizer solution   Commonly known as: DUONEB   Inhale 3 mLs into the lungs          isosorbide mononitrate 30 MG extended release tablet   Commonly known as: IMDUR   Take 30 mg by mouth          Lantus SoloStar 100 UNIT/ML injection pen   Generic drug: insulin glargine   Inject 24 Units into the skin          omeprazole 20 MG delayed release capsule   Commonly known as: PRILOSEC   Take 20 mg by mouth daily          primidone 50 MG tablet   Commonly known as:  MYSOLINE   Take 50 mg by mouth nightly          QUEtiapine 50 MG tablet   Commonly known as: SEROQUEL   Take 50 mg by mouth          simvastatin 10 MG tablet   Commonly known as: ZOCOR   Take 10 mg by mouth nightly          tamsulosin 0.4 MG capsule   Commonly known as: FLOMAX   Take 0.4 mg by mouth          True Metrix Blood Glucose Test strip   Generic drug: blood glucose test strips   USE 3 TIMES A DAY          Vitamin D (Cholecalciferol) 50 MCG ( UT) Caps   Take 1 capsule by mouth daily                Where to  your medications         these medications from any pharmacy with your printed prescription      predniSONE         [facility] Instructions     Continuity of Care Form     Patient Name: Mami Cabello   :  1950                    MRN:  [id number]     Admit date:  2020                       Discharge date:  20     Code Status Order: Full Code   Advance Directives:              Advance Care Flowsheet Documentation      Date/Time Healthcare Directive Type of Healthcare Directive Copy in [address] Agent's Name Healthcare Agent's Phone Number     20 1521  Yes, patient has an advance directive for healthcare treatment  Durable power of  for health care  No, copy requested from family  Next of kin  Renny Monahan, Pt's mother  --             Admitting Physician:  Rajan Barber MD  PCP: Michael Guevara MD     Discharging Nurse:   [address] Unit/Room#: 2123/2123-01  Discharging Unit Phone Number: [phone]     Emergency Contact:   Extended Emergency Contact Information  Primary Emergency Contact: Eusebio Martinez, Dannie [address] Phone: [phone]  Work Phone: [phone]  Mobile Phone: [phone]  Relation: Brother/Sister  Secondary Emergency Contact: Leela Andrade  Mobile Phone: [phone]  Relation: Parent     Past Surgical History:        Past Surgical History:   Procedure Laterality Date    BACK SURGERY        CARDIAC CATHETERIZATION        CORONARY ANGIOPLASTY WITH STENT PLACEMENT        EYE SURGERY        SHOULDER SURGERY        TONSILLECTOMY             Immunization History:      There is no immunization history on file for this patient.     Active Problems:       Patient Active Problem List   Diagnosis Code    Chronic obstructive pulmonary disease ([employer] [facility] [address] J44.9    Acute [phone]  · Fax [phone]           / signature: Electronically signed by Bishop Agrawal RN on 5/9/20 at 3:23 PM EDT     PHYSICIAN SECTION     Prognosis: Good     Condition at Discharge: Stable     Rehab Potential (if transferring to Rehab): Good     Recommended Labs or Other Treatments After Discharge:      Physician Certification: I certify the above information and transfer of Marycarmen Dasilva  is necessary for the continuing treatment of the diagnosis listed and that he requires [address] for greater 30 days.      Update Admission H&P: No change in H&P     PHYSICIAN SIGNATURE:  Verbal Order per Dr. Contreras Seats  / Electronically signed by Toan Mcarthur RN on 5/13/2020 at 3:00 PM

## 2020-05-21 NOTE — CARE COORDINATION
You Patient resolved from the Care Transitions episode on 5/21/2020  Patient/family has been provided the following resources and education related to COVID-19:                         Signs, symptoms and red flags related to COVID-19            CDC exposure and quarantine guidelines            [facility] exposure contact - [phone]            Contact for their local Department of Health                 Patient currently reports that the following symptoms have improved:  fever, fatigue, pain or aching joints, cough, shortness of breath, confusion or unusual change in mental status, chills or shaking, sweating, fast heart rate, fast breathing, dizziness/lightheadedness, less urine output, cold, clammy, and pale skin, low body temperature and no new/worsening symptoms   Patient has a telephone visit with pcp tomorrow//JU     No further outreach scheduled with this CTN/ACM. Episode of Care resolved. Patient has this CTN/ACM contact information if future needs arise.

## 2020-11-03 PROBLEM — J44.9 CHRONIC OBSTRUCTIVE PULMONARY DISEASE (HCC): Status: RESOLVED | Noted: 2019-02-03 | Resolved: 2020-01-01

## 2021-01-01 ENCOUNTER — HOSPITAL ENCOUNTER (INPATIENT)
Age: 71
LOS: 10 days | DRG: 207 | End: 2021-02-16
Attending: EMERGENCY MEDICINE | Admitting: INTERNAL MEDICINE
Payer: COMMERCIAL

## 2021-01-01 ENCOUNTER — APPOINTMENT (OUTPATIENT)
Dept: CT IMAGING | Age: 71
DRG: 207 | End: 2021-01-01
Payer: COMMERCIAL

## 2021-01-01 ENCOUNTER — APPOINTMENT (OUTPATIENT)
Dept: GENERAL RADIOLOGY | Age: 71
DRG: 207 | End: 2021-01-01
Payer: COMMERCIAL

## 2021-01-01 ENCOUNTER — APPOINTMENT (OUTPATIENT)
Dept: ULTRASOUND IMAGING | Age: 71
DRG: 207 | End: 2021-01-01
Payer: COMMERCIAL

## 2021-01-01 ENCOUNTER — APPOINTMENT (OUTPATIENT)
Dept: DIALYSIS | Age: 71
DRG: 207 | End: 2021-01-01
Payer: COMMERCIAL

## 2021-01-01 VITALS
DIASTOLIC BLOOD PRESSURE: 17 MMHG | TEMPERATURE: 100.2 F | RESPIRATION RATE: 22 BRPM | OXYGEN SATURATION: 80 % | HEART RATE: 10 BPM | HEIGHT: 69 IN | BODY MASS INDEX: 31.18 KG/M2 | WEIGHT: 210.54 LBS | SYSTOLIC BLOOD PRESSURE: 45 MMHG

## 2021-01-01 DIAGNOSIS — J93.9 PNEUMOTHORAX, UNSPECIFIED TYPE: ICD-10-CM

## 2021-01-01 DIAGNOSIS — R09.2 RESPIRATORY ARREST (HCC): Primary | ICD-10-CM

## 2021-01-01 LAB
-: ABNORMAL
-: NORMAL
ABO/RH: NORMAL
ABSOLUTE EOS #: 0 K/UL (ref 0–0.4)
ABSOLUTE EOS #: 0 K/UL (ref 0–0.44)
ABSOLUTE EOS #: 0.09 K/UL (ref 0–0.44)
ABSOLUTE EOS #: <0.03 K/UL (ref 0–0.44)
ABSOLUTE IMMATURE GRANULOCYTE: 0 K/UL (ref 0–0.3)
ABSOLUTE IMMATURE GRANULOCYTE: 0.11 K/UL (ref 0–0.3)
ABSOLUTE IMMATURE GRANULOCYTE: 0.17 K/UL (ref 0–0.3)
ABSOLUTE IMMATURE GRANULOCYTE: 0.19 K/UL (ref 0–0.3)
ABSOLUTE IMMATURE GRANULOCYTE: 0.2 K/UL (ref 0–0.3)
ABSOLUTE IMMATURE GRANULOCYTE: 0.25 K/UL (ref 0–0.3)
ABSOLUTE IMMATURE GRANULOCYTE: 0.26 K/UL (ref 0–0.3)
ABSOLUTE IMMATURE GRANULOCYTE: 0.29 K/UL (ref 0–0.3)
ABSOLUTE IMMATURE GRANULOCYTE: 1.58 K/UL (ref 0–0.3)
ABSOLUTE LYMPH #: 0.22 K/UL (ref 1–4.8)
ABSOLUTE LYMPH #: 0.29 K/UL (ref 1.1–3.7)
ABSOLUTE LYMPH #: 0.35 K/UL (ref 1.1–3.7)
ABSOLUTE LYMPH #: 0.38 K/UL (ref 1.1–3.7)
ABSOLUTE LYMPH #: 0.4 K/UL (ref 1.1–3.7)
ABSOLUTE LYMPH #: 0.43 K/UL (ref 1.1–3.7)
ABSOLUTE LYMPH #: 0.44 K/UL (ref 1–4.8)
ABSOLUTE LYMPH #: 0.82 K/UL (ref 1.1–3.7)
ABSOLUTE LYMPH #: 0.98 K/UL (ref 1–4.8)
ABSOLUTE LYMPH #: 1.13 K/UL (ref 1.1–3.7)
ABSOLUTE LYMPH #: 4.73 K/UL (ref 1–4.8)
ABSOLUTE MONO #: 0.24 K/UL (ref 0.1–1.2)
ABSOLUTE MONO #: 0.44 K/UL (ref 0.1–0.8)
ABSOLUTE MONO #: 0.48 K/UL (ref 0.1–1.2)
ABSOLUTE MONO #: 0.64 K/UL (ref 0.1–1.2)
ABSOLUTE MONO #: 0.76 K/UL (ref 0.1–1.2)
ABSOLUTE MONO #: 0.79 K/UL (ref 0.1–0.8)
ABSOLUTE MONO #: 0.81 K/UL (ref 0.1–1.2)
ABSOLUTE MONO #: 0.98 K/UL (ref 0.1–0.8)
ABSOLUTE MONO #: 1.36 K/UL (ref 0.1–1.2)
ABSOLUTE MONO #: 1.56 K/UL (ref 0.1–1.2)
ABSOLUTE MONO #: 4.21 K/UL (ref 0.1–0.8)
ACTION: NORMAL
ALBUMIN SERPL-MCNC: 2.6 G/DL (ref 3.5–5.2)
ALBUMIN SERPL-MCNC: 3.6 G/DL (ref 3.5–5.2)
ALBUMIN/GLOBULIN RATIO: 1.2 (ref 1–2.5)
ALLEN TEST: ABNORMAL
ALLEN TEST: NORMAL
ALLEN TEST: NORMAL
ALLEN TEST: POSITIVE
ALLEN TEST: POSITIVE
ALP BLD-CCNC: 91 U/L (ref 40–129)
ALT SERPL-CCNC: 32 U/L (ref 5–41)
AMORPHOUS: ABNORMAL
AMORPHOUS: NORMAL
ANION GAP SERPL CALCULATED.3IONS-SCNC: 10 MMOL/L (ref 9–17)
ANION GAP SERPL CALCULATED.3IONS-SCNC: 11 MMOL/L (ref 9–17)
ANION GAP SERPL CALCULATED.3IONS-SCNC: 12 MMOL/L (ref 9–17)
ANION GAP SERPL CALCULATED.3IONS-SCNC: 13 MMOL/L (ref 9–17)
ANION GAP SERPL CALCULATED.3IONS-SCNC: 13 MMOL/L (ref 9–17)
ANION GAP SERPL CALCULATED.3IONS-SCNC: 14 MMOL/L (ref 9–17)
ANION GAP SERPL CALCULATED.3IONS-SCNC: 15 MMOL/L (ref 9–17)
ANION GAP SERPL CALCULATED.3IONS-SCNC: 26 MMOL/L (ref 9–17)
ANION GAP SERPL CALCULATED.3IONS-SCNC: 8 MMOL/L (ref 9–17)
ANION GAP SERPL CALCULATED.3IONS-SCNC: 9 MMOL/L (ref 9–17)
ANION GAP SERPL CALCULATED.3IONS-SCNC: 9 MMOL/L (ref 9–17)
ANION GAP: 6 MMOL/L (ref 7–16)
ANTIBODY SCREEN: NEGATIVE
ARM BAND NUMBER: NORMAL
AST SERPL-CCNC: 35 U/L
BACTERIA: ABNORMAL
BACTERIA: NORMAL
BASOPHILS # BLD: 0 % (ref 0–2)
BASOPHILS # BLD: 1 % (ref 0–2)
BASOPHILS ABSOLUTE: 0 K/UL (ref 0–0.2)
BASOPHILS ABSOLUTE: 0.03 K/UL (ref 0–0.2)
BASOPHILS ABSOLUTE: 0.25 K/UL (ref 0–0.2)
BASOPHILS ABSOLUTE: <0.03 K/UL (ref 0–0.2)
BILIRUB SERPL-MCNC: 0.41 MG/DL (ref 0.3–1.2)
BILIRUBIN URINE: NEGATIVE
BILIRUBIN URINE: NEGATIVE
BNP INTERPRETATION: ABNORMAL
BUN BLDV-MCNC: 101 MG/DL (ref 8–23)
BUN BLDV-MCNC: 102 MG/DL (ref 8–23)
BUN BLDV-MCNC: 109 MG/DL (ref 8–23)
BUN BLDV-MCNC: 115 MG/DL (ref 8–23)
BUN BLDV-MCNC: 124 MG/DL (ref 8–23)
BUN BLDV-MCNC: 145 MG/DL (ref 8–23)
BUN BLDV-MCNC: 145 MG/DL (ref 8–23)
BUN BLDV-MCNC: 23 MG/DL (ref 8–23)
BUN BLDV-MCNC: 25 MG/DL (ref 8–23)
BUN BLDV-MCNC: 26 MG/DL (ref 8–23)
BUN BLDV-MCNC: 27 MG/DL (ref 8–23)
BUN BLDV-MCNC: 28 MG/DL (ref 8–23)
BUN BLDV-MCNC: 29 MG/DL (ref 8–23)
BUN BLDV-MCNC: 33 MG/DL (ref 8–23)
BUN BLDV-MCNC: 35 MG/DL (ref 8–23)
BUN BLDV-MCNC: 41 MG/DL (ref 8–23)
BUN BLDV-MCNC: 64 MG/DL (ref 8–23)
BUN BLDV-MCNC: 86 MG/DL (ref 8–23)
BUN BLDV-MCNC: 95 MG/DL (ref 8–23)
BUN/CREAT BLD: ABNORMAL (ref 9–20)
CALCIUM IONIZED: 0.84 MMOL/L (ref 1.13–1.33)
CALCIUM IONIZED: 0.99 MMOL/L (ref 1.13–1.33)
CALCIUM IONIZED: 1.12 MMOL/L (ref 1.13–1.33)
CALCIUM IONIZED: 1.13 MMOL/L (ref 1.13–1.33)
CALCIUM IONIZED: 1.13 MMOL/L (ref 1.13–1.33)
CALCIUM IONIZED: 1.15 MMOL/L (ref 1.13–1.33)
CALCIUM IONIZED: 1.19 MMOL/L (ref 1.13–1.33)
CALCIUM IONIZED: 1.24 MMOL/L (ref 1.13–1.33)
CALCIUM SERPL-MCNC: 7.1 MG/DL (ref 8.6–10.4)
CALCIUM SERPL-MCNC: 7.2 MG/DL (ref 8.6–10.4)
CALCIUM SERPL-MCNC: 7.5 MG/DL (ref 8.6–10.4)
CALCIUM SERPL-MCNC: 7.8 MG/DL (ref 8.6–10.4)
CALCIUM SERPL-MCNC: 7.9 MG/DL (ref 8.6–10.4)
CALCIUM SERPL-MCNC: 8.1 MG/DL (ref 8.6–10.4)
CALCIUM SERPL-MCNC: 8.2 MG/DL (ref 8.6–10.4)
CALCIUM SERPL-MCNC: 8.4 MG/DL (ref 8.6–10.4)
CALCIUM SERPL-MCNC: 8.5 MG/DL (ref 8.6–10.4)
CALCIUM SERPL-MCNC: 8.6 MG/DL (ref 8.6–10.4)
CALCIUM SERPL-MCNC: 8.7 MG/DL (ref 8.6–10.4)
CALCIUM SERPL-MCNC: 8.8 MG/DL (ref 8.6–10.4)
CALCIUM SERPL-MCNC: 8.9 MG/DL (ref 8.6–10.4)
CALCIUM SERPL-MCNC: 8.9 MG/DL (ref 8.6–10.4)
CALCIUM SERPL-MCNC: 9.2 MG/DL (ref 8.6–10.4)
CASTS UA: ABNORMAL /LPF (ref 0–8)
CASTS UA: NORMAL /LPF (ref 0–8)
CHLORIDE BLD-SCNC: 101 MMOL/L (ref 98–107)
CHLORIDE BLD-SCNC: 102 MMOL/L (ref 98–107)
CHLORIDE BLD-SCNC: 103 MMOL/L (ref 98–107)
CHLORIDE BLD-SCNC: 105 MMOL/L (ref 98–107)
CHLORIDE BLD-SCNC: 109 MMOL/L (ref 98–107)
CHLORIDE BLD-SCNC: 111 MMOL/L (ref 98–107)
CHLORIDE BLD-SCNC: 111 MMOL/L (ref 98–107)
CHLORIDE BLD-SCNC: 112 MMOL/L (ref 98–107)
CHLORIDE BLD-SCNC: 112 MMOL/L (ref 98–107)
CHLORIDE BLD-SCNC: 115 MMOL/L (ref 98–107)
CHLORIDE BLD-SCNC: 116 MMOL/L (ref 98–107)
CHLORIDE BLD-SCNC: 120 MMOL/L (ref 98–107)
CHLORIDE BLD-SCNC: 124 MMOL/L (ref 98–107)
CHLORIDE BLD-SCNC: 99 MMOL/L (ref 98–107)
CHLORIDE BLD-SCNC: 99 MMOL/L (ref 98–107)
CO2: 13 MMOL/L (ref 20–31)
CO2: 20 MMOL/L (ref 20–31)
CO2: 21 MMOL/L (ref 20–31)
CO2: 22 MMOL/L (ref 20–31)
CO2: 23 MMOL/L (ref 20–31)
CO2: 24 MMOL/L (ref 20–31)
CO2: 25 MMOL/L (ref 20–31)
CO2: 25 MMOL/L (ref 20–31)
COLOR: YELLOW
COLOR: YELLOW
COMMENT UA: ABNORMAL
COMMENT UA: ABNORMAL
CREAT SERPL-MCNC: 1.5 MG/DL (ref 0.7–1.2)
CREAT SERPL-MCNC: 1.5 MG/DL (ref 0.7–1.2)
CREAT SERPL-MCNC: 1.55 MG/DL (ref 0.7–1.2)
CREAT SERPL-MCNC: 1.57 MG/DL (ref 0.7–1.2)
CREAT SERPL-MCNC: 1.58 MG/DL (ref 0.7–1.2)
CREAT SERPL-MCNC: 1.77 MG/DL (ref 0.7–1.2)
CREAT SERPL-MCNC: 1.86 MG/DL (ref 0.7–1.2)
CREAT SERPL-MCNC: 1.92 MG/DL (ref 0.7–1.2)
CREAT SERPL-MCNC: 2.05 MG/DL (ref 0.7–1.2)
CREAT SERPL-MCNC: 2.42 MG/DL (ref 0.7–1.2)
CREAT SERPL-MCNC: 2.72 MG/DL (ref 0.7–1.2)
CREAT SERPL-MCNC: 2.73 MG/DL (ref 0.7–1.2)
CREAT SERPL-MCNC: 2.77 MG/DL (ref 0.7–1.2)
CREAT SERPL-MCNC: 2.77 MG/DL (ref 0.7–1.2)
CREAT SERPL-MCNC: 2.86 MG/DL (ref 0.7–1.2)
CREAT SERPL-MCNC: 2.86 MG/DL (ref 0.7–1.2)
CREAT SERPL-MCNC: 3.06 MG/DL (ref 0.7–1.2)
CREAT SERPL-MCNC: 3.15 MG/DL (ref 0.7–1.2)
CREAT SERPL-MCNC: 4.86 MG/DL (ref 0.7–1.2)
CRYSTALS, UA: ABNORMAL /HPF
CRYSTALS, UA: NORMAL /HPF
CULTURE: ABNORMAL
CULTURE: ABNORMAL
CULTURE: NO GROWTH
CULTURE: NORMAL
CULTURE: NORMAL
DATE AND TIME: NORMAL
DIFFERENTIAL TYPE: ABNORMAL
DIRECT EXAM: ABNORMAL
DIRECT EXAM: NORMAL
EKG ATRIAL RATE: 108 BPM
EKG ATRIAL RATE: 25 BPM
EKG ATRIAL RATE: 50 BPM
EKG ATRIAL RATE: 77 BPM
EKG ATRIAL RATE: 93 BPM
EKG P AXIS: -26 DEGREES
EKG P AXIS: 67 DEGREES
EKG P-R INTERVAL: 216 MS
EKG P-R INTERVAL: 226 MS
EKG Q-T INTERVAL: 324 MS
EKG Q-T INTERVAL: 354 MS
EKG Q-T INTERVAL: 448 MS
EKG Q-T INTERVAL: 568 MS
EKG Q-T INTERVAL: 580 MS
EKG QRS DURATION: 106 MS
EKG QRS DURATION: 118 MS
EKG QRS DURATION: 122 MS
EKG QRS DURATION: 158 MS
EKG QRS DURATION: 98 MS
EKG QTC CALCULATION (BAZETT): 434 MS
EKG QTC CALCULATION (BAZETT): 474 MS
EKG QTC CALCULATION (BAZETT): 506 MS
EKG QTC CALCULATION (BAZETT): 528 MS
EKG QTC CALCULATION (BAZETT): 534 MS
EKG R AXIS: -103 DEGREES
EKG R AXIS: 51 DEGREES
EKG R AXIS: 73 DEGREES
EKG R AXIS: 81 DEGREES
EKG R AXIS: 84 DEGREES
EKG T AXIS: -120 DEGREES
EKG T AXIS: -133 DEGREES
EKG T AXIS: -179 DEGREES
EKG T AXIS: 130 DEGREES
EKG T AXIS: 83 DEGREES
EKG VENTRICULAR RATE: 108 BPM
EKG VENTRICULAR RATE: 108 BPM
EKG VENTRICULAR RATE: 51 BPM
EKG VENTRICULAR RATE: 52 BPM
EKG VENTRICULAR RATE: 77 BPM
EOSINOPHILS RELATIVE PERCENT: 0 % (ref 1–4)
EOSINOPHILS RELATIVE PERCENT: 1 % (ref 1–4)
EPITHELIAL CELLS UA: ABNORMAL /HPF (ref 0–5)
EPITHELIAL CELLS UA: NORMAL /HPF (ref 0–5)
EXPIRATION DATE: NORMAL
FIO2: 100
FIO2: 30
FIO2: 40
FIO2: 45
FIO2: 45
FIO2: 50
FIO2: ABNORMAL
FIO2: NORMAL
GFR AFRICAN AMERICAN: 14 ML/MIN
GFR AFRICAN AMERICAN: 24 ML/MIN
GFR AFRICAN AMERICAN: 24 ML/MIN
GFR AFRICAN AMERICAN: 26 ML/MIN
GFR AFRICAN AMERICAN: 26 ML/MIN
GFR AFRICAN AMERICAN: 27 ML/MIN
GFR AFRICAN AMERICAN: 27 ML/MIN
GFR AFRICAN AMERICAN: 28 ML/MIN
GFR AFRICAN AMERICAN: 28 ML/MIN
GFR AFRICAN AMERICAN: 32 ML/MIN
GFR AFRICAN AMERICAN: 39 ML/MIN
GFR AFRICAN AMERICAN: 42 ML/MIN
GFR AFRICAN AMERICAN: 46 ML/MIN
GFR AFRICAN AMERICAN: 53 ML/MIN
GFR AFRICAN AMERICAN: 53 ML/MIN
GFR AFRICAN AMERICAN: 54 ML/MIN
GFR AFRICAN AMERICAN: 56 ML/MIN
GFR AFRICAN AMERICAN: 56 ML/MIN
GFR AFRICAN AMERICAN: ABNORMAL ML/MIN
GFR NON-AFRICAN AMERICAN: 12 ML/MIN
GFR NON-AFRICAN AMERICAN: 20 ML/MIN
GFR NON-AFRICAN AMERICAN: 20 ML/MIN
GFR NON-AFRICAN AMERICAN: 22 ML/MIN
GFR NON-AFRICAN AMERICAN: 22 ML/MIN
GFR NON-AFRICAN AMERICAN: 23 ML/MIN
GFR NON-AFRICAN AMERICAN: 26 ML/MIN
GFR NON-AFRICAN AMERICAN: 32 ML/MIN
GFR NON-AFRICAN AMERICAN: 35 ML/MIN
GFR NON-AFRICAN AMERICAN: 38 ML/MIN
GFR NON-AFRICAN AMERICAN: 43 ML/MIN
GFR NON-AFRICAN AMERICAN: 44 ML/MIN
GFR NON-AFRICAN AMERICAN: 44 ML/MIN
GFR NON-AFRICAN AMERICAN: 46 ML/MIN
GFR NON-AFRICAN AMERICAN: 46 ML/MIN
GFR NON-AFRICAN AMERICAN: ABNORMAL ML/MIN
GFR NON-AFRICAN AMERICAN: ABNORMAL ML/MIN
GFR SERPL CREATININE-BSD FRML MDRD: ABNORMAL ML/MIN
GFR SERPL CREATININE-BSD FRML MDRD: ABNORMAL ML/MIN/{1.73_M2}
GLUCOSE BLD-MCNC: 117 MG/DL (ref 75–110)
GLUCOSE BLD-MCNC: 118 MG/DL (ref 75–110)
GLUCOSE BLD-MCNC: 126 MG/DL (ref 75–110)
GLUCOSE BLD-MCNC: 138 MG/DL (ref 75–110)
GLUCOSE BLD-MCNC: 141 MG/DL (ref 70–99)
GLUCOSE BLD-MCNC: 142 MG/DL (ref 75–110)
GLUCOSE BLD-MCNC: 147 MG/DL (ref 75–110)
GLUCOSE BLD-MCNC: 154 MG/DL (ref 75–110)
GLUCOSE BLD-MCNC: 155 MG/DL (ref 74–100)
GLUCOSE BLD-MCNC: 161 MG/DL (ref 75–110)
GLUCOSE BLD-MCNC: 171 MG/DL (ref 75–110)
GLUCOSE BLD-MCNC: 178 MG/DL (ref 75–110)
GLUCOSE BLD-MCNC: 207 MG/DL (ref 70–99)
GLUCOSE BLD-MCNC: 209 MG/DL (ref 75–110)
GLUCOSE BLD-MCNC: 218 MG/DL (ref 75–110)
GLUCOSE BLD-MCNC: 219 MG/DL (ref 74–100)
GLUCOSE BLD-MCNC: 227 MG/DL (ref 75–110)
GLUCOSE BLD-MCNC: 233 MG/DL (ref 70–99)
GLUCOSE BLD-MCNC: 233 MG/DL (ref 75–110)
GLUCOSE BLD-MCNC: 237 MG/DL (ref 75–110)
GLUCOSE BLD-MCNC: 238 MG/DL (ref 75–110)
GLUCOSE BLD-MCNC: 239 MG/DL (ref 74–100)
GLUCOSE BLD-MCNC: 239 MG/DL (ref 75–110)
GLUCOSE BLD-MCNC: 242 MG/DL (ref 75–110)
GLUCOSE BLD-MCNC: 244 MG/DL (ref 75–110)
GLUCOSE BLD-MCNC: 249 MG/DL (ref 74–100)
GLUCOSE BLD-MCNC: 255 MG/DL (ref 75–110)
GLUCOSE BLD-MCNC: 259 MG/DL (ref 70–99)
GLUCOSE BLD-MCNC: 260 MG/DL (ref 75–110)
GLUCOSE BLD-MCNC: 261 MG/DL (ref 70–99)
GLUCOSE BLD-MCNC: 262 MG/DL (ref 74–100)
GLUCOSE BLD-MCNC: 263 MG/DL (ref 75–110)
GLUCOSE BLD-MCNC: 264 MG/DL (ref 74–100)
GLUCOSE BLD-MCNC: 265 MG/DL (ref 74–100)
GLUCOSE BLD-MCNC: 270 MG/DL (ref 70–99)
GLUCOSE BLD-MCNC: 270 MG/DL (ref 70–99)
GLUCOSE BLD-MCNC: 273 MG/DL (ref 75–110)
GLUCOSE BLD-MCNC: 275 MG/DL (ref 74–100)
GLUCOSE BLD-MCNC: 280 MG/DL (ref 74–100)
GLUCOSE BLD-MCNC: 283 MG/DL (ref 70–99)
GLUCOSE BLD-MCNC: 286 MG/DL (ref 75–110)
GLUCOSE BLD-MCNC: 293 MG/DL (ref 74–100)
GLUCOSE BLD-MCNC: 294 MG/DL (ref 70–99)
GLUCOSE BLD-MCNC: 299 MG/DL (ref 70–99)
GLUCOSE BLD-MCNC: 299 MG/DL (ref 74–100)
GLUCOSE BLD-MCNC: 300 MG/DL (ref 75–110)
GLUCOSE BLD-MCNC: 304 MG/DL (ref 70–99)
GLUCOSE BLD-MCNC: 305 MG/DL (ref 75–110)
GLUCOSE BLD-MCNC: 314 MG/DL (ref 75–110)
GLUCOSE BLD-MCNC: 317 MG/DL (ref 70–99)
GLUCOSE BLD-MCNC: 332 MG/DL (ref 74–100)
GLUCOSE BLD-MCNC: 334 MG/DL (ref 75–110)
GLUCOSE BLD-MCNC: 335 MG/DL (ref 75–110)
GLUCOSE BLD-MCNC: 345 MG/DL (ref 75–110)
GLUCOSE BLD-MCNC: 347 MG/DL (ref 75–110)
GLUCOSE BLD-MCNC: 356 MG/DL (ref 75–110)
GLUCOSE BLD-MCNC: 357 MG/DL (ref 75–110)
GLUCOSE BLD-MCNC: 360 MG/DL (ref 75–110)
GLUCOSE BLD-MCNC: 365 MG/DL (ref 74–100)
GLUCOSE BLD-MCNC: 369 MG/DL (ref 70–99)
GLUCOSE BLD-MCNC: 369 MG/DL (ref 74–100)
GLUCOSE BLD-MCNC: 370 MG/DL (ref 75–110)
GLUCOSE BLD-MCNC: 372 MG/DL (ref 75–110)
GLUCOSE BLD-MCNC: 373 MG/DL (ref 75–110)
GLUCOSE BLD-MCNC: 375 MG/DL (ref 75–110)
GLUCOSE BLD-MCNC: 380 MG/DL (ref 75–110)
GLUCOSE BLD-MCNC: 382 MG/DL (ref 75–110)
GLUCOSE BLD-MCNC: 383 MG/DL (ref 75–110)
GLUCOSE BLD-MCNC: 398 MG/DL (ref 75–110)
GLUCOSE BLD-MCNC: 398 MG/DL (ref 75–110)
GLUCOSE BLD-MCNC: 40 MG/DL (ref 75–110)
GLUCOSE BLD-MCNC: 400 MG/DL (ref 70–99)
GLUCOSE BLD-MCNC: 407 MG/DL (ref 74–100)
GLUCOSE BLD-MCNC: 414 MG/DL (ref 70–99)
GLUCOSE BLD-MCNC: 415 MG/DL (ref 75–110)
GLUCOSE BLD-MCNC: 422 MG/DL (ref 70–99)
GLUCOSE BLD-MCNC: 431 MG/DL (ref 70–99)
GLUCOSE BLD-MCNC: 443 MG/DL (ref 74–100)
GLUCOSE BLD-MCNC: 478 MG/DL (ref 70–99)
GLUCOSE BLD-MCNC: 483 MG/DL (ref 70–99)
GLUCOSE URINE: ABNORMAL
GLUCOSE URINE: NEGATIVE
HBV SURFACE AB TITR SER: <3.5 MIU/ML
HCO3 VENOUS: 32.5 MMOL/L (ref 22–29)
HCT VFR BLD CALC: 24.9 % (ref 40.7–50.3)
HCT VFR BLD CALC: 26.1 % (ref 40.7–50.3)
HCT VFR BLD CALC: 26.1 % (ref 40.7–50.3)
HCT VFR BLD CALC: 26.2 % (ref 40.7–50.3)
HCT VFR BLD CALC: 27 % (ref 40.7–50.3)
HCT VFR BLD CALC: 27.4 % (ref 40.7–50.3)
HCT VFR BLD CALC: 27.9 % (ref 40.7–50.3)
HCT VFR BLD CALC: 28.9 % (ref 40.7–50.3)
HCT VFR BLD CALC: 29.3 % (ref 40.7–50.3)
HCT VFR BLD CALC: 30.1 % (ref 40.7–50.3)
HCT VFR BLD CALC: 32.6 % (ref 40.7–50.3)
HEMOGLOBIN: 7.3 G/DL (ref 13–17)
HEMOGLOBIN: 7.8 G/DL (ref 13–17)
HEMOGLOBIN: 7.8 G/DL (ref 13–17)
HEMOGLOBIN: 8 G/DL (ref 13–17)
HEMOGLOBIN: 8 G/DL (ref 13–17)
HEMOGLOBIN: 8.2 G/DL (ref 13–17)
HEMOGLOBIN: 8.6 G/DL (ref 13–17)
HEMOGLOBIN: 8.6 G/DL (ref 13–17)
HEMOGLOBIN: 8.8 G/DL (ref 13–17)
HEMOGLOBIN: 8.9 G/DL (ref 13–17)
HEMOGLOBIN: 9.4 G/DL (ref 13–17)
HEPATITIS B CORE TOTAL ANTIBODY: NONREACTIVE
HEPATITIS B SURFACE ANTIGEN: NONREACTIVE
HEPATITIS C ANTIBODY: NONREACTIVE
IMMATURE GRANULOCYTES: 0 %
IMMATURE GRANULOCYTES: 1 %
IMMATURE GRANULOCYTES: 2 %
IMMATURE GRANULOCYTES: 3 %
IMMATURE GRANULOCYTES: 3 %
INR BLD: 1
KETONES, URINE: NEGATIVE
KETONES, URINE: NEGATIVE
LACTIC ACID, WHOLE BLOOD: 0.9 MMOL/L (ref 0.7–2.1)
LACTIC ACID, WHOLE BLOOD: 1.1 MMOL/L (ref 0.7–2.1)
LACTIC ACID, WHOLE BLOOD: 1.1 MMOL/L (ref 0.7–2.1)
LACTIC ACID, WHOLE BLOOD: 1.2 MMOL/L (ref 0.7–2.1)
LACTIC ACID, WHOLE BLOOD: 1.7 MMOL/L (ref 0.7–2.1)
LACTIC ACID, WHOLE BLOOD: 10.9 MMOL/L (ref 0.7–2.1)
LACTIC ACID, WHOLE BLOOD: 6.7 MMOL/L (ref 0.7–2.1)
LACTIC ACID: ABNORMAL MMOL/L
LACTIC ACID: ABNORMAL MMOL/L
LACTIC ACID: NORMAL MMOL/L
LEUKOCYTE ESTERASE, URINE: ABNORMAL
LEUKOCYTE ESTERASE, URINE: NEGATIVE
LV EF: 30 %
LVEF MODALITY: NORMAL
LYMPHOCYTES # BLD: 12 % (ref 24–43)
LYMPHOCYTES # BLD: 2 % (ref 24–43)
LYMPHOCYTES # BLD: 2 % (ref 24–44)
LYMPHOCYTES # BLD: 3 % (ref 24–43)
LYMPHOCYTES # BLD: 4 % (ref 24–43)
LYMPHOCYTES # BLD: 4 % (ref 24–43)
LYMPHOCYTES # BLD: 4 % (ref 24–44)
LYMPHOCYTES # BLD: 5 % (ref 24–44)
LYMPHOCYTES # BLD: 6 % (ref 24–43)
LYMPHOCYTES # BLD: 8 % (ref 24–43)
LYMPHOCYTES # BLD: 9 % (ref 24–44)
Lab: ABNORMAL
Lab: NORMAL
MAGNESIUM: 2 MG/DL (ref 1.6–2.6)
MAGNESIUM: 2.1 MG/DL (ref 1.6–2.6)
MAGNESIUM: 2.1 MG/DL (ref 1.6–2.6)
MAGNESIUM: 2.9 MG/DL (ref 1.6–2.6)
MAGNESIUM: 2.9 MG/DL (ref 1.6–2.6)
MCH RBC QN AUTO: 26.6 PG (ref 25.2–33.5)
MCH RBC QN AUTO: 26.7 PG (ref 25.2–33.5)
MCH RBC QN AUTO: 26.9 PG (ref 25.2–33.5)
MCH RBC QN AUTO: 26.9 PG (ref 25.2–33.5)
MCH RBC QN AUTO: 27 PG (ref 25.2–33.5)
MCH RBC QN AUTO: 27 PG (ref 25.2–33.5)
MCH RBC QN AUTO: 27.1 PG (ref 25.2–33.5)
MCH RBC QN AUTO: 27.1 PG (ref 25.2–33.5)
MCH RBC QN AUTO: 27.4 PG (ref 25.2–33.5)
MCHC RBC AUTO-ENTMCNC: 27.2 G/DL (ref 28.4–34.8)
MCHC RBC AUTO-ENTMCNC: 28.8 G/DL (ref 28.4–34.8)
MCHC RBC AUTO-ENTMCNC: 29.3 G/DL (ref 28.4–34.8)
MCHC RBC AUTO-ENTMCNC: 29.4 G/DL (ref 28.4–34.8)
MCHC RBC AUTO-ENTMCNC: 29.6 G/DL (ref 28.4–34.8)
MCHC RBC AUTO-ENTMCNC: 29.8 G/DL (ref 28.4–34.8)
MCHC RBC AUTO-ENTMCNC: 29.9 G/DL (ref 28.4–34.8)
MCHC RBC AUTO-ENTMCNC: 30.7 G/DL (ref 28.4–34.8)
MCHC RBC AUTO-ENTMCNC: 30.8 G/DL (ref 28.4–34.8)
MCHC RBC AUTO-ENTMCNC: 31.4 G/DL (ref 28.4–34.8)
MCHC RBC AUTO-ENTMCNC: 31.5 G/DL (ref 28.4–34.8)
MCV RBC AUTO: 85.8 FL (ref 82.6–102.9)
MCV RBC AUTO: 87.3 FL (ref 82.6–102.9)
MCV RBC AUTO: 87.3 FL (ref 82.6–102.9)
MCV RBC AUTO: 88.5 FL (ref 82.6–102.9)
MCV RBC AUTO: 89.4 FL (ref 82.6–102.9)
MCV RBC AUTO: 90 FL (ref 82.6–102.9)
MCV RBC AUTO: 90.3 FL (ref 82.6–102.9)
MCV RBC AUTO: 90.9 FL (ref 82.6–102.9)
MCV RBC AUTO: 91.8 FL (ref 82.6–102.9)
MCV RBC AUTO: 92.6 FL (ref 82.6–102.9)
MCV RBC AUTO: 99 FL (ref 82.6–102.9)
MODE: ABNORMAL
MODE: NORMAL
MODE: NORMAL
MONOCYTES # BLD: 10 % (ref 3–12)
MONOCYTES # BLD: 4 % (ref 1–7)
MONOCYTES # BLD: 4 % (ref 1–7)
MONOCYTES # BLD: 5 % (ref 3–12)
MONOCYTES # BLD: 5 % (ref 3–12)
MONOCYTES # BLD: 7 % (ref 3–12)
MONOCYTES # BLD: 7 % (ref 3–12)
MONOCYTES # BLD: 8 % (ref 1–7)
MONOCYTES # BLD: 8 % (ref 3–12)
MONOCYTES # BLD: 9 % (ref 1–7)
MONOCYTES # BLD: 9 % (ref 3–12)
MORPHOLOGY: NORMAL
MUCUS: ABNORMAL
MUCUS: NORMAL
NEGATIVE BASE EXCESS, ART: 1 (ref 0–2)
NEGATIVE BASE EXCESS, ART: 1 (ref 0–2)
NEGATIVE BASE EXCESS, ART: 2 (ref 0–2)
NEGATIVE BASE EXCESS, ART: 2 (ref 0–2)
NEGATIVE BASE EXCESS, ART: ABNORMAL (ref 0–2)
NEGATIVE BASE EXCESS, ART: NORMAL (ref 0–2)
NEGATIVE BASE EXCESS, ART: NORMAL (ref 0–2)
NEGATIVE BASE EXCESS, VEN: ABNORMAL (ref 0–2)
NEURON SPEC ENOLASE: 5.9 UG/L (ref 3.7–8.9)
NEURON SPEC ENOLASE: 6.6 UG/L (ref 3.7–8.9)
NEURON SPEC ENOLASE: 7.1 UG/L (ref 3.7–8.9)
NITRITE, URINE: NEGATIVE
NITRITE, URINE: NEGATIVE
NOTIFY: NORMAL
NRBC AUTOMATED: 0 PER 100 WBC
NRBC AUTOMATED: 0.4 PER 100 WBC
NRBC AUTOMATED: 0.6 PER 100 WBC
NRBC AUTOMATED: 0.6 PER 100 WBC
NRBC AUTOMATED: 0.7 PER 100 WBC
NRBC AUTOMATED: 5.3 PER 100 WBC
NUCLEATED RED BLOOD CELLS: 5 PER 100 WBC
O2 DEVICE/FLOW/%: ABNORMAL
O2 DEVICE/FLOW/%: NORMAL
O2 DEVICE/FLOW/%: NORMAL
O2 SAT, VEN: 63 % (ref 60–85)
OSMOLALITY URINE: 370 MOSM/KG (ref 80–1300)
OTHER OBSERVATIONS UA: ABNORMAL
OTHER OBSERVATIONS UA: NORMAL
PARTIAL THROMBOPLASTIN TIME: 26 SEC (ref 20.5–30.5)
PATIENT TEMP: 33
PATIENT TEMP: 33
PATIENT TEMP: 33.5
PATIENT TEMP: 34.8
PATIENT TEMP: 34.9
PATIENT TEMP: 36.6
PATIENT TEMP: 38.3
PATIENT TEMP: ABNORMAL
PCO2, VEN: 117.2 MM HG (ref 41–51)
PDW BLD-RTO: 12.6 % (ref 11.8–14.4)
PDW BLD-RTO: 12.7 % (ref 11.8–14.4)
PDW BLD-RTO: 13.1 % (ref 11.8–14.4)
PDW BLD-RTO: 13.3 % (ref 11.8–14.4)
PDW BLD-RTO: 13.4 % (ref 11.8–14.4)
PDW BLD-RTO: 13.5 % (ref 11.8–14.4)
PDW BLD-RTO: 13.5 % (ref 11.8–14.4)
PDW BLD-RTO: 13.6 % (ref 11.8–14.4)
PDW BLD-RTO: 13.8 % (ref 11.8–14.4)
PDW BLD-RTO: 14 % (ref 11.8–14.4)
PDW BLD-RTO: 14.2 % (ref 11.8–14.4)
PH UA: 5 (ref 5–8)
PH UA: 6 (ref 5–8)
PH VENOUS: 7.05 (ref 7.32–7.43)
PHOSPHORUS: 2.4 MG/DL (ref 2.5–4.5)
PHOSPHORUS: 2.8 MG/DL (ref 2.5–4.5)
PHOSPHORUS: 3.4 MG/DL (ref 2.5–4.5)
PHOSPHORUS: 3.4 MG/DL (ref 2.5–4.5)
PHOSPHORUS: 3.5 MG/DL (ref 2.5–4.5)
PHOSPHORUS: 3.6 MG/DL (ref 2.5–4.5)
PHOSPHORUS: 4.6 MG/DL (ref 2.5–4.5)
PHOSPHORUS: 4.7 MG/DL (ref 2.5–4.5)
PLATELET # BLD: 135 K/UL (ref 138–453)
PLATELET # BLD: 136 K/UL (ref 138–453)
PLATELET # BLD: 139 K/UL (ref 138–453)
PLATELET # BLD: 143 K/UL (ref 138–453)
PLATELET # BLD: 146 K/UL (ref 138–453)
PLATELET # BLD: 146 K/UL (ref 138–453)
PLATELET # BLD: 148 K/UL (ref 138–453)
PLATELET # BLD: 158 K/UL (ref 138–453)
PLATELET # BLD: 169 K/UL (ref 138–453)
PLATELET # BLD: ABNORMAL K/UL (ref 138–453)
PLATELET # BLD: ABNORMAL K/UL (ref 138–453)
PLATELET ESTIMATE: ABNORMAL
PLATELET, FLUORESCENCE: 124 K/UL (ref 138–453)
PLATELET, FLUORESCENCE: 129 K/UL (ref 138–453)
PLATELET, IMMATURE FRACTION: 4.1 % (ref 1.1–10.3)
PLATELET, IMMATURE FRACTION: 4.7 % (ref 1.1–10.3)
PMV BLD AUTO: 11.1 FL (ref 8.1–13.5)
PMV BLD AUTO: 11.3 FL (ref 8.1–13.5)
PMV BLD AUTO: 11.5 FL (ref 8.1–13.5)
PMV BLD AUTO: 11.6 FL (ref 8.1–13.5)
PMV BLD AUTO: 11.6 FL (ref 8.1–13.5)
PMV BLD AUTO: 11.9 FL (ref 8.1–13.5)
PMV BLD AUTO: 12.2 FL (ref 8.1–13.5)
PMV BLD AUTO: 12.2 FL (ref 8.1–13.5)
PMV BLD AUTO: 13 FL (ref 8.1–13.5)
PMV BLD AUTO: ABNORMAL FL (ref 8.1–13.5)
PMV BLD AUTO: ABNORMAL FL (ref 8.1–13.5)
PO2, VEN: 49.4 MM HG (ref 30–50)
POC CHLORIDE: 103 MMOL/L (ref 98–107)
POC CREATININE: 2.52 MG/DL (ref 0.51–1.19)
POC HCO3: 23.6 MMOL/L (ref 21–28)
POC HCO3: 24.6 MMOL/L (ref 21–28)
POC HCO3: 25.4 MMOL/L (ref 21–28)
POC HCO3: 25.4 MMOL/L (ref 21–28)
POC HCO3: 25.9 MMOL/L (ref 21–28)
POC HCO3: 26 MMOL/L (ref 21–28)
POC HCO3: 26.1 MMOL/L (ref 21–28)
POC HCO3: 26.6 MMOL/L (ref 21–28)
POC HCO3: 26.7 MMOL/L (ref 21–28)
POC HCO3: 27.5 MMOL/L (ref 21–28)
POC HCO3: 27.5 MMOL/L (ref 21–28)
POC HCO3: 28.1 MMOL/L (ref 21–28)
POC HCO3: 28.5 MMOL/L (ref 21–28)
POC HCO3: 30.5 MMOL/L (ref 21–28)
POC HCO3: 35.4 MMOL/L (ref 21–28)
POC HEMATOCRIT: 32 % (ref 41–53)
POC HEMOGLOBIN: 10.9 G/DL (ref 13.5–17.5)
POC IONIZED CALCIUM: 1.18 MMOL/L (ref 1.15–1.33)
POC LACTIC ACID: 0.61 MMOL/L (ref 0.56–1.39)
POC LACTIC ACID: 0.92 MMOL/L (ref 0.56–1.39)
POC LACTIC ACID: 0.98 MMOL/L (ref 0.56–1.39)
POC LACTIC ACID: 0.99 MMOL/L (ref 0.56–1.39)
POC LACTIC ACID: 1.02 MMOL/L (ref 0.56–1.39)
POC LACTIC ACID: 1.68 MMOL/L (ref 0.56–1.39)
POC LACTIC ACID: 5.69 MMOL/L (ref 0.56–1.39)
POC O2 SATURATION: 100 % (ref 94–98)
POC O2 SATURATION: 100 % (ref 94–98)
POC O2 SATURATION: 92 % (ref 94–98)
POC O2 SATURATION: 92 % (ref 94–98)
POC O2 SATURATION: 93 % (ref 94–98)
POC O2 SATURATION: 94 % (ref 94–98)
POC O2 SATURATION: 95 % (ref 94–98)
POC O2 SATURATION: 95 % (ref 94–98)
POC O2 SATURATION: 96 % (ref 94–98)
POC O2 SATURATION: 96 % (ref 94–98)
POC O2 SATURATION: 98 % (ref 94–98)
POC O2 SATURATION: 99 % (ref 94–98)
POC O2 SATURATION: 99 % (ref 94–98)
POC PCO2 TEMP: 34 MM HG
POC PCO2 TEMP: 36 MM HG
POC PCO2 TEMP: 39 MM HG
POC PCO2 TEMP: 41 MM HG
POC PCO2 TEMP: 56 MM HG
POC PCO2 TEMP: 56 MM HG
POC PCO2 TEMP: ABNORMAL MM HG
POC PCO2: 104.7 MM HG (ref 35–48)
POC PCO2: 39.2 MM HG (ref 35–48)
POC PCO2: 40.7 MM HG (ref 35–48)
POC PCO2: 40.8 MM HG (ref 35–48)
POC PCO2: 42 MM HG (ref 35–48)
POC PCO2: 44.7 MM HG (ref 35–48)
POC PCO2: 46.3 MM HG (ref 35–48)
POC PCO2: 46.7 MM HG (ref 35–48)
POC PCO2: 47.8 MM HG (ref 35–48)
POC PCO2: 50.1 MM HG (ref 35–48)
POC PCO2: 52.9 MM HG (ref 35–48)
POC PCO2: 52.9 MM HG (ref 35–48)
POC PCO2: 53.8 MM HG (ref 35–48)
POC PCO2: 60.8 MM HG (ref 35–48)
POC PCO2: 61.3 MM HG (ref 35–48)
POC PH TEMP: 7.27
POC PH TEMP: 7.34
POC PH TEMP: 7.41
POC PH TEMP: 7.45
POC PH TEMP: 7.45
POC PH TEMP: 7.48
POC PH TEMP: ABNORMAL
POC PH: 7.14 (ref 7.35–7.45)
POC PH: 7.23 (ref 7.35–7.45)
POC PH: 7.29 (ref 7.35–7.45)
POC PH: 7.29 (ref 7.35–7.45)
POC PH: 7.3 (ref 7.35–7.45)
POC PH: 7.33 (ref 7.35–7.45)
POC PH: 7.36 (ref 7.35–7.45)
POC PH: 7.36 (ref 7.35–7.45)
POC PH: 7.37 (ref 7.35–7.45)
POC PH: 7.38 (ref 7.35–7.45)
POC PH: 7.38 (ref 7.35–7.45)
POC PH: 7.39 (ref 7.35–7.45)
POC PH: 7.4 (ref 7.35–7.45)
POC PH: 7.4 (ref 7.35–7.45)
POC PH: 7.42 (ref 7.35–7.45)
POC PO2 TEMP: 141 MM HG
POC PO2 TEMP: 540 MM HG
POC PO2 TEMP: 72 MM HG
POC PO2 TEMP: 81 MM HG
POC PO2 TEMP: 86 MM HG
POC PO2 TEMP: 93 MM HG
POC PO2 TEMP: ABNORMAL MM HG
POC PO2: 102.5 MM HG (ref 83–108)
POC PO2: 104.8 MM HG (ref 83–108)
POC PO2: 116.1 MM HG (ref 83–108)
POC PO2: 116.9 MM HG (ref 83–108)
POC PO2: 162.2 MM HG (ref 83–108)
POC PO2: 423.8 MM HG (ref 83–108)
POC PO2: 556.3 MM HG (ref 83–108)
POC PO2: 75.5 MM HG (ref 83–108)
POC PO2: 76.2 MM HG (ref 83–108)
POC PO2: 77.5 MM HG (ref 83–108)
POC PO2: 79.6 MM HG (ref 83–108)
POC PO2: 82.4 MM HG (ref 83–108)
POC PO2: 85.7 MM HG (ref 83–108)
POC PO2: 89.3 MM HG (ref 83–108)
POC PO2: 89.4 MM HG (ref 83–108)
POC POTASSIUM: 5.3 MMOL/L (ref 3.5–4.5)
POC SODIUM: 141 MMOL/L (ref 138–146)
POSITIVE BASE EXCESS, ART: 0 (ref 0–3)
POSITIVE BASE EXCESS, ART: 1 (ref 0–3)
POSITIVE BASE EXCESS, ART: 2 (ref 0–3)
POSITIVE BASE EXCESS, ART: 2 (ref 0–3)
POSITIVE BASE EXCESS, ART: 3 (ref 0–3)
POSITIVE BASE EXCESS, ART: 4 (ref 0–3)
POSITIVE BASE EXCESS, ART: ABNORMAL (ref 0–3)
POSITIVE BASE EXCESS, VEN: 0 (ref 0–3)
POTASSIUM SERPL-SCNC: 4 MMOL/L (ref 3.7–5.3)
POTASSIUM SERPL-SCNC: 4.1 MMOL/L (ref 3.7–5.3)
POTASSIUM SERPL-SCNC: 4.2 MMOL/L (ref 3.7–5.3)
POTASSIUM SERPL-SCNC: 4.3 MMOL/L (ref 3.7–5.3)
POTASSIUM SERPL-SCNC: 4.5 MMOL/L (ref 3.7–5.3)
POTASSIUM SERPL-SCNC: 4.6 MMOL/L (ref 3.7–5.3)
POTASSIUM SERPL-SCNC: 4.6 MMOL/L (ref 3.7–5.3)
POTASSIUM SERPL-SCNC: 4.7 MMOL/L (ref 3.7–5.3)
POTASSIUM SERPL-SCNC: 4.7 MMOL/L (ref 3.7–5.3)
POTASSIUM SERPL-SCNC: 5 MMOL/L (ref 3.7–5.3)
POTASSIUM SERPL-SCNC: 5.1 MMOL/L (ref 3.7–5.3)
POTASSIUM SERPL-SCNC: 5.3 MMOL/L (ref 3.7–5.3)
POTASSIUM SERPL-SCNC: 5.4 MMOL/L (ref 3.7–5.3)
POTASSIUM SERPL-SCNC: 5.5 MMOL/L (ref 3.7–5.3)
POTASSIUM SERPL-SCNC: 5.6 MMOL/L (ref 3.7–5.3)
POTASSIUM SERPL-SCNC: 6 MMOL/L (ref 3.7–5.3)
POTASSIUM SERPL-SCNC: 6 MMOL/L (ref 3.7–5.3)
POTASSIUM SERPL-SCNC: 7.9 MMOL/L (ref 3.7–5.3)
PRO-BNP: 5615 PG/ML
PROTEIN UA: ABNORMAL
PROTEIN UA: ABNORMAL
PROTHROMBIN TIME: 10.8 SEC (ref 9–12)
RBC # BLD: 2.74 M/UL (ref 4.21–5.77)
RBC # BLD: 2.9 M/UL (ref 4.21–5.77)
RBC # BLD: 2.92 M/UL (ref 4.21–5.77)
RBC # BLD: 2.95 M/UL (ref 4.21–5.77)
RBC # BLD: 3 M/UL (ref 4.21–5.77)
RBC # BLD: 3.04 M/UL (ref 4.21–5.77)
RBC # BLD: 3.14 M/UL (ref 4.21–5.77)
RBC # BLD: 3.19 M/UL (ref 4.21–5.77)
RBC # BLD: 3.25 M/UL (ref 4.21–5.77)
RBC # BLD: 3.31 M/UL (ref 4.21–5.77)
RBC # BLD: 3.52 M/UL (ref 4.21–5.77)
RBC # BLD: ABNORMAL 10*6/UL
RBC UA: ABNORMAL /HPF (ref 0–4)
RBC UA: NORMAL /HPF (ref 0–4)
READ BACK: YES
RENAL EPITHELIAL, UA: ABNORMAL /HPF
RENAL EPITHELIAL, UA: NORMAL /HPF
SAMPLE SITE: ABNORMAL
SAMPLE SITE: NORMAL
SAMPLE SITE: NORMAL
SARS-COV-2, RAPID: NOT DETECTED
SARS-COV-2: NORMAL
SARS-COV-2: NORMAL
SEG NEUTROPHILS: 77 % (ref 36–65)
SEG NEUTROPHILS: 80 % (ref 36–66)
SEG NEUTROPHILS: 82 % (ref 36–65)
SEG NEUTROPHILS: 86 % (ref 36–65)
SEG NEUTROPHILS: 86 % (ref 36–66)
SEG NEUTROPHILS: 87 % (ref 36–65)
SEG NEUTROPHILS: 88 % (ref 36–65)
SEG NEUTROPHILS: 88 % (ref 36–65)
SEG NEUTROPHILS: 90 % (ref 36–65)
SEG NEUTROPHILS: 90 % (ref 36–66)
SEG NEUTROPHILS: 94 % (ref 36–66)
SEGMENTED NEUTROPHILS ABSOLUTE COUNT: 10.24 K/UL (ref 1.8–7.7)
SEGMENTED NEUTROPHILS ABSOLUTE COUNT: 11.03 K/UL (ref 1.5–8.1)
SEGMENTED NEUTROPHILS ABSOLUTE COUNT: 15.22 K/UL (ref 1.5–8.1)
SEGMENTED NEUTROPHILS ABSOLUTE COUNT: 22.14 K/UL (ref 1.8–7.7)
SEGMENTED NEUTROPHILS ABSOLUTE COUNT: 4.08 K/UL (ref 1.5–8.1)
SEGMENTED NEUTROPHILS ABSOLUTE COUNT: 42.08 K/UL (ref 1.8–7.7)
SEGMENTED NEUTROPHILS ABSOLUTE COUNT: 7.1 K/UL (ref 1.5–8.1)
SEGMENTED NEUTROPHILS ABSOLUTE COUNT: 7.57 K/UL (ref 1.8–7.7)
SEGMENTED NEUTROPHILS ABSOLUTE COUNT: 8.64 K/UL (ref 1.5–8.1)
SEGMENTED NEUTROPHILS ABSOLUTE COUNT: 8.69 K/UL (ref 1.5–8.1)
SEGMENTED NEUTROPHILS ABSOLUTE COUNT: 9.5 K/UL (ref 1.5–8.1)
SODIUM BLD-SCNC: 133 MMOL/L (ref 135–144)
SODIUM BLD-SCNC: 133 MMOL/L (ref 135–144)
SODIUM BLD-SCNC: 138 MMOL/L (ref 135–144)
SODIUM BLD-SCNC: 138 MMOL/L (ref 135–144)
SODIUM BLD-SCNC: 139 MMOL/L (ref 135–144)
SODIUM BLD-SCNC: 140 MMOL/L (ref 135–144)
SODIUM BLD-SCNC: 140 MMOL/L (ref 135–144)
SODIUM BLD-SCNC: 141 MMOL/L (ref 135–144)
SODIUM BLD-SCNC: 142 MMOL/L (ref 135–144)
SODIUM BLD-SCNC: 143 MMOL/L (ref 135–144)
SODIUM BLD-SCNC: 144 MMOL/L (ref 135–144)
SODIUM BLD-SCNC: 144 MMOL/L (ref 135–144)
SODIUM BLD-SCNC: 146 MMOL/L (ref 135–144)
SODIUM BLD-SCNC: 147 MMOL/L (ref 135–144)
SODIUM BLD-SCNC: 150 MMOL/L (ref 135–144)
SODIUM BLD-SCNC: 154 MMOL/L (ref 135–144)
SODIUM BLD-SCNC: 154 MMOL/L (ref 135–144)
SOURCE: NORMAL
SPECIFIC GRAVITY UA: 1.02 (ref 1–1.03)
SPECIFIC GRAVITY UA: 1.02 (ref 1–1.03)
SPECIMEN DESCRIPTION: ABNORMAL
SPECIMEN DESCRIPTION: NORMAL
TCO2 (CALC), ART: 25 MMOL/L (ref 22–29)
TCO2 (CALC), ART: 26 MMOL/L (ref 22–29)
TCO2 (CALC), ART: 27 MMOL/L (ref 22–29)
TCO2 (CALC), ART: 28 MMOL/L (ref 22–29)
TCO2 (CALC), ART: 29 MMOL/L (ref 22–29)
TCO2 (CALC), ART: 29 MMOL/L (ref 22–29)
TCO2 (CALC), ART: 30 MMOL/L (ref 22–29)
TCO2 (CALC), ART: 30 MMOL/L (ref 22–29)
TCO2 (CALC), ART: 32 MMOL/L (ref 22–29)
TCO2 (CALC), ART: 39 MMOL/L (ref 22–29)
THYROXINE, FREE: 0.81 NG/DL (ref 0.93–1.7)
TOTAL CK: 125 U/L (ref 39–308)
TOTAL CO2, VENOUS: 36 MMOL/L (ref 23–30)
TOTAL PROTEIN: 6.7 G/DL (ref 6.4–8.3)
TRICHOMONAS: ABNORMAL
TRICHOMONAS: NORMAL
TRIGL SERPL-MCNC: 436 MG/DL
TROPONIN INTERP: ABNORMAL
TROPONIN T: ABNORMAL NG/ML
TROPONIN, HIGH SENSITIVITY: 44 NG/L (ref 0–22)
TROPONIN, HIGH SENSITIVITY: 56 NG/L (ref 0–22)
TROPONIN, HIGH SENSITIVITY: 60 NG/L (ref 0–22)
TROPONIN, HIGH SENSITIVITY: 60 NG/L (ref 0–22)
TSH SERPL DL<=0.05 MIU/L-ACNC: 4.91 MIU/L (ref 0.3–5)
TURBIDITY: ABNORMAL
TURBIDITY: CLEAR
URINE HGB: ABNORMAL
URINE HGB: ABNORMAL
UROBILINOGEN, URINE: NORMAL
UROBILINOGEN, URINE: NORMAL
WBC # BLD: 10.1 K/UL (ref 3.5–11.3)
WBC # BLD: 10.8 K/UL (ref 3.5–11.3)
WBC # BLD: 10.9 K/UL (ref 3.5–11.3)
WBC # BLD: 13.5 K/UL (ref 3.5–11.3)
WBC # BLD: 17.3 K/UL (ref 3.5–11.3)
WBC # BLD: 24.6 K/UL (ref 3.5–11.3)
WBC # BLD: 4.7 K/UL (ref 3.5–11.3)
WBC # BLD: 52.6 K/UL (ref 3.5–11.3)
WBC # BLD: 8.8 K/UL (ref 3.5–11.3)
WBC # BLD: 9.3 K/UL (ref 3.5–11.3)
WBC # BLD: 9.6 K/UL (ref 3.5–11.3)
WBC # BLD: ABNORMAL 10*3/UL
WBC UA: ABNORMAL /HPF (ref 0–5)
WBC UA: NORMAL /HPF (ref 0–5)
YEAST: ABNORMAL
YEAST: NORMAL

## 2021-01-01 PROCEDURE — 85014 HEMATOCRIT: CPT

## 2021-01-01 PROCEDURE — 2580000003 HC RX 258: Performed by: STUDENT IN AN ORGANIZED HEALTH CARE EDUCATION/TRAINING PROGRAM

## 2021-01-01 PROCEDURE — 2500000003 HC RX 250 WO HCPCS: Performed by: STUDENT IN AN ORGANIZED HEALTH CARE EDUCATION/TRAINING PROGRAM

## 2021-01-01 PROCEDURE — 6370000000 HC RX 637 (ALT 250 FOR IP): Performed by: STUDENT IN AN ORGANIZED HEALTH CARE EDUCATION/TRAINING PROGRAM

## 2021-01-01 PROCEDURE — 84295 ASSAY OF SERUM SODIUM: CPT

## 2021-01-01 PROCEDURE — 81001 URINALYSIS AUTO W/SCOPE: CPT

## 2021-01-01 PROCEDURE — 6360000002 HC RX W HCPCS: Performed by: STUDENT IN AN ORGANIZED HEALTH CARE EDUCATION/TRAINING PROGRAM

## 2021-01-01 PROCEDURE — 99232 SBSQ HOSP IP/OBS MODERATE 35: CPT | Performed by: INTERNAL MEDICINE

## 2021-01-01 PROCEDURE — 84132 ASSAY OF SERUM POTASSIUM: CPT

## 2021-01-01 PROCEDURE — 93005 ELECTROCARDIOGRAM TRACING: CPT | Performed by: GENERAL PRACTICE

## 2021-01-01 PROCEDURE — 2500000003 HC RX 250 WO HCPCS

## 2021-01-01 PROCEDURE — 84478 ASSAY OF TRIGLYCERIDES: CPT

## 2021-01-01 PROCEDURE — 6370000000 HC RX 637 (ALT 250 FOR IP): Performed by: GENERAL PRACTICE

## 2021-01-01 PROCEDURE — 93005 ELECTROCARDIOGRAM TRACING: CPT | Performed by: STUDENT IN AN ORGANIZED HEALTH CARE EDUCATION/TRAINING PROGRAM

## 2021-01-01 PROCEDURE — 82330 ASSAY OF CALCIUM: CPT

## 2021-01-01 PROCEDURE — 80048 BASIC METABOLIC PNL TOTAL CA: CPT

## 2021-01-01 PROCEDURE — 0W9B3ZZ DRAINAGE OF LEFT PLEURAL CAVITY, PERCUTANEOUS APPROACH: ICD-10-PCS | Performed by: EMERGENCY MEDICINE

## 2021-01-01 PROCEDURE — 82040 ASSAY OF SERUM ALBUMIN: CPT

## 2021-01-01 PROCEDURE — 5A1955Z RESPIRATORY VENTILATION, GREATER THAN 96 CONSECUTIVE HOURS: ICD-10-PCS | Performed by: INTERNAL MEDICINE

## 2021-01-01 PROCEDURE — 85610 PROTHROMBIN TIME: CPT

## 2021-01-01 PROCEDURE — 86850 RBC ANTIBODY SCREEN: CPT

## 2021-01-01 PROCEDURE — 6360000002 HC RX W HCPCS: Performed by: NURSE PRACTITIONER

## 2021-01-01 PROCEDURE — 85025 COMPLETE CBC W/AUTO DIFF WBC: CPT

## 2021-01-01 PROCEDURE — 2700000000 HC OXYGEN THERAPY PER DAY

## 2021-01-01 PROCEDURE — 6360000002 HC RX W HCPCS

## 2021-01-01 PROCEDURE — 71045 X-RAY EXAM CHEST 1 VIEW: CPT

## 2021-01-01 PROCEDURE — 93306 TTE W/DOPPLER COMPLETE: CPT

## 2021-01-01 PROCEDURE — 99291 CRITICAL CARE FIRST HOUR: CPT | Performed by: INTERNAL MEDICINE

## 2021-01-01 PROCEDURE — 82947 ASSAY GLUCOSE BLOOD QUANT: CPT

## 2021-01-01 PROCEDURE — 86317 IMMUNOASSAY INFECTIOUS AGENT: CPT

## 2021-01-01 PROCEDURE — 94640 AIRWAY INHALATION TREATMENT: CPT

## 2021-01-01 PROCEDURE — 82803 BLOOD GASES ANY COMBINATION: CPT

## 2021-01-01 PROCEDURE — 94761 N-INVAS EAR/PLS OXIMETRY MLT: CPT

## 2021-01-01 PROCEDURE — 83605 ASSAY OF LACTIC ACID: CPT

## 2021-01-01 PROCEDURE — 83735 ASSAY OF MAGNESIUM: CPT

## 2021-01-01 PROCEDURE — 37799 UNLISTED PX VASCULAR SURGERY: CPT

## 2021-01-01 PROCEDURE — 84484 ASSAY OF TROPONIN QUANT: CPT

## 2021-01-01 PROCEDURE — 36415 COLL VENOUS BLD VENIPUNCTURE: CPT

## 2021-01-01 PROCEDURE — 94003 VENT MGMT INPAT SUBQ DAY: CPT

## 2021-01-01 PROCEDURE — 74176 CT ABD & PELVIS W/O CONTRAST: CPT

## 2021-01-01 PROCEDURE — 86900 BLOOD TYPING SEROLOGIC ABO: CPT

## 2021-01-01 PROCEDURE — 6360000002 HC RX W HCPCS: Performed by: INTERNAL MEDICINE

## 2021-01-01 PROCEDURE — 87040 BLOOD CULTURE FOR BACTERIA: CPT

## 2021-01-01 PROCEDURE — 87070 CULTURE OTHR SPECIMN AEROBIC: CPT

## 2021-01-01 PROCEDURE — 2000000000 HC ICU R&B

## 2021-01-01 PROCEDURE — 99291 CRITICAL CARE FIRST HOUR: CPT

## 2021-01-01 PROCEDURE — 5A1D70Z PERFORMANCE OF URINARY FILTRATION, INTERMITTENT, LESS THAN 6 HOURS PER DAY: ICD-10-PCS | Performed by: INTERNAL MEDICINE

## 2021-01-01 PROCEDURE — 99222 1ST HOSP IP/OBS MODERATE 55: CPT | Performed by: INTERNAL MEDICINE

## 2021-01-01 PROCEDURE — 2500000003 HC RX 250 WO HCPCS: Performed by: GENERAL PRACTICE

## 2021-01-01 PROCEDURE — 86316 IMMUNOASSAY TUMOR OTHER: CPT

## 2021-01-01 PROCEDURE — 84439 ASSAY OF FREE THYROXINE: CPT

## 2021-01-01 PROCEDURE — 99222 1ST HOSP IP/OBS MODERATE 55: CPT | Performed by: FAMILY MEDICINE

## 2021-01-01 PROCEDURE — 86403 PARTICLE AGGLUT ANTBDY SCRN: CPT

## 2021-01-01 PROCEDURE — 87205 SMEAR GRAM STAIN: CPT

## 2021-01-01 PROCEDURE — 36620 INSERTION CATHETER ARTERY: CPT

## 2021-01-01 PROCEDURE — 99223 1ST HOSP IP/OBS HIGH 75: CPT | Performed by: NURSE PRACTITIONER

## 2021-01-01 PROCEDURE — 36556 INSERT NON-TUNNEL CV CATH: CPT

## 2021-01-01 PROCEDURE — 95711 VEEG 2-12 HR UNMONITORED: CPT

## 2021-01-01 PROCEDURE — 0WP930Z REMOVAL OF DRAINAGE DEVICE FROM RIGHT PLEURAL CAVITY, PERCUTANEOUS APPROACH: ICD-10-PCS | Performed by: EMERGENCY MEDICINE

## 2021-01-01 PROCEDURE — 84100 ASSAY OF PHOSPHORUS: CPT

## 2021-01-01 PROCEDURE — 74018 RADEX ABDOMEN 1 VIEW: CPT

## 2021-01-01 PROCEDURE — 93010 ELECTROCARDIOGRAM REPORT: CPT | Performed by: INTERNAL MEDICINE

## 2021-01-01 PROCEDURE — 90935 HEMODIALYSIS ONE EVALUATION: CPT

## 2021-01-01 PROCEDURE — APPSS45 APP SPLIT SHARED TIME 31-45 MINUTES: Performed by: NURSE PRACTITIONER

## 2021-01-01 PROCEDURE — 85055 RETICULATED PLATELET ASSAY: CPT

## 2021-01-01 PROCEDURE — 99233 SBSQ HOSP IP/OBS HIGH 50: CPT | Performed by: FAMILY MEDICINE

## 2021-01-01 PROCEDURE — 0BH18EZ INSERTION OF ENDOTRACHEAL AIRWAY INTO TRACHEA, VIA NATURAL OR ARTIFICIAL OPENING ENDOSCOPIC: ICD-10-PCS | Performed by: EMERGENCY MEDICINE

## 2021-01-01 PROCEDURE — 95720 EEG PHY/QHP EA INCR W/VEEG: CPT | Performed by: PSYCHIATRY & NEUROLOGY

## 2021-01-01 PROCEDURE — 0W9930Z DRAINAGE OF RIGHT PLEURAL CAVITY WITH DRAINAGE DEVICE, PERCUTANEOUS APPROACH: ICD-10-PCS | Performed by: EMERGENCY MEDICINE

## 2021-01-01 PROCEDURE — 86803 HEPATITIS C AB TEST: CPT

## 2021-01-01 PROCEDURE — 70450 CT HEAD/BRAIN W/O DYE: CPT

## 2021-01-01 PROCEDURE — 36600 WITHDRAWAL OF ARTERIAL BLOOD: CPT

## 2021-01-01 PROCEDURE — 02HV33Z INSERTION OF INFUSION DEVICE INTO SUPERIOR VENA CAVA, PERCUTANEOUS APPROACH: ICD-10-PCS | Performed by: INTERNAL MEDICINE

## 2021-01-01 PROCEDURE — 2580000003 HC RX 258: Performed by: GENERAL PRACTICE

## 2021-01-01 PROCEDURE — 82435 ASSAY OF BLOOD CHLORIDE: CPT

## 2021-01-01 PROCEDURE — 83935 ASSAY OF URINE OSMOLALITY: CPT

## 2021-01-01 PROCEDURE — U0002 COVID-19 LAB TEST NON-CDC: HCPCS

## 2021-01-01 PROCEDURE — 89220 SPUTUM SPECIMEN COLLECTION: CPT

## 2021-01-01 PROCEDURE — 99233 SBSQ HOSP IP/OBS HIGH 50: CPT | Performed by: INTERNAL MEDICINE

## 2021-01-01 PROCEDURE — 95714 VEEG EA 12-26 HR UNMNTR: CPT

## 2021-01-01 PROCEDURE — 83880 ASSAY OF NATRIURETIC PEPTIDE: CPT

## 2021-01-01 PROCEDURE — 99498 ADVNCD CARE PLAN ADDL 30 MIN: CPT | Performed by: FAMILY MEDICINE

## 2021-01-01 PROCEDURE — 99497 ADVNCD CARE PLAN 30 MIN: CPT | Performed by: FAMILY MEDICINE

## 2021-01-01 PROCEDURE — 31500 INSERT EMERGENCY AIRWAY: CPT

## 2021-01-01 PROCEDURE — 76770 US EXAM ABDO BACK WALL COMP: CPT

## 2021-01-01 PROCEDURE — 87086 URINE CULTURE/COLONY COUNT: CPT

## 2021-01-01 PROCEDURE — 82565 ASSAY OF CREATININE: CPT

## 2021-01-01 PROCEDURE — 2580000003 HC RX 258: Performed by: INTERNAL MEDICINE

## 2021-01-01 PROCEDURE — 84443 ASSAY THYROID STIM HORMONE: CPT

## 2021-01-01 PROCEDURE — 06HY33Z INSERTION OF INFUSION DEVICE INTO LOWER VEIN, PERCUTANEOUS APPROACH: ICD-10-PCS | Performed by: EMERGENCY MEDICINE

## 2021-01-01 PROCEDURE — APPSS30 APP SPLIT SHARED TIME 16-30 MINUTES: Performed by: NURSE PRACTITIONER

## 2021-01-01 PROCEDURE — 0W9B30Z DRAINAGE OF LEFT PLEURAL CAVITY WITH DRAINAGE DEVICE, PERCUTANEOUS APPROACH: ICD-10-PCS | Performed by: EMERGENCY MEDICINE

## 2021-01-01 PROCEDURE — 87641 MR-STAPH DNA AMP PROBE: CPT

## 2021-01-01 PROCEDURE — 2580000003 HC RX 258: Performed by: EMERGENCY MEDICINE

## 2021-01-01 PROCEDURE — 0W993ZZ DRAINAGE OF RIGHT PLEURAL CAVITY, PERCUTANEOUS APPROACH: ICD-10-PCS | Performed by: EMERGENCY MEDICINE

## 2021-01-01 PROCEDURE — 85730 THROMBOPLASTIN TIME PARTIAL: CPT

## 2021-01-01 PROCEDURE — 86901 BLOOD TYPING SEROLOGIC RH(D): CPT

## 2021-01-01 PROCEDURE — 32551 INSERTION OF CHEST TUBE: CPT

## 2021-01-01 PROCEDURE — 82550 ASSAY OF CK (CPK): CPT

## 2021-01-01 PROCEDURE — 80053 COMPREHEN METABOLIC PANEL: CPT

## 2021-01-01 PROCEDURE — 94002 VENT MGMT INPAT INIT DAY: CPT

## 2021-01-01 PROCEDURE — 87340 HEPATITIS B SURFACE AG IA: CPT

## 2021-01-01 PROCEDURE — 06HY33Z INSERTION OF INFUSION DEVICE INTO LOWER VEIN, PERCUTANEOUS APPROACH: ICD-10-PCS | Performed by: INTERNAL MEDICINE

## 2021-01-01 PROCEDURE — 2500000003 HC RX 250 WO HCPCS: Performed by: INTERNAL MEDICINE

## 2021-01-01 PROCEDURE — 86704 HEP B CORE ANTIBODY TOTAL: CPT

## 2021-01-01 PROCEDURE — 94664 DEMO&/EVAL PT USE INHALER: CPT

## 2021-01-01 RX ORDER — DEXTROSE MONOHYDRATE 25 G/50ML
25 INJECTION, SOLUTION INTRAVENOUS ONCE
Status: COMPLETED | OUTPATIENT
Start: 2021-01-01 | End: 2021-01-01

## 2021-01-01 RX ORDER — FUROSEMIDE 10 MG/ML
40 INJECTION INTRAMUSCULAR; INTRAVENOUS ONCE
Status: COMPLETED | OUTPATIENT
Start: 2021-01-01 | End: 2021-01-01

## 2021-01-01 RX ORDER — ONDANSETRON 2 MG/ML
4 INJECTION INTRAMUSCULAR; INTRAVENOUS EVERY 6 HOURS PRN
Status: DISCONTINUED | OUTPATIENT
Start: 2021-01-01 | End: 2021-01-01 | Stop reason: HOSPADM

## 2021-01-01 RX ORDER — INSULIN GLARGINE 100 [IU]/ML
20 INJECTION, SOLUTION SUBCUTANEOUS 2 TIMES DAILY
Status: DISCONTINUED | OUTPATIENT
Start: 2021-01-01 | End: 2021-01-01

## 2021-01-01 RX ORDER — CALCIUM CHLORIDE 100 MG/ML
1000 INJECTION INTRAVENOUS; INTRAVENTRICULAR ONCE
Status: COMPLETED | OUTPATIENT
Start: 2021-01-01 | End: 2021-01-01

## 2021-01-01 RX ORDER — PROMETHAZINE HYDROCHLORIDE 12.5 MG/1
12.5 TABLET ORAL EVERY 6 HOURS PRN
Status: CANCELLED | OUTPATIENT
Start: 2021-01-01

## 2021-01-01 RX ORDER — HEPARIN SODIUM 1000 [USP'U]/ML
1400 INJECTION, SOLUTION INTRAVENOUS; SUBCUTANEOUS PRN
Status: DISCONTINUED | OUTPATIENT
Start: 2021-01-01 | End: 2021-01-01 | Stop reason: HOSPADM

## 2021-01-01 RX ORDER — IPRATROPIUM BROMIDE AND ALBUTEROL SULFATE 2.5; .5 MG/3ML; MG/3ML
1 SOLUTION RESPIRATORY (INHALATION) 4 TIMES DAILY
Status: DISCONTINUED | OUTPATIENT
Start: 2021-01-01 | End: 2021-01-01 | Stop reason: HOSPADM

## 2021-01-01 RX ORDER — HEPARIN SODIUM 1000 [USP'U]/ML
1300 INJECTION, SOLUTION INTRAVENOUS; SUBCUTANEOUS PRN
Status: DISCONTINUED | OUTPATIENT
Start: 2021-01-01 | End: 2021-01-01 | Stop reason: HOSPADM

## 2021-01-01 RX ORDER — HEPARIN SODIUM 5000 [USP'U]/ML
5000 INJECTION, SOLUTION INTRAVENOUS; SUBCUTANEOUS EVERY 8 HOURS SCHEDULED
Status: DISCONTINUED | OUTPATIENT
Start: 2021-01-01 | End: 2021-01-01 | Stop reason: HOSPADM

## 2021-01-01 RX ORDER — CALCIUM GLUCONATE 20 MG/ML
2000 INJECTION, SOLUTION INTRAVENOUS ONCE
Status: COMPLETED | OUTPATIENT
Start: 2021-01-01 | End: 2021-01-01

## 2021-01-01 RX ORDER — ALOGLIPTIN 12.5 MG/1
25 TABLET, FILM COATED ORAL DAILY
COMMUNITY

## 2021-01-01 RX ORDER — ACETAMINOPHEN 160 MG/5ML
650 SOLUTION ORAL EVERY 6 HOURS PRN
Status: DISCONTINUED | OUTPATIENT
Start: 2021-01-01 | End: 2021-01-01

## 2021-01-01 RX ORDER — SODIUM CHLORIDE 9 MG/ML
INJECTION, SOLUTION INTRAVENOUS CONTINUOUS
Status: DISCONTINUED | OUTPATIENT
Start: 2021-01-01 | End: 2021-01-01

## 2021-01-01 RX ORDER — DEXTROSE MONOHYDRATE 50 MG/ML
INJECTION, SOLUTION INTRAVENOUS
Status: DISPENSED
Start: 2021-01-01 | End: 2021-01-01

## 2021-01-01 RX ORDER — FUROSEMIDE 10 MG/ML
80 INJECTION INTRAMUSCULAR; INTRAVENOUS ONCE
Status: COMPLETED | OUTPATIENT
Start: 2021-01-01 | End: 2021-01-01

## 2021-01-01 RX ORDER — SIMVASTATIN 10 MG
10 TABLET ORAL DAILY
COMMUNITY

## 2021-01-01 RX ORDER — CALCIUM CHLORIDE 100 MG/ML
INJECTION INTRAVENOUS; INTRAVENTRICULAR
Status: COMPLETED
Start: 2021-01-01 | End: 2021-01-01

## 2021-01-01 RX ORDER — LORAZEPAM 2 MG/ML
2 INJECTION INTRAMUSCULAR ONCE
Status: DISCONTINUED | OUTPATIENT
Start: 2021-01-01 | End: 2021-01-01 | Stop reason: HOSPADM

## 2021-01-01 RX ORDER — CALCIUM GLUCONATE 20 MG/ML
1000 INJECTION, SOLUTION INTRAVENOUS ONCE
Status: COMPLETED | OUTPATIENT
Start: 2021-01-01 | End: 2021-01-01

## 2021-01-01 RX ORDER — ONDANSETRON 2 MG/ML
4 INJECTION INTRAMUSCULAR; INTRAVENOUS EVERY 6 HOURS PRN
Status: CANCELLED | OUTPATIENT
Start: 2021-01-01

## 2021-01-01 RX ORDER — NICOTINE POLACRILEX 4 MG
15 LOZENGE BUCCAL PRN
Status: DISCONTINUED | OUTPATIENT
Start: 2021-01-01 | End: 2021-01-01

## 2021-01-01 RX ORDER — BISACODYL 10 MG
10 SUPPOSITORY, RECTAL RECTAL DAILY PRN
Status: DISCONTINUED | OUTPATIENT
Start: 2021-01-01 | End: 2021-01-01 | Stop reason: HOSPADM

## 2021-01-01 RX ORDER — ALBUTEROL SULFATE 2.5 MG/3ML
2.5 SOLUTION RESPIRATORY (INHALATION)
Status: DISCONTINUED | OUTPATIENT
Start: 2021-01-01 | End: 2021-01-01 | Stop reason: HOSPADM

## 2021-01-01 RX ORDER — FENTANYL CITRATE 50 UG/ML
25 INJECTION, SOLUTION INTRAMUSCULAR; INTRAVENOUS
Status: DISCONTINUED | OUTPATIENT
Start: 2021-01-01 | End: 2021-01-01 | Stop reason: HOSPADM

## 2021-01-01 RX ORDER — INSULIN GLARGINE 100 [IU]/ML
40 INJECTION, SOLUTION SUBCUTANEOUS 2 TIMES DAILY
Status: DISCONTINUED | OUTPATIENT
Start: 2021-01-01 | End: 2021-01-01

## 2021-01-01 RX ORDER — ALBUTEROL SULFATE 2.5 MG/3ML
2.5 SOLUTION RESPIRATORY (INHALATION) 2 TIMES DAILY
Status: DISCONTINUED | OUTPATIENT
Start: 2021-01-01 | End: 2021-01-01 | Stop reason: HOSPADM

## 2021-01-01 RX ORDER — BUSPIRONE HYDROCHLORIDE 5 MG/1
5 TABLET ORAL 3 TIMES DAILY
COMMUNITY

## 2021-01-01 RX ORDER — NOREPINEPHRINE BITARTRATE 1 MG/ML
INJECTION, SOLUTION INTRAVENOUS
Status: DISPENSED
Start: 2021-01-01 | End: 2021-01-01

## 2021-01-01 RX ORDER — AZITHROMYCIN 250 MG/1
250 TABLET, FILM COATED ORAL DAILY
COMMUNITY

## 2021-01-01 RX ORDER — METHYLPREDNISOLONE SODIUM SUCCINATE 40 MG/ML
40 INJECTION, POWDER, LYOPHILIZED, FOR SOLUTION INTRAMUSCULAR; INTRAVENOUS EVERY 12 HOURS
Status: DISCONTINUED | OUTPATIENT
Start: 2021-01-01 | End: 2021-01-01

## 2021-01-01 RX ORDER — NOREPINEPHRINE BIT/0.9 % NACL 16MG/250ML
2-100 INFUSION BOTTLE (ML) INTRAVENOUS CONTINUOUS
Status: DISCONTINUED | OUTPATIENT
Start: 2021-01-01 | End: 2021-01-01 | Stop reason: HOSPADM

## 2021-01-01 RX ORDER — FUROSEMIDE 10 MG/ML
20 INJECTION INTRAMUSCULAR; INTRAVENOUS ONCE
Status: COMPLETED | OUTPATIENT
Start: 2021-01-01 | End: 2021-01-01

## 2021-01-01 RX ORDER — LABETALOL HYDROCHLORIDE 5 MG/ML
5 INJECTION, SOLUTION INTRAVENOUS ONCE
Status: COMPLETED | OUTPATIENT
Start: 2021-01-01 | End: 2021-01-01

## 2021-01-01 RX ORDER — DEXTROSE MONOHYDRATE 50 MG/ML
100 INJECTION, SOLUTION INTRAVENOUS PRN
Status: DISCONTINUED | OUTPATIENT
Start: 2021-01-01 | End: 2021-01-01

## 2021-01-01 RX ORDER — MIDODRINE HYDROCHLORIDE 5 MG/1
10 TABLET ORAL
Status: DISCONTINUED | OUTPATIENT
Start: 2021-01-01 | End: 2021-01-01 | Stop reason: HOSPADM

## 2021-01-01 RX ORDER — LORAZEPAM 2 MG/ML
INJECTION INTRAMUSCULAR
Status: COMPLETED
Start: 2021-01-01 | End: 2021-01-01

## 2021-01-01 RX ORDER — DEXTROSE MONOHYDRATE 25 G/50ML
12.5 INJECTION, SOLUTION INTRAVENOUS PRN
Status: DISCONTINUED | OUTPATIENT
Start: 2021-01-01 | End: 2021-01-01

## 2021-01-01 RX ORDER — PROPOFOL 10 MG/ML
INJECTION, EMULSION INTRAVENOUS
Status: COMPLETED
Start: 2021-01-01 | End: 2021-01-01

## 2021-01-01 RX ORDER — FENTANYL CITRATE 50 UG/ML
25 INJECTION, SOLUTION INTRAMUSCULAR; INTRAVENOUS ONCE
Status: COMPLETED | OUTPATIENT
Start: 2021-01-01 | End: 2021-01-01

## 2021-01-01 RX ORDER — INSULIN GLARGINE 100 [IU]/ML
10 INJECTION, SOLUTION SUBCUTANEOUS NIGHTLY
Status: DISCONTINUED | OUTPATIENT
Start: 2021-01-01 | End: 2021-01-01

## 2021-01-01 RX ORDER — GLIPIZIDE 10 MG/1
10 TABLET ORAL 2 TIMES DAILY
COMMUNITY

## 2021-01-01 RX ORDER — 0.9 % SODIUM CHLORIDE 0.9 %
500 INTRAVENOUS SOLUTION INTRAVENOUS ONCE
Status: COMPLETED | OUTPATIENT
Start: 2021-01-01 | End: 2021-01-01

## 2021-01-01 RX ORDER — PROPOFOL 10 MG/ML
5-50 INJECTION, EMULSION INTRAVENOUS CONTINUOUS
Status: DISCONTINUED | OUTPATIENT
Start: 2021-01-01 | End: 2021-01-01

## 2021-01-01 RX ORDER — CALCIUM CHLORIDE 100 MG/ML
2000 INJECTION INTRAVENOUS; INTRAVENTRICULAR ONCE
Status: DISCONTINUED | OUTPATIENT
Start: 2021-01-01 | End: 2021-01-01

## 2021-01-01 RX ORDER — FUROSEMIDE 40 MG/1
40 TABLET ORAL DAILY
COMMUNITY

## 2021-01-01 RX ORDER — INSULIN GLARGINE 100 [IU]/ML
50 INJECTION, SOLUTION SUBCUTANEOUS 2 TIMES DAILY
Status: DISCONTINUED | OUTPATIENT
Start: 2021-01-01 | End: 2021-01-01 | Stop reason: HOSPADM

## 2021-01-01 RX ORDER — CHLORHEXIDINE GLUCONATE 0.12 MG/ML
15 RINSE ORAL 2 TIMES DAILY
Status: CANCELLED | OUTPATIENT
Start: 2021-01-01

## 2021-01-01 RX ORDER — MINERAL OIL AND WHITE PETROLATUM 150; 830 MG/G; MG/G
OINTMENT OPHTHALMIC 4 TIMES DAILY
Status: DISCONTINUED | OUTPATIENT
Start: 2021-01-01 | End: 2021-01-01 | Stop reason: HOSPADM

## 2021-01-01 RX ORDER — IPRATROPIUM BROMIDE AND ALBUTEROL SULFATE 2.5; .5 MG/3ML; MG/3ML
1 SOLUTION RESPIRATORY (INHALATION) EVERY 4 HOURS PRN
COMMUNITY

## 2021-01-01 RX ORDER — 0.9 % SODIUM CHLORIDE 0.9 %
1000 INTRAVENOUS SOLUTION INTRAVENOUS ONCE
Status: COMPLETED | OUTPATIENT
Start: 2021-01-01 | End: 2021-01-01

## 2021-01-01 RX ORDER — CALCIUM CHLORIDE 100 MG/ML
1000 INJECTION INTRAVENOUS; INTRAVENTRICULAR ONCE
Status: DISCONTINUED | OUTPATIENT
Start: 2021-01-01 | End: 2021-01-01

## 2021-01-01 RX ORDER — 0.9 % SODIUM CHLORIDE 0.9 %
150 INTRAVENOUS SOLUTION INTRAVENOUS PRN
Status: DISCONTINUED | OUTPATIENT
Start: 2021-01-01 | End: 2021-01-01 | Stop reason: HOSPADM

## 2021-01-01 RX ORDER — SODIUM CHLORIDE 450 MG/100ML
INJECTION, SOLUTION INTRAVENOUS CONTINUOUS
Status: DISCONTINUED | OUTPATIENT
Start: 2021-01-01 | End: 2021-01-01

## 2021-01-01 RX ORDER — HEPARIN SODIUM 1000 [USP'U]/ML
1.3 INJECTION, SOLUTION INTRAVENOUS; SUBCUTANEOUS ONCE
Status: DISCONTINUED | OUTPATIENT
Start: 2021-01-01 | End: 2021-01-01

## 2021-01-01 RX ORDER — DEXTROSE MONOHYDRATE 25 G/50ML
12.5 INJECTION, SOLUTION INTRAVENOUS PRN
Status: DISCONTINUED | OUTPATIENT
Start: 2021-01-01 | End: 2021-01-01 | Stop reason: HOSPADM

## 2021-01-01 RX ORDER — OMEPRAZOLE 20 MG/1
20 CAPSULE, DELAYED RELEASE ORAL DAILY
COMMUNITY

## 2021-01-01 RX ORDER — DEXTROSE MONOHYDRATE 50 MG/ML
100 INJECTION, SOLUTION INTRAVENOUS PRN
Status: DISCONTINUED | OUTPATIENT
Start: 2021-01-01 | End: 2021-01-01 | Stop reason: HOSPADM

## 2021-01-01 RX ORDER — LABETALOL HYDROCHLORIDE 5 MG/ML
10 INJECTION, SOLUTION INTRAVENOUS
Status: DISCONTINUED | OUTPATIENT
Start: 2021-01-01 | End: 2021-01-01 | Stop reason: HOSPADM

## 2021-01-01 RX ORDER — INSULIN GLARGINE 100 [IU]/ML
30 INJECTION, SOLUTION SUBCUTANEOUS 2 TIMES DAILY
Status: DISCONTINUED | OUTPATIENT
Start: 2021-01-01 | End: 2021-01-01

## 2021-01-01 RX ORDER — GABAPENTIN 400 MG/1
400 CAPSULE ORAL 2 TIMES DAILY
COMMUNITY

## 2021-01-01 RX ORDER — ASPIRIN 81 MG/1
81 TABLET ORAL DAILY
COMMUNITY

## 2021-01-01 RX ORDER — ALBUTEROL SULFATE 90 UG/1
2 AEROSOL, METERED RESPIRATORY (INHALATION) 4 TIMES DAILY
COMMUNITY

## 2021-01-01 RX ORDER — 0.9 % SODIUM CHLORIDE 0.9 %
250 INTRAVENOUS SOLUTION INTRAVENOUS PRN
Status: DISCONTINUED | OUTPATIENT
Start: 2021-01-01 | End: 2021-01-01 | Stop reason: HOSPADM

## 2021-01-01 RX ORDER — INSULIN GLARGINE 100 [IU]/ML
10 INJECTION, SOLUTION SUBCUTANEOUS 2 TIMES DAILY
Status: DISCONTINUED | OUTPATIENT
Start: 2021-01-01 | End: 2021-01-01

## 2021-01-01 RX ORDER — CLOPIDOGREL BISULFATE 75 MG/1
75 TABLET ORAL DAILY
COMMUNITY

## 2021-01-01 RX ORDER — ISOSORBIDE MONONITRATE 30 MG/1
30 TABLET, EXTENDED RELEASE ORAL DAILY
COMMUNITY

## 2021-01-01 RX ORDER — METOPROLOL TARTRATE 5 MG/5ML
INJECTION INTRAVENOUS
Status: DISPENSED
Start: 2021-01-01 | End: 2021-01-01

## 2021-01-01 RX ORDER — ACETAMINOPHEN 160 MG/5ML
1000 SOLUTION ORAL EVERY 6 HOURS PRN
Status: DISCONTINUED | OUTPATIENT
Start: 2021-01-01 | End: 2021-01-01 | Stop reason: HOSPADM

## 2021-01-01 RX ORDER — AMIODARONE HYDROCHLORIDE 200 MG/1
200 TABLET ORAL 2 TIMES DAILY
Status: DISCONTINUED | OUTPATIENT
Start: 2021-01-01 | End: 2021-01-01

## 2021-01-01 RX ORDER — CHLORHEXIDINE GLUCONATE 0.12 MG/ML
15 RINSE ORAL 2 TIMES DAILY
Status: DISCONTINUED | OUTPATIENT
Start: 2021-01-01 | End: 2021-01-01 | Stop reason: HOSPADM

## 2021-01-01 RX ORDER — PROMETHAZINE HYDROCHLORIDE 12.5 MG/1
12.5 TABLET ORAL EVERY 6 HOURS PRN
Status: DISCONTINUED | OUTPATIENT
Start: 2021-01-01 | End: 2021-01-01 | Stop reason: HOSPADM

## 2021-01-01 RX ORDER — QUETIAPINE FUMARATE 25 MG/1
50 TABLET, FILM COATED ORAL 2 TIMES DAILY
COMMUNITY

## 2021-01-01 RX ORDER — SACUBITRIL AND VALSARTAN 24; 26 MG/1; MG/1
1 TABLET, FILM COATED ORAL 2 TIMES DAILY
COMMUNITY

## 2021-01-01 RX ORDER — FENTANYL CITRATE 50 UG/ML
100 INJECTION, SOLUTION INTRAMUSCULAR; INTRAVENOUS
Status: DISCONTINUED | OUTPATIENT
Start: 2021-01-01 | End: 2021-01-01

## 2021-01-01 RX ORDER — FENTANYL CITRATE 50 UG/ML
50 INJECTION, SOLUTION INTRAMUSCULAR; INTRAVENOUS
Status: DISCONTINUED | OUTPATIENT
Start: 2021-01-01 | End: 2021-01-01

## 2021-01-01 RX ORDER — PRIMIDONE 250 MG/1
250 TABLET ORAL 3 TIMES DAILY
COMMUNITY

## 2021-01-01 RX ORDER — INSULIN GLARGINE 100 [IU]/ML
30 INJECTION, SOLUTION SUBCUTANEOUS DAILY
COMMUNITY

## 2021-01-01 RX ORDER — MULTIVIT-MIN/IRON/FOLIC ACID/K 18-600-40
1 CAPSULE ORAL
COMMUNITY

## 2021-01-01 RX ORDER — TAMSULOSIN HYDROCHLORIDE 0.4 MG/1
0.4 CAPSULE ORAL DAILY
COMMUNITY

## 2021-01-01 RX ORDER — FAMOTIDINE 20 MG/1
20 TABLET, FILM COATED ORAL DAILY
Status: DISCONTINUED | OUTPATIENT
Start: 2021-01-01 | End: 2021-01-01 | Stop reason: HOSPADM

## 2021-01-01 RX ORDER — NICOTINE POLACRILEX 4 MG
15 LOZENGE BUCCAL PRN
Status: DISCONTINUED | OUTPATIENT
Start: 2021-01-01 | End: 2021-01-01 | Stop reason: HOSPADM

## 2021-01-01 RX ORDER — HEPARIN SODIUM 1000 [USP'U]/ML
1.4 INJECTION, SOLUTION INTRAVENOUS; SUBCUTANEOUS ONCE
Status: DISCONTINUED | OUTPATIENT
Start: 2021-01-01 | End: 2021-01-01

## 2021-01-01 RX ADMIN — EPINEPHRINE 5 MCG/MIN: 1 INJECTION INTRAMUSCULAR; INTRAVENOUS; SUBCUTANEOUS at 01:10

## 2021-01-01 RX ADMIN — METHYLPREDNISOLONE SODIUM SUCCINATE 40 MG: 40 INJECTION, POWDER, FOR SOLUTION INTRAMUSCULAR; INTRAVENOUS at 08:07

## 2021-01-01 RX ADMIN — INSULIN LISPRO 7 UNITS: 100 INJECTION, SUSPENSION SUBCUTANEOUS at 08:47

## 2021-01-01 RX ADMIN — SODIUM ZIRCONIUM CYCLOSILICATE 10 G: 10 POWDER, FOR SUSPENSION ORAL at 17:13

## 2021-01-01 RX ADMIN — VASOPRESSIN 0.03 UNITS/MIN: 20 INJECTION INTRAVENOUS at 20:06

## 2021-01-01 RX ADMIN — MINERAL OIL AND PETROLATUM: 150; 830 OINTMENT OPHTHALMIC at 08:30

## 2021-01-01 RX ADMIN — MINERAL OIL AND PETROLATUM: 150; 830 OINTMENT OPHTHALMIC at 17:00

## 2021-01-01 RX ADMIN — INSULIN HUMAN 10 UNITS: 100 INJECTION, SOLUTION PARENTERAL at 06:34

## 2021-01-01 RX ADMIN — MINERAL OIL AND PETROLATUM: 150; 830 OINTMENT OPHTHALMIC at 20:18

## 2021-01-01 RX ADMIN — Medication 10 MG: at 04:43

## 2021-01-01 RX ADMIN — ALBUTEROL SULFATE 2.5 MG: 2.5 SOLUTION RESPIRATORY (INHALATION) at 23:32

## 2021-01-01 RX ADMIN — HEPARIN SODIUM 5000 UNITS: 5000 INJECTION INTRAVENOUS; SUBCUTANEOUS at 20:10

## 2021-01-01 RX ADMIN — SODIUM PHOSPHATE, MONOBASIC, MONOHYDRATE 10 MMOL: 276; 142 INJECTION, SOLUTION INTRAVENOUS at 04:15

## 2021-01-01 RX ADMIN — Medication 10 MG: at 16:20

## 2021-01-01 RX ADMIN — IPRATROPIUM BROMIDE AND ALBUTEROL SULFATE 1 AMPULE: .5; 3 SOLUTION RESPIRATORY (INHALATION) at 20:41

## 2021-01-01 RX ADMIN — IPRATROPIUM BROMIDE AND ALBUTEROL SULFATE 1 AMPULE: .5; 3 SOLUTION RESPIRATORY (INHALATION) at 08:19

## 2021-01-01 RX ADMIN — AMIODARONE HYDROCHLORIDE 0.5 MG/MIN: 50 INJECTION, SOLUTION INTRAVENOUS at 19:50

## 2021-01-01 RX ADMIN — MINERAL OIL AND PETROLATUM: 150; 830 OINTMENT OPHTHALMIC at 21:10

## 2021-01-01 RX ADMIN — IPRATROPIUM BROMIDE AND ALBUTEROL SULFATE 1 AMPULE: .5; 3 SOLUTION RESPIRATORY (INHALATION) at 20:13

## 2021-01-01 RX ADMIN — SODIUM BICARBONATE 50 MEQ: 84 INJECTION, SOLUTION INTRAVENOUS at 07:35

## 2021-01-01 RX ADMIN — CHLORHEXIDINE GLUCONATE 0.12% ORAL RINSE 15 ML: 1.2 LIQUID ORAL at 22:38

## 2021-01-01 RX ADMIN — INSULIN LISPRO 7 UNITS: 100 INJECTION, SOLUTION INTRAVENOUS; SUBCUTANEOUS at 22:44

## 2021-01-01 RX ADMIN — ACETAMINOPHEN 650 MG: 650 SOLUTION ORAL at 04:14

## 2021-01-01 RX ADMIN — IPRATROPIUM BROMIDE AND ALBUTEROL SULFATE 1 AMPULE: .5; 3 SOLUTION RESPIRATORY (INHALATION) at 19:46

## 2021-01-01 RX ADMIN — Medication 150 MCG/HR: at 17:22

## 2021-01-01 RX ADMIN — INSULIN LISPRO 10 UNITS: 100 INJECTION, SOLUTION INTRAVENOUS; SUBCUTANEOUS at 12:45

## 2021-01-01 RX ADMIN — INSULIN GLARGINE 10 UNITS: 100 INJECTION, SOLUTION SUBCUTANEOUS at 21:11

## 2021-01-01 RX ADMIN — INSULIN LISPRO 15 UNITS: 100 INJECTION, SOLUTION INTRAVENOUS; SUBCUTANEOUS at 03:22

## 2021-01-01 RX ADMIN — SODIUM ZIRCONIUM CYCLOSILICATE 10 G: 10 POWDER, FOR SUSPENSION ORAL at 05:08

## 2021-01-01 RX ADMIN — METHYLPREDNISOLONE SODIUM SUCCINATE 40 MG: 40 INJECTION, POWDER, FOR SOLUTION INTRAMUSCULAR; INTRAVENOUS at 09:40

## 2021-01-01 RX ADMIN — FAMOTIDINE 20 MG: 20 TABLET, FILM COATED ORAL at 11:26

## 2021-01-01 RX ADMIN — PROPOFOL 45 MCG/KG/MIN: 10 INJECTION, EMULSION INTRAVENOUS at 16:30

## 2021-01-01 RX ADMIN — HEPARIN SODIUM 5000 UNITS: 5000 INJECTION INTRAVENOUS; SUBCUTANEOUS at 20:18

## 2021-01-01 RX ADMIN — PROPOFOL 45 MCG/KG/MIN: 10 INJECTION, EMULSION INTRAVENOUS at 22:38

## 2021-01-01 RX ADMIN — INSULIN LISPRO 12 UNITS: 100 INJECTION, SOLUTION INTRAVENOUS; SUBCUTANEOUS at 08:01

## 2021-01-01 RX ADMIN — Medication 10 MG: at 22:39

## 2021-01-01 RX ADMIN — FUROSEMIDE 80 MG: 10 INJECTION, SOLUTION INTRAVENOUS at 10:48

## 2021-01-01 RX ADMIN — FAMOTIDINE 20 MG: 20 TABLET, FILM COATED ORAL at 08:00

## 2021-01-01 RX ADMIN — INSULIN LISPRO 12 UNITS: 100 INJECTION, SOLUTION INTRAVENOUS; SUBCUTANEOUS at 03:38

## 2021-01-01 RX ADMIN — ALBUTEROL SULFATE 2.5 MG: 2.5 SOLUTION RESPIRATORY (INHALATION) at 03:28

## 2021-01-01 RX ADMIN — METHYLPREDNISOLONE SODIUM SUCCINATE 40 MG: 40 INJECTION, POWDER, FOR SOLUTION INTRAMUSCULAR; INTRAVENOUS at 07:27

## 2021-01-01 RX ADMIN — CISATRACURIUM BESYLATE 0.5 MCG/KG/MIN: 10 INJECTION INTRAVENOUS at 19:40

## 2021-01-01 RX ADMIN — INSULIN LISPRO 15 UNITS: 100 INJECTION, SOLUTION INTRAVENOUS; SUBCUTANEOUS at 16:10

## 2021-01-01 RX ADMIN — HEPARIN SODIUM 5000 UNITS: 5000 INJECTION INTRAVENOUS; SUBCUTANEOUS at 04:50

## 2021-01-01 RX ADMIN — METHYLPREDNISOLONE SODIUM SUCCINATE 40 MG: 40 INJECTION, POWDER, FOR SOLUTION INTRAMUSCULAR; INTRAVENOUS at 09:09

## 2021-01-01 RX ADMIN — IPRATROPIUM BROMIDE AND ALBUTEROL SULFATE 1 AMPULE: .5; 3 SOLUTION RESPIRATORY (INHALATION) at 11:42

## 2021-01-01 RX ADMIN — INSULIN GLARGINE 30 UNITS: 100 INJECTION, SOLUTION SUBCUTANEOUS at 08:54

## 2021-01-01 RX ADMIN — MINERAL OIL AND PETROLATUM: 150; 830 OINTMENT OPHTHALMIC at 22:46

## 2021-01-01 RX ADMIN — INSULIN LISPRO 4 UNITS: 100 INJECTION, SOLUTION INTRAVENOUS; SUBCUTANEOUS at 18:03

## 2021-01-01 RX ADMIN — INSULIN LISPRO 10 UNITS: 100 INJECTION, SOLUTION INTRAVENOUS; SUBCUTANEOUS at 02:31

## 2021-01-01 RX ADMIN — MINERAL OIL AND PETROLATUM: 150; 830 OINTMENT OPHTHALMIC at 13:57

## 2021-01-01 RX ADMIN — FUROSEMIDE 80 MG: 10 INJECTION, SOLUTION INTRAVENOUS at 07:40

## 2021-01-01 RX ADMIN — INSULIN GLARGINE 40 UNITS: 100 INJECTION, SOLUTION SUBCUTANEOUS at 19:55

## 2021-01-01 RX ADMIN — INSULIN LISPRO 4 UNITS: 100 INJECTION, SOLUTION INTRAVENOUS; SUBCUTANEOUS at 06:39

## 2021-01-01 RX ADMIN — INSULIN LISPRO 15 UNITS: 100 INJECTION, SOLUTION INTRAVENOUS; SUBCUTANEOUS at 23:56

## 2021-01-01 RX ADMIN — IPRATROPIUM BROMIDE AND ALBUTEROL SULFATE 1 AMPULE: .5; 3 SOLUTION RESPIRATORY (INHALATION) at 15:48

## 2021-01-01 RX ADMIN — INSULIN LISPRO 2 UNITS: 100 INJECTION, SOLUTION INTRAVENOUS; SUBCUTANEOUS at 12:36

## 2021-01-01 RX ADMIN — SODIUM CHLORIDE: 9 INJECTION, SOLUTION INTRAVENOUS at 04:26

## 2021-01-01 RX ADMIN — SODIUM ZIRCONIUM CYCLOSILICATE 10 G: 10 POWDER, FOR SUSPENSION ORAL at 04:50

## 2021-01-01 RX ADMIN — Medication 125 MCG/HR: at 01:01

## 2021-01-01 RX ADMIN — MINERAL OIL AND PETROLATUM: 150; 830 OINTMENT OPHTHALMIC at 04:10

## 2021-01-01 RX ADMIN — INSULIN LISPRO 12 UNITS: 100 INJECTION, SOLUTION INTRAVENOUS; SUBCUTANEOUS at 23:31

## 2021-01-01 RX ADMIN — CHLORHEXIDINE GLUCONATE 0.12% ORAL RINSE 15 ML: 1.2 LIQUID ORAL at 08:57

## 2021-01-01 RX ADMIN — DOCUSATE SODIUM 100 MG: 50 LIQUID ORAL at 08:45

## 2021-01-01 RX ADMIN — BISACODYL 10 MG: 10 SUPPOSITORY RECTAL at 16:20

## 2021-01-01 RX ADMIN — INSULIN HUMAN 10 UNITS: 100 INJECTION, SOLUTION PARENTERAL at 16:01

## 2021-01-01 RX ADMIN — AMIODARONE HYDROCHLORIDE 0.5 MG/MIN: 50 INJECTION, SOLUTION INTRAVENOUS at 09:55

## 2021-01-01 RX ADMIN — ACETAMINOPHEN 1000 MG: 650 SOLUTION ORAL at 11:55

## 2021-01-01 RX ADMIN — INSULIN LISPRO 12 UNITS: 100 INJECTION, SOLUTION INTRAVENOUS; SUBCUTANEOUS at 16:18

## 2021-01-01 RX ADMIN — INSULIN LISPRO 4 UNITS: 100 INJECTION, SOLUTION INTRAVENOUS; SUBCUTANEOUS at 12:25

## 2021-01-01 RX ADMIN — INSULIN GLARGINE 20 UNITS: 100 INJECTION, SOLUTION SUBCUTANEOUS at 08:28

## 2021-01-01 RX ADMIN — DEXTROSE MONOHYDRATE 25 G: 25 INJECTION, SOLUTION INTRAVENOUS at 16:03

## 2021-01-01 RX ADMIN — SODIUM ZIRCONIUM CYCLOSILICATE 10 G: 10 POWDER, FOR SUSPENSION ORAL at 08:42

## 2021-01-01 RX ADMIN — METHYLPREDNISOLONE SODIUM SUCCINATE 40 MG: 40 INJECTION, POWDER, FOR SOLUTION INTRAMUSCULAR; INTRAVENOUS at 21:40

## 2021-01-01 RX ADMIN — METHYLPREDNISOLONE SODIUM SUCCINATE 40 MG: 40 INJECTION, POWDER, FOR SOLUTION INTRAMUSCULAR; INTRAVENOUS at 20:14

## 2021-01-01 RX ADMIN — INSULIN LISPRO 6 UNITS: 100 INJECTION, SOLUTION INTRAVENOUS; SUBCUTANEOUS at 17:43

## 2021-01-01 RX ADMIN — FUROSEMIDE 80 MG: 10 INJECTION, SOLUTION INTRAMUSCULAR; INTRAVENOUS at 11:28

## 2021-01-01 RX ADMIN — CALCIUM CHLORIDE 1000 MG: 100 INJECTION, SOLUTION INTRAVENOUS; INTRAVENTRICULAR at 07:35

## 2021-01-01 RX ADMIN — AMPICILLIN AND SULBACTAM 1500 MG: 1; .5 INJECTION, POWDER, FOR SOLUTION INTRAVENOUS at 10:48

## 2021-01-01 RX ADMIN — IPRATROPIUM BROMIDE AND ALBUTEROL SULFATE 1 AMPULE: .5; 3 SOLUTION RESPIRATORY (INHALATION) at 11:33

## 2021-01-01 RX ADMIN — SODIUM ZIRCONIUM CYCLOSILICATE 10 G: 10 POWDER, FOR SUSPENSION ORAL at 18:30

## 2021-01-01 RX ADMIN — MINERAL OIL AND PETROLATUM: 150; 830 OINTMENT OPHTHALMIC at 20:29

## 2021-01-01 RX ADMIN — Medication 5 MG: at 08:55

## 2021-01-01 RX ADMIN — ALBUTEROL SULFATE 2.5 MG: 2.5 SOLUTION RESPIRATORY (INHALATION) at 03:21

## 2021-01-01 RX ADMIN — IPRATROPIUM BROMIDE AND ALBUTEROL SULFATE 1 AMPULE: .5; 3 SOLUTION RESPIRATORY (INHALATION) at 11:15

## 2021-01-01 RX ADMIN — INSULIN LISPRO 15 UNITS: 100 INJECTION, SOLUTION INTRAVENOUS; SUBCUTANEOUS at 16:43

## 2021-01-01 RX ADMIN — PROPOFOL 45 MCG/KG/MIN: 10 INJECTION, EMULSION INTRAVENOUS at 13:36

## 2021-01-01 RX ADMIN — INSULIN GLARGINE 10 UNITS: 100 INJECTION, SOLUTION SUBCUTANEOUS at 20:38

## 2021-01-01 RX ADMIN — FAMOTIDINE 20 MG: 10 INJECTION INTRAVENOUS at 09:19

## 2021-01-01 RX ADMIN — CHLORHEXIDINE GLUCONATE 0.12% ORAL RINSE 15 ML: 1.2 LIQUID ORAL at 21:40

## 2021-01-01 RX ADMIN — FAMOTIDINE 20 MG: 10 INJECTION INTRAVENOUS at 09:41

## 2021-01-01 RX ADMIN — AMPICILLIN AND SULBACTAM 1500 MG: 1; .5 INJECTION, POWDER, FOR SOLUTION INTRAVENOUS at 23:07

## 2021-01-01 RX ADMIN — AMPICILLIN AND SULBACTAM 1500 MG: 1; .5 INJECTION, POWDER, FOR SOLUTION INTRAVENOUS at 23:12

## 2021-01-01 RX ADMIN — PROPOFOL 30 MCG/KG/MIN: 10 INJECTION, EMULSION INTRAVENOUS at 11:00

## 2021-01-01 RX ADMIN — ACETAMINOPHEN 650 MG: 650 SOLUTION ORAL at 10:41

## 2021-01-01 RX ADMIN — MINERAL OIL AND PETROLATUM: 150; 830 OINTMENT OPHTHALMIC at 17:08

## 2021-01-01 RX ADMIN — INSULIN LISPRO 6 UNITS: 100 INJECTION, SOLUTION INTRAVENOUS; SUBCUTANEOUS at 13:00

## 2021-01-01 RX ADMIN — DEXTROSE MONOHYDRATE 100 ML/HR: 50 INJECTION, SOLUTION INTRAVENOUS at 01:49

## 2021-01-01 RX ADMIN — Medication 70 MCG/MIN: at 05:50

## 2021-01-01 RX ADMIN — DOCUSATE SODIUM 100 MG: 50 LIQUID ORAL at 08:57

## 2021-01-01 RX ADMIN — INSULIN LISPRO 15 UNITS: 100 INJECTION, SOLUTION INTRAVENOUS; SUBCUTANEOUS at 12:29

## 2021-01-01 RX ADMIN — IPRATROPIUM BROMIDE AND ALBUTEROL SULFATE 1 AMPULE: .5; 3 SOLUTION RESPIRATORY (INHALATION) at 11:03

## 2021-01-01 RX ADMIN — ACETAMINOPHEN 1000 MG: 650 SOLUTION ORAL at 23:53

## 2021-01-01 RX ADMIN — INSULIN LISPRO 6 UNITS: 100 INJECTION, SOLUTION INTRAVENOUS; SUBCUTANEOUS at 06:28

## 2021-01-01 RX ADMIN — FUROSEMIDE 80 MG: 10 INJECTION, SOLUTION INTRAVENOUS at 09:34

## 2021-01-01 RX ADMIN — AMPICILLIN AND SULBACTAM 1500 MG: 1; .5 INJECTION, POWDER, FOR SOLUTION INTRAVENOUS at 10:22

## 2021-01-01 RX ADMIN — INSULIN LISPRO 4 UNITS: 100 INJECTION, SOLUTION INTRAVENOUS; SUBCUTANEOUS at 06:20

## 2021-01-01 RX ADMIN — MINERAL OIL AND PETROLATUM: 150; 830 OINTMENT OPHTHALMIC at 19:51

## 2021-01-01 RX ADMIN — PROPOFOL 45 MCG/KG/MIN: 10 INJECTION, EMULSION INTRAVENOUS at 06:18

## 2021-01-01 RX ADMIN — IPRATROPIUM BROMIDE AND ALBUTEROL SULFATE 1 AMPULE: .5; 3 SOLUTION RESPIRATORY (INHALATION) at 19:53

## 2021-01-01 RX ADMIN — MINERAL OIL AND PETROLATUM: 150; 830 OINTMENT OPHTHALMIC at 12:39

## 2021-01-01 RX ADMIN — METHYLPREDNISOLONE SODIUM SUCCINATE 40 MG: 40 INJECTION, POWDER, FOR SOLUTION INTRAMUSCULAR; INTRAVENOUS at 06:39

## 2021-01-01 RX ADMIN — ALBUTEROL SULFATE 2.5 MG: 2.5 SOLUTION RESPIRATORY (INHALATION) at 03:20

## 2021-01-01 RX ADMIN — SODIUM ZIRCONIUM CYCLOSILICATE 10 G: 10 POWDER, FOR SUSPENSION ORAL at 05:32

## 2021-01-01 RX ADMIN — Medication 10 MG: at 10:33

## 2021-01-01 RX ADMIN — CHLORHEXIDINE GLUCONATE 0.12% ORAL RINSE 15 ML: 1.2 LIQUID ORAL at 20:34

## 2021-01-01 RX ADMIN — IPRATROPIUM BROMIDE AND ALBUTEROL SULFATE 1 AMPULE: .5; 3 SOLUTION RESPIRATORY (INHALATION) at 20:20

## 2021-01-01 RX ADMIN — CHLORHEXIDINE GLUCONATE 0.12% ORAL RINSE 15 ML: 1.2 LIQUID ORAL at 08:00

## 2021-01-01 RX ADMIN — INSULIN LISPRO 4 UNITS: 100 INJECTION, SOLUTION INTRAVENOUS; SUBCUTANEOUS at 06:43

## 2021-01-01 RX ADMIN — ACETAMINOPHEN 1000 MG: 650 SOLUTION ORAL at 17:16

## 2021-01-01 RX ADMIN — AMPICILLIN AND SULBACTAM 1500 MG: 1; .5 INJECTION, POWDER, FOR SOLUTION INTRAVENOUS at 10:29

## 2021-01-01 RX ADMIN — MINERAL OIL AND PETROLATUM: 150; 830 OINTMENT OPHTHALMIC at 17:36

## 2021-01-01 RX ADMIN — DEXTROSE MONOHYDRATE 25 G: 25 INJECTION, SOLUTION INTRAVENOUS at 07:51

## 2021-01-01 RX ADMIN — PROPOFOL 20 MCG/KG/MIN: 10 INJECTION, EMULSION INTRAVENOUS at 18:16

## 2021-01-01 RX ADMIN — MINERAL OIL AND PETROLATUM: 150; 830 OINTMENT OPHTHALMIC at 17:15

## 2021-01-01 RX ADMIN — ALBUTEROL SULFATE 2.5 MG: 2.5 SOLUTION RESPIRATORY (INHALATION) at 03:37

## 2021-01-01 RX ADMIN — PROPOFOL 35 MCG/KG/MIN: 10 INJECTION, EMULSION INTRAVENOUS at 16:21

## 2021-01-01 RX ADMIN — INSULIN LISPRO 6 UNITS: 100 INJECTION, SOLUTION INTRAVENOUS; SUBCUTANEOUS at 12:05

## 2021-01-01 RX ADMIN — AMPICILLIN AND SULBACTAM 1500 MG: 1; .5 INJECTION, POWDER, FOR SOLUTION INTRAVENOUS at 23:56

## 2021-01-01 RX ADMIN — MIDODRINE HYDROCHLORIDE 10 MG: 5 TABLET ORAL at 16:54

## 2021-01-01 RX ADMIN — HEPARIN SODIUM 5000 UNITS: 5000 INJECTION INTRAVENOUS; SUBCUTANEOUS at 21:40

## 2021-01-01 RX ADMIN — INSULIN LISPRO 7 UNITS: 100 INJECTION, SUSPENSION SUBCUTANEOUS at 13:42

## 2021-01-01 RX ADMIN — MINERAL OIL AND PETROLATUM: 150; 830 OINTMENT OPHTHALMIC at 08:00

## 2021-01-01 RX ADMIN — FUROSEMIDE 40 MG: 10 INJECTION, SOLUTION INTRAVENOUS at 14:49

## 2021-01-01 RX ADMIN — INSULIN LISPRO 8 UNITS: 100 INJECTION, SOLUTION INTRAVENOUS; SUBCUTANEOUS at 17:18

## 2021-01-01 RX ADMIN — CALCIUM CHLORIDE 1000 MG: 100 INJECTION, SOLUTION INTRAVENOUS; INTRAVENTRICULAR at 08:40

## 2021-01-01 RX ADMIN — DEXTROSE MONOHYDRATE 25 G: 25 INJECTION, SOLUTION INTRAVENOUS at 04:41

## 2021-01-01 RX ADMIN — IPRATROPIUM BROMIDE AND ALBUTEROL SULFATE 1 AMPULE: .5; 3 SOLUTION RESPIRATORY (INHALATION) at 08:16

## 2021-01-01 RX ADMIN — METHYLPREDNISOLONE SODIUM SUCCINATE 40 MG: 40 INJECTION, POWDER, FOR SOLUTION INTRAMUSCULAR; INTRAVENOUS at 21:09

## 2021-01-01 RX ADMIN — FAMOTIDINE 20 MG: 10 INJECTION INTRAVENOUS at 20:34

## 2021-01-01 RX ADMIN — HEPARIN SODIUM 5000 UNITS: 5000 INJECTION INTRAVENOUS; SUBCUTANEOUS at 19:55

## 2021-01-01 RX ADMIN — INSULIN GLARGINE 40 UNITS: 100 INJECTION, SOLUTION SUBCUTANEOUS at 08:47

## 2021-01-01 RX ADMIN — AMIODARONE HYDROCHLORIDE 0.5 MG/MIN: 50 INJECTION, SOLUTION INTRAVENOUS at 03:08

## 2021-01-01 RX ADMIN — AMPICILLIN AND SULBACTAM 1500 MG: 1; .5 INJECTION, POWDER, FOR SOLUTION INTRAVENOUS at 23:53

## 2021-01-01 RX ADMIN — CHLORHEXIDINE GLUCONATE 0.12% ORAL RINSE 15 ML: 1.2 LIQUID ORAL at 23:02

## 2021-01-01 RX ADMIN — SODIUM ZIRCONIUM CYCLOSILICATE 10 G: 10 POWDER, FOR SUSPENSION ORAL at 14:14

## 2021-01-01 RX ADMIN — IPRATROPIUM BROMIDE AND ALBUTEROL SULFATE 1 AMPULE: .5; 3 SOLUTION RESPIRATORY (INHALATION) at 15:38

## 2021-01-01 RX ADMIN — DEXTROSE MONOHYDRATE 12.5 G: 25 INJECTION, SOLUTION INTRAVENOUS at 01:49

## 2021-01-01 RX ADMIN — FENTANYL CITRATE 25 MCG: 50 INJECTION, SOLUTION INTRAMUSCULAR; INTRAVENOUS at 21:51

## 2021-01-01 RX ADMIN — INSULIN HUMAN 10 UNITS: 100 INJECTION, SOLUTION PARENTERAL at 04:41

## 2021-01-01 RX ADMIN — INSULIN LISPRO 10 UNITS: 100 INJECTION, SOLUTION INTRAVENOUS; SUBCUTANEOUS at 06:15

## 2021-01-01 RX ADMIN — Medication 5 MCG/MIN: at 14:19

## 2021-01-01 RX ADMIN — CHLORHEXIDINE GLUCONATE 0.12% ORAL RINSE 15 ML: 1.2 LIQUID ORAL at 08:29

## 2021-01-01 RX ADMIN — MINERAL OIL AND PETROLATUM: 150; 830 OINTMENT OPHTHALMIC at 08:22

## 2021-01-01 RX ADMIN — Medication 10 MG: at 09:36

## 2021-01-01 RX ADMIN — FENTANYL CITRATE 25 MCG: 50 INJECTION, SOLUTION INTRAMUSCULAR; INTRAVENOUS at 09:52

## 2021-01-01 RX ADMIN — HEPARIN SODIUM 1400 UNITS: 1000 INJECTION INTRAVENOUS; SUBCUTANEOUS at 19:20

## 2021-01-01 RX ADMIN — ACETAMINOPHEN 1000 MG: 650 SOLUTION ORAL at 05:32

## 2021-01-01 RX ADMIN — SODIUM ZIRCONIUM CYCLOSILICATE 10 G: 10 POWDER, FOR SUSPENSION ORAL at 17:15

## 2021-01-01 RX ADMIN — AMPICILLIN AND SULBACTAM 1500 MG: 1; .5 INJECTION, POWDER, FOR SOLUTION INTRAVENOUS at 11:25

## 2021-01-01 RX ADMIN — METHYLPREDNISOLONE SODIUM SUCCINATE 40 MG: 40 INJECTION, POWDER, FOR SOLUTION INTRAMUSCULAR; INTRAVENOUS at 06:29

## 2021-01-01 RX ADMIN — CHLORHEXIDINE GLUCONATE 0.12% ORAL RINSE 15 ML: 1.2 LIQUID ORAL at 09:41

## 2021-01-01 RX ADMIN — DOCUSATE SODIUM 100 MG: 50 LIQUID ORAL at 19:55

## 2021-01-01 RX ADMIN — METHYLPREDNISOLONE SODIUM SUCCINATE 40 MG: 40 INJECTION, POWDER, FOR SOLUTION INTRAMUSCULAR; INTRAVENOUS at 08:45

## 2021-01-01 RX ADMIN — IPRATROPIUM BROMIDE AND ALBUTEROL SULFATE 1 AMPULE: .5; 3 SOLUTION RESPIRATORY (INHALATION) at 11:55

## 2021-01-01 RX ADMIN — CALCIUM CHLORIDE 1000 MG: 100 INJECTION INTRAVENOUS; INTRAVENTRICULAR at 08:40

## 2021-01-01 RX ADMIN — AMIODARONE HYDROCHLORIDE 0.5 MG/MIN: 50 INJECTION, SOLUTION INTRAVENOUS at 05:23

## 2021-01-01 RX ADMIN — INSULIN LISPRO 6 UNITS: 100 INJECTION, SOLUTION INTRAVENOUS; SUBCUTANEOUS at 04:51

## 2021-01-01 RX ADMIN — SODIUM CHLORIDE: 9 INJECTION, SOLUTION INTRAVENOUS at 18:20

## 2021-01-01 RX ADMIN — SODIUM CHLORIDE 1500 MG: 900 INJECTION INTRAVENOUS at 10:49

## 2021-01-01 RX ADMIN — INSULIN GLARGINE 30 UNITS: 100 INJECTION, SOLUTION SUBCUTANEOUS at 20:21

## 2021-01-01 RX ADMIN — INSULIN GLARGINE 30 UNITS: 100 INJECTION, SOLUTION SUBCUTANEOUS at 08:01

## 2021-01-01 RX ADMIN — ALBUTEROL SULFATE 2.5 MG: 2.5 SOLUTION RESPIRATORY (INHALATION) at 04:01

## 2021-01-01 RX ADMIN — Medication 50 MEQ: at 07:35

## 2021-01-01 RX ADMIN — CHLORHEXIDINE GLUCONATE 0.12% ORAL RINSE 15 ML: 1.2 LIQUID ORAL at 08:42

## 2021-01-01 RX ADMIN — DOCUSATE SODIUM 100 MG: 50 LIQUID ORAL at 19:51

## 2021-01-01 RX ADMIN — ALBUTEROL SULFATE 2.5 MG: 2.5 SOLUTION RESPIRATORY (INHALATION) at 03:25

## 2021-01-01 RX ADMIN — MINERAL OIL AND PETROLATUM: 150; 830 OINTMENT OPHTHALMIC at 12:24

## 2021-01-01 RX ADMIN — Medication 10 MG: at 09:40

## 2021-01-01 RX ADMIN — AMIODARONE HYDROCHLORIDE 0.5 MG/MIN: 50 INJECTION, SOLUTION INTRAVENOUS at 05:40

## 2021-01-01 RX ADMIN — Medication 10 MG: at 06:11

## 2021-01-01 RX ADMIN — HEPARIN SODIUM 5000 UNITS: 5000 INJECTION INTRAVENOUS; SUBCUTANEOUS at 06:15

## 2021-01-01 RX ADMIN — CALCIUM GLUCONATE 1000 MG: 20 INJECTION, SOLUTION INTRAVENOUS at 01:42

## 2021-01-01 RX ADMIN — IPRATROPIUM BROMIDE AND ALBUTEROL SULFATE 1 AMPULE: .5; 3 SOLUTION RESPIRATORY (INHALATION) at 15:55

## 2021-01-01 RX ADMIN — MINERAL OIL AND PETROLATUM: 150; 830 OINTMENT OPHTHALMIC at 13:05

## 2021-01-01 RX ADMIN — CALCIUM GLUCONATE 2000 MG: 20 INJECTION, SOLUTION INTRAVENOUS at 14:14

## 2021-01-01 RX ADMIN — DEXTROSE MONOHYDRATE 25 G: 25 INJECTION, SOLUTION INTRAVENOUS at 08:21

## 2021-01-01 RX ADMIN — INSULIN GLARGINE 20 UNITS: 100 INJECTION, SOLUTION SUBCUTANEOUS at 21:08

## 2021-01-01 RX ADMIN — FENTANYL CITRATE 25 MCG: 50 INJECTION, SOLUTION INTRAMUSCULAR; INTRAVENOUS at 14:35

## 2021-01-01 RX ADMIN — HEPARIN SODIUM 5000 UNITS: 5000 INJECTION INTRAVENOUS; SUBCUTANEOUS at 05:42

## 2021-01-01 RX ADMIN — MINERAL OIL AND PETROLATUM: 150; 830 OINTMENT OPHTHALMIC at 12:35

## 2021-01-01 RX ADMIN — FAMOTIDINE 20 MG: 20 TABLET, FILM COATED ORAL at 08:50

## 2021-01-01 RX ADMIN — CHLORHEXIDINE GLUCONATE 0.12% ORAL RINSE 15 ML: 1.2 LIQUID ORAL at 21:08

## 2021-01-01 RX ADMIN — INSULIN LISPRO 15 UNITS: 100 INJECTION, SOLUTION INTRAVENOUS; SUBCUTANEOUS at 12:20

## 2021-01-01 RX ADMIN — IPRATROPIUM BROMIDE AND ALBUTEROL SULFATE 1 AMPULE: .5; 3 SOLUTION RESPIRATORY (INHALATION) at 12:05

## 2021-01-01 RX ADMIN — CALCIUM CHLORIDE 1000 MG: 100 INJECTION INTRAVENOUS; INTRAVENTRICULAR at 07:35

## 2021-01-01 RX ADMIN — SODIUM ZIRCONIUM CYCLOSILICATE 10 G: 10 POWDER, FOR SUSPENSION ORAL at 17:11

## 2021-01-01 RX ADMIN — CALCIUM GLUCONATE 1000 MG: 20 INJECTION, SOLUTION INTRAVENOUS at 11:06

## 2021-01-01 RX ADMIN — MINERAL OIL AND PETROLATUM: 150; 830 OINTMENT OPHTHALMIC at 16:18

## 2021-01-01 RX ADMIN — CHLORHEXIDINE GLUCONATE 0.12% ORAL RINSE 15 ML: 1.2 LIQUID ORAL at 20:14

## 2021-01-01 RX ADMIN — AMPICILLIN AND SULBACTAM 1500 MG: 1; .5 INJECTION, POWDER, FOR SOLUTION INTRAVENOUS at 22:45

## 2021-01-01 RX ADMIN — INSULIN LISPRO 9 UNITS: 100 INJECTION, SOLUTION INTRAVENOUS; SUBCUTANEOUS at 08:46

## 2021-01-01 RX ADMIN — FAMOTIDINE 20 MG: 10 INJECTION INTRAVENOUS at 20:14

## 2021-01-01 RX ADMIN — INSULIN LISPRO 4 UNITS: 100 INJECTION, SOLUTION INTRAVENOUS; SUBCUTANEOUS at 12:43

## 2021-01-01 RX ADMIN — ALBUTEROL SULFATE 2.5 MG: 2.5 SOLUTION RESPIRATORY (INHALATION) at 23:41

## 2021-01-01 RX ADMIN — AMIODARONE HYDROCHLORIDE 0.5 MG/MIN: 50 INJECTION, SOLUTION INTRAVENOUS at 13:41

## 2021-01-01 RX ADMIN — MINERAL OIL AND PETROLATUM: 150; 830 OINTMENT OPHTHALMIC at 08:43

## 2021-01-01 RX ADMIN — HEPARIN SODIUM 5000 UNITS: 5000 INJECTION INTRAVENOUS; SUBCUTANEOUS at 05:32

## 2021-01-01 RX ADMIN — AMIODARONE HYDROCHLORIDE 200 MG: 200 TABLET ORAL at 10:21

## 2021-01-01 RX ADMIN — LORAZEPAM 2 MG: 2 INJECTION INTRAMUSCULAR; INTRAVENOUS at 10:20

## 2021-01-01 RX ADMIN — FAMOTIDINE 20 MG: 10 INJECTION INTRAVENOUS at 08:07

## 2021-01-01 RX ADMIN — SODIUM CHLORIDE: 9 INJECTION, SOLUTION INTRAVENOUS at 17:03

## 2021-01-01 RX ADMIN — FENTANYL CITRATE 25 MCG: 50 INJECTION, SOLUTION INTRAMUSCULAR; INTRAVENOUS at 04:32

## 2021-01-01 RX ADMIN — Medication 10 MG: at 14:13

## 2021-01-01 RX ADMIN — INSULIN LISPRO 8 UNITS: 100 INJECTION, SOLUTION INTRAVENOUS; SUBCUTANEOUS at 02:20

## 2021-01-01 RX ADMIN — INSULIN LISPRO 9 UNITS: 100 INJECTION, SOLUTION INTRAVENOUS; SUBCUTANEOUS at 19:56

## 2021-01-01 RX ADMIN — ACETAMINOPHEN 1000 MG: 650 SOLUTION ORAL at 05:06

## 2021-01-01 RX ADMIN — Medication 50 MCG/HR: at 08:58

## 2021-01-01 RX ADMIN — ALBUTEROL SULFATE 2.5 MG: 2.5 SOLUTION RESPIRATORY (INHALATION) at 00:22

## 2021-01-01 RX ADMIN — EPINEPHRINE 1 MG: 1 INJECTION INTRAMUSCULAR; INTRAVENOUS; SUBCUTANEOUS at 08:40

## 2021-01-01 RX ADMIN — METHYLPREDNISOLONE SODIUM SUCCINATE 40 MG: 40 INJECTION, POWDER, FOR SOLUTION INTRAMUSCULAR; INTRAVENOUS at 18:45

## 2021-01-01 RX ADMIN — CISATRACURIUM BESYLATE 1.5 MCG/KG/MIN: 10 INJECTION INTRAVENOUS at 04:26

## 2021-01-01 RX ADMIN — IPRATROPIUM BROMIDE AND ALBUTEROL SULFATE 1 AMPULE: .5; 3 SOLUTION RESPIRATORY (INHALATION) at 19:36

## 2021-01-01 RX ADMIN — CALCIUM GLUCONATE 1000 MG: 20 INJECTION, SOLUTION INTRAVENOUS at 07:50

## 2021-01-01 RX ADMIN — IPRATROPIUM BROMIDE AND ALBUTEROL SULFATE 1 AMPULE: .5; 3 SOLUTION RESPIRATORY (INHALATION) at 15:16

## 2021-01-01 RX ADMIN — MINERAL OIL AND PETROLATUM: 150; 830 OINTMENT OPHTHALMIC at 17:33

## 2021-01-01 RX ADMIN — IPRATROPIUM BROMIDE AND ALBUTEROL SULFATE 1 AMPULE: .5; 3 SOLUTION RESPIRATORY (INHALATION) at 07:43

## 2021-01-01 RX ADMIN — MINERAL OIL AND PETROLATUM: 150; 830 OINTMENT OPHTHALMIC at 09:41

## 2021-01-01 RX ADMIN — DOCUSATE SODIUM 100 MG: 50 LIQUID ORAL at 20:10

## 2021-01-01 RX ADMIN — INSULIN LISPRO 4 UNITS: 100 INJECTION, SOLUTION INTRAVENOUS; SUBCUTANEOUS at 00:11

## 2021-01-01 RX ADMIN — MINERAL OIL AND PETROLATUM: 150; 830 OINTMENT OPHTHALMIC at 20:10

## 2021-01-01 RX ADMIN — FAMOTIDINE 20 MG: 10 INJECTION INTRAVENOUS at 09:44

## 2021-01-01 RX ADMIN — PROPOFOL 40 MCG/KG/MIN: 10 INJECTION, EMULSION INTRAVENOUS at 10:40

## 2021-01-01 RX ADMIN — INSULIN LISPRO 4 UNITS: 100 INJECTION, SOLUTION INTRAVENOUS; SUBCUTANEOUS at 18:09

## 2021-01-01 RX ADMIN — Medication 10 MG: at 12:46

## 2021-01-01 RX ADMIN — HEPARIN SODIUM 5000 UNITS: 5000 INJECTION INTRAVENOUS; SUBCUTANEOUS at 14:14

## 2021-01-01 RX ADMIN — PROPOFOL 30 MCG/KG/MIN: 10 INJECTION, EMULSION INTRAVENOUS at 08:00

## 2021-01-01 RX ADMIN — IPRATROPIUM BROMIDE AND ALBUTEROL SULFATE 1 AMPULE: .5; 3 SOLUTION RESPIRATORY (INHALATION) at 15:31

## 2021-01-01 RX ADMIN — Medication: at 04:46

## 2021-01-01 RX ADMIN — INSULIN LISPRO 12 UNITS: 100 INJECTION, SOLUTION INTRAVENOUS; SUBCUTANEOUS at 20:21

## 2021-01-01 RX ADMIN — CALCIUM GLUCONATE 2000 MG: 20 INJECTION, SOLUTION INTRAVENOUS at 21:35

## 2021-01-01 RX ADMIN — IPRATROPIUM BROMIDE AND ALBUTEROL SULFATE 1 AMPULE: .5; 3 SOLUTION RESPIRATORY (INHALATION) at 08:03

## 2021-01-01 RX ADMIN — IPRATROPIUM BROMIDE AND ALBUTEROL SULFATE 1 AMPULE: .5; 3 SOLUTION RESPIRATORY (INHALATION) at 07:55

## 2021-01-01 RX ADMIN — ACETAMINOPHEN 650 MG: 650 SOLUTION ORAL at 20:18

## 2021-01-01 RX ADMIN — PROPOFOL 35 MCG/KG/MIN: 10 INJECTION, EMULSION INTRAVENOUS at 13:39

## 2021-01-01 RX ADMIN — Medication 45 MCG/MIN: at 21:03

## 2021-01-01 RX ADMIN — MINERAL OIL AND PETROLATUM: 150; 830 OINTMENT OPHTHALMIC at 16:21

## 2021-01-01 RX ADMIN — IPRATROPIUM BROMIDE AND ALBUTEROL SULFATE 1 AMPULE: .5; 3 SOLUTION RESPIRATORY (INHALATION) at 15:44

## 2021-01-01 RX ADMIN — FAMOTIDINE 20 MG: 10 INJECTION INTRAVENOUS at 21:03

## 2021-01-01 RX ADMIN — METHYLPREDNISOLONE SODIUM SUCCINATE 40 MG: 40 INJECTION, POWDER, FOR SOLUTION INTRAMUSCULAR; INTRAVENOUS at 21:03

## 2021-01-01 RX ADMIN — IPRATROPIUM BROMIDE AND ALBUTEROL SULFATE 1 AMPULE: .5; 3 SOLUTION RESPIRATORY (INHALATION) at 15:23

## 2021-01-01 RX ADMIN — DOCUSATE SODIUM 100 MG: 50 LIQUID ORAL at 08:00

## 2021-01-01 RX ADMIN — SODIUM CHLORIDE: 9 INJECTION, SOLUTION INTRAVENOUS at 05:23

## 2021-01-01 RX ADMIN — SODIUM CHLORIDE 500 ML: 0.9 INJECTION, SOLUTION INTRAVENOUS at 22:25

## 2021-01-01 RX ADMIN — PROPOFOL 25 MCG/KG/MIN: 10 INJECTION, EMULSION INTRAVENOUS at 03:30

## 2021-01-01 RX ADMIN — INSULIN LISPRO 9 UNITS: 100 INJECTION, SOLUTION INTRAVENOUS; SUBCUTANEOUS at 11:44

## 2021-01-01 RX ADMIN — HEPARIN SODIUM 5000 UNITS: 5000 INJECTION INTRAVENOUS; SUBCUTANEOUS at 05:08

## 2021-01-01 RX ADMIN — AMIODARONE HYDROCHLORIDE 1 MG/MIN: 50 INJECTION, SOLUTION INTRAVENOUS at 02:21

## 2021-01-01 RX ADMIN — METHYLPREDNISOLONE SODIUM SUCCINATE 40 MG: 40 INJECTION, POWDER, FOR SOLUTION INTRAMUSCULAR; INTRAVENOUS at 21:02

## 2021-01-01 RX ADMIN — Medication 125 MCG/HR: at 09:01

## 2021-01-01 RX ADMIN — Medication 70 MCG/MIN: at 01:49

## 2021-01-01 RX ADMIN — PROPOFOL 45 MCG/KG/MIN: 10 INJECTION, EMULSION INTRAVENOUS at 04:24

## 2021-01-01 RX ADMIN — INSULIN LISPRO 15 UNITS: 100 INJECTION, SOLUTION INTRAVENOUS; SUBCUTANEOUS at 08:47

## 2021-01-01 RX ADMIN — Medication 4 MG/HR: at 13:20

## 2021-01-01 RX ADMIN — INSULIN HUMAN 10 UNITS: 100 INJECTION, SOLUTION PARENTERAL at 07:51

## 2021-01-01 RX ADMIN — INSULIN HUMAN 10 UNITS: 100 INJECTION, SOLUTION PARENTERAL at 08:20

## 2021-01-01 RX ADMIN — INSULIN GLARGINE 10 UNITS: 100 INJECTION, SOLUTION SUBCUTANEOUS at 20:40

## 2021-01-01 RX ADMIN — ACETAMINOPHEN 1000 MG: 650 SOLUTION ORAL at 11:14

## 2021-01-01 RX ADMIN — Medication 10 MG: at 20:21

## 2021-01-01 RX ADMIN — INSULIN GLARGINE 10 UNITS: 100 INJECTION, SOLUTION SUBCUTANEOUS at 21:41

## 2021-01-01 RX ADMIN — HEPARIN SODIUM 5000 UNITS: 5000 INJECTION INTRAVENOUS; SUBCUTANEOUS at 13:56

## 2021-01-01 RX ADMIN — CALCIUM GLUCONATE 1000 MG: 20 INJECTION, SOLUTION INTRAVENOUS at 06:19

## 2021-01-01 RX ADMIN — PROPOFOL 35 MCG/KG/MIN: 10 INJECTION, EMULSION INTRAVENOUS at 00:05

## 2021-01-01 RX ADMIN — AMIODARONE HYDROCHLORIDE 150 MG: 50 INJECTION, SOLUTION INTRAVENOUS at 20:45

## 2021-01-01 RX ADMIN — ACETAMINOPHEN 650 MG: 650 SOLUTION ORAL at 04:53

## 2021-01-01 RX ADMIN — HEPARIN SODIUM 5000 UNITS: 5000 INJECTION INTRAVENOUS; SUBCUTANEOUS at 13:07

## 2021-01-01 RX ADMIN — MINERAL OIL AND PETROLATUM: 150; 830 OINTMENT OPHTHALMIC at 09:44

## 2021-01-01 RX ADMIN — DOCUSATE SODIUM 100 MG: 50 LIQUID ORAL at 08:43

## 2021-01-01 RX ADMIN — CALCIUM GLUCONATE 2000 MG: 20 INJECTION, SOLUTION INTRAVENOUS at 04:32

## 2021-01-01 RX ADMIN — IPRATROPIUM BROMIDE AND ALBUTEROL SULFATE 1 AMPULE: .5; 3 SOLUTION RESPIRATORY (INHALATION) at 11:31

## 2021-01-01 RX ADMIN — CALCIUM GLUCONATE 1000 MG: 20 INJECTION, SOLUTION INTRAVENOUS at 16:03

## 2021-01-01 RX ADMIN — EPINEPHRINE 20 MCG/MIN: 1 INJECTION INTRAMUSCULAR; INTRAVENOUS; SUBCUTANEOUS at 06:43

## 2021-01-01 RX ADMIN — SODIUM CHLORIDE 75 ML/HR: 9 INJECTION, SOLUTION INTRAVENOUS at 06:33

## 2021-01-01 RX ADMIN — AMIODARONE HYDROCHLORIDE 150 MG: 50 INJECTION, SOLUTION INTRAVENOUS at 02:08

## 2021-01-01 RX ADMIN — IPRATROPIUM BROMIDE AND ALBUTEROL SULFATE 1 AMPULE: .5; 3 SOLUTION RESPIRATORY (INHALATION) at 07:40

## 2021-01-01 RX ADMIN — PROPOFOL 50 MCG/KG/MIN: 10 INJECTION, EMULSION INTRAVENOUS at 04:20

## 2021-01-01 RX ADMIN — MINERAL OIL AND PETROLATUM: 150; 830 OINTMENT OPHTHALMIC at 08:07

## 2021-01-01 RX ADMIN — PROPOFOL 35 MCG/KG/MIN: 10 INJECTION, EMULSION INTRAVENOUS at 20:57

## 2021-01-01 RX ADMIN — IPRATROPIUM BROMIDE AND ALBUTEROL SULFATE 1 AMPULE: .5; 3 SOLUTION RESPIRATORY (INHALATION) at 15:52

## 2021-01-01 RX ADMIN — FENTANYL CITRATE 25 MCG: 50 INJECTION, SOLUTION INTRAMUSCULAR; INTRAVENOUS at 08:56

## 2021-01-01 RX ADMIN — FAMOTIDINE 20 MG: 10 INJECTION INTRAVENOUS at 08:46

## 2021-01-01 RX ADMIN — CHLORHEXIDINE GLUCONATE 0.12% ORAL RINSE 15 ML: 1.2 LIQUID ORAL at 09:19

## 2021-01-01 RX ADMIN — CHLORHEXIDINE GLUCONATE 0.12% ORAL RINSE 15 ML: 1.2 LIQUID ORAL at 21:34

## 2021-01-01 RX ADMIN — FENTANYL CITRATE 100 MCG: 50 INJECTION, SOLUTION INTRAMUSCULAR; INTRAVENOUS at 08:13

## 2021-01-01 RX ADMIN — PROPOFOL 25 MCG/KG/MIN: 10 INJECTION, EMULSION INTRAVENOUS at 19:53

## 2021-01-01 RX ADMIN — Medication 10 MG: at 17:02

## 2021-01-01 RX ADMIN — SODIUM CHLORIDE: 9 INJECTION, SOLUTION INTRAVENOUS at 06:20

## 2021-01-01 RX ADMIN — INSULIN HUMAN 10 UNITS: 100 INJECTION, SOLUTION PARENTERAL at 14:38

## 2021-01-01 RX ADMIN — HEPARIN SODIUM 1300 UNITS: 1000 INJECTION INTRAVENOUS; SUBCUTANEOUS at 19:20

## 2021-01-01 RX ADMIN — CISATRACURIUM BESYLATE 2 MCG/KG/MIN: 10 INJECTION INTRAVENOUS at 09:44

## 2021-01-01 RX ADMIN — PROPOFOL 25 MCG/KG/MIN: 10 INJECTION, EMULSION INTRAVENOUS at 23:21

## 2021-01-01 RX ADMIN — ALBUTEROL SULFATE 2.5 MG: 2.5 SOLUTION RESPIRATORY (INHALATION) at 03:31

## 2021-01-01 RX ADMIN — INSULIN LISPRO 2 UNITS: 100 INJECTION, SOLUTION INTRAVENOUS; SUBCUTANEOUS at 01:12

## 2021-01-01 RX ADMIN — INSULIN LISPRO 2 UNITS: 100 INJECTION, SOLUTION INTRAVENOUS; SUBCUTANEOUS at 00:01

## 2021-01-01 RX ADMIN — Medication 5 MG: at 08:03

## 2021-01-01 RX ADMIN — ACETAMINOPHEN 1000 MG: 650 SOLUTION ORAL at 21:17

## 2021-01-01 RX ADMIN — INSULIN LISPRO 7 UNITS: 100 INJECTION, SUSPENSION SUBCUTANEOUS at 16:19

## 2021-01-01 RX ADMIN — VASOPRESSIN 0.04 UNITS/MIN: 20 INJECTION INTRAVENOUS at 02:48

## 2021-01-01 RX ADMIN — CHLORHEXIDINE GLUCONATE 0.12% ORAL RINSE 15 ML: 1.2 LIQUID ORAL at 08:45

## 2021-01-01 RX ADMIN — IPRATROPIUM BROMIDE AND ALBUTEROL SULFATE 1 AMPULE: .5; 3 SOLUTION RESPIRATORY (INHALATION) at 11:19

## 2021-01-01 RX ADMIN — MINERAL OIL AND PETROLATUM: 150; 830 OINTMENT OPHTHALMIC at 09:19

## 2021-01-01 RX ADMIN — INSULIN LISPRO 15 UNITS: 100 INJECTION, SOLUTION INTRAVENOUS; SUBCUTANEOUS at 20:09

## 2021-01-01 RX ADMIN — ALBUTEROL SULFATE 2.5 MG: 2.5 SOLUTION RESPIRATORY (INHALATION) at 23:13

## 2021-01-01 RX ADMIN — INSULIN LISPRO 18 UNITS: 100 INJECTION, SOLUTION INTRAVENOUS; SUBCUTANEOUS at 08:29

## 2021-01-01 RX ADMIN — ACETAMINOPHEN 1000 MG: 650 SOLUTION ORAL at 11:13

## 2021-01-01 RX ADMIN — MINERAL OIL AND PETROLATUM: 150; 830 OINTMENT OPHTHALMIC at 14:07

## 2021-01-01 RX ADMIN — FUROSEMIDE 20 MG: 10 INJECTION, SOLUTION INTRAMUSCULAR; INTRAVENOUS at 13:45

## 2021-01-01 RX ADMIN — MINERAL OIL AND PETROLATUM: 150; 830 OINTMENT OPHTHALMIC at 12:38

## 2021-01-01 RX ADMIN — ALBUTEROL SULFATE 2.5 MG: 2.5 SOLUTION RESPIRATORY (INHALATION) at 23:16

## 2021-01-01 RX ADMIN — MINERAL OIL AND PETROLATUM: 150; 830 OINTMENT OPHTHALMIC at 12:45

## 2021-01-01 RX ADMIN — HEPARIN SODIUM 5000 UNITS: 5000 INJECTION INTRAVENOUS; SUBCUTANEOUS at 14:28

## 2021-01-01 RX ADMIN — INSULIN GLARGINE 10 UNITS: 100 INJECTION, SOLUTION SUBCUTANEOUS at 20:18

## 2021-01-01 RX ADMIN — AMIODARONE HYDROCHLORIDE 0.5 MG/MIN: 50 INJECTION, SOLUTION INTRAVENOUS at 01:17

## 2021-01-01 RX ADMIN — INSULIN LISPRO 6 UNITS: 100 INJECTION, SOLUTION INTRAVENOUS; SUBCUTANEOUS at 23:53

## 2021-01-01 RX ADMIN — HEPARIN SODIUM 5000 UNITS: 5000 INJECTION INTRAVENOUS; SUBCUTANEOUS at 14:40

## 2021-01-01 RX ADMIN — FAMOTIDINE 20 MG: 10 INJECTION INTRAVENOUS at 21:09

## 2021-01-01 RX ADMIN — MINERAL OIL AND PETROLATUM: 150; 830 OINTMENT OPHTHALMIC at 08:46

## 2021-01-01 RX ADMIN — SODIUM CHLORIDE 1000 ML: 9 INJECTION, SOLUTION INTRAVENOUS at 13:20

## 2021-01-01 RX ADMIN — PROPOFOL 45 MCG/KG/MIN: 10 INJECTION, EMULSION INTRAVENOUS at 01:12

## 2021-01-01 RX ADMIN — INSULIN HUMAN 10 UNITS: 100 INJECTION, SOLUTION PARENTERAL at 07:55

## 2021-01-01 RX ADMIN — Medication 10 MG: at 18:19

## 2021-01-01 RX ADMIN — HEPARIN SODIUM 5000 UNITS: 5000 INJECTION INTRAVENOUS; SUBCUTANEOUS at 23:12

## 2021-01-01 RX ADMIN — IPRATROPIUM BROMIDE AND ALBUTEROL SULFATE 1 AMPULE: .5; 3 SOLUTION RESPIRATORY (INHALATION) at 08:10

## 2021-01-01 RX ADMIN — MINERAL OIL AND PETROLATUM: 150; 830 OINTMENT OPHTHALMIC at 20:34

## 2021-01-01 RX ADMIN — IPRATROPIUM BROMIDE AND ALBUTEROL SULFATE 1 AMPULE: .5; 3 SOLUTION RESPIRATORY (INHALATION) at 20:25

## 2021-01-01 RX ADMIN — MINERAL OIL AND PETROLATUM: 150; 830 OINTMENT OPHTHALMIC at 21:40

## 2021-01-01 RX ADMIN — CHLORHEXIDINE GLUCONATE 0.12% ORAL RINSE 15 ML: 1.2 LIQUID ORAL at 21:35

## 2021-01-01 RX ADMIN — SODIUM BICARBONATE 100 MEQ: 84 INJECTION, SOLUTION INTRAVENOUS at 04:31

## 2021-01-01 RX ADMIN — DEXTROSE MONOHYDRATE 25 G: 25 INJECTION, SOLUTION INTRAVENOUS at 06:34

## 2021-01-01 RX ADMIN — Medication 10 MG: at 09:39

## 2021-01-01 RX ADMIN — PROPOFOL 50 MCG/KG/MIN: 10 INJECTION, EMULSION INTRAVENOUS at 07:10

## 2021-01-01 RX ADMIN — MINERAL OIL AND PETROLATUM: 150; 830 OINTMENT OPHTHALMIC at 16:54

## 2021-01-01 RX ADMIN — HEPARIN SODIUM 5000 UNITS: 5000 INJECTION INTRAVENOUS; SUBCUTANEOUS at 13:11

## 2021-01-01 RX ADMIN — FENTANYL CITRATE 25 MCG: 50 INJECTION, SOLUTION INTRAMUSCULAR; INTRAVENOUS at 16:36

## 2021-01-01 RX ADMIN — FAMOTIDINE 20 MG: 10 INJECTION INTRAVENOUS at 08:22

## 2021-01-01 RX ADMIN — AMIODARONE HYDROCHLORIDE 1 MG/MIN: 50 INJECTION, SOLUTION INTRAVENOUS at 21:02

## 2021-01-01 RX ADMIN — ALBUTEROL SULFATE 2.5 MG: 2.5 SOLUTION RESPIRATORY (INHALATION) at 23:53

## 2021-01-01 RX ADMIN — MINERAL OIL AND PETROLATUM: 150; 830 OINTMENT OPHTHALMIC at 12:23

## 2021-01-01 RX ADMIN — FUROSEMIDE 20 MG: 10 INJECTION, SOLUTION INTRAMUSCULAR; INTRAVENOUS at 17:47

## 2021-01-01 RX ADMIN — ALBUTEROL SULFATE 2.5 MG: 2.5 SOLUTION RESPIRATORY (INHALATION) at 04:20

## 2021-01-01 RX ADMIN — METHYLPREDNISOLONE SODIUM SUCCINATE 40 MG: 40 INJECTION, POWDER, FOR SOLUTION INTRAMUSCULAR; INTRAVENOUS at 23:48

## 2021-01-01 ASSESSMENT — PULMONARY FUNCTION TESTS
PIF_VALUE: 31
PIF_VALUE: 26
PIF_VALUE: 24
PIF_VALUE: 38
PIF_VALUE: 27
PIF_VALUE: 29
PIF_VALUE: 27
PIF_VALUE: 24
PIF_VALUE: 39
PIF_VALUE: 26
PIF_VALUE: 29
PIF_VALUE: 30
PIF_VALUE: 23
PIF_VALUE: 25
PIF_VALUE: 27
PIF_VALUE: 33
PIF_VALUE: 25
PIF_VALUE: 26
PIF_VALUE: 30
PIF_VALUE: 27
PIF_VALUE: 38
PIF_VALUE: 11
PIF_VALUE: 29
PIF_VALUE: 23
PIF_VALUE: 36
PIF_VALUE: 26
PIF_VALUE: 25
PIF_VALUE: 24
PIF_VALUE: 25
PIF_VALUE: 31
PIF_VALUE: 27
PIF_VALUE: 30
PIF_VALUE: 24
PIF_VALUE: 27
PIF_VALUE: 37
PIF_VALUE: 33
PIF_VALUE: 30
PIF_VALUE: 32
PIF_VALUE: 24
PIF_VALUE: 23
PIF_VALUE: 28
PIF_VALUE: 38
PIF_VALUE: 25
PIF_VALUE: 30
PIF_VALUE: 24
PIF_VALUE: 20
PIF_VALUE: 25
PIF_VALUE: 33
PIF_VALUE: 24
PIF_VALUE: 32
PIF_VALUE: 26
PIF_VALUE: 34
PIF_VALUE: 35
PIF_VALUE: 26
PIF_VALUE: 28
PIF_VALUE: 26
PIF_VALUE: 26
PIF_VALUE: 34
PIF_VALUE: 37
PIF_VALUE: 25
PIF_VALUE: 28
PIF_VALUE: 26
PIF_VALUE: 31
PIF_VALUE: 26
PIF_VALUE: 34
PIF_VALUE: 28
PIF_VALUE: 31
PIF_VALUE: 33
PIF_VALUE: 28
PIF_VALUE: 26
PIF_VALUE: 28
PIF_VALUE: 33
PIF_VALUE: 25
PIF_VALUE: 35
PIF_VALUE: 31
PIF_VALUE: 22
PIF_VALUE: 25
PIF_VALUE: 26
PIF_VALUE: 28
PIF_VALUE: 31

## 2021-01-01 ASSESSMENT — PAIN SCALES - GENERAL
PAINLEVEL_OUTOF10: 0
PAINLEVEL_OUTOF10: 4
PAINLEVEL_OUTOF10: 0

## 2021-01-01 ASSESSMENT — ENCOUNTER SYMPTOMS: TACHYPNEA: 1

## 2021-02-06 PROBLEM — Z95.810 AICD (AUTOMATIC CARDIOVERTER/DEFIBRILLATOR) PRESENT: Status: ACTIVE | Noted: 2021-01-01

## 2021-02-06 PROBLEM — N18.30 CKD (CHRONIC KIDNEY DISEASE) STAGE 3, GFR 30-59 ML/MIN (HCC): Status: ACTIVE | Noted: 2021-01-01

## 2021-02-06 PROBLEM — I25.10 CAD (CORONARY ARTERY DISEASE): Status: ACTIVE | Noted: 2021-01-01

## 2021-02-06 PROBLEM — I50.20 HFREF (HEART FAILURE WITH REDUCED EJECTION FRACTION) (HCC): Status: ACTIVE | Noted: 2021-01-01

## 2021-02-06 PROBLEM — J93.9 PNEUMOTHORAX: Status: ACTIVE | Noted: 2021-01-01

## 2021-02-06 PROBLEM — I46.9 CARDIAC ARREST (HCC): Status: ACTIVE | Noted: 2021-01-01

## 2021-02-06 PROBLEM — J44.9 COPD (CHRONIC OBSTRUCTIVE PULMONARY DISEASE) (HCC): Status: ACTIVE | Noted: 2021-01-01

## 2021-02-06 NOTE — ED NOTES
Dr. Glenna Villarreal and Dr. Sam Rivrea at patient bedside preparing to place central line      Jelani Sosa RN  02/06/21 8075

## 2021-02-06 NOTE — ED PROVIDER NOTES
[facility]     Emergency Department     Faculty Attestation    I performed a history and physical examination of the patient and discussed management with the resident. I reviewed the residents note and agree with the documented findings and plan of care. Any areas of disagreement are noted on the chart. I was personally present for the key portions of any procedures. I have documented in the chart those procedures where I was not present during the key portions. I have reviewed the emergency nurses triage note. I agree with the chief complaint, past medical history, past surgical history, allergies, medications, social and family history as documented unless otherwise noted below. For Physician Assistant/ Nurse Practitioner cases/documentation I have personally evaluated this patient and have completed at least one if not all key elements of the E/M (history, physical exam, and MDM). Additional findings are as noted. I have personally seen and evaluated the patient. I find the patient's history and physical exam are consistent with the NP/PA documentation. I agree with the care provided, treatment rendered, disposition and follow-up plan. 65year old male with history of COPD on 3L home O2 presenting after arrest.  Per family report, the patient had been complaining of difficulty breathing and had a \"panic attack\", then fell to the ground. He was found to be in PEA by EMS and required 2 rounds of epinephrine and intubation with Igel (LMA) prior to ROSC. Upon arrival, patient is making no purposeful movements. Oxygen saturation 85 to 90%. Immediately intubated with ET tube, unable to hear breath sounds in any lung fields after intubation. Bedside ultrasound shows no lung sliding in the bilateral superior rib spaces. Immediate needle decompression was performed, followed by tube thoracostomy bilaterally.     Exam:  General: Laying on the bed and obese CV: normal rate and regular rhythm  Lungs: Intubated, unable to hear breath sounds bilaterally  Abdomen: soft, non-tender, non-distended  Skin: No bruising or abrasions    Plan:  Intubated immediately  Needle decompression bilaterally, followed by tube thoracostomy  Initial XR shows R chest tube not completely within chest wall. This was replaced. Piece of chest tube seen in the R lower lung field is external to the patient. After chest tubes were placed, still having difficulty maintaining expiratory volumes on the vent. Patient bagged by RT to relieve breath stacking, then placed on no PEEP, low tidal volumes to prevent breath stacking with better volumes and pressures noted. Second XR shows newly placed chest tube projecting over the midline. Will pull back 2 cm. CT head, chest, abd, pelvis obtained with fall history. Cardiology consulted, will initiate cooling the patient. Quattro placed. Will admit to the MICU. CT CHEST ABDOMEN PELVIS WO CONTRAST   Preliminary Result   1. Bilateral chest tubes in place, abutting the mediastinal pleura. Persistent small pneumothoraces. 2.  Nonspecific patchy airspace disease in the upper lobes may represent   multifocal inflammatory process or infection. Trace pleural effusions. 3.  Multiple old left rib fractures and old left scapular body fracture. No   acute fracture identified. 4.  Sequela of old granulomatous disease in the chest and noncalcified   pulmonary nodules measuring up to 7 mm. While this may represent sequela of   old infection, metastatic disease is not excluded. Follow-up imaging in 6-12   months is recommended (in the absence of prior imaging for comparison). 5.  Cholelithiasis. Mild stranding around the gallbladder may represent   cholecystitis in the appropriate clinical setting. No biliary dilatation. 6.  Localized skin thickening in the left mid abdomen may represent soft   tissue contusion. 7.  Mildly prominent scattered mediastinal lymph nodes for which attention to   on follow-up is recommended. CT HEAD WO CONTRAST   Final Result   No convincing evidence for acute intracranial pathology. Intracranial   hemorrhage, mass effect or midline shift. XR CHEST PORTABLE   Final Result   Interval advancement of right thoracostomy tube. The tip projects over the   left of midline. Trace right apical pneumothorax. 2.4 cm radiopaque density projecting over the right lung base has move   medially in likely is external to the patient. XR CHEST PORTABLE   Preliminary Result   1. Endotracheal tube tip is 8.2 cm above the annie. 2. Bilateral thoracostomy tubes are noted. The right thoracostomy tube   proximal side-port is just external to the ribcage. Subcutaneous emphysema   is noted with a trace right lateral pneumothorax. There is also a 2.4 cm   radiopaque density projecting over the right lower lung which has the   appearance of a fractured thoracostomy tube. Recommend correlation if this   is external to the patient. 3. Patchy left opacity in the left lung base. 4. Questionable acute 7th right rib fracture. Age-indeterminate left 2nd   through 6th rib fractures are noted favoring subacute to chronic etiology. Critical Care: This patient had a high probability of imminent or life-threatening deterioration due to COPD with pneumothorax, breath stacking, which required my direct attention, intervention, and personal management. I have personally provided 30 minutes of critical care time exclusive of time spent on separately billable procedures. Time includes:   review of laboratory data, radiology results, discussion with consultants, and monitoring for potential decompensation. Interventions were performed as documented above.     Mark Zuleta MD   Attending Emergency  Physician (Please note that portions of this note were completed with a voice recognition program. Efforts were made to edit the dictations but occasionally words are mis-transcribed.)              Curry Finney MD  02/06/21 2051

## 2021-02-06 NOTE — ED PROVIDER NOTES
[facility] ED  Emergency Department Encounter  EmergencyMedicine Resident     Pt Name:Kirk Narayan  MRN: [id number]  [place] 1/1/1882  Date of evaluation: 2/6/21  PCP:  Chantel Rojas MD    CHIEF COMPLAINT       Chief Complaint   Patient presents with    Cardiac Arrest       HISTORY OF PRESENT ILLNESS  (Location/Symptom, Timing/Onset, Context/Setting, Quality, Duration, Modifying Factors, Severity.)      Keon Clemons is a 78 y.o. male who presents cardiac and pulmonary arrest.  Per the family member, patient has been complaining of \"panic attacks\" and shortness of breath over the last several days, developed sudden onset \"panic attack\" prior to arrest, dropped to his knees and stopped breathing. EMS was called, patient was found to be in PEA arrest.  2 rounds of epinephrine were given and patient transported to our facility with ROSC being achieved in route. In the resuscitation bay, bilateral loss of breath sounds were noted. Needle decompression was performed in bilateral chest, and subsequent chest tubes were placed with improvement in SPO2 from low 80s to 100%. Patient has significant history for CAD. Sister is present and states patient may take Plavix but is unsure. Family does not believe patient has a history of pulmonary embolism or DVT in the past.  Patient does have significant COPD history and is on 3 L nasal cannula 24/7. PAST MEDICAL / SURGICAL / SOCIAL / FAMILY HISTORY      has no past medical history on file. CAD, COPD     has no past surgical history on file.   Not available     Social History     Socioeconomic History    Marital status:      Spouse name: Not on file    Number of children: Not on file    Years of education: Not on file    Highest education level: Not on file   Occupational History    Not on file   Social Needs    Financial resource strain: Not on file    Food insecurity     Worry: Not on file     Inability: Not on file  Transportation needs     Medical: Not on file     Non-medical: Not on file   Tobacco Use    Smoking status: Not on file   Substance and Sexual Activity    Alcohol use: Not on file    Drug use: Not on file    Sexual activity: Not on file   Lifestyle    Physical activity     Days per week: Not on file     Minutes per session: Not on file    Stress: Not on file   Relationships    Social connections     Talks on phone: Not on file     Gets together: Not on file     Attends Christian service: Not on file     Active member of club or organization: Not on file     Attends meetings of clubs or organizations: Not on file     Relationship status: Not on file    Intimate partner violence     Fear of current or ex partner: Not on file     Emotionally abused: Not on file     Physically abused: Not on file     Forced sexual activity: Not on file   Other Topics Concern    Not on file   Social History Narrative    Not on file       No family history on file. Allergies:  Patient has no allergy information on record. Home Medications:  Prior to Admission medications    Not on File       REVIEW OF SYSTEMS    (2-9 systems for level 4, 10 or more for level 5)      Review of Systems    Intubated and sedated    PHYSICAL EXAM   (up to 7 for level 4, 8 or more for level 5)      INITIAL VITALS:   BP (!) 145/90   Pulse 81   Resp 17   SpO2 100%     Physical Exam   Gen. Appearance: Patient appears pale, intubated. HEENT: ET tube in place, 27 at the lip neck: Trachea midline   pulmonary: Bilateral chest tubes in place. Equal chest rise   cardiovascular: Regular rate and rhythm, no murmurs   Abdomen: Soft, nondistended   neurology: Intubated and sedated  Skin: Cool, dry.         DIFFERENTIAL  DIAGNOSIS     PLAN (LABS / IMAGING / EKG):  Orders Placed This Encounter   Procedures    XR CHEST PORTABLE    XR CHEST PORTABLE    CT CHEST ABDOMEN PELVIS WO CONTRAST    CT HEAD WO CONTRAST    CBC Auto Differential  Comprehensive Metabolic Panel w/ Reflex to MG    Troponin    Brain Natriuretic Peptide    Protime-INR    APTT    Urinalysis, reflex to microscopic    CK    TSH without Reflex    T4, FREE    Hemoglobin and hematocrit, blood    SODIUM (POC)    POTASSIUM (POC)    CHLORIDE (POC)    CALCIUM, IONIC (POC)    Blood gas, arterial    Microscopic Urinalysis    COVID-19    Elevate Head of Bed    Spontaneous Awakening Trial (SAT)    Telemetry Monitoring    Inpatient consult to Dietitian    Inpatient consult to Cardiology    Inpatient consult to Critical Care    Capnography    Pulse oximetry, continuous    Post Extubation Oxygen Therapy    Spontaneous Breathing Trial (SBT)    Manual Mechanical Ventilation    Pacer Interrogate    Venous Blood Gas, POC    Creatinine W/GFR Point of Care    Lactic Acid, POC    POCT Glucose    Anion Gap (Calc) POC    Arterial Blood Gas, POC    POCT Glucose    TYPE AND SCREEN    Saline lock IV    PATIENT STATUS (FROM ED OR OR/PROCEDURAL) Inpatient       MEDICATIONS ORDERED:  Orders Placed This Encounter   Medications    MIDAZOLAM 1 MG/ML IN D5W INFUSION     Kip Hermosillo: cabinet override    0.9 % sodium chloride bolus    norepinephrine (LEVOPHED) 1 MG/ML injection     Tang Eduardo: cabinet override    dextrose 5 % solution     Tang Eduardo: cabinet override    calcium gluconate 1000 mg in sodium chloride 50 mL    insulin regular (HUMULIN R;NOVOLIN R) injection 10 Units    dextrose 50 % IV solution           DIAGNOSTIC RESULTS / EMERGENCY DEPARTMENT COURSE / MDM     LABS:  Results for orders placed or performed during the hospital encounter of 02/06/21   CBC Auto Differential   Result Value Ref Range    WBC 9.3 3.5 - 11.3 k/uL    RBC 3.52 (L) 4.21 - 5.77 m/uL    Hemoglobin 9.4 (L) 13.0 - 17.0 g/dL    Hematocrit 32.6 (L) 40.7 - 50.3 %    MCV 92.6 82.6 - 102.9 fL    MCH 26.7 25.2 - 33.5 pg    MCHC 28.8 28.4 - 34.8 g/dL    RDW 12.6 11.8 - 14.4 % Platelets 352 203 - 717 k/uL    MPV 11.3 8.1 - 13.5 fL    NRBC Automated 0.0 0.0 per 100 WBC    Differential Type NOT REPORTED     Seg Neutrophils 77 (H) 36 - 65 %    Lymphocytes 12 (L) 24 - 43 %    Monocytes 7 3 - 12 %    Eosinophils % 1 1 - 4 %    Basophils 0 0 - 2 %    Immature Granulocytes 3 (H) 0 %    Segs Absolute 7.10 1.50 - 8.10 k/uL    Absolute Lymph # 1.13 1.10 - 3.70 k/uL    Absolute Mono # 0.64 0.10 - 1.20 k/uL    Absolute Eos # 0.09 0.00 - 0.44 k/uL    Basophils Absolute 0.03 0.00 - 0.20 k/uL    Absolute Immature Granulocyte 0.29 0.00 - 0.30 k/uL    WBC Morphology NOT REPORTED     RBC Morphology NOT REPORTED     Platelet Estimate NOT REPORTED    Comprehensive Metabolic Panel w/ Reflex to MG   Result Value Ref Range    Glucose 369 (H) 70 - 99 mg/dL    BUN 23 8 - 23 mg/dL    CREATININE 1.86 (H) 0.70 - 1.20 mg/dL    Bun/Cre Ratio NOT REPORTED 9 - 20    Calcium 8.1 (L) 8.6 - 10.4 mg/dL    Sodium 142 135 - 144 mmol/L    Potassium 6.0 (HH) 3.7 - 5.3 mmol/L    Chloride 103 98 - 107 mmol/L    CO2 25 20 - 31 mmol/L    Anion Gap 14 9 - 17 mmol/L    Alkaline Phosphatase 91 40 - 129 U/L    ALT 32 5 - 41 U/L    AST 35 <40 U/L    Total Bilirubin 0.41 0.3 - 1.2 mg/dL    Total Protein 6.7 6.4 - 8.3 g/dL    Albumin 3.6 3.5 - 5.2 g/dL    Albumin/Globulin Ratio 1.2 1.0 - 2.5    GFR Non- NOT REPORTED >60 mL/min    GFR  NOT REPORTED >60 mL/min    GFR Comment NOT REPORTED     GFR Staging NOT REPORTED    Troponin   Result Value Ref Range    Troponin, High Sensitivity 44 (H) 0 - 22 ng/L    Troponin T NOT REPORTED <0.03 ng/mL    Troponin Interp NOT REPORTED    Brain Natriuretic Peptide   Result Value Ref Range    Pro-BNP 5,615 (H) <300 pg/mL    BNP Interpretation Pro-BNP Reference Range:    Protime-INR   Result Value Ref Range    Protime 10.8 9.0 - 12.0 sec    INR 1.0    APTT   Result Value Ref Range    PTT 26.0 20.5 - 30.5 sec   Urinalysis, reflex to microscopic   Result Value Ref Range Color, UA YELLOW YELLOW    Turbidity UA CLEAR CLEAR    Glucose, Ur 1+ (A) NEGATIVE    Bilirubin Urine NEGATIVE NEGATIVE    Ketones, Urine NEGATIVE NEGATIVE    Specific Gravity, UA 1.018 1.005 - 1.030    Urine Hgb SMALL (A) NEGATIVE    pH, UA 6.0 5.0 - 8.0    Protein, UA 3+ (A) NEGATIVE    Urobilinogen, Urine Normal Normal    Nitrite, Urine NEGATIVE NEGATIVE    Leukocyte Esterase, Urine NEGATIVE NEGATIVE    Urinalysis Comments NOT REPORTED    CK   Result Value Ref Range    Total  39 - 308 U/L   TSH without Reflex   Result Value Ref Range    TSH 4.91 0.30 - 5.00 mIU/L   T4, FREE   Result Value Ref Range    Thyroxine, Free 0.81 (L) 0.93 - 1.70 ng/dL   Hemoglobin and hematocrit, blood   Result Value Ref Range    POC Hemoglobin 10.9 (L) 13.5 - 17.5 g/dL    POC Hematocrit 32 (L) 41 - 53 %   SODIUM (POC)   Result Value Ref Range    POC Sodium 141 138 - 146 mmol/L   POTASSIUM (POC)   Result Value Ref Range    POC Potassium 5.3 (H) 3.5 - 4.5 mmol/L   CHLORIDE (POC)   Result Value Ref Range    POC Chloride 103 98 - 107 mmol/L   CALCIUM, IONIC (POC)   Result Value Ref Range    POC Ionized Calcium 1.18 1.15 - 1.33 mmol/L   Microscopic Urinalysis   Result Value Ref Range    -          WBC, UA 20 TO 50 0 - 5 /HPF    RBC, UA 10 TO 20 0 - 4 /HPF    Casts UA  0 - 8 /LPF     2 TO 5 HYALINE Reference range defined for non-centrifuged specimen. Crystals, UA NOT REPORTED None /HPF    Epithelial Cells UA 0 TO 2 0 - 5 /HPF    Renal Epithelial, UA NOT REPORTED 0 /HPF    Bacteria, UA NOT REPORTED None    Mucus, UA NOT REPORTED None    Trichomonas, UA NOT REPORTED None    Amorphous, UA NOT REPORTED None    Other Observations UA NOT REPORTED NOT REQ. Yeast, UA NOT REPORTED None   COVID-19    Specimen: Other   Result Value Ref Range    SARS-CoV-2          SARS-CoV-2, Rapid Not Detected Not Detected    Source . NASOPHARYNGEAL SWAB     SARS-CoV-2         Venous Blood Gas, POC   Result Value Ref Range pH, Tai 7.051 (LL) 7.320 - 7.430    pCO2, Tai 117.2 (HH) 41.0 - 51.0 mm Hg    pO2, Tai 49.4 30.0 - 50.0 mm Hg    HCO3, Venous 32.5 (H) 22.0 - 29.0 mmol/L    Total CO2, Venous 36 (H) 23.0 - 30.0 mmol/L    Negative Base Excess, Tai NOT REPORTED 0.0 - 2.0    Positive Base Excess, Tai 0 0.0 - 3.0    O2 Sat, Tai 63 60.0 - 85.0 %    O2 Device/Flow/% NOT REPORTED     Rito Test NOT REPORTED     Sample Site NOT REPORTED     Mode NOT REPORTED     FIO2 NOT REPORTED     Pt Temp NOT REPORTED     POC pH Temp NOT REPORTED     POC pCO2 Temp NOT REPORTED mm Hg    POC pO2 Temp NOT REPORTED mm Hg   Creatinine W/GFR Point of Care   Result Value Ref Range    POC Creatinine 2.52 (H) 0.51 - 1.19 mg/dL    GFR Comment CANNOT BE CALCULATED >60 mL/min    GFR Non- CANNOT BE CALCULATED >60 mL/min    GFR Comment NOT REPORTED    Lactic Acid, POC   Result Value Ref Range    POC Lactic Acid 5.69 (H) 0.56 - 1.39 mmol/L   POCT Glucose   Result Value Ref Range    POC Glucose 365 (H) 74 - 100 mg/dL   Anion Gap (Calc) POC   Result Value Ref Range    Anion Gap 6 (L) 7 - 16 mmol/L   Arterial Blood Gas, POC   Result Value Ref Range    POC pH 7.305 (L) 7.350 - 7.450    POC pCO2 61.3 (H) 35.0 - 48.0 mm Hg    POC PO2 556.3 (H) 83.0 - 108.0 mm Hg    POC HCO3 30.5 (H) 21.0 - 28.0 mmol/L    TCO2 (calc), Art 32 (H) 22.0 - 29.0 mmol/L    Negative Base Excess, Art NOT REPORTED 0.0 - 2.0    Positive Base Excess, Art 3 0.0 - 3.0    POC O2  (H) 94.0 - 98.0 %    O2 Device/Flow/% Adult Ventilator     Rito Test POSITIVE     Sample Site Left Radial Artery     Mode PRVC     FIO2 100.0     Pt Temp 34.8     POC pH Temp 7.34     POC pCO2 Temp 56 mm Hg    POC pO2 Temp 540 mm Hg   POCT Glucose   Result Value Ref Range    POC Glucose 293 (H) 74 - 100 mg/dL   TYPE AND SCREEN   Result Value Ref Range    Expiration Date 02/09/2021,2359     Arm Band Number BE [id number]     ABO/Rh A POSITIVE     Antibody Screen NEGATIVE        RADIOLOGY: CT CHEST ABDOMEN PELVIS WO CONTRAST   Preliminary Result   1. Bilateral chest tubes in place, abutting the mediastinal pleura. Persistent small pneumothoraces. 2.  Nonspecific patchy airspace disease in the upper lobes may represent   multifocal inflammatory process or infection. Trace pleural effusions. 3.  Multiple old left rib fractures and old left scapular body fracture. No   acute fracture identified. 4.  Sequela of old granulomatous disease in the chest and noncalcified   pulmonary nodules measuring up to 7 mm. While this may represent sequela of   old infection, metastatic disease is not excluded. Follow-up imaging in 6-12   months is recommended (in the absence of prior imaging for comparison). 5.  Cholelithiasis. Mild stranding around the gallbladder may represent   cholecystitis in the appropriate clinical setting. No biliary dilatation. 6.  Localized skin thickening in the left mid abdomen may represent soft   tissue contusion. 7.  Mildly prominent scattered mediastinal lymph nodes for which attention to   on follow-up is recommended. CT HEAD WO CONTRAST   Final Result   No convincing evidence for acute intracranial pathology. Intracranial   hemorrhage, mass effect or midline shift. XR CHEST PORTABLE   Final Result   Interval advancement of right thoracostomy tube. The tip projects over the   left of midline. Trace right apical pneumothorax. 2.4 cm radiopaque density projecting over the right lung base has move   medially in likely is external to the patient. XR CHEST PORTABLE   Preliminary Result   1. Endotracheal tube tip is 8.2 cm above the annie. 2. Bilateral thoracostomy tubes are noted. The right thoracostomy tube   proximal side-port is just external to the ribcage. Subcutaneous emphysema   is noted with a trace right lateral pneumothorax.   There is also a 2.4 cm radiopaque density projecting over the right lower lung which has the   appearance of a fractured thoracostomy tube. Recommend correlation if this   is external to the patient. 3. Patchy left opacity in the left lung base. 4. Questionable acute 7th right rib fracture. Age-indeterminate left 2nd   through 6th rib fractures are noted favoring subacute to chronic etiology. EKG  None    All EKG's are interpreted by the Emergency Department Physician who either signs or Co-signs this chart in the absence of a cardiologist.    [address] [place] [address] MDM:  78 y.o. male who presents following respiratory arrest, found to have bilateral pneumothoraces. Bilateral chest tubes placed. Patient intubated. Treated for hyper kalemia with D5, insulin and calcium gluconate. A right femoral cooling catheter was placed and cooling initiated. Patient admitted to the critical care team        ED Course as of Feb 06 1730   Sat Feb 06, 2021   1517 1g calcium gluconate, plus 10u insulin and d50 for potassium of 6.0    [LEONIDES]      ED Course User Index  [LEONIDES] Genoveva Sánchez DO         PROCEDURES:  Intubation Procedure Note    Performed by: Genoveva Sánchez DO    Indication: Respiratory failure    Consent: Unable to be obtained due to the emergent nature of this procedure. Time out performed: Immediately prior to the procedure a \"time out\" was called to verify the correct patient, the correct procedure, equipment, support staff and site/side marked as required.       Medications Used: etomidate intravenously and rocuronium intravenously Procedure: The patient was placed in the appropriate position. Intubation was performed by indirect laryngoscopy using a bronchoscope and a 7.5 cuffed endotracheal tube. The cuff was then inflated and the tube was secured appropriately at a distance of 25 cm to the dental ridge. Initial confirmation of placement included bilateral breath sounds, an end tidal CO2 detector and adequate chest rise. A chest x-ray to verify correct placement of the tube showed appropriate tube position. The patient tolerated the procedure well. Chest Tube Placement Procedure Note    Performed by: [facility]     Indication: pneumothorax    Consent: Unable to be obtained due to the emergent nature of this procedure. Pre-Medication: none    Time out performed: Immediately prior to the procedure a \"time out\" was called to verify the correct patient, the correct procedure, equipment, support staff and site/side marked as required. All elements of maximal sterile barrier techniques were followed. Procedure: The patient was placed in a semirecumbent position with the head of the bed at 30 degrees. The right side was prepped with betadine and draped in a sterile fashion. Local anesthesia over the insertion site was not performed due to emergent nature of this procedure. An incision was made laterally in the midaxillary line. Blunt dissection up and over the rib was performed until access was obtained into the pleural cavity. A 36 Turkish chest tube was placed and connected to suction. Initial output from the tube was air. The tube was sutured in place and the site was covered with an occlusive dressing. All connections were banded. Breath sounds after the procedure were normal.  A chest x-ray was obtained to evaluate placement which showed tube placement that required repositioning which was subsequently performed. The patient tolerated the procedure well.     Complications: None      Complications: None

## 2021-02-06 NOTE — FLOWSHEET NOTE
SPIRITUAL CARE DEPARTMENT - [facility] [address]  PROGRESS NOTE    Shift date: 02/06/2021  Shift day: Saturday   Shift # 1    Room # 13/13     Name: Mike Thornton            Age: 78 y.o. Gender: male          Gnosticist: Oriental orthodox   Place of Lutheran: None    Referral: ED Full Arrest  Admit Date & Time: 2/6/2021  1:04 PM    PATIENT/EVENT DESCRIPTION:  Paco Cassidy is a 78 y.o. male who arrived ED as full arrest. Patient was intubated and unresponsive. Patient to be admitted to ED 13. SPIRITUAL ASSESSMENT/INTERVENTION:  No spiritual assessment was carried out because  did not speak to patient. Family arrived minutes later and one of the ED doctors spoke to them. Per family, patient was [facility] but not affiliated with any [employer].  maintained presence, offered support and prayed with family. Family was very grateful and thankful for the spiritual support they received. SPIRITUAL CARE FOLLOW-UP PLAN:  Chaplains would continue to visit for ongoing assessment of patient's condition and for more spiritual and emotional support. Electronically signed by Fr. Reyna Li on 2/6/2021 at 3:00 PM.  [address]  [phone]       02/06/21 [id number]   Encounter Summary   Services provided to: Patient; Family   Support System Family members   Place of [address] [place] [address] Avenue Visiting   (02/06/2021)   Complexity of Encounter High   Length of Encounter 1 hour;30 minutes   Spiritual Assessment Completed Yes   Routine   Type Initial   Intervention Active listening;Prayer;[place]   Outcome Expressed gratitude   Crisis   Type   (Full Arrest)

## 2021-02-06 NOTE — PROCEDURES
PROCEDURE NOTE - CENTRAL VENOUS LINE PLACEMENT    PATIENT NAME: [facility] [address] RECORD NO. [id number]  DATE: 2/6/2021  ATTENDING PHYSICIAN: Dr Cas Gonzales DIAGNOSIS:  vascular access and hypothermia protocol  POSTOPERATIVE DIAGNOSIS:  Same  PROCEDURE PERFORMED:  Right Femoral Vein Central Line Insertion  PERFORMING PHYSICIAN: Donna Kessler DO  ANESTHESIA:  Local utilizing 1% lidocaine  ESTIMATED BLOOD LOSS:  Less than 25 ml  COMPLICATIONS:  None immediately appreciated. DISCUSSION:  Bryn Woods is a 13 y.o.-year-old male who requires central IV access vascular access and hypothermia protocol. The history and physical examination were reviewed and confirmed. CONSENT: Unable to be obtained due to patient's condition. PROCEDURE:  A timeout was initiated by the bedside nurse and was confirmed by those present. The patient was placed in a supine position. The skin overlying the Right Femoral Vein was prepped with chlorhexadine and draped in sterile fashion. The skin was infiltrated with local anesthetic. The vessel and surrounding anatomy was visualized using ultrasound. Through the anesthetized region, the introducer needle was inserted into the femoral vein returning dark red non pulsatile blood. A guidewire was placed through the center of the needle with no resistance. Ultrasound confirmed presence of wire in the vein. A small incision made in the skin with a #11 scalpel blade. The dilator was inserted into the skin and vein over guidewire using Seldinger technique. The dilator was then removed and the 9.3F 45cm catheter was placed in the vein over the guidewire using Seldinger technique. The guidewire was then removed and all ports aspirated and flushed appropriately. The catheter then secured using silk suture and a temporary sterile dressing was applied. No immediate complication was evident. All sponge, instrument and needle counts were correct at the completion of the procedure. The patient tolerated the procedure well with no immediate complication evident.      Sameer Garcia DO  4:34 PM, 2/6/21

## 2021-02-06 NOTE — ED PROVIDER NOTES
FACULTY SIGN-OUT  ADDENDUM     Care of this patient was assumed from previous attending physician. The patient's initial evaluation and plan have been discussed with the prior provider who initially evaluated the patient. Attestation  I was available and discussed any additional care issues that arose and coordinated the management plans with the resident(s) caring for the patient during my duty period. Any areas of disagreement with resident's documentation of care or procedures are noted on the chart. I was personally present for the key portions of any/all procedures, during my duty period. I have documented in the chart those procedures where I was not present during the key portions. ED COURSE      The patient was given the following medications:  Orders Placed This Encounter   Medications    MIDAZOLAM 1 MG/ML IN D5W INFUSION     Kip Hermosillo: cabinet override    0.9 % sodium chloride bolus    norepinephrine (LEVOPHED) 1 MG/ML injection     Tang Eduardo: cabinet override    dextrose 5 % solution     Tang Eduardo: cabinet override    calcium gluconate 1000 mg in sodium chloride 50 mL    insulin regular (HUMULIN R;NOVOLIN R) injection 10 Units    dextrose 50 % IV solution       RECENT VITALS:    , Pulse: 81, Resp: 17, BP: (!) 145/90    MEDICAL DECISION MAKING        Bk Misc Diane Jurado is a 78 y.o. male who presents to the Emergency Department with complaints of Posy arrest       Indigo Garcia MD, F.A.C.E.P.   Attending Emergency Physician    (Please note that portions of this note were completed with a voice recognition program.  Efforts were made to edit the dictations but occasionally words are mis-transcribed.)        Indigo Garcia MD  02/06/21 [id number]

## 2021-02-06 NOTE — PROCEDURES
PROCEDURE NOTE - CHEST TUBE PLACEMENT     PATIENT NAME: Oumou Neil  MEDICAL RECORD NO. [id number]  DATE: 2/6/2021  ATTENDING PHYSICIAN: Boby    PREOPERATIVE DIAGNOSIS:  Left pneumothorax  POSTOPERATIVE DIAGNOSIS:  Same  PROCEDURE PERFORMED:  Emergency Chest Tube insertion  PERFORMING PHYSICIAN: Raphael Valdez DO  COMPLICATIONS:  None      DISCUSSION:  Oumou Neil is a 13 y.o.-year-old male who requires LEFT chest tube placement due to  Pneumothorax s/p bilateral needle decompression from cardaic arrest.  The history and physical examination were reviewed and confirmed. CONSENT: Unable to be obtained due to patient's condition. PROCEDURE:  The patient was placed in a semirecumbent position with the head of the bed at 30 degrees. The left side was prepped with betadine and chlorhexidine. Local anesthesia over the insertion site was obtained by infiltration using 1% Lidocaine without epinephrine. An incision was made anteriorly in the midclavicular line. Blunt dissection up and over the rib was performed until access was obtained into the pleural cavity. A 36 Peruvian chest tube was placed and connected to suction. Initial output from the tube was air. The tube was sutured in place and the site was covered with an occlusive dressing. All connections were banded. Breath sounds after the procedure were improved. A chest x-ray was obtained to evaluate placement which showed good tube position. The patient tolerated the procedure well.      Raphael Valdez DO  2:03 PM, 2/6/21

## 2021-02-06 NOTE — PROGRESS NOTES
Decreased VT to 500, RR to 12, PEEP to 0. Was getting exhaled VT of 130-140 Spo2 desatting to low 80's. Post vent changes Spo2 increased to 86-88 and exhaled -360.

## 2021-02-06 NOTE — H&P
Critical Care - History and Physical Examination    Date: 2/6/2021  Patient Name: Anderson Sheffield  MRN: [id number]  [place]: [de-identified]   Date of Admission: 2/6/2021  1:04 PM  YOB: 1882  Primary Care Physician: Bonifacio Mclean MD  Attending Physician: Ann Hunt MD   Code Status: No Order    Chief complaint:   Chief Complaint   Patient presents with    Cardiac Arrest       HISTORY OF PRESENT ILLNESS:      History was obtained from chart review. Anderson Sheffield is a 78 y.o. male who presents to the hospital after collapsing. Per family member at bedside, patient has been having shortness of breath for the last few days. Today he was witnessed falling down by family who called EMS. On arrival by EMS, patient was noticed to be in PEA and CPR was started. ROSC was achieved after 15 minutes. 15 minutes with patient maintaining PEA on cardiac, ROSC was achieved. Pt received epi x2, 100 mcg fentanyl, and 4 mg versed. Patient was intubated with Ernestene Graces airway and lifeflighted to [facility]. On arrival, patient was sedated on Versed, was found to have bilateral silent lungs, needle thoracostomy was done and chest tubes were placed to evacuate bilateral pneumothorax. Remigio airway was exchanged with endotracheal intubation. Quatro catheter was inserted, and TTM was started. Bilateral chest tubes in place. Left sided tibial intraosseous line in place. Patient continues to be sedated on Versed. EKG unremarkable, and troponins mildly elevated at 44. Cardiology was consulted and AICD interrogation was ordered. Labs significant for potassium of 6, Blood glucose elevated in 300s. Patient is being admitted to medical ICU for further management of post cardiac arrest.    PAST MEDICAL HISTORY:   No past medical history on file. PAST SURGICAL HISTORY:   No past surgical history on file.   ALLERGIES:    Not on File    HOME MEDS: :      Prior to Admission medications    Not on File SOCIAL HISTORY:       Social History    None        FAMILY HISTORY:    No family history on file. REVIEW OF SYSTEMS (ROS):   Unable to obtain    OBJECTIVE:     Vitals: BP (!) 145/90   Pulse 81   Resp 17   SpO2 100%   Tmax: No data recorded. Last Body weight:   Wt Readings from Last 3 Encounters:   No data found for Wt     Body Mass Index : There is no height or weight on file to calculate BMI. Input/Output:     Intake/Output Summary (Last 24 hours) at 2/6/2021 1726  Last data filed at 2/6/2021 1431  Gross per 24 hour   Intake 1000 ml   Output 0 ml   Net 1000 ml     Date 02/06/21 0000 - 02/06/21 2359   Shift 0780-3208 3780-3469 0142-9063 24 Hour Total   INTAKE   IV Piggyback  1000  1000   Shift Total  1000  1000   OUTPUT   Chest Tube  0  0   Shift Total  0  0   Weight (kg)           PHYSICAL EXAM:  Constitutional: intubated, sedated  HEENT: PERRLA, EOMI, sclera clear, anicteric  Respiratory: clear to auscultation, wheezes present bilaterally. Cardiovascular: regular rate and rhythm, normal S1, S2, no murmur noted and 2+ pulses throughout  Abdomen: soft, nontender, nondistended, no masses or organomegaly  Neurologic: Sedated and intubated. Not withdrawing to pain. Pupils reactive to light. Extremities:  peripheral pulses normal, no pedal edema.     MEDICATIONS:  Scheduled Meds:   midazolam (VERSED) 1 mg/mL in D5W infusion        norepinephrine         Continuous Infusions:   dextrose       PRN Meds:        INVESTIGATIONS:  Laboratory Testing:     Recent Results (from the past 24 hour(s))   Venous Blood Gas, POC    Collection Time: 02/06/21  1:10 PM   Result Value Ref Range    pH, Tai 7.051 (LL) 7.320 - 7.430    pCO2, Tai 117.2 (HH) 41.0 - 51.0 mm Hg    pO2, Tai 49.4 30.0 - 50.0 mm Hg    HCO3, Venous 32.5 (H) 22.0 - 29.0 mmol/L    Total CO2, Venous 36 (H) 23.0 - 30.0 mmol/L    Negative Base Excess, Tai NOT REPORTED 0.0 - 2.0    Positive Base Excess, Tai 0 0.0 - 3.0    O2 Sat, Tai 63 60.0 - 85.0 % O2 Device/Flow/% NOT REPORTED     Rito Test NOT REPORTED     Sample Site NOT REPORTED     Mode NOT REPORTED     FIO2 NOT REPORTED     Pt Temp NOT REPORTED     POC pH Temp NOT REPORTED     POC pCO2 Temp NOT REPORTED mm Hg    POC pO2 Temp NOT REPORTED mm Hg   Hemoglobin and hematocrit, blood    Collection Time: 02/06/21  1:10 PM   Result Value Ref Range    POC Hemoglobin 10.9 (L) 13.5 - 17.5 g/dL    POC Hematocrit 32 (L) 41 - 53 %   Creatinine W/GFR Point of Care    Collection Time: 02/06/21  1:10 PM   Result Value Ref Range    POC Creatinine 2.52 (H) 0.51 - 1.19 mg/dL    GFR Comment CANNOT BE CALCULATED >60 mL/min    GFR Non- CANNOT BE CALCULATED >60 mL/min    GFR Comment NOT REPORTED    SODIUM (POC)    Collection Time: 02/06/21  1:10 PM   Result Value Ref Range    POC Sodium 141 138 - 146 mmol/L   POTASSIUM (POC)    Collection Time: 02/06/21  1:10 PM   Result Value Ref Range    POC Potassium 5.3 (H) 3.5 - 4.5 mmol/L   CHLORIDE (POC)    Collection Time: 02/06/21  1:10 PM   Result Value Ref Range    POC Chloride 103 98 - 107 mmol/L   CALCIUM, IONIC (POC)    Collection Time: 02/06/21  1:10 PM   Result Value Ref Range    POC Ionized Calcium 1.18 1.15 - 1.33 mmol/L   Lactic Acid, POC    Collection Time: 02/06/21  1:10 PM   Result Value Ref Range    POC Lactic Acid 5.69 (H) 0.56 - 1.39 mmol/L   POCT Glucose    Collection Time: 02/06/21  1:10 PM   Result Value Ref Range    POC Glucose 365 (H) 74 - 100 mg/dL   Anion Gap (Calc) POC    Collection Time: 02/06/21  1:10 PM   Result Value Ref Range    Anion Gap 6 (L) 7 - 16 mmol/L   CBC Auto Differential    Collection Time: 02/06/21  1:29 PM   Result Value Ref Range    WBC 9.3 3.5 - 11.3 k/uL    RBC 3.52 (L) 4.21 - 5.77 m/uL    Hemoglobin 9.4 (L) 13.0 - 17.0 g/dL    Hematocrit 32.6 (L) 40.7 - 50.3 %    MCV 92.6 82.6 - 102.9 fL    MCH 26.7 25.2 - 33.5 pg    MCHC 28.8 28.4 - 34.8 g/dL    RDW 12.6 11.8 - 14.4 %    Platelets 153 448 - 869 k/uL MPV 11.3 8.1 - 13.5 fL    NRBC Automated 0.0 0.0 per 100 WBC    Differential Type NOT REPORTED     Seg Neutrophils 77 (H) 36 - 65 %    Lymphocytes 12 (L) 24 - 43 %    Monocytes 7 3 - 12 %    Eosinophils % 1 1 - 4 %    Basophils 0 0 - 2 %    Immature Granulocytes 3 (H) 0 %    Segs Absolute 7.10 1.50 - 8.10 k/uL    Absolute Lymph # 1.13 1.10 - 3.70 k/uL    Absolute Mono # 0.64 0.10 - 1.20 k/uL    Absolute Eos # 0.09 0.00 - 0.44 k/uL    Basophils Absolute 0.03 0.00 - 0.20 k/uL    Absolute Immature Granulocyte 0.29 0.00 - 0.30 k/uL    WBC Morphology NOT REPORTED     RBC Morphology NOT REPORTED     Platelet Estimate NOT REPORTED    Comprehensive Metabolic Panel w/ Reflex to MG    Collection Time: 02/06/21  1:29 PM   Result Value Ref Range    Glucose 369 (H) 70 - 99 mg/dL    BUN 23 8 - 23 mg/dL    CREATININE 1.86 (H) 0.70 - 1.20 mg/dL    Bun/Cre Ratio NOT REPORTED 9 - 20    Calcium 8.1 (L) 8.6 - 10.4 mg/dL    Sodium 142 135 - 144 mmol/L    Potassium 6.0 (HH) 3.7 - 5.3 mmol/L    Chloride 103 98 - 107 mmol/L    CO2 25 20 - 31 mmol/L    Anion Gap 14 9 - 17 mmol/L    Alkaline Phosphatase 91 40 - 129 U/L    ALT 32 5 - 41 U/L    AST 35 <40 U/L    Total Bilirubin 0.41 0.3 - 1.2 mg/dL    Total Protein 6.7 6.4 - 8.3 g/dL    Albumin 3.6 3.5 - 5.2 g/dL    Albumin/Globulin Ratio 1.2 1.0 - 2.5    GFR Non- NOT REPORTED >60 mL/min    GFR  NOT REPORTED >60 mL/min    GFR Comment NOT REPORTED     GFR Staging NOT REPORTED    Troponin    Collection Time: 02/06/21  1:29 PM   Result Value Ref Range    Troponin, High Sensitivity 44 (H) 0 - 22 ng/L    Troponin T NOT REPORTED <0.03 ng/mL    Troponin Interp NOT REPORTED    Brain Natriuretic Peptide    Collection Time: 02/06/21  1:29 PM   Result Value Ref Range    Pro-BNP 5,615 (H) <300 pg/mL    BNP Interpretation Pro-BNP Reference Range:    Protime-INR    Collection Time: 02/06/21  1:29 PM   Result Value Ref Range    Protime 10.8 9.0 - 12.0 sec    INR 1.0    APTT Collection Time: 02/06/21  1:29 PM   Result Value Ref Range    PTT 26.0 20.5 - 30.5 sec   CK    Collection Time: 02/06/21  1:29 PM   Result Value Ref Range    Total  39 - 308 U/L   TSH without Reflex    Collection Time: 02/06/21  1:29 PM   Result Value Ref Range    TSH 4.91 0.30 - 5.00 mIU/L   T4, FREE    Collection Time: 02/06/21  1:29 PM   Result Value Ref Range    Thyroxine, Free 0.81 (L) 0.93 - 1.70 ng/dL   TYPE AND SCREEN    Collection Time: 02/06/21  1:36 PM   Result Value Ref Range    Expiration Date 02/09/2021,2359     Arm Band Number [id number]     ABO/Rh A POSITIVE     Antibody Screen NEGATIVE    Urinalysis, reflex to microscopic    Collection Time: 02/06/21  1:39 PM   Result Value Ref Range    Color, UA YELLOW YELLOW    Turbidity UA CLEAR CLEAR    Glucose, Ur 1+ (A) NEGATIVE    Bilirubin Urine NEGATIVE NEGATIVE    Ketones, Urine NEGATIVE NEGATIVE    Specific Gravity, UA 1.018 1.005 - 1.030    Urine Hgb SMALL (A) NEGATIVE    pH, UA 6.0 5.0 - 8.0    Protein, UA 3+ (A) NEGATIVE    Urobilinogen, Urine Normal Normal    Nitrite, Urine NEGATIVE NEGATIVE    Leukocyte Esterase, Urine NEGATIVE NEGATIVE    Urinalysis Comments NOT REPORTED    Microscopic Urinalysis    Collection Time: 02/06/21  1:39 PM   Result Value Ref Range    -          WBC, UA 20 TO 50 0 - 5 /HPF    RBC, UA 10 TO 20 0 - 4 /HPF    Casts UA  0 - 8 /LPF     2 TO 5 HYALINE Reference range defined for non-centrifuged specimen. Crystals, UA NOT REPORTED None /HPF    Epithelial Cells UA 0 TO 2 0 - 5 /HPF    Renal Epithelial, UA NOT REPORTED 0 /HPF    Bacteria, UA NOT REPORTED None    Mucus, UA NOT REPORTED None    Trichomonas, UA NOT REPORTED None    Amorphous, UA NOT REPORTED None    Other Observations UA NOT REPORTED NOT REQ.     Yeast, UA NOT REPORTED None   COVID-19    Collection Time: 02/06/21  2:04 PM    Specimen: Other   Result Value Ref Range    SARS-CoV-2          SARS-CoV-2, Rapid Not Detected Not Detected Source . NASOPHARYNGEAL SWAB     SARS-CoV-2         Arterial Blood Gas, POC    Collection Time: 02/06/21  3:25 PM   Result Value Ref Range    POC pH 7.305 (L) 7.350 - 7.450    POC pCO2 61.3 (H) 35.0 - 48.0 mm Hg    POC PO2 556.3 (H) 83.0 - 108.0 mm Hg    POC HCO3 30.5 (H) 21.0 - 28.0 mmol/L    TCO2 (calc), Art 32 (H) 22.0 - 29.0 mmol/L    Negative Base Excess, Art NOT REPORTED 0.0 - 2.0    Positive Base Excess, Art 3 0.0 - 3.0    POC O2  (H) 94.0 - 98.0 %    O2 Device/Flow/% Adult Ventilator     Rito Test POSITIVE     Sample Site Left Radial Artery     Mode PRVC     FIO2 100.0     Pt Temp 34.8     POC pH Temp 7.34     POC pCO2 Temp 56 mm Hg    POC pO2 Temp 540 mm Hg   POCT Glucose    Collection Time: 02/06/21  3:25 PM   Result Value Ref Range    POC Glucose 293 (H) 74 - 100 mg/dL       Imaging:   Ct Head Wo Contrast  Result Date: 2/6/2021  No convincing evidence for acute intracranial pathology. Intracranial hemorrhage, mass effect or midline shift. Xr Chest Portable  Result Date: 2/6/2021  Interval advancement of right thoracostomy tube. The tip projects over the left of midline. Trace right apical pneumothorax. 2.4 cm radiopaque density projecting over the right lung base has move medially in likely is external to the patient.      Xr Chest Portable  Result Date: 2/6/2021 1. Endotracheal tube tip is 8.2 cm above the annie. 2. Bilateral thoracostomy tubes are noted. The right thoracostomy tube proximal side-port is just external to the ribcage. Subcutaneous emphysema is noted with a trace right lateral pneumothorax. There is also a 2.4 cm radiopaque density projecting over the right lower lung which has the appearance of a fractured thoracostomy tube. Recommend correlation if this is external to the patient. 3. Patchy left opacity in the left lung base. 4. Questionable acute 7th right rib fracture. Age-indeterminate left 2nd through 6th rib fractures are noted favoring subacute to chronic etiology. Ct Chest Abdomen Pelvis Wo Contrast    Result Date: 2/6/2021  1. Bilateral chest tubes in place, abutting the mediastinal pleura. Persistent small pneumothoraces. 2.  Nonspecific patchy airspace disease in the upper lobes may represent multifocal inflammatory process or infection. Trace pleural effusions. 3.  Multiple old left rib fractures and old left scapular body fracture. No acute fracture identified. 4.  Sequela of old granulomatous disease in the chest and noncalcified pulmonary nodules measuring up to 7 mm. While this may represent sequela of old infection, metastatic disease is not excluded. Follow-up imaging in 6-12 months is recommended (in the absence of prior imaging for comparison). 5.  Cholelithiasis. Mild stranding around the gallbladder may represent cholecystitis in the appropriate clinical setting. No biliary dilatation. 6.  Localized skin thickening in the left mid abdomen may represent soft tissue contusion. 7.  Mildly prominent scattered mediastinal lymph nodes for which attention to on follow-up is recommended. ASSESSMENT AND PLAN:     Active Problems:    Cardiac arrest [facility])  Resolved Problems:    * No resolved hospital problems.  *    Impression:    Post cardiac arrest - AICD in place. Will get interrogation to see rhythm prior to cardiac arrest.  - Cardiology consulted. Patient undergoing targeted temperature management with temperature goal of 35.  - Trend troponins. Will get Echo. Bilateral pneumothorax  - Chest tubes in place. Monitor.     COPD  - Will start IV solumedrol 40mg BiD  - Supportive management. Bronchodilators.     Atherosclerotic coronary artery disease with severe multivessel disease -not amenable for PCI or CABG on cardiac catheterization March 2019.       Ischemic cardiomyopathy with severe LV systolic dysfunction with LVEF 25% on cardiac catheterization March 2019 and with improved LVEF 43% on most recent 2D echocardiogram April 2020. Status post [address]. Chronic kidney disease stage 3. - Baseline likely 1.7-2. DVT ppx: Lovenox  GI ppx: Pepcid  Diet: NPO  Isolation: Not indicated  Discharge Planning:    Jun Sofia MD  PGY-2, Internal Medicine Resident  SUKH Storm 21 2/6/2021, 5:26 PM    The critical care team assigned to the patient will be following up the patient in the intensive care unit. I have discussed the current plan with the critical care attending. The above mentioned assessment and plan will be reviewed again in detail by the critical care attending at bedside, and can be further changed or modified accordingly by the attending physician. Attending Physician Statement  I have discussed the care of [facility], including pertinent history and exam findings,  with the resident. I have seen and examined the patient and the key elements of all parts of the encounter have been performed by me. I agree with the assessment, plan and orders as documented by the resident with additions .   Cardiac arrest   chf systolic   Copd - prolonged exp , obatructive flow volume loop   Plan   Hypothermia protocol   Cont chest tube to suction   ai cd interrogation   Continue vent support Sedation with propofol     Total critical care time caring for this patient with life threatening, unstable organ failure, including direct patient contact, management of life support systems, review of data including imaging and labs, discussions with other team members and physicians at least 28   Min so far today, excluding procedures. Treatment plan Discussed with nursing staff in detail , all questions answered . Electronically signed by Xavi Jeffries MD on   2/6/21 at 8:43 PM EST    Please note that this chart was generated using voice recognition Dragon dictation software. Although every effort was made to ensure the accuracy of this automated transcription, some errors in transcription may have occurred.

## 2021-02-06 NOTE — ED NOTES
Pt intubated by Dr. Pawan Serrano 7.5, 24 at [facility]          Tomi Antunez RN  02/06/21 2267

## 2021-02-06 NOTE — ED NOTES
Sister states that patient was observed having difficulty breathing by live in brother. Pt was said to have collapsed from a standing position. Brother then called EMS. EMS arrived pt was unresponsive, and compressions were resumed by first responders. Compressions resumed, coded for 15 minutes with patient maintaining PEA on cardiac, ROSC was achieved. Pt received epi x2, 100 mcg fentanyl, and 4 mg versed.  Pt arrived with I gel in place, still unresponsive      Ramsey Muñoz RN  02/06/21 5915

## 2021-02-06 NOTE — ED NOTES
Pt to ED following post cardiac arrest. Pt was found by family, unknown downtime       Yee Perkins RN  02/06/21 8981

## 2021-02-07 NOTE — PLAN OF CARE
Problem: OXYGENATION/RESPIRATORY FUNCTION  Goal: Patient will maintain patent airway  2/7/2021 1542 by Franco Lama RN  Outcome: Ongoing  2/7/2021 0448 by Germaine Solo RN  Outcome: Ongoing  Goal: Patient will achieve/maintain normal respiratory rate/effort  Description: Respiratory rate and effort will be within normal limits for the patient  2/7/2021 1542 by Franco Lama RN  Outcome: Ongoing  2/7/2021 0448 by Germaine Solo RN  Outcome: Ongoing     Problem: MECHANICAL VENTILATION  Goal: Patient will maintain patent airway  2/7/2021 1542 by Franco Lama RN  Outcome: Ongoing  2/7/2021 0448 by Germaine Solo RN  Outcome: Ongoing  Goal: Oral health is maintained or improved  2/7/2021 1542 by Franco Lama RN  Outcome: Ongoing  2/7/2021 0448 by Germaine Solo RN  Outcome: Met This Shift  Goal: ET tube will be managed safely  2/7/2021 1542 by Franco Lama RN  Outcome: Ongoing  2/7/2021 0448 by Germaine Solo RN  Outcome: Met This Shift  Goal: Ability to express needs and understand communication  2/7/2021 1542 by Franco Lama RN  Outcome: Ongoing  2/7/2021 0448 by Germaine Solo RN  Outcome: Ongoing  Goal: Mobility/activity is maintained at optimum level for patient  2/7/2021 1542 by Franco Lama RN  Outcome: Ongoing  2/7/2021 0448 by Germaine Solo RN  Outcome: Ongoing     Problem: SKIN INTEGRITY  Goal: Skin integrity is maintained or improved  2/7/2021 1542 by Franco Lama RN  Outcome: Ongoing  2/7/2021 0448 by Germaine Solo RN  Outcome: Ongoing     Problem: NUTRITION  Goal: Nutritional status is improving  2/7/2021 1542 by Franco Lama RN  Outcome: Ongoing  2/7/2021 0448 by Germaine Solo RN  Outcome: Ongoing     Problem: Discharge Planning:  Goal: Participates in care planning  Description: Participates in care planning  2/7/2021 1542 by Franco Lama RN  Outcome: Ongoing  2/7/2021 0448 by Germaine Solo RN  Outcome: Ongoing Goal: Discharged to appropriate level of care  Description: Discharged to appropriate level of care  2/7/2021 1542 by Mallorie Schumacher RN  Outcome: Ongoing  2/7/2021 0448 by Charla Tabor RN  Outcome: Ongoing     Problem: Airway Clearance - Ineffective:  Goal: Ability to maintain a clear airway will improve  Description: Ability to maintain a clear airway will improve  2/7/2021 1542 by Mallorie Schumacher RN  Outcome: Ongoing  2/7/2021 0448 by Charla Tabor RN  Outcome: Ongoing     Problem: Aspiration:  Goal: Absence of aspiration  Description: Absence of aspiration  2/7/2021 1542 by Mallorie Schumacher RN  Outcome: Ongoing  2/7/2021 0448 by Charla Tabor RN  Outcome: Ongoing     Problem: Cardiac Output - Decreased:  Goal: Hemodynamic stability will improve  Description: Hemodynamic stability will improve  2/7/2021 1542 by Mallorie Schumacher RN  Outcome: Ongoing  2/7/2021 0448 by Charla Tabor RN  Outcome: Ongoing     Problem: Fluid Volume - Imbalance:  Goal: Absence of imbalanced fluid volume signs and symptoms  Description: Absence of imbalanced fluid volume signs and symptoms  2/7/2021 1542 by Mallorie Schumacher RN  Outcome: Ongoing  2/7/2021 0448 by Charla Tabor RN  Outcome: Ongoing     Problem: Gas Exchange - Impaired:  Goal: Levels of oxygenation will improve  Description: Levels of oxygenation will improve  2/7/2021 1542 by Mallorie Schumacher RN  Outcome: Ongoing  2/7/2021 0448 by Charla Tabor RN  Outcome: Ongoing     Problem: Pain:  Description: Pain management should include both nonpharmacologic and pharmacologic interventions.   Goal: Pain level will decrease  Description: Pain level will decrease  2/7/2021 1542 by Mallorie Schumacher RN  Outcome: Ongoing  2/7/2021 0448 by Charla Tabor RN  Outcome: Ongoing  Goal: Recognizes and communicates pain  Description: Recognizes and communicates pain  2/7/2021 1542 by Mallorie Schumacher RN  Outcome: Ongoing  2/7/2021 0448 by Charla Tabor RN Outcome: Ongoing  Goal: Control of acute pain  Description: Control of acute pain  2/7/2021 1542 by Lulú Pruitt RN  Outcome: Ongoing  2/7/2021 0448 by Merrell Lundborg, RN  Outcome: Ongoing  Goal: Control of chronic pain  Description: Control of chronic pain  2/7/2021 1542 by Lulú Pruitt RN  Outcome: Ongoing  2/7/2021 0448 by Merrell Lundborg, RN  Outcome: Ongoing     Problem: Serum Glucose Level - Abnormal:  Goal: Ability to maintain appropriate glucose levels will improve to within specified parameters  Description: Ability to maintain appropriate glucose levels will improve to within specified parameters  2/7/2021 1542 by Lulú Pruitt RN  Outcome: Ongoing  2/7/2021 0448 by Merrell Lundborg, RN  Outcome: Ongoing     Problem: Skin Integrity - Impaired:  Goal: Will show no infection signs and symptoms  Description: Will show no infection signs and symptoms  2/7/2021 1542 by Lulú Pruitt RN  Outcome: Ongoing  2/7/2021 0448 by Merrell Lundborg, RN  Outcome: Met This Shift  Goal: Absence of new skin breakdown  Description: Absence of new skin breakdown  2/7/2021 1542 by Lulú Pruitt RN  Outcome: Ongoing  2/7/2021 0448 by Merrell Lundborg, RN  Outcome: Met This Shift     Problem: Tissue Perfusion, Altered:  Goal: Circulatory function within specified parameters  Description: Circulatory function within specified parameters  2/7/2021 1542 by Lulú Pruitt RN  Outcome: Ongoing  2/7/2021 0448 by Merrell Lundborg, RN  Outcome: Ongoing     Problem: Falls - Risk of:  Goal: Will remain free from falls  Description: Will remain free from falls  2/7/2021 1542 by Lulú Pruitt RN  Outcome: Ongoing  2/7/2021 0448 by Merrell Lundborg, RN  Outcome: Met This Shift  Goal: Absence of physical injury  Description: Absence of physical injury  2/7/2021 1542 by Lulú Pruitt RN  Outcome: Ongoing  2/7/2021 0448 by Merrell Lundborg, RN  Outcome: Met This Shift     Problem: Skin Integrity: Goal: Will show no infection signs and symptoms  Description: Will show no infection signs and symptoms  Outcome: Ongoing  Goal: Absence of new skin breakdown  Description: Absence of new skin breakdown  Outcome: Ongoing     Problem: Nutrition  Goal: Optimal nutrition therapy  Description: Nutrition Problem #1: Inadequate oral intake  Intervention: Food and/or Nutrient Delivery: Continue NPO(Start diet vs nutrition support as able)  Nutritional Goals: Start diet vs nutrition support within 24-72 hrs     Outcome: Ongoing

## 2021-02-07 NOTE — PROGRESS NOTES
Critical Care Team - Daily Progress Note      Date and time: 2021 3:47 PM  Patient's name:  Ash Painter Record Number: [id number]  Patient's account/billing number: [de-identified]  Patient's YOB: 1950  Age: 65 y.o. Date of Admission: 2021  1:04 PM  Length of stay during current admission: 1      Primary Care Physician: Arnav Luna MD  ICU Attending Physician: Dr. Fabian Shirley Status: Full Code    Reason for ICU admission: PEA arrest      SUBJECTIVE:     OVERNIGHT EVENTS:       Fever:-  Temp (24hrs), Av.1 °F (33.4 °C), Min:91.4 °F (33 °C), Max:95 °F (35 °C)    Blood pressure: 120/77  Vasopressors: Weaned off from Levophed  Systolic (24KNM), BPF:934 , Min:119 , DQT:649     Diastolic (72HFX), OSW:44, Min:53, Max:77    Urine output in the last 24 hours: In: 882.8 [I.V.:882.8]  Out: 1060 [BCPTN:3655]  Patient Vitals for the past 96 hrs (Last 3 readings):   Weight   21 1759 270 lb (122.5 kg)     ABG/Ventilator settings   PH 7.416,PCO2 40.7,PO2 102,HCO3 26  FIO2 30, RR 20 ,PEEP 2,TIDVOL 500    Overnight no complaints. Urine output is good  Patient sedated and paralyzed on propofol and Nimbex  Bilateral chest tubes placed having air leaks and right-sided subcutaneous emphysema with crepitus. We will contact CT surgery and continue to follow.     AWAKE & FOLLOWING COMMANDS:  [x] No   [] Yes    CURRENT VENTILATION STATUS:     [x] Ventilator  [] BIPAP  [] Nasal Cannula [] Room Air        SECRETIONS Amount:  [] Small [] Moderate  [] Large  [] None  Color:     [] White [] Colored  [] Bloody    SEDATION:  RAAS Score:  [x] Propofol gtt  [] Versed gtt  [] Ativan gtt   [] No Sedation    PARALYZED:  [] No    [x] Yes    DIARRHEA:                [x] No                [] Yes  (C. Difficile status: [] positive                                                                                                                       [] negative [] pending)    VASOPRESSORS:  [] No    [] Yes    If yes -   [] Levophed       [] Dopamine     [] Vasopressin       [] Dobutamine  [] Phenylephrine         [] Epinephrine    CENTRAL LINES:     [] No   [x] Yes   (Date of Insertion:   )           If yes -     [] Right IJ     [] Left IJ [] Right Femoral [] Left Femoral                   [] Right Subclavian [] Left Subclavian       VELIZ'S CATHETER:   [] No   [x] Yes  (Date of Insertion:   )     URINE OUTPUT:            [x] Good   [] Low              [] Anuric      OBJECTIVE:     VITAL SIGNS:  /62   Pulse (!) 46   Temp (!) 91.4 °F (33 °C) (Core)   Resp 20   Ht 5' 9\" (1.753 m)   Wt 270 lb (122.5 kg)   SpO2 100%   BMI 39.87 kg/m²   Tmax over 24 hours:  Temp (24hrs), Av.1 °F (33.4 °C), Min:91.4 °F (33 °C), Max:95 °F (35 °C)      Patient Vitals for the past 6 hrs:   BP Temp Temp src Pulse Resp SpO2 Height   21 1532    (!) 46 20 100 %    21 1445    (!) 47 20 100 %    21 1430    (!) 46 18 100 %    21 1415    (!) 47 20 100 %    21 1400 136/62   (!) 47 19 100 %    21 1345    (!) 47 20 100 %    21 1330    (!) 46 20 100 %    21 1315    (!) 46 18 100 %    21 1300 122/60   (!) 45 20 100 %    21 1245    (!) 47 20 100 %    21 1230    (!) 46 20 100 %    21 1215    (!) 47 20 100 %    21 1200 (!) 119/59 (!) 91.4 °F (33 °C) CORE (!) 47 20 100 %    21 1145    (!) 46 20 100 %    21 1131    (!) 47 20 100 %    21 1115    (!) 47 20 100 %    21 1100 (!) 121/59   (!) 47 20 100 %    21 1045    (!) 47 20 100 %    21 1030    (!) 47 20 100 % 5' 9\" (1.753 m)   21 1015    (!) 46 20 100 %    21 1000 137/69   (!) 47 20 100 %  Rate Set: 20 bmp  FiO2 : 30 %  SpO2: 100 %  SpO2/FiO2 ratio: 333.33  Sensitivity: 3  PEEP/CPAP: 2  I Time/ I Time %: 0.9 s  Humidification Source: HME  Nitric Oxide/Epoprostenol In Use?: No  Additional Respiratory  Assessments  Pulse: (!) 46  Resp: 20  SpO2: 100 %  End Tidal CO2: 23  Position: Semi-Ko's  Humidification Source: HME  Oral Care Completed?: Yes  Oral Care: Mouthwash, Mouth moisturizer, Mouth suctioned  Subglottic Suction Done?: Yes    ABGs:     Laboratory findings:    Complete Blood Count:   Recent Labs     02/06/21  1329 02/07/21  0615   WBC 9.3 4.7   HGB 9.4* 8.9*   HCT 32.6* 28.9*    See Reflexed IPF Result        Last 3 Blood Glucose:   Recent Labs     02/07/21  0123 02/07/21 0618 02/07/21  1330   GLUCOSE 270* 299* 283*        PT/INR:    Lab Results   Component Value Date    PROTIME 10.8 02/06/2021    INR 1.0 02/06/2021     PTT:    Lab Results   Component Value Date    APTT 26.0 02/06/2021       Comprehensive Metabolic Profile:   Recent Labs     02/06/21  1329 02/06/21  1329 02/07/21  0123 02/07/21 0618 02/07/21  1330      < > 140 139 138   K 6.0*   < > 4.7 4.6 4.1      < > 102 101 101   CO2 25   < > 23 24 22   BUN 23   < > 26* 27* 28*   CREATININE 1.86*   < > 1.50* 1.57* 1.55*   GLUCOSE 369*   < > 270* 299* 283*   CALCIUM 8.1*   < > 9.2 8.9 8.9   PROT 6.7  --   --   --   --    LABALBU 3.6  --   --   --   --    BILITOT 0.41  --   --   --   --    ALKPHOS 91  --   --   --   --    AST 35  --   --   --   --    ALT 32  --   --   --   --     < > = values in this interval not displayed. Magnesium:   Lab Results   Component Value Date    MG 2.0 02/07/2021     Phosphorus:   Lab Results   Component Value Date    PHOS 3.4 02/07/2021     Ionized Calcium:   Lab Results   Component Value Date    CAION 1.15 02/07/2021        Urinalysis:     Troponin: No results for input(s): TROPONINI in the last 72 hours.     Microbiology:    Cultures during this admission: Blood cultures:                 [] None drawn      [] Negative             []  Positive (Details:  )  Urine Culture:                   [] None drawn      [] Negative             []  Positive (Details:  )  Sputum Culture:               [] None drawn       [] Negative             []  Positive (Details:  )   Endotracheal aspirate:     [] None drawn       [] Negative             []  Positive (Details:  )     Other pertinent Labs:       Radiology/Imaging:     Chest Xray (2/7/2021):    ASSESSMENT:     · Active Problems:  ·   Cardiac arrest (HCC)  ·   Pneumothorax  ·   COPD (chronic obstructive pulmonary disease) ([facility])  ·   CAD (coronary artery disease)  ·   HFrEF (heart failure with reduced ejection fraction) [facility]  ·   AICD (automatic cardioverter/defibrillator) present  ·   CKD (chronic kidney disease) stage 3, GFR 30-59 ml/min  · Resolved Problems:  ·   * No resolved hospital problems. *  ·   ·         PLAN:     WEAN PER PROTOCOL:  [] No   [] Yes  [] N/A    DISCONTINUE ANY LABS:   [] No   [] Yes    ICU PROPHYLAXIS:  Stress ulcer:  [] PPI Agent  [] O5Hyyhb [] Sucralfate  [] Other:  VTE:   [] Enoxaparin  [] Unfract. Heparin Subcut  [] EPC Cuffs    NUTRITION:  [x] NPO [] Tube Feeding (Specify: ) [] TPN  [] PO (Diet: Diet NPO Effective Now)    HOME MEDICATIONS RECONCILED: [] No  [] Yes    INSULIN DRIP:   [x] No   [] Yes    CONSULTATION NEEDED:  [x] No   [] Yes    FAMILY UPDATED:    [] No   [x] Yes    TRANSFER OUT OF ICU:   [x] No   [] Yes    ADDITIONAL PLAN:    Active Problems:    Cardiac arrest ([facility]  Resolved Problems:    * No resolved hospital problems. *     Impression:     Post cardiac arrest  - AICD in place. Will get interrogation to see rhythm prior to cardiac arrest.  - Cardiology consulted. Patient undergoing targeted temperature management with temperature goal of 35.  - Trend troponins.      Bilateral pneumothorax  - Chest tubes in place. Please note that this chart was generated using voice recognition Dragon dictation software. Although every effort was made to ensure the accuracy of this automated transcription, some errors in transcription may have occurred.

## 2021-02-07 NOTE — PLAN OF CARE
Problem: OXYGENATION/RESPIRATORY FUNCTION  Goal: Patient will maintain patent airway  2/6/2021 2049 by Zenon Persons, RCP  Outcome: Ongoing     Problem: OXYGENATION/RESPIRATORY FUNCTION  Goal: Patient will achieve/maintain normal respiratory rate/effort  Description: Respiratory rate and effort will be within normal limits for the patient  2/6/2021 2049 by Zenon Persons, RCP  Outcome: Ongoing     Problem: MECHANICAL VENTILATION  Goal: Patient will maintain patent airway  2/6/2021 2049 by Zenon Persons, RCP  Outcome: Ongoing     Problem: MECHANICAL VENTILATION  Goal: Oral health is maintained or improved  2/6/2021 2049 by Zenon Persons, RCP  Outcome: Ongoing     Problem: MECHANICAL VENTILATION  Goal: ET tube will be managed safely  2/6/2021 2049 by Zenon Persons, RCP  Outcome: Ongoing     Problem: MECHANICAL VENTILATION  Goal: Ability to express needs and understand communication  2/6/2021 2049 by Zenon Persons, RCP  Outcome: Ongoing     Problem: MECHANICAL VENTILATION  Goal: Mobility/activity is maintained at optimum level for patient  2/6/2021 2049 by Zenon Persons, RCP  Outcome: Ongoing

## 2021-02-07 NOTE — PROGRESS NOTES
Comprehensive Nutrition Assessment    Type and Reason for Visit:  Initial, Consult(Vent)    Nutrition Recommendations/Plan:   -Continue NPO status   -Start diet vs nutrition support as able   -If tube feeding is desired, recommend Vital AF 1.2 (Semi-elemental and carb-controlled) @ 30 mL/hr x 24 hrs ~> 864 kcals, 54 gms protein + 2 bottles of Proteinex 2go/d ~> 1072 kcals, 106 gms protein (w/ current rate of propofol ~> 1655 kcals/d)  -Please obtain actual wt as able   -Will monitor nutrition progression     Nutrition Assessment:   Pt admitted d/t cardiac arrest. Pt currently NPO and intubated. Propofol running @ 22.1 mL/hr. Pt noted w/ hypoactive bowel sounds. No wt hx in EMR. Pt noted w/ consistent elevated glucose >250 mg/dL. Tube feed rec's above if needed, will monitor.      Malnutrition Assessment:  Malnutrition Status:  Insufficient data    Context:  Acute Illness     Findings of the 6 clinical characteristics of malnutrition:  Energy Intake:  Mild decrease in energy intake (Comment)  Weight Loss:  Unable to assess     Body Fat Loss:  Unable to assess     Muscle Mass Loss:  Unable to assess    Fluid Accumulation:  1 - Mild Extremities, Generalized   Strength:  Not Performed    Estimated Daily Nutrient Needs:  Energy (kcal):  11-14 ~>2063-7904 kcals/d; Weight Used for Energy Requirements:  Current     Protein (g):  1.5-2.0 gm/kg ~> 110-146 gms/d; Weight Used for Protein Requirements:  Ideal          Nutrition Related Findings:  hypoactive bowel sounds; glucose 299      Wounds:  None       Current Nutrition Therapies:    Diet NPO Effective Now  Additional Calorie Sources:   propofol @ 22.1 mL/hr ~> 583 kcals/d    Anthropometric Measures:  · Height: 5' 9\" (175.3 cm)  · Current Body Weight: 270 lb (122.5 kg)(estimated)   · Ideal Body Weight: 160 lbs; % Ideal Body Weight 168.8 %   · BMI: 39.9   · BMI Categories: Obese Class 2 (BMI 35.0 -39.9)       Nutrition Diagnosis: · Inadequate oral intake related to impaired respiratory function as evidenced by NPO or clear liquid status due to medical condition, intubation      Nutrition Interventions:   Food and/or Nutrient Delivery:  Continue NPO(Start diet vs nutrition support as able)  Nutrition Education/Counseling:  Education not indicated   Coordination of Nutrition Care:  Continue to monitor while inpatient    Goals: Set   Start diet vs nutrition support within 24-72 hrs       Nutrition Monitoring and Evaluation:   Food/Nutrient Intake Outcomes:  Diet Advancement/Tolerance  Physical Signs/Symptoms Outcomes:  Biochemical Data, Nutrition Focused Physical Findings, Skin, Weight, GI Status, Fluid Status or Edema     Discharge Planning:     Too soon to determine     Electronically signed by Surya Myers RD, LD on 2/7/21 at 10:33 AM EST    Contact: [phone]

## 2021-02-07 NOTE — PLAN OF CARE
Problem: MECHANICAL VENTILATION  Goal: Oral health is maintained or improved  2/7/2021 0448 by Jeannine Parsons RN  Outcome: Met This Shift  2/6/2021 2049 by Jewel Louise RCP  Outcome: Ongoing  2/6/2021 1844 by Matthew Leroy RCP  Outcome: Ongoing  Goal: ET tube will be managed safely  2/7/2021 0448 by Jeannine Parsons RN  Outcome: Met This Shift  2/6/2021 2049 by Jewel Louise RCP  Outcome: Ongoing  2/6/2021 1844 by Matthew Leroy RCP  Outcome: Ongoing     Problem: Skin Integrity - Impaired:  Goal: Will show no infection signs and symptoms  Description: Will show no infection signs and symptoms  Outcome: Met This Shift  Goal: Absence of new skin breakdown  Description: Absence of new skin breakdown  Outcome: Met This Shift     Problem: Falls - Risk of:  Goal: Will remain free from falls  Description: Will remain free from falls  Outcome: Met This Shift  Goal: Absence of physical injury  Description: Absence of physical injury  Outcome: Met This Shift     Problem: MECHANICAL VENTILATION  Goal: ET tube will be managed safely  2/7/2021 0448 by Jeannine Parsons RN  Outcome: Met This Shift  2/6/2021 2049 by Jewel Louise RCP  Outcome: Ongoing  2/6/2021 1844 by Matthew Leroy RCP  Outcome: Ongoing     Problem: OXYGENATION/RESPIRATORY FUNCTION  Goal: Patient will maintain patent airway  2/7/2021 0448 by Jeannine Parsons RN  Outcome: Ongoing  2/6/2021 2049 by Jewel Louise RCP  Outcome: Ongoing  2/6/2021 1844 by Matthew Leroy RCP  Outcome: Ongoing  Goal: Patient will achieve/maintain normal respiratory rate/effort  Description: Respiratory rate and effort will be within normal limits for the patient  2/7/2021 0448 by Jeannine Parsons RN  Outcome: Ongoing  2/6/2021 2049 by Jewel Louise RCP  Outcome: Ongoing  2/6/2021 1844 by Matthew Leroy RCP  Outcome: Ongoing     Problem: MECHANICAL VENTILATION  Goal: Patient will maintain patent airway  2/7/2021 0448 by Jeannine Parsons RN  Outcome: Ongoing 2/6/2021 2049 by Ashley Carrillo RCP  Outcome: Ongoing  2/6/2021 1844 by Krystin Colin RCP  Outcome: Ongoing  Goal: Ability to express needs and understand communication  2/7/2021 0448 by Jason Rubio RN  Outcome: Ongoing  2/6/2021 2049 by Ashley Carrillo RCP  Outcome: Ongoing  2/6/2021 1844 by Krystin Colin RCP  Outcome: Ongoing  Goal: Mobility/activity is maintained at optimum level for patient  2/7/2021 0448 by Jason Rubio RN  Outcome: Ongoing  2/6/2021 2049 by Ashley Carrillo RCP  Outcome: Ongoing  2/6/2021 1844 by Krystin Colin RCP  Outcome: Ongoing     Problem: SKIN INTEGRITY  Goal: Skin integrity is maintained or improved  2/7/2021 0448 by Jason Rubio RN  Outcome: Ongoing  2/6/2021 1844 by Krystin Colin RCP  Outcome: Ongoing     Problem: NUTRITION  Goal: Nutritional status is improving  Outcome: Ongoing     Problem: Discharge Planning:  Goal: Participates in care planning  Description: Participates in care planning  Outcome: Ongoing  Goal: Discharged to appropriate level of care  Description: Discharged to appropriate level of care  Outcome: Ongoing     Problem: Airway Clearance - Ineffective:  Goal: Ability to maintain a clear airway will improve  Description: Ability to maintain a clear airway will improve  Outcome: Ongoing     Problem: Aspiration:  Goal: Absence of aspiration  Description: Absence of aspiration  Outcome: Ongoing     Problem: Cardiac Output - Decreased:  Goal: Hemodynamic stability will improve  Description: Hemodynamic stability will improve  Outcome: Ongoing     Problem: Fluid Volume - Imbalance:  Goal: Absence of imbalanced fluid volume signs and symptoms  Description: Absence of imbalanced fluid volume signs and symptoms  Outcome: Ongoing     Problem: Gas Exchange - Impaired:  Goal: Levels of oxygenation will improve  Description: Levels of oxygenation will improve  Outcome: Ongoing     Problem: Pain:  Goal: Pain level will decrease Description: Pain level will decrease  Outcome: Ongoing  Goal: Recognizes and communicates pain  Description: Recognizes and communicates pain  Outcome: Ongoing  Goal: Control of acute pain  Description: Control of acute pain  Outcome: Ongoing  Goal: Control of chronic pain  Description: Control of chronic pain  Outcome: Ongoing     Problem: Serum Glucose Level - Abnormal:  Goal: Ability to maintain appropriate glucose levels will improve to within specified parameters  Description: Ability to maintain appropriate glucose levels will improve to within specified parameters  Outcome: Ongoing     Problem: Tissue Perfusion, Altered:  Goal: Circulatory function within specified parameters  Description: Circulatory function within specified parameters  Outcome: Ongoing

## 2021-02-07 NOTE — FLOWSHEET NOTE
[address] representative at bedside, AICD interrogated and paperwork placed in paper chart.      Electronically signed by Irene Roy RN on 2/7/2021 at 11:21 AM

## 2021-02-07 NOTE — CONSULTS
[facility]  Consult    Patient's Name/Date of Birth: Elly Wang / 1950 (69 y.o.)    Date: February 7, 2021     Chief Complaint:   Chief Complaint   Patient presents with    Cardiac Arrest       HPI: Elly Wang is a 65 y.o.  male who was a witness cardiac arrest. EMS called and patient was PEA. CPR initiated in field. ROSC after 15 min of CPR. Cardiothoracic consulted due to airleak in chest tube and crepitus at site. ROS:   ERICA    No past medical history on file. No past surgical history on file. Not on File  No family history on file.   Social History     Socioeconomic History    Marital status:      Spouse name: Not on file    Number of children: Not on file    Years of education: Not on file    Highest education level: Not on file   Occupational History    Not on file   Social Needs    Financial resource strain: Not on file    Food insecurity     Worry: Not on file     Inability: Not on file    Transportation needs     Medical: Not on file     Non-medical: Not on file   Tobacco Use    Smoking status: Not on file   Substance and Sexual Activity    Alcohol use: Not on file    Drug use: Not on file    Sexual activity: Not on file   Lifestyle    Physical activity     Days per week: Not on file     Minutes per session: Not on file    Stress: Not on file   Relationships    Social connections     Talks on phone: Not on file     Gets together: Not on file     Attends [employer] service: Not on file     Active member of club or organization: Not on file     Attends meetings of clubs or organizations: Not on file     Relationship status: Not on file    Intimate partner violence     Fear of current or ex partner: Not on file     Emotionally abused: Not on file     Physically abused: Not on file     Forced sexual activity: Not on file   Other Topics Concern    Not on file   Social History Narrative    Not on file  albuterol (PROVENTIL) nebulizer solution 2.5 mg  2.5 mg Nebulization As Directed RT PRN Dima Medina MD        methylPREDNISolone sodium (SOLU-MEDROL) injection 40 mg  40 mg Intravenous Q12H Dima Downs MD   40 mg at 02/07/21 0629    midazolam (VERSED) 1 mg/mL in D5W infusion  1-10 mg/hr Intravenous Continuous Juan Blank DO   Stopped at 02/06/21 1808       Physical Exam:  Vitals:    02/07/21 1131   BP:    Pulse: (!) 47   Resp: 20   Temp:    SpO2: 100%     Weight: Weight: 270 lb (122.5 kg)    Weight: 270 lb (122.5 kg)          HEENT:  Normocephalic and atraumatic. PERRL. EOMI. Lips and oral mucosa moist and without lesions. Neck:  Supple. Trachea midline. Chest:  No abnormality. Equal and symmetric expansion with respiration. Lungs:  Clear to auscultation. Cardiac:  Regular rate and rhythm without murmurs, rubs or gallops. Abdomen:  Soft, non-tender, normoactive bowel sounds. No masses or organomegaly. Extremities:  No cyanosis, clubbing, or edema. Intact pulses in all four extremities. Musculoskeletal:  Intact range of motion of peripheral joints. Normal muscular strength. Neurologic:  Cranial nerves are grossly intact. Non-focal sensory deficits on exam.        Imaging Studies:      CT:   1.  Bilateral chest tubes in place, abutting the mediastinal pleura. Persistent small pneumothoraces. Assessment & Plan:  Patient Active Problem List   Diagnosis    Card[facility] ([facility]    Pneumothorax    COPD (chronic obstructive pulmonary disease) ([facility]    CAD (coronary artery disease)    HFrEF (heart failure with reduced ejection fraction) ([facility])    AICD (automatic cardioverter/defibrillator) present    CKD (chronic kidney disease) stage 3, GFR 30-59 ml/min     PLAN:  Please keep chest tubes to suction at this time. We will evaluate daily for chest tube intervention. Managed by primary team at this time.        MARZENA GRACE, CNP Phone: [phone]

## 2021-02-07 NOTE — FLOWSHEET NOTE
RN called [employer] to interrogate patient AICD. Local representative to call RN back.     [employer] [phone]    AICD # [id number]    Electronically signed by Jan Mora RN on 2/7/2021 at 7:33 AM

## 2021-02-07 NOTE — PLAN OF CARE
Patient was having bilateral air leaks and right-sided subcutaneous emphysema with crepitus. Patient has bilateral chest tubes placed. CT surgery was consulted. Met with Dr. Mary Beth Rico and CT surgery resident in MICU and went over the CT chest and chest x-ray. Dr. Mary Beth Rico recommended keeping the chest tubes on suction. We will continue to follow. Holly Venegas MD  Internal Medicine Resident, PGY-1  [facility];  [place], [place]  2/7/2021, 02:12 PM

## 2021-02-07 NOTE — PROGRESS NOTES
Insert Arterial Line  Date/Time:  02/06/21, 8:36 PM  Performed by: Gunnar Carson    Patient identity confirmed: arm band and provided demographic data   Time out: Immediately prior to procedure a \"time out\" was called to verify the correct patient, procedure, equipment, support staff. Preparation: Patient was prepped and draped in the usual sterile fashion.     Location:right radial    Rito's test normal: yes  Needle gauge: 20     Number of attempts: 1  Post-procedure: transparent dressing applied and line secured    Patient tolerance: well

## 2021-02-07 NOTE — FLOWSHEET NOTE
Rewarming to 36.5 degrees Celsius started, utilizing a rate of 0.25 degrees Celsius per hour.       Electronically signed by Yamile Bhagat RN on 2/7/2021 at 6:08 PM

## 2021-02-07 NOTE — CONSULTS
[facility]   Consult Note         Today's Date: 2/7/2021  Patient Name: Rey Ceja  Date of admission: 2/6/2021  1:04 PM  Patient's age: 65 y.o., 1950  Admission Dx: Cardiac arrest [facility]) [I46.9]    Reason for Consult:  Cardiac evaluation    Requesting Physician: Billy Singh MD    REASON FOR CONSULT:  PEA arrest    History Obtained From:  Patient, chart, staff, records    HISTORY OF PRESENT ILLNESS:      The patient is a 65 y.o. male with PMH of CAD s/p 3 stents, ICM (EF 42%, 4/20) s/p AICD, CKD who is admitted to the ICU after having a witnessed fall by family. EMS was called, noted to be in PEA arrest.  Patient underwent 3 rounds of CPR, ROSC achieved after roughly 15 minutes. Patient was life flighted to [place].  Patient found to have bilateral pneumothorax, chest tube placed. Patient intubated, sedated. Hypothermia protocol started. Quattro catheter also inserted. On admission, patient was hyperkalemic with K of 6.0. Troponin 56-60-60-44. ProBNP 5615. EKG showed normal sinus rhythm, first-degree AV block. Echocardiogram ordered, not yet completed. Pacer interrogation to be done today. On examination, patient is intubated, sedated, paralyzed. No pressor support. Past Medical History:   has no past medical history on file. Past Surgical History:   has no past surgical history on file. Home Medications:    Prior to Admission medications    Not on File     artificial tears, , Both Eyes, 4x Daily    insulin lispro, 0-12 Units, Subcutaneous, 4x Daily AC & HS    chlorhexidine, 15 mL, Mouth/Throat, BID    famotidine (PEPCID) injection, 20 mg, Intravenous, Daily    methylPREDNISolone, 40 mg, Intravenous, Q12H      Allergies:  Patient has no allergy information on record. Social History:        Family History: family history is not on file. No h/o sudden cardiac death.     REVIEW OF SYSTEMS:    · Unable to perform      PHYSICAL EXAM: /70   Pulse (!) 49   Temp (!) 91.4 °F (33 °C) (Core)   Resp 20   Ht 5' 9\" (1.753 m)   Wt 270 lb (122.5 kg)   SpO2 100%   BMI 39.87 kg/m²    Constitutional and General Appearance: Intubated, sedated, paralyzed. Unresponsive. HEENT: PERRL, no cervical lymphadenopathy. No masses palpable. Normal oral mucosa  Respiratory:  · Mechanically ventilated. CTAB.   Cardiovascular:  · The apical impulse is not displaced  · Heart tones are crisp and normal. regular S1 and S2.  · Peripheral pulses are symmetrical and full   Abdomen:   · No masses or tenderness  · Bowel sounds present  Extremities:  ·  No Cyanosis or Clubbing  ·  Lower extremity edema: No  ·  Skin: Warm and dry      Labs:     CBC:   Recent Labs     02/06/21  1329 02/07/21  0615   WBC 9.3 4.7   HGB 9.4* 8.9*   HCT 32.6* 28.9*    See Reflexed IPF Result     BMP:   Recent Labs     02/07/21  0123 02/07/21  0618    139   K 4.7 4.6   CO2 23 24   BUN 26* 27*   CREATININE 1.50* 1.57*   LABGLOM 46* 44*   GLUCOSE 270* 299*     PT/INR:   Recent Labs     02/06/21  1329   PROTIME 10.8   INR 1.0     APTT:  Recent Labs     02/06/21  1329   APTT 26.0     CARDIAC ENZYMES:  Recent Labs     02/06/21  1329   CKTOTAL 125     LIVER PROFILE:  Recent Labs     02/06/21  1329   AST 35   ALT 32   LABALBU 3.6       Other Current Problems  Patient Active Problem List   Diagnosis    [facility] [address]    Pneumothorax    COPD (chronic obstructive pulmonary disease) [facility] [address]    CAD (coronary artery disease)    HFrEF (heart failure with reduced ejection fraction) [facility] [address]    AICD (automatic cardioverter/defibrillator) present    CKD (chronic kidney disease) stage 3, GFR 30-59 ml/min           IMPRESSION  PEA arrest  CAD s/p 3 stents  CHFrEF (EF 43%, 4/2020) s/p AICD  CKD  COPD      Recommendations:    Continue hypothermia protocol  Follow-up echo   troponins downtrending  ICD to be interrogated today Obtain previous cardiac records and testing, previous cardiac workup is not available at this time  Patient will need ischemic work-up when neurologically improved  Keep K >4, Mag >2      Discussed with patient, family, and Nurse. Electronically signed by Phu Jimenez MD on 2/7/2021 at 8:30 AM       Attending Physician Statement  I have discussed the case of Rahul Fess including pertinent history and exam findings with the resident. I have seen and examined the patient and the key elements of the encounter have been performed by me. I agree with the assessment, plan and orders as documented by the resident With changes made to the note.      Electronically signed by George Olmos MD on 2/7/2021 at 10:54 AM.    [facility]      [phone]

## 2021-02-08 NOTE — PROGRESS NOTES
Ventilator Bronchodilator assessment    Breath sounds: diminished  Inspiratory Pressure: 34  Plateau Pressure: 26    Patient assessed at level 4          []    Bronchodilator Assessment    BRONCHODILATOR ASSESSMENT SCORE  Score 0 (Home) 1 2 3 4   Breath Sounds   []  Chronic Ventilator: Patient at baseline []  Mild Wheezes/ Clear []  Intermittent wheezes with good air entry [x]  Bilateral/unilateral wheezing with diminished air entry []  Insp/Exp wheeze and/or poor aeration   Ventilator Pressures   []  Chronic Ventilator []  Insp. Pressure less than 25 cm H20 []  Insp. Pressure less than 25 cm H20 [x]  Insp. Pressure exceeds 25 cm H20 [x]  Insp.  Pressure exceeds 30 cm H20   Plateau Pressure []  NA   []  Plateau Pressure less than 4  []  Plateau Pressure less than or equal to 5 []  Plateau Pressure greater than or equal to 6 [x]  Plateau Pressure greater than or equal to 8       EDVIN COPPOLA  8:57 AM

## 2021-02-08 NOTE — FLOWSHEET NOTE
MRI ordered per neurology. RN notified MRI of patient's AICD/pacer. MRI tech called [employer] to verify if patient's device was compatible. Per MRI tech, patient's AICD is not MRI compatible. Dr. Bessie Chang (neuro resident) informed via [facility].

## 2021-02-08 NOTE — PROCEDURES
Based on recent Ct scan, right sided chest tube was advanced to far crossing mediastinum. I backed chest tube out about 1.5inches. Pt tolerated well. No respiratory changes on vent. No airleak before and after procedure. We will review CXR this morning/afternoon for improvement of position of CXR and for any signs of pneumothorax.

## 2021-02-08 NOTE — PROGRESS NOTES
Critical Care Team - Daily Progress Note      Date and time: 2021 11:31 AM  Patient's name:  Kari Alvarado  Medical Record Number: [id number]  Patient's account/billing number: [de-identified]  Patient's YOB: 1950  Age: 65 y.o. Date of Admission: 2021  1:04 PM  Length of stay during current admission: 2      Primary Care Physician: Gene Mackenzie MD  ICU Attending Physician: Dr. Niru Pineda Status: Full Code    Reason for ICU admission: PEA arrest      SUBJECTIVE:     OVERNIGHT EVENTS:       Fever:-  Temp (24hrs), Av.8 °F (33.8 °C), Min:91.4 °F (33 °C), Max:95.5 °F (35.3 °C)    Blood pressure: 120/77  Vasopressors: Weaned off from Levophed  Systolic (39ZOS), LAUREN:371 , Min:116 , BJZ:788     Diastolic (30UPD), JYH:31, Min:52, Max:75    Urine output in the last 24 hours: In: 1735.1 [I.V.:1735.1]  Out: 500 [Urine:390]  Patient Vitals for the past 96 hrs (Last 3 readings):   Weight   21 1759 270 lb (122.5 kg)     ABG/Ventilator settings   PH 7.384,PCO2 44.7,PO2 82.4,HCO3 26.6  FIO2 30, RR 20 ,PEEP 2,TIDVOL 500    Overnight no complaints.   Patient sedated on propofol  Patient taken off from nimbex  Airleaks decreased from both the chest tubes  Patient successfully rewarmed    AWAKE & FOLLOWING COMMANDS:  [x] No   [] Yes    CURRENT VENTILATION STATUS:     [x] Ventilator  [] BIPAP  [] Nasal Cannula [] Room Air        SECRETIONS Amount:  [] Small [] Moderate  [] Large  [] None  Color:     [] White [] Colored  [] Bloody    SEDATION:  RAAS Score:  [x] Propofol gtt  [] Versed gtt  [] Ativan gtt   [] No Sedation    PARALYZED:  [x] No    [] Yes    DIARRHEA:                [x] No                [] Yes  (C. Difficile status: [] positive                                                                                                                       [] negative                                                                                                                     [] pending) VASOPRESSORS:  [x] No    [] Yes    If yes -   [] Levophed       [] Dopamine     [] Vasopressin       [] Dobutamine  [] Phenylephrine         [] Epinephrine    CENTRAL LINES:     [] No   [x] Yes   (Date of Insertion:   )           If yes -     [] Right IJ     [] Left IJ [] Right Femoral [] Left Femoral                   [] Right Subclavian [] Left Subclavian       VELIZ'S CATHETER:   [] No   [x] Yes  (Date of Insertion:   )     URINE OUTPUT:            [] Good   [x] Low              [] Anuric      OBJECTIVE:     VITAL SIGNS:  /61   Pulse 83   Temp 95.5 °F (35.3 °C) (Esophageal)   Resp 20   Ht 5' 9\" (1.753 m)   Wt 270 lb (122.5 kg)   SpO2 99%   BMI 39.87 kg/m²   Tmax over 24 hours:  Temp (24hrs), Av.8 °F (33.8 °C), Min:91.4 °F (33 °C), Max:95.5 °F (35.3 °C)      Patient Vitals for the past 6 hrs:   BP Pulse Resp SpO2   21 0738  83 20 99 %   21 0700 139/61 77 20 97 %   21 0645 136/61 77 20 97 %   21 0630 (!) 138/58 75 20 96 %   21 0615  72 20 95 %   21 0600 133/60 71 20 93 %   21 0545  70 20 92 %         Intake/Output Summary (Last 24 hours) at 2021 1131  Last data filed at 2021 0600  Gross per 24 hour   Intake 1735.06 ml   Output 655 ml   Net 1080.06 ml     Wt Readings from Last 2 Encounters:   21 270 lb (122.5 kg)     Body mass index is 39.87 kg/m². PHYSICAL EXAMINATION:    Constitutional: intubated, sedated  HEENT: PERRLA, EOMI, sclera clear, anicteric  Respiratory: clear to auscultation, wheezes present bilaterally. Cardiovascular: regular rate and rhythm, normal S1, S2, no murmur noted and 2+ pulses throughout  Abdomen: soft, nontender, nondistended, no masses or organomegaly  Neurologic: Sedated and intubated. Not withdrawing to pain. Pupils reactive to light. Extremities:  peripheral pulses normal, no pedal edema.     MEDICATIONS:    Scheduled Meds:   ipratropium-albuterol  1 ampule Inhalation 4x daily Last 3 Blood Glucose:   Recent Labs     02/07/21 1953 02/08/21 0156 02/08/21 0642   GLUCOSE 261* 259* 270*        PT/INR:    Lab Results   Component Value Date    PROTIME 10.8 02/06/2021    INR 1.0 02/06/2021     PTT:    Lab Results   Component Value Date    APTT 26.0 02/06/2021       Comprehensive Metabolic Profile:   Recent Labs     02/06/21  1329 02/06/21  1329 02/07/21 1953 02/07/21 1953 02/08/21 0156 02/08/21 0156 02/08/21  0642 02/08/21 0821 02/08/21  1027      < > 133*  --  133*  --  138  --   --    K 6.0*   < > 4.0   < > 4.0  4.1   < > 4.2 4.2 4.1      < > 99  --  99  --  103  --   --    CO2 25   < > 22  --  23  --  22  --   --    BUN 23   < > 29*  --  33*  --  35*  --   --    CREATININE 1.86*   < > 1.50*  --  1.58*  --  1.77*  --   --    GLUCOSE 369*   < > 261*  --  259*  --  270*  --   --    CALCIUM 8.1*   < > 8.6  --  8.5*  --  8.7  --   --    PROT 6.7  --   --   --   --   --   --   --   --    LABALBU 3.6  --   --   --   --   --   --   --   --    BILITOT 0.41  --   --   --   --   --   --   --   --    ALKPHOS 91  --   --   --   --   --   --   --   --    AST 35  --   --   --   --   --   --   --   --    ALT 32  --   --   --   --   --   --   --   --     < > = values in this interval not displayed. Magnesium:   Lab Results   Component Value Date    MG 2.0 02/08/2021     Phosphorus:   Lab Results   Component Value Date    PHOS 3.5 02/08/2021     Ionized Calcium:   Lab Results   Component Value Date    CAION 1.12 02/08/2021        Urinalysis:     Troponin: No results for input(s): TROPONINI in the last 72 hours.     Microbiology:    Cultures during this admission:     Blood cultures:                 [] None drawn      [] Negative             []  Positive (Details:  )  Urine Culture:                   [] None drawn      [] Negative             []  Positive (Details:  )  Sputum Culture:               [] None drawn       [] Negative             []  Positive (Details:  ) Endotracheal aspirate:     [] None drawn       [] Negative             []  Positive (Details:  )     Other pertinent Labs:       Radiology/Imaging:     Chest Xray (2/8/2021):    ASSESSMENT:     Active Problems:    Cardiac arrest ([facility] [address]    Pneumothorax    COPD (chronic obstructive pulmonary disease) ([facility])    CAD (coronary artery disease)    HFrEF (heart failure with reduced ejection fraction) ([facility])    AICD (automatic cardioverter/defibrillator) present    CKD (chronic kidney disease) stage 3, GFR 30-59 ml/min  Resolved Problems:    * No resolved hospital problems. *    ·         PLAN:     WEAN PER PROTOCOL:  [] No   [] Yes  [] N/A    DISCONTINUE ANY LABS:   [] No   [] Yes    ICU PROPHYLAXIS:  Stress ulcer:  [] PPI Agent  [] [id number] [] Sucralfate  [] Other:  VTE:   [] Enoxaparin  [] Unfract. Heparin Subcut  [] EPC Cuffs    NUTRITION:  [x] NPO [] Tube Feeding (Specify: ) [] TPN  [] PO (Diet: Diet NPO Effective Now)    HOME MEDICATIONS RECONCILED: [] No  [] Yes    INSULIN DRIP:   [x] No   [] Yes    CONSULTATION NEEDED:  [x] No   [] Yes    FAMILY UPDATED:    [] No   [x] Yes    TRANSFER OUT OF ICU:   [x] No   [] Yes    ADDITIONAL PLAN:    Active Problems:    Cardiac arrest ([facility] [address]  Resolved Problems:    * No resolved hospital problems. *     Impression:     Post cardiac arrest  - AICD in place. Will get interrogation to see rhythm prior to cardiac arrest.  - Cardiology consulted. Patient undergoing targeted temperature management with temperature goal of 35.  - Trend troponins.   -Patient successfully rewarmed.  -We will get neuro critical care to assess neurological improvement  -Cardiology to get ICD interrogation and ischemic work-up after neurological improvement.     Bilateral pneumothorax  - Chest tubes in place. -CT surgery contacted with concerns of air leaks and right-sided subcutaneous emphysema.   CT surgery recommended keeping chest tubes on suction  -We will get repeat chest x-ray.     COPD - Will start IV solumedrol 40mg BiD  - Supportive management. Bronchodilators.     Atherosclerotic coronary artery disease with severe multivessel disease -not amenable for PCI or CABG on cardiac catheterization March 2019.       Ischemic cardiomyopathy with severe LV systolic dysfunction with LVEF 25% on cardiac catheterization March 2019 and with improved LVEF 43% on most recent 2D echocardiogram April 2020. Status post [employer] AICD.      Chronic kidney disease stage 3.   - Baseline likely 1.7-2.     DVT ppx: none  GI ppx: Pepcid  Diet: NPO  Isolation: Not indicated      La Thomas M.D.              Critical care resident, PGY-1  Department of Internal Medicine/ Critical care  [facility]             2/8/2021, 11:31 AM

## 2021-02-08 NOTE — PROGRESS NOTES
Physician Progress Note      Shawn Ann  CSN #:                  [id number]  :                       1950  ADMIT DATE:       2021 1:04 PM  [address] DATE:  RESPONDING  PROVIDER #:        Jaki Dobbs MD          QUERY TEXT:    Pt admitted with post cardiac arrest.  Pt noted to have pneumothorax,   bilateral chest tubes, and ventilator. If possible, please document in the   progress notes and discharge summary if you are evaluating and/or treating any   of the following: The medical record reflects the following:  Risk Factors: COPD s/p Cardiac PEA arrest  Clinical Indicators: ABG's pH 7.051, PC02 117.2 Hx of COPD on home oxygen of   3l/nc 24/7 per ED physician notes. Pt c/o SOB last several days. Per ED notes:   Needle decompression was performed in bilateral chest, and subsequent chest   tubes were placed with improvement in SPO2 from low 80s to 100%. CXR with   Patchy left opacity in the left lung base. Treatment: Vent management, IV Solumedrol, ICU, CXR, labs/monitoring    Thank-you,  Hector Travis RN, CDS  [email or web address]  Options provided:  -- Acute on chronic respiratory failure with hypoxia & hypercapnia  -- Acute on chronic respiratory failure with hypoxia  -- Acute on chronic respiratory failure with hypercapnia  -- Other - I will add my own diagnosis  -- Disagree - Not applicable / Not valid  -- Disagree - Clinically unable to determine / Unknown  -- Refer to Clinical Documentation Reviewer    PROVIDER RESPONSE TEXT:    This patient is in acute on chronic respiratory failure with hypoxia &   hypercapnia.     Query created by: Beth Mitchell on 2021 7:44 AM      Electronically signed by:  Jaki Dobbs MD 2021 7:59 AM

## 2021-02-08 NOTE — PROGRESS NOTES
[facility] Cardiothoracic Surgery  Progress Note    2/8/2021 9:54 AM       Subjective:  Mr. Tricia Manley. Guarded status. Cooling measures in place. ICU status. Objective:  /61   Pulse 83   Temp 95.5 °F (35.3 °C) (Esophageal)   Resp 20   Ht 5' 9\" (1.753 m)   Wt 270 lb (122.5 kg)   SpO2 99%   BMI 39.87 kg/m²   Chest: pacing wires: no, chest tubes:yes, air leak no, 2 +  CV: no murmur noted, Normal S1, S2,   Lungs: clear to auscultation, no wheezes, rales, or rhonchi  Abd: normal bowel sounds   Lower Extremities: Trace edema    CXR-pending review    Labs:   CBC:   Recent Labs     02/06/21  1329 02/07/21  0615 02/08/21  0642   WBC 9.3 4.7 10.9   HGB 9.4* 8.9* 8.8*   HCT 32.6* 28.9* 27.9*   MCV 92.6 87.3 85.8    See Reflexed IPF Result 146     BMP:   Recent Labs     02/07/21  1330 02/07/21  1953 02/07/21  1953 02/08/21  0156 02/08/21  0423 02/08/21  0642 02/08/21  0821    133*  --  133*  --  138  --    K 4.1 4.0   < > 4.0  4.1 4.1 4.2 4.2    99  --  99  --  103  --    CO2 22 22  --  23  --  22  --    PHOS 3.4 3.6  --  3.5  --   --   --    BUN 28* 29*  --  33*  --  35*  --    CREATININE 1.55* 1.50*  --  1.58*  --  1.77*  --     < > = values in this interval not displayed. I/O: I/O last 3 completed shifts:   In: 2157.1 [I.V.:2157.1]  Out: 845 [Urine:735; Emesis/NG output:50; Chest Tube:60]  Scheduled Meds:   ipratropium-albuterol  1 ampule Inhalation 4x daily    [START ON 2/9/2021] albuterol  2.5 mg Nebulization BID    artificial tears   Both Eyes 4x Daily    insulin glargine  10 Units Subcutaneous Nightly    famotidine (PEPCID) injection  20 mg Intravenous BID    insulin lispro  0-12 Units Subcutaneous Q6H    chlorhexidine  15 mL Mouth/Throat BID    methylPREDNISolone  40 mg Intravenous Q12H     Continuous Infusions:   dextrose      sodium chloride 50 mL/hr at 02/08/21 4286    propofol 40 mcg/kg/min (02/08/21 6740)  cisatracurium (NIMBEX) infusion Stopped (02/08/21 1162)    midazolam Stopped (02/06/21 8613)     PRN Meds:sodium phosphate IVPB, hydroxypropyl methylcellulose, glucose, dextrose, glucagon (rDNA), dextrose, promethazine **OR** ondansetron, albuterol      Assessment/ Plan:  Right sided chest tube was over advanced perpendicular crossing mediastinum-noted no airleak   Measured on CT scan first islet to skin was about 60mm   Backed chest tube out about 1.5inches and retied suture    No airleak after intervention  Bedside CXR ordered.   We will continue to follow along   Daily CXRs  Strict CT output monitoring from chest tube          [facility], APRN - NP

## 2021-02-08 NOTE — PLAN OF CARE
Problem: MECHANICAL VENTILATION  Goal: Oral health is maintained or improved  2/8/2021 0044 by Zeeshan Naik RN  Outcome: Met This Shift  2/7/2021 1958 by Tia Carreno RCP  Outcome: Ongoing  2/7/2021 1542 by Juan Witt RN  Outcome: Ongoing  Goal: ET tube will be managed safely  2/8/2021 0044 by Zeeshan Naki RN  Outcome: Met This Shift  2/7/2021 1958 by Tia Carreno RCP  Outcome: Ongoing  2/7/2021 1542 by Juan Witt RN  Outcome: Ongoing     Problem: SKIN INTEGRITY  Goal: Skin integrity is maintained or improved  2/8/2021 0044 by Zeeshan Naik RN  Outcome: Met This Shift  2/7/2021 1958 by Tia Carreno RCP  Outcome: Ongoing  2/7/2021 1542 by Juan Witt RN  Outcome: Ongoing     Problem: Aspiration:  Goal: Absence of aspiration  Description: Absence of aspiration  2/8/2021 0044 by Zeeshan Naik RN  Outcome: Met This Shift  2/7/2021 1958 by Tia Carreno RCP  Outcome: Ongoing  2/7/2021 1542 by Juan Witt RN  Outcome: Ongoing     Problem: Skin Integrity - Impaired:  Goal: Will show no infection signs and symptoms  Description: Will show no infection signs and symptoms  2/8/2021 0044 by Zeeshan Naik RN  Outcome: Met This Shift  2/7/2021 1542 by Juan Witt RN  Outcome: Ongoing  Goal: Absence of new skin breakdown  Description: Absence of new skin breakdown  2/8/2021 0044 by Zeeshan Naik RN  Outcome: Met This Shift  2/7/2021 1542 by Juan Witt RN  Outcome: Ongoing     Problem: Falls - Risk of:  Goal: Will remain free from falls  Description: Will remain free from falls  2/8/2021 0044 by Zeeshan Naik RN  Outcome: Met This Shift  2/7/2021 1542 by Juan Witt RN  Outcome: Ongoing  Goal: Absence of physical injury  Description: Absence of physical injury  2/8/2021 0044 by Zeeshan Naik, RN  Outcome: Met This Shift  2/7/2021 1542 by Juan Witt RN  Outcome: Ongoing     Problem: Skin Integrity: Goal: Participates in care planning  Description: Participates in care planning  2/8/2021 0044 by Ulises Johnston RN  Outcome: Ongoing  2/7/2021 1542 by Jan Mora RN  Outcome: Ongoing  Goal: Discharged to appropriate level of care  Description: Discharged to appropriate level of care  2/8/2021 0044 by Ulises Johnston RN  Outcome: Ongoing  2/7/2021 1542 by Jan Mora RN  Outcome: Ongoing     Problem: Airway Clearance - Ineffective:  Goal: Ability to maintain a clear airway will improve  Description: Ability to maintain a clear airway will improve  2/8/2021 0044 by Ulises Johnston RN  Outcome: Ongoing  2/7/2021 1958 by Haleigh Valdez RCP  Outcome: Ongoing  2/7/2021 1542 by Jan Mora RN  Outcome: Ongoing     Problem: Cardiac Output - Decreased:  Goal: Hemodynamic stability will improve  Description: Hemodynamic stability will improve  2/8/2021 0044 by Ulises Johnston RN  Outcome: Ongoing  2/7/2021 1542 by Jan Mora RN  Outcome: Ongoing     Problem: Fluid Volume - Imbalance:  Goal: Absence of imbalanced fluid volume signs and symptoms  Description: Absence of imbalanced fluid volume signs and symptoms  2/8/2021 0044 by Ulises Johnston RN  Outcome: Ongoing  2/7/2021 1542 by Jan Mora RN  Outcome: Ongoing     Problem: Gas Exchange - Impaired:  Goal: Levels of oxygenation will improve  Description: Levels of oxygenation will improve  2/8/2021 0044 by Ulises Johnston RN  Outcome: Ongoing  2/7/2021 1958 by Haleigh Valdez RCP  Outcome: Ongoing  2/7/2021 1542 by Jan Mora RN  Outcome: Ongoing     Problem: Pain:  Goal: Pain level will decrease  Description: Pain level will decrease  2/8/2021 0044 by Ulises Johnston RN  Outcome: Ongoing  2/7/2021 1542 by Jan Mora RN  Outcome: Ongoing  Goal: Recognizes and communicates pain  Description: Recognizes and communicates pain  2/8/2021 0044 by Ulises Johnston RN  Outcome: Ongoing 2/7/2021 1542 by Kike Ren RN  Outcome: Ongoing  Goal: Control of acute pain  Description: Control of acute pain  2/8/2021 0044 by Kale Burkett RN  Outcome: Ongoing  2/7/2021 1542 by Kike Ren RN  Outcome: Ongoing  Goal: Control of chronic pain  Description: Control of chronic pain  2/8/2021 0044 by Kale Burkett RN  Outcome: Ongoing  2/7/2021 1542 by Kike Ren RN  Outcome: Ongoing     Problem: Serum Glucose Level - Abnormal:  Goal: Ability to maintain appropriate glucose levels will improve to within specified parameters  Description: Ability to maintain appropriate glucose levels will improve to within specified parameters  2/8/2021 0044 by Kale Burkett RN  Outcome: Ongoing  2/7/2021 1542 by Kike Ren RN  Outcome: Ongoing     Problem: Tissue Perfusion, Altered:  Goal: Circulatory function within specified parameters  Description: Circulatory function within specified parameters  2/8/2021 0044 by Kale Burkett RN  Outcome: Ongoing  2/7/2021 1542 by Kike Ren RN  Outcome: Ongoing     Problem: Nutrition  Goal: Optimal nutrition therapy  Description: Nutrition Problem #1: Inadequate oral intake  Intervention: Food and/or Nutrient Delivery: Continue NPO(Start diet vs nutrition support as able)  Nutritional Goals: Start diet vs nutrition support within 24-72 hrs     2/7/2021 1542 by Kike Ren RN  Outcome: Ongoing

## 2021-02-08 NOTE — PLAN OF CARE
Problem: OXYGENATION/RESPIRATORY FUNCTION  Goal: Patient will maintain patent airway  2/7/2021 1958 by Jesus Chang RCP  Outcome: Ongoing     Problem: OXYGENATION/RESPIRATORY FUNCTION  Goal: Patient will achieve/maintain normal respiratory rate/effort  Description: Respiratory rate and effort will be within normal limits for the patient  2/7/2021 1958 by Jesus Chang RCP  Outcome: Ongoing     Problem: MECHANICAL VENTILATION  Goal: Patient will maintain patent airway  2/7/2021 1958 by Jesus Chang RCP  Outcome: Ongoing     Problem: MECHANICAL VENTILATION  Goal: Oral health is maintained or improved  2/7/2021 1958 by Jesus Chnag RCP  Outcome: Ongoing     Problem: MECHANICAL VENTILATION  Goal: ET tube will be managed safely  2/7/2021 1958 by Jesus Chang RCP  Outcome: Ongoing     Problem: MECHANICAL VENTILATION  Goal: Ability to express needs and understand communication  2/7/2021 1958 by Jesus Chang RCP  Outcome: Ongoing     Problem: MECHANICAL VENTILATION  Goal: Mobility/activity is maintained at optimum level for patient  2/7/2021 1958 by Jesus Chang RCP  Outcome: Ongoing     Problem: SKIN INTEGRITY  Goal: Skin integrity is maintained or improved  2/7/2021 1958 by Jesus Chang RCP  Outcome: Ongoing     Problem: Airway Clearance - Ineffective:  Goal: Ability to maintain a clear airway will improve  Description: Ability to maintain a clear airway will improve  2/7/2021 1958 by Jesus Chang RCP  Outcome: Ongoing     Problem: Aspiration:  Goal: Absence of aspiration  Description: Absence of aspiration  2/7/2021 1958 by Jesus Chang RCP  Outcome: Ongoing     Problem: Gas Exchange - Impaired:  Goal: Levels of oxygenation will improve  Description: Levels of oxygenation will improve  2/7/2021 1958 by Jesus Chang RCP  Outcome: Ongoing

## 2021-02-08 NOTE — CONSULTS
Neuro Critical Care Consult Note    Reason for Consult:  Post cardiac arrest  Requesting Physician:  Dr. Simona Rose  Attending Physician: Dr. Villalobos [facility]    History Obtained From:  Kaylyn Tristan record    CHIEF COMPLAINT:       Cardiac Arrest    HISTORY OF PRESENT ILLNESS:       The patient is a 65 y.o. male with history of COPD, CAD s/p AICD placement, CHF with EF 25% who presents with cardiac arrest.  Patient was having shortness of breath for a few days prior to presentation, had a witnessed by family member, EMS was notified. On arrival, patient was in PEA, ACLS was started, ROSC was achieved after 15 minutes after receiving epinephrine x2. Patient was life flighted to [facility], patient was found to have bilateral pneumothoraces, received bilateral chest tubes. Patient was admitted to the medical ICU, therapeutic cooling was started, sedated on propofol and paralyzed on Nimbex. Echocardiogram was conducted on 2/6/2021, significant for EF 30%. Patient was rewarmed, Nimbex discontinued, continued sedation with propofol. Neuro critical care was consulted for prognostic patient status post cardiac arrest.    On exam, patient is intubated, sedated on propofol, paused propofol for the exam.       PAST MEDICAL HISTORY :       Past Medical History:    No past medical history on file. Past Surgical History:    No past surgical history on file.     Social History:   Social History     Socioeconomic History    Marital status:      Spouse name: Not on file    Number of children: Not on file    Years of education: Not on file    Highest education level: Not on file   Occupational History    Not on file   Social Needs    Financial resource strain: Not on file    Food insecurity     Worry: Not on file     Inability: Not on file    Transportation needs     Medical: Not on file     Non-medical: Not on file   Tobacco Use    Smoking status: Not on file Substance and Sexual Activity    Alcohol use: Not on file    Drug use: Not on file    Sexual activity: Not on file   Lifestyle    Physical activity     Days per week: Not on file     Minutes per session: Not on file    Stress: Not on file   Relationships    Social connections     Talks on phone: Not on file     Gets together: Not on file     Attends Confucianism service: Not on file     Active member of club or organization: Not on file     Attends meetings of clubs or organizations: Not on file     Relationship status: Not on file    Intimate partner violence     Fear of current or ex partner: Not on file     Emotionally abused: Not on file     Physically abused: Not on file     Forced sexual activity: Not on file   Other Topics Concern    Not on file   Social History Narrative    Not on file       Family History:   No family history on file. Allergies:  Patient has no allergy information on record.     Home Medications:  Prior to Admission medications    Not on File       Current Medications:   Current Facility-Administered Medications: ipratropium-albuterol (DUONEB) nebulizer solution 1 ampule, 1 ampule, Inhalation, 4x daily  [START ON 2/9/2021] albuterol (PROVENTIL) nebulizer solution 2.5 mg, 2.5 mg, Nebulization, BID  labetalol (NORMODYNE;TRANDATE) injection 10 mg, 10 mg, Intravenous, Q3H PRN  sodium phosphate 10 mmol in dextrose 5 % 250 mL IVPB, 10 mmol, Intravenous, PRN  hydroxypropyl methylcellulose (GONIOSOL) 2.5 % ophthalmic solution 1 drop, 1 drop, Both Eyes, 4x Daily PRN  lubrifresh P.M. (artificial tears) ophthalmic ointment, , Both Eyes, 4x Daily  glucose (GLUTOSE) 40 % oral gel 15 g, 15 g, Oral, PRN  dextrose 50 % IV solution, 12.5 g, Intravenous, PRN  glucagon (rDNA) injection 1 mg, 1 mg, Intramuscular, PRN  dextrose 5 % solution, 100 mL/hr, Intravenous, PRN  insulin glargine (LANTUS) injection vial 10 Units, 10 Units, Subcutaneous, Nightly EXAMINATION: ONE XRAY VIEW OF THE CHEST 2/7/2021 1:00 pm COMPARISON: CT and chest radiographs yesterday. HISTORY: ORDERING SYSTEM PROVIDED HISTORY: bilateral chest tubes TECHNOLOGIST PROVIDED HISTORY: bilateral chest tubes Reason for Exam: supine FINDINGS: Bilateral chest tubes in place. The right chest tube appears retracted since the prior chest radiograph with the tip projecting at the level of the mediastinal pleura. Left chest tube positioning appears unchanged, abutting the superior left mediastinal pleura. Pneumothoraces remain visible, left greater than right. Subcutaneous emphysema is more extensive overlying the right chest compared to prior exams. The endotracheal tube terminates in appropriate position. The enteric tube courses off the field of view in the upper abdomen. The cardiac and mediastinal contours appear unchanged. Left subclavian ICD in place. 1.  Bilateral chest tubes in place, as described. Persistent small pneumothoraces, left greater than right. 2.  Subcutaneous emphysema has increased on the right side. 3.  Endotracheal tube remains in appropriate position. The enteric tube courses off the field of view in the upper abdomen.      Xr Chest Portable    Result Date: 2/7/2021 EXAMINATION: ONE X-RAY VIEW OF THE CHEST 2/6/2021 2:04 pm COMPARISON: None HISTORY: ORDERING SYSTEM PROVIDED history: post arrest, intubated, chest tube placement. TECHNOLOGIST PROVIDED HISTORY: Post arrest, intubated, chest tube placement. Reason for Exam:  Portable, supine,  image. Initial encounter FINDINGS: Endotracheal tube tip is 8.2 cm above the annie. Enteric tube courses below the diaphragm. Bilateral thoracostomy tubes are noted. The right thoracostomy tube side-port is just external to the right rib cage. Subcutaneous emphysema is noted. There is a 2.4 cm radiopaque density projecting over the right hemithorax which could reflect a fractured chest tube component but is difficult to say if this is external to the patient. There is cardiomegaly. No overt pulmonary edema. Patchy opacity in the left lower lung. There is a trace right lateral pneumothorax. No obvious left pneumothorax. No definite pleural effusion although the costophrenic angles are excluded on this exam.  Cardiac AICD is noted. Questionable 7th right rib fracture. Age-indeterminate left 2nd through 6th rib fractures favoring subacute to chronic etiology. 1. Endotracheal tube tip is 8.2 cm above the anine. 2. Bilateral thoracostomy tubes are noted. The right thoracostomy tube proximal side-port is just external to the ribcage. Subcutaneous emphysema is noted with a trace right lateral pneumothorax. There is also a 2.4 cm radiopaque density projecting over the right lower lung which has the appearance of a fractured thoracostomy tube. Recommend correlation if this is external to the patient. 3. Patchy left opacity in the left lung base. 4. Questionable acute 7th right rib fracture. Age-indeterminate left 2nd through 6th rib fractures are noted favoring subacute to chronic etiology.      Xr Chest Portable    Result Date: 2/6/2021 EXAMINATION: ONE XRAY VIEW OF THE CHEST 2/6/2021 2:47 pm COMPARISON: 02/06/2021, earlier today HISTORY: ORDERING SYSTEM PROVIDED HISTORY: Chest tube placement TECHNOLOGIST PROVIDED HISTORY: Chest tube placement Reason for Exam: supine port chest, check rt chest tube placement FINDINGS: Endotracheal tube tip is 6.3 cm above the annie. The enteric tube courses below the diaphragm. The left thoracostomy tube is stable in positioning. The right thoracostomy tube has been advanced. The tip projects over the left of midline. Again noted is a 2.4 cm radiopaque density projecting over the right lung base has slightly moved medially and is likely external to the patient. Cardiac AICD is stable in positioning. Stable cardiac silhouette. Rib fractures are otherwise stable. Subcutaneous emphysema. Previously noted right lateral pneumothorax is not well seen on this exam.  No obvious pneumothorax is otherwise noted. There is a trace right apical pneumothorax. No left pneumothorax identified. Left basilar airspace opacity is less conspicuous on this exam.  The osseous structures otherwise stable. Interval advancement of right thoracostomy tube. The tip projects over the left of midline. Trace right apical pneumothorax. 2.4 cm radiopaque density projecting over the right lung base has move medially in likely is external to the patient.      Ct Chest Abdomen Pelvis Wo Contrast    Result Date: 2/6/2021 EXAMINATION: CT OF THE CHEST, ABDOMEN, AND PELVIS WITHOUT CONTRAST 2/6/2021 2:46 pm TECHNIQUE: CT of the chest, abdomen and pelvis was performed without the administration of intravenous contrast. Multiplanar reformatted images are provided for review. Dose modulation, iterative reconstruction, and/or weight based adjustment of the mA/kV was utilized to reduce the radiation dose to as low as reasonably achievable. COMPARISON: Chest radiograph today HISTORY: ORDERING SYSTEM PROVIDED HISTORY: ptx TECHNOLOGIST PROVIDED HISTORY: ptx Decision Support Exception->Emergency Medical Condition (MA) Reason for Exam: ptx Acuity: Acute Type of Exam: Initial FINDINGS: Chest: Mediastinum: The endotracheal tube terminates above the annie. The enteric tube terminates in the body of the stomach. The heart and great vessels appear normal in size. Calcified atheromatous plaque and coronary calcification. No mediastinal hematoma. No pericardial effusion. Scattered small paratracheal and paraesophageal lymph nodes are noted measuring up to 1 cm adjacent to the right side of the esophagus near the thoracic inlet on axial image 28. Left subclavian ICD in place. Lungs/pleura: Bilateral chest tubes are present. The left chest tube terminates near the apex and abuts the mediastinal pleura. The right chest tube terminates at the midthoracic level, which also abuts the mediastinal pleura. No pneumomediastinum. Persistent small pneumothoraces, left greater than right. Patchy bilateral airspace disease is noted in the right upper lobe and dependent left upper lobe. Sequela of old granulomatous disease is noted with noncalcified nodules measuring up to 7 mm in the anterior right middle lobe on axial image 84, and 5 mm in the left upper lobe on axial image 55. Trace pleural effusions. The central airway appears patent. Soft Tissues/Bones: Old unfused and overlapped healed fractures are present involving the left 2nd-6th ribs.   Old 1.  Bilateral chest tubes in place, abutting the mediastinal pleura. Persistent small pneumothoraces. 2.  Nonspecific patchy airspace disease in the upper lobes may represent multifocal inflammatory process or infection. Trace pleural effusions. 3.  Multiple old left rib fractures and old left scapular body fracture. No acute fracture identified. 4.  Sequela of old granulomatous disease in the chest and noncalcified pulmonary nodules measuring up to 7 mm. While this may represent sequela of old infection, metastatic disease is not excluded. Follow-up imaging in 6-12 months is recommended (in the absence of prior imaging for comparison). 5.  Cholelithiasis. Mild stranding around the gallbladder may represent cholecystitis in the appropriate clinical setting. No biliary dilatation. 6.  Localized skin thickening in the left mid abdomen may represent soft tissue contusion. 7.  Mildly prominent scattered mediastinal lymph nodes for which attention to on follow-up is recommended. ASSESSMENT AND PLAN:       66-year-old male with history of COPD, CAD s/p AICD, CHF, witnessed collapse at home, EMS notified, initial rhythm PEA, ACLS for 15 minutes, epinephrine x2, ROSC, transported to [facility]. Patient found to have bilateral pneumothoraces, bilateral chest tubes inserted. Patient was therapeutically cooled, rewarmed, neuro critical care consulted for prognostication. NEUROLOGIC:  Concern for Anoxic brain injury after cardiac arrest  -Unable to obtain MRI secondary to non-compatible pacer, will obtain CT head without contrast on 2/9/2021 at 8 pm.  -NSE daily  -LTME  -Follow clinical exam    Management per medical ICU    DISPOSITION:  [x] To remain ICU: We will continue to follow along. For any changes in exam or patient status please contact Neuro Critical Care.       Naveen Sofia MD  Neuro Critical Care  Pager [phone]  2/8/2021     12:29 PM

## 2021-02-08 NOTE — PROGRESS NOTES
[facility]   Consult Note         Today's Date: 2/8/2021  Patient Name: Melanie Doe  Date of admission: 2/6/2021  1:04 PM  Patient's age: 65 y.o., 1950  Admission Dx: Cardiac arrest [facility]) [I46.9]    Reason for Consult:  Cardiac evaluation    Requesting Physician: Lupe Herrera MD    REASON FOR CONSULT:  PEA arrest    History Obtained From:  Patient, chart, staff, records    Interval History    Afebrile. Pressors weaned at this time. Intubated and sedated with propofol. Nimbex weaned. Urine output 1.5 L last 24 hours. + 1 L net since admission. Creatinine improved. K 4.2, Bicarb 22, No AG. Trop 44->60->56      HISTORY OF PRESENT ILLNESS:      The patient is a 65 y.o. male with PMH of CAD s/p 3 stents, ICM (EF 42%, 4/20) s/p AICD, CKD who is admitted to the ICU after having a witnessed fall by family. EMS was called, noted to be in PEA arrest.  Patient underwent 3 rounds of CPR, ROSC achieved after roughly 15 minutes. Patient was life flighted to [facility].  Patient found to have bilateral pneumothorax, chest tube placed. Patient intubated, sedated. Hypothermia protocol started. Quattro catheter also inserted. On admission, patient was hyperkalemic with K of 6.0. Troponin 56-60-60-44. ProBNP 5615. EKG showed normal sinus rhythm, first-degree AV block. Echocardiogram ordered, not yet completed. Pacer interrogation to be done today. On examination, patient is intubated, sedated, paralyzed. No pressor support. Past Medical History:   has no past medical history on file. Past Surgical History:   has no past surgical history on file.      Home Medications:    Prior to Admission medications    Not on File       ipratropium-albuterol, 1 ampule, Inhalation, 4x daily    [START ON 2/9/2021] albuterol, 2.5 mg, Nebulization, BID    artificial tears, , Both Eyes, 4x Daily    insulin glargine, 10 Units, Subcutaneous, Nightly   famotidine (PEPCID) injection, 20 mg, Intravenous, BID    insulin lispro, 0-12 Units, Subcutaneous, Q6H    chlorhexidine, 15 mL, Mouth/Throat, BID    methylPREDNISolone, 40 mg, Intravenous, Q12H      Allergies:  Patient has no allergy information on record. Social History:        Family History: family history is not on file. No h/o sudden cardiac death. REVIEW OF SYSTEMS:    · Unable to perform      PHYSICAL EXAM:      /61   Pulse 83   Temp 95.5 °F (35.3 °C) (Esophageal)   Resp 20   Ht 5' 9\" (1.753 m)   Wt 270 lb (122.5 kg)   SpO2 99%   BMI 39.87 kg/m²    Constitutional and General Appearance: Intubated, sedated, paralyzed. Unresponsive. HEENT: PERRL, no cervical lymphadenopathy. No masses palpable. Normal oral mucosa  Respiratory:  · Mechanically ventilated. CTAB. Cardiovascular:  · The apical impulse is not displaced  · Heart tones are crisp and normal. regular S1 and S2.  · Peripheral pulses are symmetrical and full   Abdomen:   · No masses or tenderness  · Bowel sounds present  Extremities:  ·  No Cyanosis or Clubbing  ·  Lower extremity edema: No  ·  Skin: Warm and dry      Labs:     CBC:   Recent Labs     02/07/21  0615 02/08/21  0642   WBC 4.7 10.9   HGB 8.9* 8.8*   HCT 28.9* 27.9*   PLT See Reflexed IPF Result 146     BMP:   Recent Labs     02/08/21  0156 02/08/21  0156 02/08/21  0642 02/08/21  0821   *  --  138  --    K 4.0  4.1   < > 4.2 4.2   CO2 23  --  22  --    BUN 33*  --  35*  --    CREATININE 1.58*  --  1.77*  --    LABGLOM 43*  --  38*  --    GLUCOSE 259*  --  270*  --     < > = values in this interval not displayed.      PT/INR:   Recent Labs     02/06/21  1329   PROTIME 10.8   INR 1.0     APTT:  Recent Labs     02/06/21  1329   APTT 26.0     CARDIAC ENZYMES:  Recent Labs     02/06/21  1329   CKTOTAL 125     LIVER PROFILE:  Recent Labs     02/06/21  1329   AST 35   ALT 32   LABALBU 3.6       Other Current Problems  Patient Active Problem List   Diagnosis  Cardiac arrest ([facility] [address]    Pneumothorax    COPD (chronic obstructive pulmonary disease) ([facility]    CAD (coronary artery disease)    HFrEF (heart failure with reduced ejection fraction) ([facility])    AICD (automatic cardioverter/defibrillator) present    CKD (chronic kidney disease) stage 3, GFR 30-59 ml/min           IMPRESSION  PEA arrest  CAD s/p 3 stents  CHFrEF (EF 43%, 4/2020) s/p AICD  CKD  COPD      Recommendations:    Rewarmed at this time. Follow-up echo official read. Previously with 45% EF. Troponins downtrending  ICD showing some non-sustained vtach. Patient will need ischemic work-up when neurologically improved  Keep K >4, Mag >2      Discussed with patient, family, and Nurse. Marilou Mohr MD  Internal Medicine Resident, PGY-3  [facility]; [place], [place]  2/8/2021, 10:13 AM    Attending Physician Statement  I have discussed the care of the patient, including pertinent history and exam findings, with the resident. I have seen and examined the patient and the key elements of all parts of the encounter have been performed by me. I agree with the assessment, plan and orders as documented by the resident.   Plan for cath once acute conditions improves   Talked with family   Eri Mckeon MD

## 2021-02-09 NOTE — PLAN OF CARE
Problem: OXYGENATION/RESPIRATORY FUNCTION  Goal: Patient will maintain patent airway  Outcome: Ongoing     Problem: OXYGENATION/RESPIRATORY FUNCTION  Goal: Patient will achieve/maintain normal respiratory rate/effort  Description: Respiratory rate and effort will be within normal limits for the patient  Outcome: Ongoing     Problem: MECHANICAL VENTILATION  Goal: Patient will maintain patent airway  Outcome: Ongoing     Problem: MECHANICAL VENTILATION  Goal: Oral health is maintained or improved  Outcome: Ongoing     Problem: MECHANICAL VENTILATION  Goal: ET tube will be managed safely  Outcome: Ongoing     Problem: MECHANICAL VENTILATION  Goal: Ability to express needs and understand communication  Outcome: Ongoing     Problem: MECHANICAL VENTILATION  Goal: Mobility/activity is maintained at optimum level for patient  Outcome: Ongoing     Problem: SKIN INTEGRITY  Goal: Skin integrity is maintained or improved  Outcome: Ongoing     Problem: NUTRITION  Goal: Nutritional status is improving  Outcome: Ongoing     Problem: Discharge Planning:  Goal: Participates in care planning  Description: Participates in care planning  Outcome: Ongoing     Problem: Discharge Planning:  Goal: Discharged to appropriate level of care  Description: Discharged to appropriate level of care  Outcome: Ongoing     Problem: Airway Clearance - Ineffective:  Goal: Ability to maintain a clear airway will improve  Description: Ability to maintain a clear airway will improve  Outcome: Ongoing     Problem: Aspiration:  Goal: Absence of aspiration  Description: Absence of aspiration  Outcome: Ongoing     Problem: Cardiac Output - Decreased:  Goal: Hemodynamic stability will improve  Description: Hemodynamic stability will improve  Outcome: Ongoing     Problem: Fluid Volume - Imbalance:  Goal: Absence of imbalanced fluid volume signs and symptoms  Description: Absence of imbalanced fluid volume signs and symptoms  Outcome: Ongoing Problem: Gas Exchange - Impaired:  Goal: Levels of oxygenation will improve  Description: Levels of oxygenation will improve  Outcome: Ongoing     Problem: Pain:  Goal: Pain level will decrease  Description: Pain level will decrease  Outcome: Ongoing     Problem: Pain:  Goal: Recognizes and communicates pain  Description: Recognizes and communicates pain  Outcome: Ongoing     Problem: Pain:  Goal: Control of acute pain  Description: Control of acute pain  Outcome: Ongoing     Problem: Pain:  Goal: Control of chronic pain  Description: Control of chronic pain  Outcome: Ongoing     Problem: Serum Glucose Level - Abnormal:  Goal: Ability to maintain appropriate glucose levels will improve to within specified parameters  Description: Ability to maintain appropriate glucose levels will improve to within specified parameters  Outcome: Ongoing     Problem: Skin Integrity - Impaired:  Goal: Will show no infection signs and symptoms  Description: Will show no infection signs and symptoms  Outcome: Ongoing     Problem: Skin Integrity - Impaired:  Goal: Absence of new skin breakdown  Description: Absence of new skin breakdown  Outcome: Ongoing     Problem: Tissue Perfusion, Altered:  Goal: Circulatory function within specified parameters  Description: Circulatory function within specified parameters  Outcome: Ongoing     Problem: Falls - Risk of:  Goal: Will remain free from falls  Description: Will remain free from falls  Outcome: Ongoing     Problem: Falls - Risk of:  Goal: Absence of physical injury  Description: Absence of physical injury  Outcome: Ongoing     Problem: Skin Integrity:  Goal: Absence of new skin breakdown  Description: Absence of new skin breakdown  Outcome: Ongoing     Problem: Nutrition  Goal: Optimal nutrition therapy  Description: Nutrition Problem #1: Inadequate oral intake  Intervention: Food and/or Nutrient Delivery: Continue NPO(Start diet vs nutrition support as able) Nutritional Goals: Start diet vs nutrition support within 24-72 hrs     Outcome: Ongoing

## 2021-02-09 NOTE — PROCEDURES
LONG-TERM EEG-VIDEO [id number] [address]    Patient: Major Cruise  Age: 65 y.o. MRN: [id number]    Referring Physician: No ref. provider found  History: The patient is a 65 y.o. male who presented breakthrough seizure/encephalopathy. This long-term video-EEG monitoring study was performed to determine the nature of the patient's clinical events. The patient is on neuroactive medications.    Major Cruise   Current Facility-Administered Medications   Medication Dose Route Frequency Provider Last Rate Last Admin    fentaNYL 20 mcg/mL Infusion  12.5-200 mcg/hr Intravenous Continuous Slime Aquino MD 2.5 mL/hr at 02/09/21 0858 50 mcg/hr at 02/09/21 0858    ipratropium-albuterol (DUONEB) nebulizer solution 1 ampule  1 ampule Inhalation 4x daily Dima Downs MD   1 ampule at 02/09/21 0803    albuterol (PROVENTIL) nebulizer solution 2.5 mg  2.5 mg Nebulization BID Dima Downs MD   2.5 mg at 02/09/21 0331    labetalol (NORMODYNE;TRANDATE) injection 10 mg  10 mg Intravenous Q3H PRN Onesimo Clark MD   10 mg at 02/08/21 1819    sodium phosphate 10 mmol in dextrose 5 % 250 mL IVPB  10 mmol Intravenous PRN Shaylee Chery MD   Stopped at 02/07/21 0815    hydroxypropyl methylcellulose (GONIOSOL) 2.5 % ophthalmic solution 1 drop  1 drop Both Eyes 4x Daily PRN Shaylee Chery MD        lubrifresh P.M. (artificial tears) ophthalmic ointment   Both Eyes 4x Daily Shaylee Chery MD   Given at 02/09/21 0807    glucose (GLUTOSE) 40 % oral gel 15 g  15 g Oral PRN Shaylee Chery MD        dextrose 50 % IV solution  12.5 g Intravenous PRN Shaylee Chery MD        glucagon (rDNA) injection 1 mg  1 mg Intramuscular PRN Shaylee Chery MD        dextrose 5 % solution  100 mL/hr Intravenous PRN Shaylee Chery MD  insulin glargine (LANTUS) injection vial 10 Units  10 Units Subcutaneous Nightly Tonny Lopez MD   10 Units at 02/08/21 2038    famotidine (PEPCID) injection 20 mg  20 mg Intravenous BID Dima Downs MD   20 mg at 02/09/21 0807    0.9 % sodium chloride infusion   Intravenous Continuous Arlene Digioia, DO 75 mL/hr at 02/09/21 0620 New Bag at 02/09/21 0620    insulin lispro (HUMALOG) injection vial 0-12 Units  0-12 Units Subcutaneous Q6H Tonny Lopez MD   4 Units at 02/09/21 0620    promethazine (PHENERGAN) tablet 12.5 mg  12.5 mg Oral Q6H PRN Dima Downs MD        Or    ondansetron (ZOFRAN) injection 4 mg  4 mg Intravenous Q6H PRN Dima Downs MD        chlorhexidine (PERIDEX) 0.12 % solution 15 mL  15 mL Mouth/Throat BID Dima Downs MD   15 mL at 02/08/21 2034    albuterol (PROVENTIL) nebulizer solution 2.5 mg  2.5 mg Nebulization As Directed RT PRN Dima Downs MD        methylPREDNISolone sodium (SOLU-MEDROL) injection 40 mg  40 mg Intravenous Q12H Dima Youssef MD   40 mg at 02/09/21 4328     Technical Description: This is a 21-channel digital EEG recording with time-locked video. Electrodes were placed in accordance with the 10-20 International System of Electrode Placement. Single lead EKG monitoring was included. Baseline EEG Recording:  A formal baseline EEG recording was not obtained. Day 1 - 2/8/21, starting at 14:42    Interictal EEG Samples: The background activity consisted of 2 to 4 Hz of polymorphic delta activity of 15  Microvolts. There was poor anterior posterior amplitude gradient. Normal sleep architecture and normal wakeful pattern   was not seen. The EKG channel revealed no abnormalities. Ictal EEG Recording / Patient Events: During this period the patient had no events or seizures.

## 2021-02-09 NOTE — PROGRESS NOTES
Ventilator Bronchodilator assessment    Breath sounds: diminished  Inspiratory Pressure: 23  Plateau Pressure: unable    Patient assessed at level 3          []    Bronchodilator Assessment    BRONCHODILATOR ASSESSMENT SCORE  Score 0 (Home) 1 2 3 4   Breath Sounds   []  Chronic Ventilator: Patient at baseline []  Mild Wheezes/ Clear []  Intermittent wheezes with good air entry [x]  Bilateral/unilateral wheezing with diminished air entry []  Insp/Exp wheeze and/or poor aeration   Ventilator Pressures   []  Chronic Ventilator [x]  Insp. Pressure less than 25 cm H20 [x]  Insp. Pressure less than 25 cm H20 []  Insp. Pressure exceeds 25 cm H20 []  Insp.  Pressure exceeds 30 cm H20   Plateau Pressure [x]  NA   []  Plateau Pressure less than 4  []  Plateau Pressure less than or equal to 5 []  Plateau Pressure greater than or equal to 6 []  Plateau Pressure greater than or equal to 8       EDVIN COPPOLA  1:57 PM

## 2021-02-09 NOTE — PROGRESS NOTES
Comprehensive Nutrition Assessment    Type and Reason for Visit:  Reassess    Nutrition Recommendations/Plan: If enteral nutrition support warranted, suggest Semi-elemental formula with goal rate of 60 ml/hr. Will follow/monitor care plans. Nutrition Assessment:  Chart reviewed. Pt remains on vent. No nutrition at present. Meds/labs reviewed. Estimated Daily Nutrient Needs:  Energy (kcal): 5752-6760 kcals/d  Protein (g):110-146 g/d     Current Nutrition Therapies:    Diet NPO Effective Now    Anthropometric Measures:  · Height: 5' 9\" (175.3 cm)  · Current Body Weight: 270 lb (122.5 kg)(estimated)   · Ideal Body Weight: 160 lbs; % Ideal Body Weight 168.8 %   · BMI: 39.9  · BMI Categories: Obese Class 2 (BMI 35.0 -39.9)       Nutrition Diagnosis:   · Inadequate oral intake related to impaired respiratory function as evidenced by NPO or clear liquid status due to medical condition    Nutrition Interventions:   Food and/or Nutrient Delivery: If enteral nutrition support warranted, suggest Semi-elemental formula with goal rate of 60 ml/hr (1728 kcal and 108 g pro/day). Nutrition Education/Counseling:  No recommendation at this time   Coordination of Nutrition Care:  Continue to monitor while inpatient    Goals:  Start diet vs nutrition support within 24-72 hrs -no progress towards goal      Nutrition Monitoring and Evaluation:   Food/Nutrient Intake Outcomes:  Diet Advancement/Tolerance  Physical Signs/Symptoms Outcomes:  Biochemical Data, Nutrition Focused Physical Findings, Skin, Weight, GI Status, Fluid Status or Edema     Discharge Planning:     Too soon to determine     Electronically signed by Tyler Russo RD, LD on 2/9/21 at 2:40 PM EST    Contact: [phone]

## 2021-02-09 NOTE — PLAN OF CARE
Problem: OXYGENATION/RESPIRATORY FUNCTION  Goal: Patient will maintain patent airway  2/9/2021 1146 by Lia Oliver RN  Outcome: Ongoing     Problem: OXYGENATION/RESPIRATORY FUNCTION  Goal: Patient will achieve/maintain normal respiratory rate/effort  Description: Respiratory rate and effort will be within normal limits for the patient  2/9/2021 1146 by Lia Oliver RN  Outcome: Ongoing     Problem: MECHANICAL VENTILATION  Goal: Patient will maintain patent airway  2/9/2021 1146 by Lia Oliver RN  Outcome: Ongoing     Problem: MECHANICAL VENTILATION  Goal: Oral health is maintained or improved  2/9/2021 1146 by Lia Oliver RN  Outcome: Ongoing     Problem: MECHANICAL VENTILATION  Goal: ET tube will be managed safely  2/9/2021 1146 by Lia Oliver RN  Outcome: Ongoing     Problem: MECHANICAL VENTILATION  Goal: Ability to express needs and understand communication  2/9/2021 1146 by Lia Oliver RN  Outcome: Ongoing     Problem: MECHANICAL VENTILATION  Goal: Mobility/activity is maintained at optimum level for patient  2/9/2021 1146 by Lia Oliver RN  Outcome: Ongoing     Problem: SKIN INTEGRITY  Goal: Skin integrity is maintained or improved  2/9/2021 1146 by Lia Oliver RN  Outcome: Ongoing     Problem: NUTRITION  Goal: Nutritional status is improving  2/9/2021 1146 by Lia Oliver RN  Outcome: Ongoing     Problem: Discharge Planning:  Goal: Participates in care planning  Description: Participates in care planning  2/9/2021 1146 by Lia Oliver RN  Outcome: Ongoing     Problem: Discharge Planning:  Goal: Discharged to appropriate level of care  Description: Discharged to appropriate level of care  2/9/2021 1146 by Lia Oliver RN  Outcome: Ongoing     Problem: Discharge Planning:  Goal: Ability to perform activities of daily living will improve  Description: Ability to perform activities of daily living will improve  2/9/2021 1146 by Lia Oliver RN  Outcome: Ongoing Problem: Airway Clearance - Ineffective:  Goal: Ability to maintain a clear airway will improve  Description: Ability to maintain a clear airway will improve  2/9/2021 1146 by Leonor Donnelly RN  Outcome: Ongoing     Problem: Aspiration:  Goal: Absence of aspiration  Description: Absence of aspiration  2/9/2021 1146 by Leonor Donnelly RN  Outcome: Ongoing     Problem: Cardiac Output - Decreased:  Goal: Hemodynamic stability will improve  Description: Hemodynamic stability will improve  2/9/2021 1146 by Leonor Donnelly RN  Outcome: Ongoing     Problem: Fluid Volume - Imbalance:  Goal: Absence of imbalanced fluid volume signs and symptoms  Description: Absence of imbalanced fluid volume signs and symptoms  Outcome: Ongoing     Problem: Gas Exchange - Impaired:  Goal: Levels of oxygenation will improve  Description: Levels of oxygenation will improve  2/9/2021 1146 by Leonor Donnelly RN  Outcome: Ongoing     Problem: Pain:  Goal: Pain level will decrease  Description: Pain level will decrease  2/9/2021 1146 by Leonor Donnelly RN  Outcome: Ongoing     Problem: Pain:  Goal: Recognizes and communicates pain  Description: Recognizes and communicates pain  2/9/2021 1146 by Leonor Donnelly RN  Outcome: Ongoing     Problem: Pain:  Goal: Control of acute pain  Description: Control of acute pain  2/9/2021 1146 by Leonor Donnelly RN  Outcome: Ongoing     Problem: Pain:  Goal: Control of chronic pain  Description: Control of chronic pain  2/9/2021 1146 by Leonor Donnelly RN  Outcome: Ongoing     Problem: Serum Glucose Level - Abnormal:  Goal: Ability to maintain appropriate glucose levels will improve to within specified parameters  Description: Ability to maintain appropriate glucose levels will improve to within specified parameters  Outcome: Ongoing     Problem: Skin Integrity - Impaired:  Goal: Will show no infection signs and symptoms  Description: Will show no infection signs and symptoms  2/9/2021 1146 by Leonor Donnelly RN Outcome: Ongoing     Problem: Skin Integrity - Impaired:  Goal: Absence of new skin breakdown  Description: Absence of new skin breakdown  2/9/2021 1146 by Leonor Donnelly RN  Outcome: Ongoing     Problem: Tissue Perfusion, Altered:  Goal: Circulatory function within specified parameters  Description: Circulatory function within specified parameters  2/9/2021 1146 by Leonor Donnelly RN  Outcome: Ongoing     Problem: Falls - Risk of:  Goal: Will remain free from falls  Description: Will remain free from falls  2/9/2021 1146 by Leonor Donnelly RN  Outcome: Ongoing     Problem: Falls - Risk of:  Goal: Absence of physical injury  Description: Absence of physical injury  2/9/2021 1146 by Leonor Donnelly RN  Outcome: Ongoing     Problem: Skin Integrity:  Goal: Will show no infection signs and symptoms  Description: Will show no infection signs and symptoms  Outcome: Ongoing     Problem: Skin Integrity:  Goal: Absence of new skin breakdown  Description: Absence of new skin breakdown  Outcome: Ongoing     Problem: Nutrition  Goal: Optimal nutrition therapy  Description: Nutrition Problem #1: Inadequate oral intake  Intervention: Food and/or Nutrient Delivery: Continue NPO(Start diet vs nutrition support as able)  Nutritional Goals: Start diet vs nutrition support within 24-72 hrs     Outcome: Ongoing     Problem: Confusion - Acute:  Goal: Absence of continued neurological deterioration signs and symptoms  Description: Absence of continued neurological deterioration signs and symptoms  Outcome: Ongoing     Problem: Confusion - Acute:  Goal: Mental status will be restored to baseline  Description: Mental status will be restored to baseline  Outcome: Ongoing     Problem: Injury - Risk of, Physical Injury:  Goal: Will remain free from falls  Description: Will remain free from falls  2/9/2021 1146 by Leonor Donnelly RN  Outcome: Ongoing     Problem: Injury - Risk of, Physical Injury:  Goal: Absence of physical injury Description: Absence of physical injury  2/9/2021 1146 by Nan Sena RN  Outcome: Ongoing     Problem: Mood - Altered:  Goal: Mood stable  Description: Mood stable  Outcome: Ongoing     Problem: Mood - Altered:  Goal: Absence of abusive behavior  Description: Absence of abusive behavior  Outcome: Ongoing     Problem: Psychomotor Activity - Altered:  Goal: Absence of psychomotor disturbance signs and symptoms  Description: Absence of psychomotor disturbance signs and symptoms  Outcome: Ongoing     Problem: Sensory Perception - Impaired:  Goal: Demonstrations of improved sensory functioning will increase  Description: Demonstrations of improved sensory functioning will increase  Outcome: Ongoing     Problem: Sensory Perception - Impaired:  Goal: Decrease in sensory misperception frequency  Description: Decrease in sensory misperception frequency  Outcome: Ongoing     Problem: Sensory Perception - Impaired:  Goal: Able to refrain from responding to false sensory perceptions  Description: Able to refrain from responding to false sensory perceptions  Outcome: Ongoing     Problem: Sensory Perception - Impaired:  Goal: Demonstrates accurate environmental perceptions  Description: Demonstrates accurate environmental perceptions  Outcome: Ongoing     Problem: Sensory Perception - Impaired:  Goal: Able to distinguish between reality-based and nonreality-based thinking  Description: Able to distinguish between reality-based and nonreality-based thinking  Outcome: Ongoing     Problem: Sensory Perception - Impaired:  Goal: Able to interrupt nonreality-based thinking  Description: Able to interrupt nonreality-based thinking  Outcome: Ongoing     Problem: Sleep Pattern Disturbance:  Goal: Appears well-rested  Description: Appears well-rested  Outcome: Ongoing

## 2021-02-09 NOTE — PLAN OF CARE
Problem: MECHANICAL VENTILATION  Goal: Oral health is maintained or improved  2/9/2021 0249 by Kale Burkett RN  Outcome: Met This Shift  2/8/2021 2213 by Chavez Dey RCP  Outcome: Ongoing  2/8/2021 1906 by Conchis Mirza RN  Outcome: Ongoing  Goal: ET tube will be managed safely  2/9/2021 0249 by Kale Burkett RN  Outcome: Met This Shift  2/8/2021 2213 by Chavez Dey RCP  Outcome: Ongoing  2/8/2021 1906 by Conchis Mirza RN  Outcome: Ongoing     Problem: Skin Integrity - Impaired:  Goal: Will show no infection signs and symptoms  Description: Will show no infection signs and symptoms  2/9/2021 0249 by Kale Burkett RN  Outcome: Met This Shift  2/8/2021 1906 by Conchis Mirza RN  Outcome: Ongoing  Goal: Absence of new skin breakdown  Description: Absence of new skin breakdown  2/9/2021 0249 by Kale Burkett RN  Outcome: Met This Shift  2/8/2021 1906 by Conchis Mirza RN  Outcome: Ongoing     Problem: Falls - Risk of:  Goal: Will remain free from falls  Description: Will remain free from falls  2/9/2021 0249 by Kale Burkett RN  Outcome: Met This Shift  2/8/2021 1906 by Conchis Mirza RN  Outcome: Ongoing  Goal: Absence of physical injury  Description: Absence of physical injury  2/9/2021 0249 by Kale Burkett RN  Outcome: Met This Shift  2/8/2021 1906 by Conchis Mirza RN  Outcome: Ongoing     Problem: Injury - Risk of, Physical Injury:  Goal: Will remain free from falls  Description: Will remain free from falls  2/9/2021 0249 by Kale Burkett RN  Outcome: Met This Shift  2/8/2021 1906 by Conchis Mirza RN  Outcome: Ongoing  Goal: Absence of physical injury  Description: Absence of physical injury  2/9/2021 0249 by Kale Burkett RN  Outcome: Met This Shift  2/8/2021 1906 by Conchis Mirza RN  Outcome: Ongoing     Problem: OXYGENATION/RESPIRATORY FUNCTION  Goal: Patient will maintain patent airway  2/9/2021 0249 by Henley Madelaine, RN  Outcome: Ongoing  2/8/2021 2213 by Chavez Dey RCP Outcome: Ongoing  2/8/2021 1906 by Nu Freeman RN  Outcome: Ongoing  Goal: Patient will achieve/maintain normal respiratory rate/effort  Description: Respiratory rate and effort will be within normal limits for the patient  2/9/2021 0249 by Sampson Avila RN  Outcome: Ongoing  2/8/2021 2213 by ZAHIRA LewisP  Outcome: Ongoing  2/8/2021 1906 by Nu Freeman RN  Outcome: Ongoing     Problem: MECHANICAL VENTILATION  Goal: Patient will maintain patent airway  2/9/2021 0249 by Sampson Avila RN  Outcome: Ongoing  2/8/2021 2213 by Sy Campbell RCP  Outcome: Ongoing  2/8/2021 1906 by Nu Freeman RN  Outcome: Ongoing  Goal: Ability to express needs and understand communication  2/9/2021 0249 by Sampson Avila RN  Outcome: Ongoing  2/8/2021 2213 by Sy Campbell RCP  Outcome: Ongoing  2/8/2021 1906 by Nu Freeman RN  Outcome: Ongoing  Goal: Mobility/activity is maintained at optimum level for patient  2/9/2021 0249 by Sampson Avila RN  Outcome: Ongoing  2/8/2021 2213 by Sy Campbell RCP  Outcome: Ongoing  2/8/2021 1906 by Nu Freeman RN  Outcome: Ongoing     Problem: SKIN INTEGRITY  Goal: Skin integrity is maintained or improved  2/9/2021 0249 by Sampson Avila RN  Outcome: Ongoing  2/8/2021 2213 by Sy Campbell RCP  Outcome: Ongoing  2/8/2021 1906 by Nu Freeman RN  Outcome: Ongoing     Problem: NUTRITION  Goal: Nutritional status is improving  2/9/2021 0249 by Sampson Avila RN  Outcome: Ongoing  2/8/2021 1906 by Nu Freeman RN  Outcome: Ongoing     Problem: Discharge Planning:  Goal: Participates in care planning  Description: Participates in care planning  2/9/2021 0249 by Sampson Avila RN  Outcome: Ongoing  2/8/2021 1906 by Nu Freeman RN  Outcome: Ongoing  Goal: Discharged to appropriate level of care  Description: Discharged to appropriate level of care  2/9/2021 0249 by Sampson Avila RN  Outcome: Ongoing  2/8/2021 1906 by Nu Freeman RN  Outcome: Ongoing Goal: Ability to perform activities of daily living will improve  Description: Ability to perform activities of daily living will improve  Outcome: Ongoing     Problem: Airway Clearance - Ineffective:  Goal: Ability to maintain a clear airway will improve  Description: Ability to maintain a clear airway will improve  2/9/2021 0249 by Cher Rico RN  Outcome: Ongoing  2/8/2021 2213 by Glenn Morin RCP  Outcome: Ongoing  2/8/2021 1906 by Valdez Bennett RN  Outcome: Ongoing     Problem: Aspiration:  Goal: Absence of aspiration  Description: Absence of aspiration  2/9/2021 0249 by Cher Rico RN  Outcome: Ongoing  2/8/2021 2213 by Glenn Morin RCP  Outcome: Ongoing  2/8/2021 1906 by Valdez Bennett RN  Outcome: Ongoing     Problem: Cardiac Output - Decreased:  Goal: Hemodynamic stability will improve  Description: Hemodynamic stability will improve  2/9/2021 0249 by Cher Rico RN  Outcome: Ongoing  2/8/2021 1906 by Valdez Bennett RN  Outcome: Ongoing     Problem: Fluid Volume - Imbalance:  Goal: Absence of imbalanced fluid volume signs and symptoms  Description: Absence of imbalanced fluid volume signs and symptoms  2/8/2021 1906 by Valdez Bennett RN  Outcome: Ongoing     Problem: Gas Exchange - Impaired:  Goal: Levels of oxygenation will improve  Description: Levels of oxygenation will improve  2/9/2021 0249 by Cher Rico RN  Outcome: Ongoing  2/8/2021 2213 by Glenn Morin RCP  Outcome: Ongoing  2/8/2021 1906 by Valdez Bennett RN  Outcome: Ongoing     Problem: Pain:  Goal: Pain level will decrease  Description: Pain level will decrease  2/9/2021 0249 by Cher Rico RN  Outcome: Ongoing  2/8/2021 1906 by Valdez Bennett RN  Outcome: Ongoing  Goal: Recognizes and communicates pain  Description: Recognizes and communicates pain  2/9/2021 0249 by Cher Rico RN  Outcome: Ongoing  2/8/2021 1906 by Valdez Bennett RN  Outcome: Ongoing  Goal: Control of acute pain Description: Control of acute pain  2/9/2021 0249 by Merrell Lundborg, RN  Outcome: Ongoing  2/8/2021 1906 by Destiney Dailey RN  Outcome: Ongoing  Goal: Control of chronic pain  Description: Control of chronic pain  2/9/2021 0249 by Merrell Lundborg, RN  Outcome: Ongoing  2/8/2021 1906 by Destiney Dailey RN  Outcome: Ongoing     Problem: Serum Glucose Level - Abnormal:  Goal: Ability to maintain appropriate glucose levels will improve to within specified parameters  Description: Ability to maintain appropriate glucose levels will improve to within specified parameters  2/8/2021 1906 by Destiney Dailey RN  Outcome: Ongoing     Problem: Tissue Perfusion, Altered:  Goal: Circulatory function within specified parameters  Description: Circulatory function within specified parameters  2/9/2021 0249 by Merrell Lundborg, RN  Outcome: Ongoing  2/8/2021 1906 by Destiney Dailey RN  Outcome: Ongoing     Problem: Skin Integrity:  Goal: Will show no infection signs and symptoms  Description: Will show no infection signs and symptoms  2/8/2021 1906 by Destiney Dailey RN  Outcome: Ongoing  Goal: Absence of new skin breakdown  Description: Absence of new skin breakdown  2/8/2021 1906 by Destiney Dailey RN  Outcome: Ongoing     Problem: Nutrition  Goal: Optimal nutrition therapy  Description: Nutrition Problem #1: Inadequate oral intake  Intervention: Food and/or Nutrient Delivery: Continue NPO(Start diet vs nutrition support as able)  Nutritional Goals: Start diet vs nutrition support within 24-72 hrs     2/8/2021 1906 by Destiney Dailey RN  Outcome: Ongoing

## 2021-02-09 NOTE — PROGRESS NOTES
[facility]   Consult Note         Today's Date: 2/9/2021  Patient Name: Anamaria Vogt  Date of admission: 2/6/2021  1:04 PM  Patient's age: 65 y.o., 1950  Admission Dx: Cardiac arrest [facility]) [I46.9]    Reason for Consult:  Cardiac evaluation    Requesting Physician: Taylor Guajardo MD    REASON FOR CONSULT:  PEA arrest    History Obtained From:  Patient, chart, staff, records    Interval History    Patient seen and examined at bedside. Afebrile overnight. Remains intubated, sedated only with fentanyl  Off all pressors. Currently hypertensive, due for IV labetolol    Echo yesterday showing EF 30%, down from 43% (4/2020)    Currently on LTME - showing severe encephalopathy   Neuro planning CTH today as ICD is not MRI compatible      HISTORY OF PRESENT ILLNESS:      The patient is a 65 y.o. male with PMH of CAD s/p 3 stents, ICM (EF 42%, 4/20) s/p AICD, CKD who is admitted to the ICU after having a witnessed fall by family. EMS was called, noted to be in PEA arrest.  Patient underwent 3 rounds of CPR, ROSC achieved after roughly 15 minutes. Patient was life flighted to [facility].  Patient found to have bilateral pneumothorax, chest tube placed. Patient intubated, sedated. Hypothermia protocol started. Quattro catheter also inserted. On admission, patient was hyperkalemic with K of 6.0. Troponin 56-60-60-44. ProBNP 5615. EKG showed normal sinus rhythm, first-degree AV block. Echocardiogram ordered, not yet completed. Pacer interrogation to be done today. On examination, patient is intubated, sedated, paralyzed. No pressor support. Past Medical History:   has no past medical history on file. Past Surgical History:   has a past surgical history that includes Cardiac defibrillator placement (09/25/2019).      Home Medications:    Prior to Admission medications    Not on File     ipratropium-albuterol, 1 ampule, Inhalation, 4x daily   albuterol, 2.5 mg, Nebulization, BID    artificial tears, , Both Eyes, 4x Daily    insulin glargine, 10 Units, Subcutaneous, Nightly    famotidine (PEPCID) injection, 20 mg, Intravenous, BID    insulin lispro, 0-12 Units, Subcutaneous, Q6H    chlorhexidine, 15 mL, Mouth/Throat, BID    methylPREDNISolone, 40 mg, Intravenous, Q12H      Allergies:  Patient has no allergy information on record. Social History:        Family History: family history is not on file. No h/o sudden cardiac death. REVIEW OF SYSTEMS:    · Unable to perform      PHYSICAL EXAM:      BP (!) 114/56   Pulse 87   Temp 96.3 °F (35.7 °C) (Esophageal)   Resp 29   Ht 5' 9\" (1.753 m)   Wt 270 lb (122.5 kg)   SpO2 96%   BMI 39.87 kg/m²    Constitutional and General Appearance: Intubated, sedated,  Unresponsive. HEENT: PERRL, no cervical lymphadenopathy. No masses palpable. Normal oral mucosa  Respiratory:  · Mechanically ventilated. CTAB. Cardiovascular:  · The apical impulse is not displaced  · Heart tones are crisp and normal. regular S1 and S2.  · Peripheral pulses are symmetrical and full   Abdomen:   · No masses or tenderness  · Bowel sounds present  Extremities:  ·  No Cyanosis or Clubbing  ·  Lower extremity edema: No  ·  Skin: Warm and dry      Labs:     CBC:   Recent Labs     02/08/21  0642 02/09/21  0434   WBC 10.9 17.3*   HGB 8.8* 8.6*   HCT 27.9* 27.4*    158     BMP:   Recent Labs     02/08/21  0642 02/08/21  0642 02/08/21  1809 02/09/21  0434     --   --  140   K 4.2   < > 4.3 4.5   CO2 22  --   --  23   BUN 35*  --   --  41*   CREATININE 1.77*  --   --  2.05*   LABGLOM 38*  --   --  32*   GLUCOSE 270*  --   --  207*    < > = values in this interval not displayed.      PT/INR:   Recent Labs     02/06/21  1329   PROTIME 10.8   INR 1.0     APTT:  Recent Labs     02/06/21  1329   APTT 26.0     CARDIAC ENZYMES:  Recent Labs     02/06/21  1329   CKTOTAL 125     LIVER PROFILE:  Recent Labs     02/06/21  1324 AST 35   ALT 32   LABALBU 3.6       Other Current Problems  Patient Active Problem List   Diagnosis    [facility]    Pneumothorax    COPD (chronic obstructive pulmonary disease) [facility]    CAD (coronary artery disease)    HFrEF (heart failure with reduced ejection fraction) ([facility] [facility]    AICD (automatic cardioverter/defibrillator) present    CKD (chronic kidney disease) stage 3, GFR 30-59 ml/min    Respiratory arrest ([facility] [address] [facility]           IMPRESSION  PEA arrest  CAD s/p 3 stents  CHFrEF (EF 43%, 4/2020) s/p AICD  CKD  COPD  Severe encephalopathy      Recommendations:    Rewarmed at this time. Echo reading 30% EF, down from 43% in 4/2020)  Troponins downtrending  ICD showing some non-sustained vtach. EEG Showing severe encephalopathy  Follow up CT head  PRN labetolol   Keep K >4, Mag >2  Cardiology will sign off  Please call us back in case of neurologic recovery. Discussed with patient, family, and Nurse. Juan Caraballo MD  Internal Medicine Resident, PGY-2  [address], [place]  2/9/2021, 9:52 AM     Attending Physician Statement  I have discussed the case of Clotilde Preciado including pertinent history and exam findings with the resident. I have seen and examined the patient and the key elements of the encounter have been performed by me. I agree with the assessment, plan and orders as documented by the resident With changes made to the note.      Electronically signed by Mao Bonilla MD on 2/9/2021 at 3:37 PM.    [facility]      [phone]

## 2021-02-09 NOTE — PROGRESS NOTES
[facility] Cardiothoracic Surgery  Progress Note    2/9/2021 12:23 PM       Subjective:  Mr. Rock Sims and non purposeful movement. Guarded status. Cooling measures in place. ICU status. Objective:  BP (!) 169/62   Pulse 93   Temp 97.9 °F (36.6 °C) (Core) Comment: Zoll  Resp 24   Ht 5' 9\" (1.753 m)   Wt 270 lb (122.5 kg)   SpO2 95%   BMI 39.87 kg/m²   Chest: pacing wires: no, chest tubes:yes, air leak no, 2 +  CV: no murmur noted, Normal S1, S2,   Lungs: clear to auscultation, no wheezes, rales, or rhonchi  Abd: normal bowel sounds   Lower Extremities: Trace edema    CXR-no appreciated pneumothorax  Noted on CXR-  Islet of chest tube on right is on rib marginal. May need to be advanced. No airleak noted on CXR we will continue to monitor. Labs:   CBC:   Recent Labs     02/07/21  0615 02/08/21  0642 02/09/21  0434   WBC 4.7 10.9 17.3*   HGB 8.9* 8.8* 8.6*   HCT 28.9* 27.9* 27.4*   MCV 87.3 85.8 87.3   PLT See Reflexed IPF Result 146 158     BMP:   Recent Labs     02/07/21  1330 02/07/21  1953 02/07/21  1953 02/08/21  0156 02/08/21  0156 02/08/21  0642 02/08/21  0642 02/08/21  1625 02/08/21  1809 02/09/21  0434    133*  --  133*  --  138  --   --   --  140   K 4.1 4.0   < > 4.0  4.1   < > 4.2   < > 4.3 4.3 4.5    99  --  99  --  103  --   --   --  103   CO2 22 22  --  23  --  22  --   --   --  23   PHOS 3.4 3.6  --  3.5  --   --   --   --   --   --    BUN 28* 29*  --  33*  --  35*  --   --   --  41*   CREATININE 1.55* 1.50*  --  1.58*  --  1.77*  --   --   --  2.05*    < > = values in this interval not displayed. I/O: I/O last 3 completed shifts:   In: 1023 [I.V.:1023]  Out: 1123 [Urine:1033; Emesis/NG output:60; Chest Tube:30]  Scheduled Meds:   ipratropium-albuterol  1 ampule Inhalation 4x daily    albuterol  2.5 mg Nebulization BID    artificial tears   Both Eyes 4x Daily    insulin glargine  10 Units Subcutaneous Nightly

## 2021-02-09 NOTE — PLAN OF CARE
Problem: OXYGENATION/RESPIRATORY FUNCTION  Goal: Patient will maintain patent airway  2/8/2021 2213 by Joseph Salinas RCP  Outcome: Ongoing     Problem: OXYGENATION/RESPIRATORY FUNCTION  Goal: Patient will achieve/maintain normal respiratory rate/effort  Description: Respiratory rate and effort will be within normal limits for the patient  2/8/2021 2213 by Joseph Salinas RCP  Outcome: Ongoing     Problem: MECHANICAL VENTILATION  Goal: Patient will maintain patent airway  2/8/2021 2213 by Joseph Salinas RCP  Outcome: Ongoing     Problem: MECHANICAL VENTILATION  Goal: Oral health is maintained or improved  2/8/2021 2213 by Joseph Salinas RCP  Outcome: Ongoing     Problem: MECHANICAL VENTILATION  Goal: ET tube will be managed safely  2/8/2021 2213 by Joseph Salinas RCP  Outcome: Ongoing     Problem: MECHANICAL VENTILATION  Goal: Ability to express needs and understand communication  2/8/2021 2213 by Joseph Salinas RCP  Outcome: Ongoing     Problem: MECHANICAL VENTILATION  Goal: Mobility/activity is maintained at optimum level for patient  2/8/2021 2213 by Joseph Salinas RCP  Outcome: Ongoing     Problem: SKIN INTEGRITY  Goal: Skin integrity is maintained or improved  2/8/2021 2213 by Joseph Salinas RCP  Outcome: Ongoing     Problem: Airway Clearance - Ineffective:  Goal: Ability to maintain a clear airway will improve  Description: Ability to maintain a clear airway will improve  2/8/2021 2213 by Joseph Salinas RCP  Outcome: Ongoing     Problem: Aspiration:  Goal: Absence of aspiration  Description: Absence of aspiration  2/8/2021 2213 by Joseph Salinas RCP  Outcome: Ongoing     Problem: Gas Exchange - Impaired:  Goal: Levels of oxygenation will improve  Description: Levels of oxygenation will improve  2/8/2021 2213 by Joseph Salinas RCP  Outcome: Ongoing   BRONCHOSPASM/BRONCHOCONSTRICTION     [x]         IMPROVE AERATION/BREATH SOUNDS [x]   ADMINISTER BRONCHODILATOR THERAPY AS APPROPRIATE  [x]   ASSESS BREATH SOUNDS  [x]   IMPLEMENT AEROSOL/MDI PROTOCOL  [x]   PATIENT EDUCATION AS NEEDED   PROVIDE ADEQUATE OXYGENATION WITH ACCEPTABLE SP02/ABG'S    [x]  IDENTIFY APPROPRIATE OXYGEN THERAPY  [x]   MONITOR SP02/ABG'S AS NEEDED   [x]   PATIENT EDUCATION AS NEEDED

## 2021-02-09 NOTE — PROGRESS NOTES
Daily Progress Note  Neuro Critical Care    Patient Name: Mariya Plata  Patient : 1950  Room/Bed: 0128/0128-01  Code Status: Full  Allergies: Not on File    CHIEF COMPLAINT:      Cardiac arrest     INTERVAL HISTORY    Initial Presentation (Admitted 2021): The patient is a 65 y.o. male with history of COPD, CAD s/p AICD placement, CHF with EF 25% who presents with cardiac arrest.  Patient was having shortness of breath for a few days prior to presentation, had a witnessed by family member, EMS was notified. On arrival, patient was in PEA, ACLS was started, ROSC was achieved after 15 minutes after receiving epinephrine x2. Patient was life flighted to [facility], patient was found to have bilateral pneumothoraces, received bilateral chest tubes. Patient was admitted to the medical ICU, therapeutic cooling was started, sedated on propofol and paralyzed on Nimbex. Echocardiogram was conducted on 2021, significant for EF 30%. Patient was rewarmed, Nimbex discontinued, continued sedation with propofol. Neuro critical care was consulted for prognostic patient status post cardiac arrest.      Hospital Course:   : Exam remained unchanged, continued propofol for overbreathing the vent. Started LTME. Last 24h:   No acute events overnight. LTME consistent with severe encephalopathy, no epileptiform activity noted. Patient's exam remains unchanged.     CURRENT MEDICATIONS:  SCHEDULED MEDICATIONS:   ipratropium-albuterol  1 ampule Inhalation 4x daily    albuterol  2.5 mg Nebulization BID    artificial tears   Both Eyes 4x Daily    insulin glargine  10 Units Subcutaneous Nightly    famotidine (PEPCID) injection  20 mg Intravenous BID    insulin lispro  0-12 Units Subcutaneous Q6H    chlorhexidine  15 mL Mouth/Throat BID    methylPREDNISolone  40 mg Intravenous Q12H     CONTINUOUS INFUSIONS:   dextrose      sodium chloride 75 mL/hr at 21 0620     PRN MEDICATIONS: fentanNYL **OR** fentanNYL, labetalol, sodium phosphate IVPB, hydroxypropyl methylcellulose, glucose, dextrose, glucagon (rDNA), dextrose, promethazine **OR** ondansetron, albuterol    VITALS:  Temperature Range: Temp: 96.3 °F (35.7 °C) Temp  Av.9 °F (36.1 °C)  Min: 95.7 °F (35.4 °C)  Max: 98.1 °F (36.7 °C)  BP Range: Systolic (32UZF), VPZ:613 , Min:97 , RID:028     Diastolic (39GNC), JXL:83, Min:48, Max:95    Pulse Range: Pulse  Av.3  Min: 63  Max: 92  Respiration Range: Resp  Av.2  Min: 23  Max: 32  Current Pulse Ox: SpO2: 96 %  24HR Pulse Ox Range: SpO2  Av.2 %  Min: 93 %  Max: 100 %  Patient Vitals for the past 12 hrs:   BP Temp Temp src Pulse Resp SpO2   21 0809    87 29 96 %   21 0700 (!) 114/56   78 30    21 0645    80 29 99 %   21 0630    80 30    21 0615    81 27 98 %   21 0600 117/71   83 29 98 %   21 0545    79 29    21 0530    85 30    21 0515    81 30    21 0500 (!) 125/56   85 (!) 31 99 %   21 0445    85 28 96 %   21 0430    88 29 97 %   21 0422     29 97 %   21 0415    87 30 98 %   21 0400 (!) 123/57 96.3 °F (35.7 °C) Esophageal 82 (!) 31 99 %   21 0345    84 29 94 %   21 0331    87 27 95 %   21 0315    88 26 97 %   21 0300 125/66   84 27 97 %   21 0245    84 27 98 %   21 0230    83 27 98 %   21 0215    82 27 98 %   21 0200 136/61   86 26 98 %   21 0145    84 26 97 %   21 0130    87 27 96 %   21 0115    84 27 96 %   21 0100 139/60   85 27 96 %   21 0045    82 28 95 %   21 0030    81 26 96 %   21 0015    84 27 93 %   21 0000 136/73 95.7 °F (35.4 °C) Bladder 80 27 100 %   21 2354    81 (!) 31 95 %   21 2345    79 23 99 %   21 2330    79 28 96 %   21 2315    76 29 97 % EXAMINATION: CT OF THE HEAD WITHOUT CONTRAST  2/6/2021 2:46 pm TECHNIQUE: CT of the head was performed without the administration of intravenous contrast. Dose modulation, iterative reconstruction, and/or weight based adjustment of the mA/kV was utilized to reduce the radiation dose to as low as reasonably achievable. COMPARISON: None. HISTORY: ORDERING SYSTEM PROVIDED HISTORY: arrest TECHNOLOGIST PROVIDED HISTORY: arrest Reason for Exam: arrest Acuity: Acute Type of Exam: Initial FINDINGS: BRAIN/VENTRICLES: There is no acute intracranial hemorrhage, mass effect or midline shift. No abnormal extra-axial fluid collection. The gray-white differentiation is maintained without evidence of an acute infarct. There is no evidence of hydrocephalus. ORBITS: The visualized portion of the orbits demonstrate no acute abnormality. SINUSES: The visualized paranasal sinuses and mastoid air cells demonstrate no acute abnormality. SOFT TISSUES/SKULL:  No acute abnormality of the visualized skull or soft tissues. No convincing evidence for acute intracranial pathology. Intracranial hemorrhage, mass effect or midline shift. Labs and Images reviewed with:  [] Dr. Morene Councilman. Aure    [x] Dr. Giselle Anaya  [] Dr. Yolande Freeman  [] There are no new interval images to review. PHYSICAL EXAM       CONSTITUTIONAL:   Intubated and sedated   HEAD:  normocephalic, atraumatic    EYES:  PERRLA, corneal reflex intact   ENT:  moist mucous membranes, ET tube in place   NECK:  supple, symmetric   LUNGS:  Equal air entry bilaterally, bilateral chest tubes   CARDIOVASCULAR:  normal s1 / s2, RRR, distal pulses intact   ABDOMEN:  Soft, no rigidity   NEUROLOGIC:  Mental Status: Intubated, not responsive             Cranial Nerves:    III: Pupils:  equal, round, reactive to light  V: Corneal reflex:  completed. intact  X: No gag, no cough       DRAINS:  [x] There are no drains for Neuro Critical Care to monitor at this time. ASSESSMENT AND PLAN:       65 y.o. male with history of COPD, CAD s/p AICD placement (not MRI compatible), CHF with EF 25% who presents with cardiac arrest.    PEA arrest, ROSC was achieved after 15 minutes, bilateral chest tubes. Patient was admitted to the medical ICU, therapeutic cooling completed. Continued sedation with propofol. Neuro critical care was consulted for prognostic patient status post cardiac arrest.        NEUROLOGIC:  - Imaging CT head - no acute abnormalities  - Follow-up CT tonight as patient is unable to obtain MRI  - AEDs None   - LTME without epileptiform activity, severe encephalopathy  - Goal MAP >65  - Neuro checks per protocol  - NSE labs pending  - Follow clinical exam  - Medical management medical ICU      We will continue to follow along. For any changes in exam or patient status please contact Neuro Critical Care.       Shellie Jaime MD  Neuro Critical Care  Pager [phone]  2/9/2021     8:26 AM

## 2021-02-09 NOTE — PROGRESS NOTES
Critical Care Team - Daily Progress Note      Date and time: 2/9/2021 4:41 PM  Patient's name:  Traci Calderón Record Number: [id number]  Patient's account/billing number: [de-identified]  Patient's YOB: 1950  Age: 65 y.o. Date of Admission: 2/6/2021  1:04 PM  Length of stay during current admission: 3      Primary Care Physician: Mary Jo Ceballos MD  ICU Attending Physician: Dr. Keli Salas Status: Full Code    Reason for ICU admission: PEA arrest      SUBJECTIVE:     OVERNIGHT EVENTS:     No acute overnight issues. Patient currently hemodynamically stable with /50. Mentation has been the same. Not following commands, no withdrawal to the pain. Pupils 3 mm, minimally reactive. Creatinine bumped up 1.72. At baseline. Urine output good 1L. WBC 17 K, afebrile. ABG 7.3 2/52/27. Echo 30%, ICD in place. , mag replaced. Bilateral chest tubes in place.        AWAKE & FOLLOWING COMMANDS:  [x] No   [] Yes    CURRENT VENTILATION STATUS:     [x] Ventilator  [] BIPAP  [] Nasal Cannula [] Room Air        SECRETIONS Amount:  [] Small [x] Moderate  [] Large  [] None  Color:     [] White [] Colored  [] Bloody    SEDATION:  RAAS Score:  [] Propofol gtt  [] Versed gtt  [] Fentanyl gtt   [] No Sedation    PARALYZED:  [x] No    [] Yes    DIARRHEA:                [x] No                [] Yes  (C. Difficile status: [] positive                                                                                                                       [] negative                                                                                                                     [] pending)    VASOPRESSORS:  [x] No    [] Yes    If yes -   [] Levophed       [] Dopamine     [] Vasopressin       [] Dobutamine  [] Phenylephrine         [] Epinephrine    CENTRAL LINES:     [] No   [x] Yes   (Date of Insertion:   )           If yes -     [] Right IJ     [] Left IJ [] Right Femoral [] Left Femoral [] Right Subclavian [] Left Subclavian       VELIZ'S CATHETER:   [] No   [x] Yes  (Date of Insertion:   )     URINE OUTPUT:            [] Good   [x] Low              [] Anuric      OBJECTIVE:     VITAL SIGNS:  BP (!) 156/93   Pulse 104   Temp 97.9 °F (36.6 °C) (Core)   Resp 16   Ht 5' 9\" (1.753 m)   Wt 270 lb (122.5 kg)   SpO2 97%   BMI 39.87 kg/m²   Tmax over 24 hours:  Temp (24hrs), Av °F (36.1 °C), Min:95.7 °F (35.4 °C), Max:97.9 °F (36.6 °C)      Patient Vitals for the past 6 hrs:   BP Temp Temp src Pulse Resp SpO2   21 1600  97.9 °F (36.6 °C) CORE      21 1545    104 16 97 %   21 1230    101 21 93 %   21 1215    99 22 93 %   21 1200 (!) 156/93   96 23 92 %   21 1145    93 23 95 %   21 1133    93 24 95 %   21 1130    92 24 96 %   21 1115    91 25 98 %   21 1100 (!) 146/60   90 26 97 %   21 1045    88 26 97 %         Intake/Output Summary (Last 24 hours) at 2021 1641  Last data filed at 2021 1600  Gross per 24 hour   Intake 1612.17 ml   Output 1281 ml   Net 331.17 ml     Wt Readings from Last 2 Encounters:   21 270 lb (122.5 kg)     Body mass index is 39.87 kg/m². PHYSICAL EXAMINATION:    Constitutional: intubated, nn fentanyl drip  HEENT: Pupils 3 mm, minimally reactive. No visual threat reflex. Respiratory:  Decreased breath sounds, right-sided crepitus secondary to chest tubes. No wheezing, no rales. Cardiovascular: regular rate and rhythm, normal S1, S2, and 2+ pulses throughout  Abdomen: soft, nondistended, BS hypoactive. Neurologic: Sedated and intubated. Not withdrawing to pain. Pupils 3 mm, minimally reactive. Extremities:  peripheral pulses normal, no pedal edema.     MEDICATIONS:    Scheduled Meds:   ipratropium-albuterol  1 ampule Inhalation 4x daily    albuterol  2.5 mg Nebulization BID    artificial tears   Both Eyes 4x Daily  insulin glargine  10 Units Subcutaneous Nightly    famotidine (PEPCID) injection  20 mg Intravenous BID    insulin lispro  0-12 Units Subcutaneous Q6H    chlorhexidine  15 mL Mouth/Throat BID    methylPREDNISolone  40 mg Intravenous Q12H     Continuous Infusions:   fentaNYL 150 mcg/hr (02/09/21 1548)    dextrose      sodium chloride 75 mL/hr at 02/09/21 0620     PRN Meds:       labetalol, 10 mg, Q3H PRN      sodium phosphate IVPB, 10 mmol, PRN      hydroxypropyl methylcellulose, 1 drop, 4x Daily PRN      glucose, 15 g, PRN      dextrose, 12.5 g, PRN      glucagon (rDNA), 1 mg, PRN      dextrose, 100 mL/hr, PRN      promethazine, 12.5 mg, Q6H PRN    Or      ondansetron, 4 mg, Q6H PRN      albuterol, 2.5 mg, As Directed RT PRN          VENT SETTINGS (Comprehensive) (if applicable):  Vent Information  $Ventilation: $Subsequent Day  Skin Assessment: Clean, dry, & intact  Suction Catheter Diameter: 14  Equipment ID: [id number]  Equipment Changed: HME  Vent Type: Servo i  Vent Mode: PRVC  Vt Ordered: 500 mL  Rate Set: 18 bmp  FiO2 : 30 %  SpO2: 97 %  SpO2/FiO2 ratio: 323.33  Sensitivity: 3  PEEP/CPAP: 5  I Time/ I Time %: 0.7 s  Humidification Source: HME  Nitric Oxide/Epoprostenol In Use?: No  Additional Respiratory  Assessments  Pulse: 104  Resp: 16  SpO2: 97 %  End Tidal CO2: 29  Position: Semi-Ko's  Humidification Source: HME  Oral Care Completed?: Yes  Oral Care: Mouthwash, Mouth suctioned  Subglottic Suction Done?: No  Cuff Pressure (cm H2O): (MLT)    ABGs:     Laboratory findings:    Complete Blood Count:   Recent Labs     02/07/21  0615 02/08/21  0642 02/09/21  0434   WBC 4.7 10.9 17.3*   HGB 8.9* 8.8* 8.6*   HCT 28.9* 27.9* 27.4*   PLT See Reflexed IPF Result 146 158        Last 3 Blood Glucose:   Recent Labs     02/08/21  0156 02/08/21  0642 02/09/21  0434   GLUCOSE 259* 270* 207*        PT/INR:    Lab Results   Component Value Date    PROTIME 10.8 02/06/2021    INR 1.0 02/06/2021 PTT:    Lab Results   Component Value Date    APTT 26.0 02/06/2021       Comprehensive Metabolic Profile:   Recent Labs     02/08/21  0156 02/08/21  0156 02/08/21  0642 02/08/21  0642 02/08/21  1625 02/08/21  1809 02/09/21  0434   *  --  138  --   --   --  140   K 4.0  4.1   < > 4.2   < > 4.3 4.3 4.5   CL 99  --  103  --   --   --  103   CO2 23  --  22  --   --   --  23   BUN 33*  --  35*  --   --   --  41*   CREATININE 1.58*  --  1.77*  --   --   --  2.05*   GLUCOSE 259*  --  270*  --   --   --  207*   CALCIUM 8.5*  --  8.7  --   --   --  7.8*    < > = values in this interval not displayed. Magnesium:   Lab Results   Component Value Date    MG 2.0 02/08/2021     Phosphorus:   Lab Results   Component Value Date    PHOS 3.5 02/08/2021     Ionized Calcium:   Lab Results   Component Value Date    CAION 0.99 02/09/2021        Urinalysis:     Troponin: No results for input(s): TROPONINI in the last 72 hours. Microbiology:    Cultures during this admission:     Blood cultures:                 [] None drawn      [] Negative             []  Positive (Details:  )  Urine Culture:                   [] None drawn      [] Negative             []  Positive (Details:  )  Sputum Culture:               [] None drawn       [] Negative             []  Positive (Details:  )   Endotracheal aspirate:     [] None drawn       [] Negative             []  Positive (Details:  )     Other pertinent Labs:       Radiology/Imaging:     Chest Xray (2/9/2021):    ASSESSMENT:     Active Problems:    Cardiac arrest ([address]    Pneumothorax    COPD (chronic obstructive pulmonary disease) (HCC)    CAD (coronary artery disease)    HFrEF (heart failure with reduced ejection fraction) ([address]    AICD (automatic cardioverter/defibrillator) present    CKD (chronic kidney disease) stage 3, GFR 30-59 ml/min    Respiratory arrest ([address]  Resolved Problems:    * No resolved hospital problems.  *    ·         PLAN: WEAN PER PROTOCOL:  [] No   [x] Yes  [] N/A    DISCONTINUE ANY LABS:   [x] No   [] Yes    ICU PROPHYLAXIS:  Stress ulcer:  [x] PPI Agent  [] S3Iqsae [] Sucralfate  [] Other:  VTE:   [] Enoxaparin  [x] Unfract. Heparin Subcut  [] EPC Cuffs    NUTRITION:  [x] NPO [] Tube Feeding (Specify: ) [] TPN  [] PO (Diet: Diet NPO Effective Now)    INSULIN DRIP:   [x] No   [] Yes    CONSULTATION NEEDED:  [] No   [x] Yes    FAMILY UPDATED:    [] No   [x] Yes    TRANSFER OUT OF ICU:   [x] No   [] Yes     Impression:     PEA s/p Cardiac arrest:  Therapeutic rewarming completed. Cardiology following. AICD interrogated, shown some NSVT. Cardiology planning for ischemic work-up once neurological status improved. Encephalopathy s/p cardiac arrest:  Off sedation, not following commands even to pain. Neuro critical care evaluated. Currently getting EEG, showing severe encephalopathy. CT head this evening. Tentative plan of family meeting tomorrow afternoon along with palliative care.     Bilateral pneumothorax  - b/l Chest tubes in place.  - CT surgery contacted with concerns of air leaks and right-sided subcutaneous emphysema. CT surgery recommended keeping chest tubes on suction. -CT surgery following. Currently stable.     Acute respiratory failure s/p mechanical ventilation:  PRVC mode. Currently on fentanyl drip. SBT trials every day, however neurological status still not improved.     Multivessel CAD:  CAD s/p 3 stents. Not amenable for PCI or CABG on cardiac catheterization March 2019.       Ischemic cardiomyopathy with LVEF 25%  s/p AICD:  AICD interrogation showed no sensitivities. Cardiology following. Replace electrolytes. Monitor QTC. Chronic kidney disease stage 3.   - Baseline likely 1.7-2. Currently stable. Monitor urine output. DVT ppx: none  GI ppx: Pepcid  Diet: NPO, will start tube feeds from tomorrow.   Isolation: Not indicated      Philippe No M.D. Critical care resident, PGY-2  Department of Internal Medicine/ Critical care  [facility], [facility] ([place])             2/9/2021

## 2021-02-09 NOTE — FLOWSHEET NOTE
Assessment: Patient intubated and not awake/alert. Mother and brother at bedside. Very large family system that has already seen a lot of loss. Mother requested prayer. Intervention:  provided emotional support, active listening, and prayer. Outcome and Plan: Family expressed gratitude for visit and support. Follow as needed/requested. 02/09/21 1330   Encounter Summary   Services provided to: Patient and family together   Referral/Consult From: Nurse   Support System Family members;Parent   Continue Visiting   (2/9/2021)   Complexity of Encounter High   Length of Encounter 45 minutes   Spiritual Assessment Completed Yes   Routine   Type Follow up   Assessment Anxious; Approachable;Coping   Intervention Explored feelings, thoughts, concerns;Explored coping resources; Discussed belief system/Moravian practices/davy;Prayer;Sustaining presence/ Ministry of presence   Outcome Expressed gratitude;Engaged in conversation;Coping; Shared life review;Grieving;Receptive

## 2021-02-09 NOTE — FLOWSHEET NOTE
Attempted to retrieve patient's current home medication list.  Per patient's sister Annalisa Dover, patient uses the [facility] in [place] and the [facility] in [place]. Patient's sister was unable to find the patient's med list at home. Meds obtained & entered from [facility]. When [address] was called, technician stated she was not able to provide information and transferred call to the Release of Information Department. Associate did not answer, voicemail message left. Awaiting return call. none

## 2021-02-10 NOTE — PROCEDURES
LONG-TERM EEG-VIDEO [address]    Patient: Luis Gonzales  Age: 65 y.o. MRN: [id number]    Referring Physician: No ref. provider found  History: The patient is a 65 y.o. male who presented breakthrough seizure/encephalopathy. This long-term video-EEG monitoring study was performed to determine the nature of the patient's clinical events. The patient is on neuroactive medications.    Luis Gonzales   Current Facility-Administered Medications   Medication Dose Route Frequency Provider Last Rate Last Admin    glucose (GLUTOSE) 40 % oral gel 15 g  15 g Oral PRN Shadia Braxton MD        dextrose 50 % IV solution  12.5 g Intravenous PRN Shadia Braxton MD        glucagon (rDNA) injection 1 mg  1 mg Intramuscular PRN Shadia Braxton MD        dextrose 5 % solution  100 mL/hr Intravenous PRN Shadia Braxton MD        heparin (porcine) injection 5,000 Units  5,000 Units Subcutaneous 3 times per day Dima Downs MD        fentaNYL 20 mcg/mL Infusion  12.5-200 mcg/hr Intravenous Continuous Grady Shrestha MD 6.3 mL/hr at 02/10/21 0901 125 mcg/hr at 02/10/21 0901    ipratropium-albuterol (DUONEB) nebulizer solution 1 ampule  1 ampule Inhalation 4x daily Dima Downs MD   1 ampule at 02/10/21 0816    albuterol (PROVENTIL) nebulizer solution 2.5 mg  2.5 mg Nebulization BID Dima Downs MD   2.5 mg at 02/10/21 0321    labetalol (NORMODYNE;TRANDATE) injection 10 mg  10 mg Intravenous Q3H PRN Ac Soto MD   10 mg at 02/09/21 0936    sodium phosphate 10 mmol in dextrose 5 % 250 mL IVPB  10 mmol Intravenous PRN Junaid Guy MD   Stopped at 02/07/21 0815    hydroxypropyl methylcellulose (GONIOSOL) 2.5 % ophthalmic solution 1 drop  1 drop Both Eyes 4x Daily PRN MD Yang Phillipsrifresh P.M. (artificial tears) ophthalmic ointment   Both Eyes 4x Daily Zena was poor anterior posterior amplitude gradient. Normal sleep architecture and normal wakeful pattern   was not seen. The EKG channel revealed no abnormalities. Ictal EEG Recording / Patient Events: During this period the patient had no events or seizures. Summary: During this day of recording no events were recorded. The interictal EEG was abnormal due to diffuse polymorphic delta slowing suggesting severe encephalopathy. Monitoring was continued in order to record the patient's typical events. The EKG channel revealed no abnormalities. Day 2 - 2/9/21    Interictal EEG Samples: Interictal EEG was unchanged from yesterday. Ictal EEG Recording / Patient Events: During this period the patient had no events or seizures. Summary: During this day of recording no events were recorded. The interictal EEG was abnormal due to diffuse polymorphic delta slowing suggesting severe encephalopathy. Monitoring was continued in order to record the patient's typical events. The EKG channel revealed no abnormalities. Day 3 - 2/10/21, reviewed through end of the recording at 9:41 AM    Interictal EEG Samples: Interictal EEG was unchanged from yesterday. Ictal EEG Recording / Patient Events: During this period the patient had no events or seizures. Summary: During this day of recording no events were recorded. The interictal EEG was abnormal due to diffuse polymorphic delta slowing suggesting severe encephalopathy. Monitoring was discontinued. The EKG channel revealed no abnormalities. Summary and behavior: The interictal EEG was abnormal.  The background activity showed diffuse polymorphic delta slowing    Conclusion: This was an abnormal 3 days video EEG recording. Diffuse polymorphic delta slowing suggested severe encephalopathy. No abnormal epileptiform activity was seen.     Rekha Steiner MD  Diplomate, American Board of Psychiatry and Neurology  Diplomate, American Board of Clinical Neurophysiology Diplomate, American Board of Epilepsy

## 2021-02-10 NOTE — CARE COORDINATION
Case Management Initial Discharge Plan  Gonzalo Borja             Met with:family member brother to discuss discharge plans. Information verified: address, contacts, phone number, , insurance Yes    Emergency Contact/Next of Kin name & number:   Felicita Schafer     Brother/Sister  Parent [phone]     PCP: Azul Hobson MD  Date of last visit: past year    Insurance Provider: [employer]    Discharge Planning    Living Arrangements:      Support Systems:       Home has 2 stories    Patient able to perform ADL's:Independent    Current Services (outpatient & in home)   DME equipment: walker, glucometer, O2 concentrator from [address]  DME provider:     Receiving oral anticoagulation therapy? Yes    If indicated:   Physician managing anticoagulation treatment:   Where does patient obtain lab work for ATC treatment? Potential Assistance Needed:       Patient agreeable to home care: Yes  Freedom of choice provided:  yes    Prior SNF/Rehab Placement and Facility: \  Agreeable to SNF/Rehab: Yes  [place] of choice provided: yes     Evaluation: n/a    Expected Discharge date:       Patient expects to be discharged to: Follow Up Appointment: Best Day/ Time:      Transportation provider: Dependent on transitional Plan  Transportation arrangements needed for discharge: Yes    Readmission Risk              Risk of Unplanned Readmission:        25             Does patient have a readmission risk score greater than 14?: Yes  If yes, follow-up appointment must be made within 7 days of discharge. Goals of Care:       Discharge Plan: Neurosurg to f/u on Saturday. Will follow for needs.      Electronically signed by Scot Denise RN on 2/10/21 at 5:15 PM EST

## 2021-02-10 NOTE — PROGRESS NOTES
Comprehensive Nutrition Assessment    Type and Reason for Visit:  Reassess, Consult (TF ordering/management)    Nutrition Recommendations/Plan: Suggest Renal formula with goal rate of 40 mL/hr d/t hyperkalemia. This will provide 1728 kcal and 78 g pro/day. Will monitor TF tolerance/adequacy and labs-modify TF as needed. Nutrition Assessment:  Pt remains on vent. Plan to start tube feeding today- RD consulted for ordering/management. Labs reviewed: Noted K of 5.4 mmol/L this morning, now down to 4.7 mmol/L after treatment. BUN 64 mg/dL, CR 2.86 mg/dL. Glu 218-239 mg/dL. Meds include:Lantus, Humalog SS, Solumedrol. Malnutrition Assessment:  Malnutrition Status:  Insufficient data      Estimated Daily Nutrient Needs:  Energy (kcal):  1700 kcal/day; Weight Used for Energy Requirements:  Admission     Protein (g):  110 g pro/day; Weight Used for Protein Requirements:  Ideal(1.5)          Current Nutrition Therapies:    Diet NPO Effective Now    Anthropometric Measures:  · Height: 5' 9\" (175.3 cm)  · Current Body Weight: 270 lb (122.5 kg)(estimated)   · Admission Body Weight: 270 lb (122.5 kg)(estimated)     · Ideal Body Weight: 160 lbs; % Ideal Body Weight 168.8 %   · BMI: 39.9  · BMI Categories: Obese Class 2 (BMI 35.0 -39.9)       Nutrition Diagnosis:   · Inadequate oral intake related to impaired respiratory function as evidenced by NPO or clear liquid status due to medical condition, need for enteral nutrition support     Nutrition Interventions:   Food and/or Nutrient Delivery:  Suggest Renal formula with goal rate of 40 mL/hr d/t hyperkalemia. This will provide 1728 kcal and 78 g pro/day. Will monitor TF tolerance/adequacy and labs-modify TF as needed.   Nutrition Education/Counseling:  No recommendation at this time   Coordination of Nutrition Care:  Continue to monitor while inpatient    Goals:  meet % of estimated nutrient needs  -progressing towards goal

## 2021-02-10 NOTE — PROGRESS NOTES
Daily Progress Note  Neuro Critical Care    Patient Name: Dulce Maria Sparks  Patient : 1950  Room/Bed: 0128/0128-01  Code Status: FULL  Allergies: Not on File    CHIEF COMPLAINT:     Cardiac arrest     INTERVAL HISTORY    Initial Presentation (Admitted 2021):   The patient is P 76 y. o. male with history of COPD, CAD s/p AICD placement, CHF with EF 25% who presents with cardiac arrest.  Patient was having shortness of breath for a few days prior to presentation, had a witnessed by family member, EMS was notified. Fransico [place] arrival, patient was in PEA, ACLS was started, ROSC was achieved after 15 minutes after receiving epinephrine x2. Bettina Berg was life flighted to [facility]. Chuckent's, patient was found to have bilateral pneumothoraces, received bilateral chest tubes.  Patient was admitted to the medical ICU, therapeutic cooling was started, sedated on propofol and paralyzed on Nimbex.  Echocardiogram was conducted on 2021, significant for EF 30%.  Patient was rewarmed, Nimbex discontinued, continued sedation with propofol.  Neuro critical care was consulted for prognostic patient status post cardiac arrest.     Hospital Course:   : Exam remained unchanged, continued propofol for overbreathing the vent.  Started LTME.  :  No change in exam.  Remains on Fentanyl infusion for sedation due to tachypnea/hypertension.     Last 24h: No acute events overnight. LTME abnormal due to diffuse polymorphic delta slowing suggesting severe encephalopathy. CT Head reviewed; some subtle blurring of gray white differentiation but no significant cerebral edema. Examined with Fentanyl infusion held for 20-30 minutes this afternoon. Eyes open spontaneously. PERRL, 3mm. Intact corneal reflexes. Gaze right/upward. Absent blink to threat, no tracking. Absent cough and gag reflex. No motor movement. Met with family and discussed patient status. Explained results of CT. Discussed patient's current clinical exam.  We also explain patient's worsening kidney functioning and heart failure. Palliative Care and Crit Care teams present. Decision to monitor exam off sedation for next 24-48h and reconvene Saturday. Explained that if renal functioning continues to deteriorate we may need to meet sooner.     CURRENT MEDICATIONS:  SCHEDULED MEDICATIONS:   calcium gluconate-NaCl  1,000 mg Intravenous Once    ipratropium-albuterol  1 ampule Inhalation 4x daily    albuterol  2.5 mg Nebulization BID    artificial tears   Both Eyes 4x Daily    insulin glargine  10 Units Subcutaneous Nightly    famotidine (PEPCID) injection  20 mg Intravenous BID    insulin lispro  0-12 Units Subcutaneous Q6H    chlorhexidine  15 mL Mouth/Throat BID    methylPREDNISolone  40 mg Intravenous Q12H     CONTINUOUS INFUSIONS:   dextrose      fentaNYL 125 mcg/hr (02/10/21 0101)    dextrose      sodium chloride 75 mL/hr at 21 0620     PRN MEDICATIONS:   glucose, dextrose, glucagon (rDNA), dextrose, labetalol, sodium phosphate IVPB, hydroxypropyl methylcellulose, glucose, dextrose, glucagon (rDNA), dextrose, promethazine **OR** ondansetron, albuterol    VITALS:  Temperature Range: Temp: 99.9 °F (37.7 °C) Temp  Av.9 °F (37.2 °C)  Min: 97.9 °F (36.6 °C)  Max: 99.9 °F (37.7 °C)  BP Range: Systolic (38SXE), KHQ:782 , Min:93 , Max:156 No convincing evidence for acute intracranial pathology. Intracranial hemorrhage, mass effect or midline shift. Labs and Images reviewed with:  [] Dr. Lara Morrow. Aure    [x] Dr. Camille Villanueva  [] Dr. Aydee Serrano  [] There are no new interval images to review. PHYSICAL EXAM       CONSTITUTIONAL:  Intubated, sedation held. Spontaneous eyes opening. Does not track or follow commands. HEAD:  normocephalic, atraumatic    EYES:  PERRL, right upward gaze   ENT:  moist mucous membranes   NECK:  supple, symmetric   NEUROLOGIC:  Mental Status:  Intubated, sedation held. Spontaneous eye opening. Does not track or follow commands. Cranial Nerves:    II: Visual fields:  abnormal - absent blink to threat  III: Pupils:  equal, round, reactive to light  III,IV,VI: Extra Ocular Movements: abnormal - right upward gaze, does not track  V: Corneal reflex:  completed. Intact bilaterally  Absent cough and gag    Motor Exam:    No motor movement to central or local noxious stimuli. DRAINS:  [x] There are no drains for Neuro Critical Care to monitor at this time. ASSESSMENT AND PLAN:       65 y. o. male with history of COPD, CAD s/p AICD placement (not MRI compatible), CHF with EF 25% who presents with cardiac arrest.    PEA arrest, ROSC was achieved after 15 minutes, bilateral chest tubes placed for pneumothoraces.  Patient was admitted to the medical ICU, underwent targeted temperature management and was rewarmed.  Neuro critical Care was consulted on 2/8 for prognostic patient status post cardiac arrest.       Severe encephalopathy secondary to anoxic brain injury in setting of cardiac arrest  LTME suggestive of severe encephalopathy, no abnormal epileptiform activity  Monitor off AEDs  Pacemaker not MRI compatible  NSE x2 pending  CT Head 2/9 (72h) with some subtle blurring of gray/white differentiation suggesting possible anoxic injury but no significant cerebral edema

## 2021-02-10 NOTE — PATIENT CARE CONFERENCE
Palliative Care Family Conference    Patient: Rosalinda Arzola  Room: 0128/0128-01    Attended By: Dr. Vivi Amador care attending, Dr. Norm Cedeño neuro critical care attending, NP [facility] neuro critical care, Dr. Jennifer Tracy critical care resident, RN Jenelle, patient's mother Jing Vargas, patient's brother Mikala Pillai and patient's sister Zoe Duverney    Reason for meeting:Routine meeting  Disease progression  Functional decline  Discuss goals of care  Treatment options/plans  Provide clinical updates and answer questions  Share information and provider education for the family  Listen to patient/family concerns  Assess family understanding, concerns, and coping  Build trust  Poor prognosis is anticipated  Provide emotional support to the family  Elicit patient's goals and values, and use these to establish or modify goals of care  Other major care decisions     Conference Summary:   The family meeting was held in the conference this room in the MICU on the first floor  The family meeting was conducted by Dr. Norm Cedeño and me and attended by neuro critical care NP medicine, critical care resident Dr. Mima Mace, patient's mother Jing Vargas, patient's brother Mikala Pillai and patient's Sister Zoe Duverney    Dr. Norm Cedeño discussed patient's current medical conditions in detail  Dr. Norm Cedeño explained patient's CT imaging's and labs as well as other tests that were done  Dr. Norm Cedeño explained to the family that patient had cardiac arrest for unknown time and that has resulted in severe injury to his brain    Dr. Norm Cedeño explained that patient's EEG showed suppression and marked slowing and and meant that patient's cortex is not functioning  Dr. Norm Cedeño told that patient has been off sedation and has not been following commands although he does have spontaneous eye movements but does not do tracking  The family was also explained that patient has not responded to any external stimulus like withdrawing to painful stimulus We explained to the family that if patient undergoes trach/PEG tube placement patient will have to go to long-term care facility and this will not prevent repeated infections as well as aspiration    We discussed with the family that patient also has multiple health issues and his heart is and his EF is markedly decreased  We also informed the family that patient's renal function is worsening and patient may not be a candidate for hemodialysis    Dr. Fantasma Page explained to the family that patient has shown no neurological improvement after 24 hours as well as after 72 hours and 7 days which are considered as important landmarks for improvement and further explained to the family that patient can be given few more days to see how patient does after about a week    I explained the role of palliative care to the family and told her that palliative care is an extra layer of support and strength for the patient and family    I asked the family whether patient was  or  and they stated that patient has been  many times but presently is     Patient's family told that patient had had a son who was legally adopted by someone and also has a daughter who has been estranged from him for many years    Family further told that they only know that her daughter's first name is \" Mireya Rosen \" and they do not even know whether she carries the same last name as the patient or not and no one has any contact information regarding her    I explained to the family the significance of POA paperwork for health    They stated that patient had made POA paperwork at [facility] when patient was admitted there 2 years ago and he had made patient's brother Dilan Morrow and sister Gume Tong as his POA for health    We explained to the family that we will be calling [facility] to check if they have patient's POA paperwork and they can be faxed to us if present and family agreed with it Patient's Sister Bettina Blake told that they had looked everywhere for patient's POA paperwork but they could not find them   We offered comfort and emotional support to the patient and family    Conclusion/Plan: We will follow-up with the POA paperwork for the patient and contact [facility]   We will continue to follow-up with the family for further goals of care discussions  We will continue to provide comfort and support to the patient and family    The ACP note will be completed once it is clear who patient's POA for health is      The total time was spent in face-to-face family meeting discussing goals of care, advanced directives, greater than 50% time in counseling and other major issues was more than 60 minutes  The note has been dictated by dragon, typing errors may be a possibility.         Electronically signed by   Ángel Abraham MD  Palliative Care Team  on 2/10/2021 at 2:35 PM

## 2021-02-10 NOTE — PLAN OF CARE
Problem: OXYGENATION/RESPIRATORY FUNCTION  Goal: Patient will maintain patent airway  2/10/2021 1032 by Cristi Hwang RN  Outcome: Ongoing  2/10/2021 0926 by Kaitlin Robison RCP  Outcome: Ongoing  2/9/2021 2054 by Jorge Youssef RCP  Outcome: Ongoing  Goal: Patient will achieve/maintain normal respiratory rate/effort  Description: Respiratory rate and effort will be within normal limits for the patient  2/10/2021 1032 by Cristi Hwang RN  Outcome: Ongoing  2/10/2021 0926 by Kaitlin Robison RCP  Outcome: Ongoing  2/9/2021 2054 by Jorge Youssef RCP  Outcome: Ongoing     Problem: MECHANICAL VENTILATION  Goal: Patient will maintain patent airway  2/10/2021 1032 by Cristi Hwang RN  Outcome: Ongoing  2/10/2021 0926 by Kaitlin Robison RCP  Outcome: Ongoing  2/9/2021 2054 by Jorge Youssef RCP  Outcome: Ongoing  Goal: Oral health is maintained or improved  2/10/2021 1032 by Cristi Hwang RN  Outcome: Ongoing  2/9/2021 2054 by Jorge Youssef RCP  Outcome: Ongoing  Goal: ET tube will be managed safely  2/10/2021 1032 by Cristi Hwang RN  Outcome: Ongoing  2/9/2021 2054 by Jorge Youssef RCP  Outcome: Ongoing  Goal: Ability to express needs and understand communication  2/10/2021 1032 by Cristi Hwang RN  Outcome: Ongoing  2/10/2021 0926 by Kaitlin Robison RCP  Outcome: Ongoing  2/9/2021 2054 by Jorge Youssef RCP  Outcome: Ongoing  Goal: Mobility/activity is maintained at optimum level for patient  2/10/2021 1032 by Cristi Hwang RN  Outcome: Ongoing  2/10/2021 0926 by Kaitlin Robison RCP  Outcome: Ongoing  2/9/2021 2054 by Jorge Youssef RCP  Outcome: Ongoing     Problem: Airway Clearance - Ineffective:  Goal: Ability to maintain a clear airway will improve  Description: Ability to maintain a clear airway will improve  2/10/2021 1032 by Cristi Hwang RN  Outcome: Ongoing  2/10/2021 0926 by Kaitlin Robison RCP  Outcome: Ongoing  2/9/2021 2054 by Jorge Youssef RCP Outcome: Ongoing     Problem: Aspiration:  Goal: Absence of aspiration  Description: Absence of aspiration  2/10/2021 1032 by Yamilka Aguilar RN  Outcome: Ongoing  2/10/2021 0926 by Tiffani Thao RCP  Outcome: Ongoing  2/9/2021 2054 by Robina Bhandari RCP  Outcome: Ongoing     Problem: SKIN INTEGRITY  Goal: Skin integrity is maintained or improved  2/10/2021 1032 by Yamilka Aguilar RN  Outcome: Ongoing  2/9/2021 2054 by Robina Bhandari RCP  Outcome: Ongoing     Problem: Sensory Perception - Impaired:  Goal: Demonstrations of improved sensory functioning will increase  Description: Demonstrations of improved sensory functioning will increase  Outcome: Ongoing  Goal: Decrease in sensory misperception frequency  Description: Decrease in sensory misperception frequency  Outcome: Ongoing  Goal: Able to refrain from responding to false sensory perceptions  Description: Able to refrain from responding to false sensory perceptions  Outcome: Ongoing  Goal: Demonstrates accurate environmental perceptions  Description: Demonstrates accurate environmental perceptions  Outcome: Ongoing  Goal: Able to distinguish between reality-based and nonreality-based thinking  Description: Able to distinguish between reality-based and nonreality-based thinking  Outcome: Ongoing  Goal: Able to interrupt nonreality-based thinking  Description: Able to interrupt nonreality-based thinking  Outcome: Ongoing     Problem: Psychomotor Activity - Altered:  Goal: Absence of psychomotor disturbance signs and symptoms  Description: Absence of psychomotor disturbance signs and symptoms  Outcome: Ongoing     Problem: Mood - Altered:  Goal: Mood stable  Description: Mood stable  Outcome: Ongoing  Goal: Absence of abusive behavior  Description: Absence of abusive behavior  Outcome: Ongoing  Goal: Verbalizations of feeling emotionally comfortable while being cared for will increase Description: Verbalizations of feeling emotionally comfortable while being cared for will increase  Outcome: Ongoing     Problem: Injury - Risk of, Physical Injury:  Goal: Will remain free from falls  Description: Will remain free from falls  Outcome: Ongoing  Goal: Absence of physical injury  Description: Absence of physical injury  Outcome: Ongoing     Problem: Confusion - Acute:  Goal: Absence of continued neurological deterioration signs and symptoms  Description: Absence of continued neurological deterioration signs and symptoms  Outcome: Ongoing  Goal: Mental status will be restored to baseline  Description: Mental status will be restored to baseline  Outcome: Ongoing

## 2021-02-10 NOTE — PROCEDURES
Pt has had minimal CT output of right sided chest tube and no tideling. No airleak present but on CXR islet outside pleural space. I Betadine chest tube and advanced slowly about 1 inch. I retied suture to secure CT tighter. I placed a suture at incision to prevent airleak. Reinforced dressing. Chest tube noted to have what appears to be blood clot where chest tube inserts into skin. I clamped chest tube and suctioned out chest tube using sterile technique. Large blood clot noted inside chest tube. Some drainage noted after suction. After interventions, I checked to make sure there was suction applied to chest tubes. After checking all Y ports I noticed a small airlea to chest tube in the right for about 5 seconds which went away. Right sided chest tube has small about of drainage and is now idling. We will get a chest XR and evaluate.

## 2021-02-10 NOTE — PROGRESS NOTES
02/09/21 2041   Patient Transport   Time spent transporting 31-45   Transport ventillation type Transport vent   Transport from ICU   Transport destination CT scan   Transport destination ICU   Emergency equipment included Yes     Transport to [address] originated in MICU room 128 by RT and two RNs. Returned back to 128 without incident.

## 2021-02-10 NOTE — PLAN OF CARE
Problem: OXYGENATION/RESPIRATORY FUNCTION  Goal: Patient will maintain patent airway  2/9/2021 2054 by Kate Farris RCP  Outcome: Ongoing     Problem: OXYGENATION/RESPIRATORY FUNCTION  Goal: Patient will achieve/maintain normal respiratory rate/effort  Description: Respiratory rate and effort will be within normal limits for the patient  2/9/2021 2054 by Kate Farris RCP  Outcome: Ongoing     Problem: MECHANICAL VENTILATION  Goal: Patient will maintain patent airway  2/9/2021 2054 by Kate Farris RCP  Outcome: Ongoing     Problem: MECHANICAL VENTILATION  Goal: Oral health is maintained or improved  2/9/2021 2054 by Kate Farris RCP  Outcome: Ongoing     Problem: MECHANICAL VENTILATION  Goal: ET tube will be managed safely  2/9/2021 2054 by Kate Farris RCP  Outcome: Ongoing     Problem: MECHANICAL VENTILATION  Goal: Ability to express needs and understand communication  2/9/2021 2054 by Kate Farris RCP  Outcome: Ongoing     Problem: MECHANICAL VENTILATION  Goal: Mobility/activity is maintained at optimum level for patient  2/9/2021 2054 by Kate Farris RCP  Outcome: Ongoing     Problem: SKIN INTEGRITY  Goal: Skin integrity is maintained or improved  2/9/2021 2054 by Kate Farris RCP  Outcome: Ongoing     Problem: Airway Clearance - Ineffective:  Goal: Ability to maintain a clear airway will improve  Description: Ability to maintain a clear airway will improve  2/9/2021 2054 by Kate Farris RCP  Outcome: Ongoing     Problem: Aspiration:  Goal: Absence of aspiration  Description: Absence of aspiration  2/9/2021 2054 by Kate Farris RCP  Outcome: Ongoing     Problem: Gas Exchange - Impaired:  Goal: Levels of oxygenation will improve  Description: Levels of oxygenation will improve  2/9/2021 2054 by Kate Farris RCP  Outcome: Ongoing   BRONCHOSPASM/BRONCHOCONSTRICTION     [x]         IMPROVE AERATION/BREATH SOUNDS [x]   ADMINISTER BRONCHODILATOR THERAPY AS APPROPRIATE  [x]   ASSESS BREATH SOUNDS  [x]   IMPLEMENT AEROSOL/MDI PROTOCOL  [x]   PATIENT EDUCATION AS NEEDED   PROVIDE ADEQUATE OXYGENATION WITH ACCEPTABLE SP02/ABG'S    [x]  IDENTIFY APPROPRIATE OXYGEN THERAPY  [x]   MONITOR SP02/ABG'S AS NEEDED   [x]   PATIENT EDUCATION AS NEEDED

## 2021-02-10 NOTE — PROGRESS NOTES
[] Phenylephrine         [] Epinephrine    CENTRAL LINES:     [] No   [x] Yes   (Date of Insertion:   )           If yes -     [] Right IJ     [] Left IJ [] Right Femoral [] Left Femoral                   [] Right Subclavian [] Left Subclavian       VELIZ'S CATHETER:   [] No   [x] Yes  (Date of Insertion:   )     URINE OUTPUT:            [] Good   [x] Low              [] Anuric      OBJECTIVE:     VITAL SIGNS:  BP (!) 114/59   Pulse 89   Temp 99.3 °F (37.4 °C) (Core)   Resp 24   Ht 5' 9\" (1.753 m)   Wt 270 lb (122.5 kg)   SpO2 98%   BMI 39.87 kg/m²   Tmax over 24 hours:  Temp (24hrs), Av °F (37.2 °C), Min:97.9 °F (36.6 °C), Max:99.9 °F (37.7 °C)      Patient Vitals for the past 6 hrs:   BP Temp Temp src Pulse Resp SpO2   02/10/21 0900 (!) 114/59 99.3 °F (37.4 °C) CORE 89 24    02/10/21 0816    87 24 98 %   02/10/21 0426     18 98 %         Intake/Output Summary (Last 24 hours) at 2/10/2021 1010  Last data filed at 2/10/2021 0600  Gross per 24 hour   Intake 1591.69 ml   Output 681 ml   Net 910.69 ml     Wt Readings from Last 2 Encounters:   21 270 lb (122.5 kg)     Body mass index is 39.87 kg/m². PHYSICAL EXAMINATION:    Constitutional: intubated, nn fentanyl drip  HEENT: Pupils 3 mm, minimally reactive. No visual threat reflex. Respiratory:  Decreased breath sounds, right-sided crepitus secondary to chest tubes. No wheezing, no rales. Cardiovascular: regular rate and rhythm, normal S1, S2, and 2+ pulses throughout  Abdomen: soft, nondistended, BS hypoactive. Neurologic:  Not withdrawing to pain. Pupils 3 mm, minimally reactive. Extremities:  peripheral pulses normal, no pedal edema.     MEDICATIONS:    Scheduled Meds:   heparin (porcine)  5,000 Units Subcutaneous 3 times per day    ipratropium-albuterol  1 ampule Inhalation 4x daily    albuterol  2.5 mg Nebulization BID    artificial tears   Both Eyes 4x Daily    insulin glargine  10 Units Subcutaneous Nightly Lab Results   Component Value Date    PROTIME 10.8 02/06/2021    INR 1.0 02/06/2021     PTT:    Lab Results   Component Value Date    APTT 26.0 02/06/2021       Comprehensive Metabolic Profile:   Recent Labs     02/08/21  0642 02/08/21  0642 02/09/21  0434 02/10/21  0451 02/10/21  0936     --  140 141  --    K 4.2   < > 4.5 5.4* 4.7     --  103 105  --    CO2 22  --  23 23  --    BUN 35*  --  41* 64*  --    CREATININE 1.77*  --  2.05* 2.86*  --    GLUCOSE 270*  --  207* 233*  --    CALCIUM 8.7  --  7.8* 7.5*  --     < > = values in this interval not displayed. Magnesium:   Lab Results   Component Value Date    MG 2.0 02/08/2021     Phosphorus:   Lab Results   Component Value Date    PHOS 3.5 02/08/2021     Ionized Calcium:   Lab Results   Component Value Date    CAION 0.99 02/09/2021        Urinalysis:     Troponin: No results for input(s): TROPONINI in the last 72 hours. Microbiology:    Cultures during this admission:     Blood cultures:                 [] None drawn      [x] Negative             []  Positive (Details:  )  Urine Culture:                   [] None drawn      [x] Negative             []  Positive (Details:  )  Sputum Culture:               [] None drawn       [] Negative             []  Positive (Details:  )   Endotracheal aspirate:     [x] None drawn       [] Negative             []  Positive (Details:  )     Other pertinent Labs:       Radiology/Imaging:     Chest Xray (2/10/2021):    ASSESSMENT:     Active Problems:    Cardiac arrest ([facility] [address]    Pneumothorax    COPD (chronic obstructive pulmonary disease) ([facility])    CAD (coronary artery disease)    HFrEF (heart failure with reduced ejection fraction) ([facility] [address]    AICD (automatic cardioverter/defibrillator) present    CKD (chronic kidney disease) stage 3, GFR 30-59 ml/min    Respiratory arrest ([facility] [address]  Resolved Problems:    * No resolved hospital problems.  *    ·         PLAN:     WEAN PER PROTOCOL:  [] No   [x] Yes  [] N/A DISCONTINUE ANY LABS:   [x] No   [] Yes    ICU PROPHYLAXIS:  Stress ulcer:  [x] PPI Agent  [] [id number] [] Sucralfate  [] Other:  VTE:   [] Enoxaparin  [x] Unfract. Heparin Subcut  [] EPC Cuffs    NUTRITION:  [x] NPO [] Tube Feeding (Specify: ) [] TPN  [] PO (Diet: Diet NPO Effective Now)    INSULIN DRIP:   [x] No   [] Yes    CONSULTATION NEEDED:  [] No   [x] Yes    FAMILY UPDATED:    [] No   [x] Yes    TRANSFER OUT OF ICU:   [x] No   [] Yes     Impression:     PEA s/p Cardiac arrest:  Therapeutic rewarming completed. Cardiology following. AICD interrogated, shown some NSVT. Cardiology planning for ischemic work-up once neurological status improved. Encephalopathy s/p cardiac arrest:  Off sedation, not following commands even to pain. Neuro critical care evaluated, f/u recommendations  Currently getting EEG, showing severe encephalopathy. CT head - no acute abnormality  Tentative plan of family meeting along with palliative care.     Bilateral pneumothorax  - b/l Chest tubes in place.  - CT surgery contacted with concerns of air leaks and right-sided subcutaneous emphysema. CT surgery recommended keeping chest tubes on suction. -CT surgery following., will come back after rounds to clear right chest tube of clot     Acute respiratory failure s/p mechanical ventilation:  PRVC mode  SBT trials every day, however neurological status still not improved. Concern for ARDS type picturr on CXR this AM. P/f ratio 190     Multivessel CAD:  CAD s/p 3 stents. Not amenable for PCI or CABG on cardiac catheterization March 2019.       Ischemic cardiomyopathy with LVEF 25%  s/p AICD:  AICD interrogation showed no sensitivities. Cardiology following. Replace electrolytes. Monitor QTC. Chronic kidney disease stage 3.   - Baseline likely 1.7-2. Slowly inreasing. Monitor urine output.         DVT ppx: nhep SQ  GI ppx: Pepcid  Diet: start TFs today  Isolation: Not indicated      Drenda Nones, DO Critical care resident, PGY-3  Department of Emergency Medicine / Critical care  [facility])             2/10/2021

## 2021-02-10 NOTE — PROGRESS NOTES
[facility] Cardiothoracic Surgery  Progress Note    2/10/2021 9:37 AM       Subjective:  Mr. Mcmahon [facility] and non purposeful movement. Guarded status. Cooling measures in place. ICU status. Objective:  BP (!) 124/57   Pulse 87   Temp 99.9 °F (37.7 °C) (Bladder)   Resp 24   Ht 5' 9\" (1.753 m)   Wt 270 lb (122.5 kg)   SpO2 98%   BMI 39.87 kg/m²   Chest: pacing wires: no, chest tubes:yes, air leak no, 2 +  CV: no murmur noted, Normal S1, S2,   Lungs: clear to auscultation, no wheezes, rales, or rhonchi  Abd: normal bowel sounds   Lower Extremities: Trace edema    CXR-no appreciated pneumothorax  Noted on CXR-  Islet of chest tube on right is on rib marginal. May need to be advanced. No airleak noted on CXR we will continue to monitor. Labs:   CBC:   Recent Labs     02/08/21  0642 02/09/21  0434 02/10/21  0451   WBC 10.9 17.3* 10.8   HGB 8.8* 8.6* 7.8*   HCT 27.9* 27.4* 26.2*   MCV 85.8 87.3 90.3    158 See Reflexed IPF Result     BMP:   Recent Labs     02/07/21  1330 02/07/21  1953 02/07/21  1953 02/08/21  0156 02/08/21  0156 02/08/21  0642 02/08/21  0642 02/08/21  1809 02/09/21  0434 02/10/21  0451    133*  --  133*  --  138  --   --  140 141   K 4.1 4.0   < > 4.0  4.1   < > 4.2   < > 4.3 4.5 5.4*    99  --  99  --  103  --   --  103 105   CO2 22 22  --  23  --  22  --   --  23 23   PHOS 3.4 3.6  --  3.5  --   --   --   --   --   --    BUN 28* 29*  --  33*  --  35*  --   --  41* 64*   CREATININE 1.55* 1.50*  --  1.58*  --  1.77*  --   --  2.05* 2.86*    < > = values in this interval not displayed. I/O: I/O last 3 completed shifts:   In: 2505.7 [I.V.:2505.7]  Out: 911 [Urine:746; Emesis/NG output:75; Chest Tube:90]  Scheduled Meds:   heparin (porcine)  5,000 Units Subcutaneous 3 times per day    ipratropium-albuterol  1 ampule Inhalation 4x daily    albuterol  2.5 mg Nebulization BID    artificial tears   Both Eyes 4x Daily  insulin glargine  10 Units Subcutaneous Nightly    famotidine (PEPCID) injection  20 mg Intravenous BID    insulin lispro  0-12 Units Subcutaneous Q6H    chlorhexidine  15 mL Mouth/Throat BID    methylPREDNISolone  40 mg Intravenous Q12H     Continuous Infusions:   dextrose      fentaNYL 125 mcg/hr (02/10/21 0901)    dextrose      sodium chloride 75 mL/hr at 02/09/21 0620     PRN Meds:glucose, dextrose, glucagon (rDNA), dextrose, labetalol, sodium phosphate IVPB, hydroxypropyl methylcellulose, glucose, dextrose, glucagon (rDNA), dextrose, promethazine **OR** ondansetron, albuterol      Assessment/ Plan:  Chest x-ray looks like bilateral ARDS-no pneumothorax appreciated  Right-sided chest tube is not draining due to possible blood clot in tube   -We will be back after rounding to clear chest tube  We will continue to follow along daily and chest for worsening pneumos and airleaks. Pt is currently off pressor support. Neuro.  Evaluation in process-EEG thus far is suggestive of severe encephalopathy   Patient still has continuous EEG  Palliative care to discuss Ameena 69, APRN - NP

## 2021-02-10 NOTE — PROGRESS NOTES
Family meeting with pt's mom, sister and brother this afternoon. Dr. Elicia Bah, neuro critical care and Dr. Radha Youssef, pallitive care, [facility] and critical care resident present. Apparently pt has 2 children one which was adopted at birth and one that is estranged from the family and they only know her first name. Son states that 2 years ago POA paperwork was completed at [facility] when pt was in icu on a vent and family was told the patient would not survive.  Requested [address] to contact [facility] medical records to attempt to locate copy of POA paperwork

## 2021-02-11 NOTE — CONSULTS
Nephrology Consult Note    Reason for Consult: Elevated creatinine  Requesting Physician: Dr. Shilpa Espinoza  Chief Complaint: Consulted for elevated creatinine   history Obtained From: Chart review  History of Present Illness: This is a 65 y.o. male who has a past medical history significant for coronary artery disease, status post AICD, history of COPD. Patient has ejection fraction of 43%. He was admitted at [facility] in April 2020 with Covid pneumonia. He also developed acute renal failure. By the time he was discharged his creatinine was 2.0 mg/dL. His lowest recorded creatinine during that hospitalization was 1.6 mg/dL and may be that was his baseline          Patient was admitted to hospital on sixth of this month. He had a witnessed cardiac arrest.  CPR was initiated. His downtime was close to 15 minutes. Finally pulses were regained. Patient was intubated and airlifted to [facility].  Patient has bilateral pneumothorax which probably will resulted from CPR. Now he has 2 chest tubes  Neurology is on board  Patient is not showing much improvement in his neurological status over the. Of time. Reason nephrology was consulted because of elevated creatinine. His acute renal failure is still evolving. His admitting creatinine was 1.8 and now it is up to 3.1 mg/dL.      Past Medical History:    Status post AICD  History of COPD  History of compromised left ventricular ejection fraction  History of Covid  History of chronic kidney disease baseline creatinine 1.6 mg/dL  History of chronic hypertension    Past Surgical History:        Procedure Laterality Date    CARDIAC DEFIBRILLATOR PLACEMENT  09/25/2019    [facility] model # D150, Lead # 8048 - NOT MRI CONDITIONAL       Current Medications:        Acetaminophen SOLN 650 mg, Q6H PRN      ampicillin-sulbactam (UNASYN) 1500 mg IVPB minibag, Q6H      glucose (GLUTOSE) 40 % oral gel 15 g, PRN   dextrose 50 % IV solution, PRN      glucagon (rDNA) injection 1 mg, PRN      dextrose 5 % solution, PRN      heparin (porcine) injection 5,000 Units, 3 times per day      famotidine (PEPCID) injection 20 mg, Daily      fentaNYL 20 mcg/mL Infusion, Continuous      ipratropium-albuterol (DUONEB) nebulizer solution 1 ampule, 4x daily      albuterol (PROVENTIL) nebulizer solution 2.5 mg, BID      labetalol (NORMODYNE;TRANDATE) injection 10 mg, Q3H PRN      sodium phosphate 10 mmol in dextrose 5 % 250 mL IVPB, PRN      hydroxypropyl methylcellulose (GONIOSOL) 2.5 % ophthalmic solution 1 drop, 4x Daily PRN      lubrifresh P.M. (artificial tears) ophthalmic ointment, 4x Daily      insulin glargine (LANTUS) injection vial 10 Units, Nightly      0.9 % sodium chloride infusion, Continuous      insulin lispro (HUMALOG) injection vial 0-12 Units, Q6H      promethazine (PHENERGAN) tablet 12.5 mg, Q6H PRN    Or      ondansetron (ZOFRAN) injection 4 mg, Q6H PRN      chlorhexidine (PERIDEX) 0.12 % solution 15 mL, BID      albuterol (PROVENTIL) nebulizer solution 2.5 mg, As Directed RT PRN      methylPREDNISolone sodium (SOLU-MEDROL) injection 40 mg, Q12H        Allergies:  Patient has no allergy information on record.     Social History:   Social History     Socioeconomic History    Marital status:      Spouse name: Not on file    Number of children: Not on file    Years of education: Not on file    Highest education level: Not on file   Occupational History    Not on file   Social Needs    Financial resource strain: Not on file    Food insecurity     Worry: Not on file     Inability: Not on file    Transportation needs     Medical: Not on file     Non-medical: Not on file   Tobacco Use    Smoking status: Not on file   Substance and Sexual Activity    Alcohol use: Not on file    Drug use: Not on file    Sexual activity: Not on file   Lifestyle    Physical activity Days per week: Not on file     Minutes per session: Not on file    Stress: Not on file   Relationships    Social connections     Talks on phone: Not on file     Gets together: Not on file     Attends Congregational service: Not on file     Active member of club or organization: Not on file     Attends meetings of clubs or organizations: Not on file     Relationship status: Not on file    Intimate partner violence     Fear of current or ex partner: Not on file     Emotionally abused: Not on file     Physically abused: Not on file     Forced sexual activity: Not on file   Other Topics Concern    Not on file   Social History Narrative    Not on file       Family History:   No family history on file.     Review of Systems:    Constitutional: Patient is intubated  Objective:  CURRENT TEMPERATURE:  Temp: 101.1 °F (38.4 °C)  MAXIMUM TEMPERATURE OVER 24HRS:  Temp (24hrs), Av.6 °F (38.1 °C), Min:99.9 °F (37.7 °C), Max:101.1 °F (38.4 °C)    CURRENT RESPIRATORY RATE:  Resp: (!) 33  CURRENT PULSE:  Pulse: 111  CURRENT BLOOD PRESSURE:  BP: (!) 180/92  24HR BLOOD PRESSURE RANGE:  Systolic (55WHE), MRE:169 , Min:122 , HQP:625   ; Diastolic (27RLO), KDU:52, Min:51, Max:92    24HR INTAKE/OUTPUT:      Intake/Output Summary (Last 24 hours) at 2021 1111  Last data filed at 2021 1100  Gross per 24 hour   Intake 2299 ml   Output 1785 ml   Net 514 ml     Patient Vitals for the past 96 hrs (Last 3 readings):   Weight   21 1000 211 lb 6.7 oz (95.9 kg)     Physical Exam:  General appearance: Not responding to painful stimuli   skin: Warm to touch  Eyes: Jenene Pastures was pink  ENT: :no thrush no pharyngeal congestion    Neck: No carotid bruit  Pulmonary: No wheezing   cardiovascular: S1 and S2 audible no S3  Abdomen: Soft and nontender bowel sounds are positive no ascites  Extremities: Trace edema  Labs:   CBC:  Recent Labs     21  0434 02/10/21  0451 21  0434   WBC 17.3* 10.8 9.6   RBC 3.14* 2.90* 2.95* HGB 8.6* 7.8* 8.0*   HCT 27.4* 26.2* 26.1*   MCV 87.3 90.3 88.5   MCH 27.4 26.9 27.1   MCHC 31.4 29.8 30.7   RDW 13.3 13.4 13.6    See Reflexed IPF Result 139   MPV 11.1 NOT REPORTED 11.6      BMP:   Recent Labs     02/09/21  0434 02/10/21  0451 02/10/21  0936 02/11/21 0434    141  --  144   K 4.5 5.4* 4.7 5.6*    105  --  111*   CO2 23 23  --  21   BUN 41* 64*  --  86*   CREATININE 2.05* 2.86*  --  3.15*   GLUCOSE 207* 233*  --  400*   CALCIUM 7.8* 7.5*  --  8.4*      SPEP:   Lab Results   Component Value Date    PROT 6.7 02/06/2021   Urine Osmolarity:    Lab Results   Component Value Date    OSMOU 370 02/08/2021   Urinalysis:  U/A:   Lab Results   Component Value Date    NITRU NEGATIVE 02/09/2021    COLORU YELLOW 02/09/2021    PHUR 5.0 02/09/2021    WBCUA 20 TO 50 02/09/2021    RBCUA TOO NUMEROUS TO COUNT 02/09/2021    MUCUS NOT REPORTED 02/09/2021    TRICHOMONAS NOT REPORTED 02/09/2021    YEAST NOT REPORTED 02/09/2021    BACTERIA NOT REPORTED 02/09/2021    SPECGRAV 1.020 02/09/2021    LEUKOCYTESUR MODERATE 02/09/2021    UROBILINOGEN Normal 02/09/2021    BILIRUBINUR NEGATIVE 02/09/2021    GLUCOSEU NEGATIVE 02/09/2021    [address] NEGATIVE 02/09/2021    AMORPHOUS NOT REPORTED 02/09/2021         Radiology:  Reviewed as available. Assessment:  1. Acute kidney injury most likely due to ischemic ATN. His acute renal failure is still evolving  2. Bilateral pneumothorax status post chest tube placement  3. COPD  4. History of hypertension  5. Hyperkalemia    Plan:  1. Continue IV fluid  2. We will check renal ultrasound  3. Will give Elester Merchant through NG tube for correction of mild hyperkalemia  4. BMP this evening  5. We will follow with you    Thank you for the consultation. Please do not hesitate to call with questions.     Electronically signed by Marylee Daring, MD on 2/11/2021 at 11:11 AM

## 2021-02-11 NOTE — PLAN OF CARE
Problem: OXYGENATION/RESPIRATORY FUNCTION  Goal: Patient will maintain patent airway  2/11/2021 0746 by Vernairish Lint, RCP  Outcome: Ongoing  Goal: Patient will achieve/maintain normal respiratory rate/effort  Description: Respiratory rate and effort will be within normal limits for the patient  2/11/2021 0746 by Delroy Dey, RCP  Outcome: Ongoing  2/11/2021 0515 by Jamila Oliver RCP  Outcome: Ongoing     Problem: MECHANICAL VENTILATION  Goal: Patient will maintain patent airway  2/11/2021 1552 by Katarzyna Thompson RN  Outcome: Ongoing  2/11/2021 0746 by Delroy Dey, RCP  Outcome: Ongoing  2/11/2021 0515 by Jamila Oliver RCP  Outcome: Ongoing  Goal: Oral health is maintained or improved  2/11/2021 1552 by Katarzyna Thompson RN  Outcome: Ongoing  2/11/2021 0746 by Delroy Dey, RCP  Outcome: Ongoing  2/11/2021 0515 by Jamila Oliver RCP  Outcome: Ongoing  Goal: ET tube will be managed safely  2/11/2021 1552 by Katarzyna Thompson RN  Outcome: Ongoing  2/11/2021 0746 by Delroy Dey, RCP  Outcome: Ongoing  2/11/2021 0515 by Jamila Oliver RCP  Outcome: Ongoing  Goal: Ability to express needs and understand communication  2/11/2021 0515 by Jamila Oliver RCP  Outcome: Ongoing  Goal: Mobility/activity is maintained at optimum level for patient  2/11/2021 0515 by Jamila Oliver RCP  Outcome: Ongoing     Problem: SKIN INTEGRITY  Goal: Skin integrity is maintained or improved  2/11/2021 1552 by Katarzyna Thompson RN  Outcome: Ongoing  2/11/2021 0515 by Jamila Oliver RCP  Outcome: Ongoing     Problem: NUTRITION  Goal: Nutritional status is improving  Outcome: Ongoing     Problem: Airway Clearance - Ineffective:  Goal: Ability to maintain a clear airway will improve  Description: Ability to maintain a clear airway will improve  2/11/2021 0746 by Delroy Dey, RCP  Outcome: Ongoing  2/11/2021 0515 by Jamila Oliver RCP  Outcome: Ongoing     Problem: Aspiration:  Goal: Absence of aspiration  Description: Absence of aspiration 2/11/2021 1552 by Cristino Gamboa RN  Outcome: Ongoing  2/11/2021 0515 by Festus Prieto RCP  Outcome: Ongoing     Problem: Cardiac Output - Decreased:  Goal: Hemodynamic stability will improve  Description: Hemodynamic stability will improve  Outcome: Ongoing     Problem: Gas Exchange - Impaired:  Goal: Levels of oxygenation will improve  Description: Levels of oxygenation will improve  2/11/2021 1552 by Cristino Gamboa RN  Outcome: Ongoing  2/11/2021 0515 by Festus Prieto RCP  Outcome: Ongoing     Problem: Pain:  Goal: Pain level will decrease  Description: Pain level will decrease  Outcome: Ongoing     Problem: Serum Glucose Level - Abnormal:  Goal: Ability to maintain appropriate glucose levels will improve to within specified parameters  Description: Ability to maintain appropriate glucose levels will improve to within specified parameters  Outcome: Ongoing     Problem: Skin Integrity - Impaired:  Goal: Will show no infection signs and symptoms  Description: Will show no infection signs and symptoms  Outcome: Ongoing     Problem: Tissue Perfusion, Altered:  Goal: Circulatory function within specified parameters  Description: Circulatory function within specified parameters  Outcome: Ongoing     Problem: Skin Integrity:  Goal: Will show no infection signs and symptoms  Description: Will show no infection signs and symptoms  2/11/2021 1552 by Cristino Gamboa RN  Outcome: Ongoing  2/11/2021 0515 by Festus Prieto RCP  Outcome: Ongoing  Goal: Absence of new skin breakdown  Description: Absence of new skin breakdown  2/11/2021 1552 by Cristino Gamboa RN  Outcome: Ongoing  2/11/2021 0515 by Festus Prieto RCP  Outcome: Ongoing

## 2021-02-11 NOTE — PLAN OF CARE
Problem: OXYGENATION/RESPIRATORY FUNCTION  Goal: Patient will maintain patent airway  Outcome: Ongoing  Goal: Patient will achieve/maintain normal respiratory rate/effort  Description: Respiratory rate and effort will be within normal limits for the patient  2/11/2021 0746 by Francisco Javier Carter RCP  Outcome: Ongoing  2/11/2021 0515 by Francisco Rubio RCP  Outcome: Ongoing     Problem: MECHANICAL VENTILATION  Goal: Patient will maintain patent airway  2/11/2021 0746 by Francisco Javier Carter RCP  Outcome: Ongoing  2/11/2021 0515 by Francisco Rubio RCP  Outcome: Ongoing  Goal: Oral health is maintained or improved  2/11/2021 0746 by Francisco Javier Carter RCP  Outcome: Ongoing  2/11/2021 0515 by Francisco Rubio RCP  Outcome: Ongoing  Goal: ET tube will be managed safely  2/11/2021 0746 by Francisco Javier Carter RCP  Outcome: Ongoing  2/11/2021 0515 by Francisco Rubio RCP  Outcome: Ongoing  Goal: Ability to express needs and understand communication  2/11/2021 0515 by Francisco Rubio RCP  Outcome: Ongoing  Goal: Mobility/activity is maintained at optimum level for patient  2/11/2021 0515 by Francisco Rubio RCP  Outcome: Ongoing     Problem: SKIN INTEGRITY  Goal: Skin integrity is maintained or improved  2/11/2021 0515 by Francisco Rubio RCP  Outcome: Ongoing     Problem: Airway Clearance - Ineffective:  Goal: Ability to maintain a clear airway will improve  Description: Ability to maintain a clear airway will improve  2/11/2021 0746 by Francisco Javier Carter RCP  Outcome: Ongoing  2/11/2021 0515 by Francisco Rubio RCP  Outcome: Ongoing     Problem: Aspiration:  Goal: Absence of aspiration  Description: Absence of aspiration  2/11/2021 0515 by Francisco Rubio RCP  Outcome: Ongoing     Problem: Gas Exchange - Impaired:  Goal: Levels of oxygenation will improve  Description: Levels of oxygenation will improve  2/11/2021 0515 by Francisco Rubio RCP  Outcome: Ongoing     Problem: Skin Integrity:  Goal: Will show no infection signs and symptoms  Description: Will show no infection signs and symptoms 2/11/2021 0515 by Steffi Pillai RCP  Outcome: Ongoing  Goal: Absence of new skin breakdown  Description: Absence of new skin breakdown  2/11/2021 0515 by Steffi Pillai RCP  Outcome: Ongoing

## 2021-02-11 NOTE — CARE COORDINATION
Transitional Planning:    Spoke with nurse at bedside, neurosurgery is tentatively to have a meeting with family on Saturday.

## 2021-02-11 NOTE — PLAN OF CARE
Problem: OXYGENATION/RESPIRATORY FUNCTION  Goal: Patient will achieve/maintain normal respiratory rate/effort  Description: Respiratory rate and effort will be within normal limits for the patient  Outcome: Ongoing     Problem: MECHANICAL VENTILATION  Goal: Patient will maintain patent airway  Outcome: Ongoing  Goal: Oral health is maintained or improved  Outcome: Ongoing  Goal: ET tube will be managed safely  Outcome: Ongoing  Goal: Ability to express needs and understand communication  Outcome: Ongoing  Goal: Mobility/activity is maintained at optimum level for patient  Outcome: Ongoing     Problem: SKIN INTEGRITY  Goal: Skin integrity is maintained or improved  Outcome: Ongoing     Problem: Airway Clearance - Ineffective:  Goal: Ability to maintain a clear airway will improve  Description: Ability to maintain a clear airway will improve  Outcome: Ongoing     Problem: Aspiration:  Goal: Absence of aspiration  Description: Absence of aspiration  Outcome: Ongoing     Problem: Gas Exchange - Impaired:  Goal: Levels of oxygenation will improve  Description: Levels of oxygenation will improve  Outcome: Ongoing     Problem: Skin Integrity:  Goal: Will show no infection signs and symptoms  Description: Will show no infection signs and symptoms  Outcome: Ongoing  Goal: Absence of new skin breakdown  Description: Absence of new skin breakdown  Outcome: Ongoing

## 2021-02-11 NOTE — PROGRESS NOTES
Daily Progress Note  Neuro Critical Care    Patient Name: Elly Wang  Patient : 1950  Room/Bed: 0128/0128-01  Code Status: DNR-CCA  Allergies: Not on File    CHIEF COMPLAINT:      Cardiac arrest     INTERVAL HISTORY    Initial Presentation (Admitted 21): The patient is a 64 y. o. male with history of COPD, CAD s/p AICD placement, CHF with EF 25% who presents with cardiac arrest.  Patient was having shortness of breath for a few days prior to presentation, had a witnessed by family member, EMS was notified. [facility] arrival, patient was in PEA, ACLS was started, ROSC was achieved after 15 minutes after receiving epinephrine x2. Benitez Herrera was life flighted to [facility]. Ced's, patient was found to have bilateral pneumothoraces, received bilateral chest tubes.  Patient was admitted to the medical ICU, therapeutic cooling was started, sedated on propofol and paralyzed on Nimbex.  Echocardiogram was conducted on 2021, significant for EF 30%.  Patient was rewarmed, Nimbex discontinued, continued sedation with propofol.  Neuro critical care was consulted for prognostic patient status post cardiac arrest.    Hospital Course:   : Exam remained unchanged, continued propofol for overbreathing the vent. Started LTME.  :  No change in exam.  Remains on Fentanyl infusion for sedation due to tachypnea/hypertension. 2/10: LTME abnormal due to diffuse polymorphic delta slowing suggesting severe encephalopathy. CT Head reviewed; some subtle blurring of gray white differentiation but no significant cerebral edema. Family updated, plan for follow up family meeting on Saturday, .     Last 24h: No acute events overnight. Nephrology consulted for worsening LILIANA. Renal ultrasound ordered, Ismael Dove started for hyperkalemia. Neuro exam stable, Pupils are reactive bilaterally, roving eye movements, weak corneals, cough/gag absent, no movement to noxious stimulation. Family updated at bedside regarding neuro examination. Will plan for meeting Saturday or sooner if needed.     CURRENT MEDICATIONS:  SCHEDULED MEDICATIONS:   insulin glargine  10 Units Subcutaneous BID    sodium zirconium cyclosilicate  10 g Oral BID    ampicillin-sulbactam  1,500 mg Intravenous Q12H    heparin (porcine)  5,000 Units Subcutaneous 3 times per day    famotidine (PEPCID) injection  20 mg Intravenous Daily    ipratropium-albuterol  1 ampule Inhalation 4x daily    albuterol  2.5 mg Nebulization BID    artificial tears   Both Eyes 4x Daily    insulin lispro  0-12 Units Subcutaneous Q6H    chlorhexidine  15 mL Mouth/Throat BID    methylPREDNISolone  40 mg Intravenous Q12H     CONTINUOUS INFUSIONS:   dextrose      sodium chloride 75 mL/hr at 21 1703     PRN MEDICATIONS:   Acetaminophen, fentanNYL, glucose, dextrose, glucagon (rDNA), dextrose, labetalol, sodium phosphate IVPB, hydroxypropyl methylcellulose, promethazine **OR** ondansetron, albuterol    VITALS:  Temperature Range: Temp: 100.9 °F (38.3 °C) Temp  Av.8 °F (38.2 °C)  Min: 99.9 °F (37.7 °C)  Max: 101.3 °F (38.5 °C)  BP Range: Systolic (00KEF), XWW:071 , Min:127 , BYP:653     Diastolic (85YPL), VFQ:79, Min:47, Max:92    Pulse Range: Pulse  Av.4  Min: 78  Max: 111  Respiration Range: Resp  Av.5  Min: 15  Max: 36  Current Pulse Ox: SpO2: 94 %  24HR Pulse Ox Range: SpO2  Av %  Min: 90 %  Max: 100 %  Patient Vitals for the past 12 hrs:   BP Temp Temp src Pulse Resp SpO2 Weight   21 1800    89 15 94 %    21 1730    85 27 93 %    21 1700    98 (!) 31 91 %    21 1630 (!) 153/60 100.9 °F (38.3 °C) CORE 94 30 94 %  02/11/21 1600 (!) 136/47   93 29 94 %    02/11/21 1530    90 28 93 %    02/11/21 1500    90 28 92 %    02/11/21 1430    88 27 93 %    02/11/21 1400    86 27 92 %    02/11/21 1330    82 27 95 %    02/11/21 1300    78 26 93 %    02/11/21 1230    98 28 94 %    02/11/21 1200 (!) 152/67 100.9 °F (38.3 °C) CORE 98 27 92 %    02/11/21 1130    96 26 92 %    02/11/21 1115    98 27 92 %    02/11/21 1100    97 27 92 %    02/11/21 1045    98 27 94 %    02/11/21 1030    99 27 93 %    02/11/21 1015    98 27 93 %    02/11/21 1000    98 26 90 % 211 lb 6.7 oz (95.9 kg)   02/11/21 0945    99 26 93 %    02/11/21 0930    99 25 94 %    02/11/21 0915    99 (!) 34 93 %    02/11/21 0900    97 29 93 %    02/11/21 0845    98 (!) 36 93 %    02/11/21 0830    98 (!) 32 93 %    02/11/21 0815    97 26 92 %    02/11/21 0800 (!) 142/71 101.3 °F (38.5 °C) CORE 96 26 92 %    02/11/21 0745 (!) 152/74   96 26 95 %    02/11/21 0730 127/70   96 25 94 %    02/11/21 0715 (!) 145/66   95 25 94 %    02/11/21 0700 (!) 143/60   93 25 93 %      Estimated body mass index is 31.22 kg/m² as calculated from the following:    Height as of this encounter: 5' 9\" (1.753 m).     Weight as of this encounter: 211 lb 6.7 oz (95.9 kg).  []<16 Severe malnutrition  []1616.99 Moderate malnutrition  []1718.49 Mild malnutrition  []18.524.9 Normal  []2529.9 Overweight (not obese)  [x]3034.9 Obese class 1 (Low Risk)  []3539.9 Obese class 2 (Moderate Risk)  []?40 Obese class 3 (High Risk)    RECENT LABS:   Lab Results   Component Value Date    WBC 9.6 02/11/2021    HGB 8.0 (L) 02/11/2021    HCT 26.1 (L) 02/11/2021     02/11/2021    TRIG 436 (H) 02/09/2021    ALT 32 02/06/2021    AST 35 02/06/2021     02/11/2021    K 6.0 (HH) 02/11/2021     (H) 02/11/2021    CREATININE 2.86 (H) 02/11/2021    BUN 95 (HH) 02/11/2021    CO2 20 02/11/2021    TSH 4.91 02/06/2021 INR 1.0 02/06/2021     24 HOUR INTAKE/OUTPUT:    Intake/Output Summary (Last 24 hours) at 2/11/2021 1837  Last data filed at 2/11/2021 1800  Gross per 24 hour   Intake 2583 ml   Output 1910 ml   Net 673 ml       IMAGING:   US RENAL COMPLETE   Final Result   Essentially unremarkable renal ultrasound         XR CHEST PORTABLE   Final Result   Worsening right perihilar, mid and lower lung field airspace disease which   could represent increasing atelectasis. No pneumothorax is seen. Otherwise   similar appearing chest.         XR CHEST PORTABLE   Final Result   1. Tip of the right chest tube overlying the right parahilar region. No   definite pneumothorax. 2. Small pleural effusions and bibasilar opacities without significant change. XR CHEST PORTABLE   Final Result   1. Increasing opacification of the lower half right hemithorax possibly   combination of effusion and infiltrate. New left basal/retrocardiac   infiltrate obscures the hemidiaphragm. These infiltrates probably   atelectasis. 2. Support tubes and line in place as noted above. CT HEAD WO CONTRAST   Final Result   No acute intracranial abnormality. XR CHEST PORTABLE   Final Result   1. Similar position of right chest tube. The side hole again appears   external to the pleural space. 2.  Unchanged appearance of left chest tube. 3.  Similar appearance of small bilateral pneumothoraces and subcutaneous   emphysema. 4.  No appreciable change in basilar subsegmental atelectasis and small   effusions. XR CHEST PORTABLE   Final Result   Right chest tube has been retracted. Side hole projects over the soft   tissues. Minimal pneumothoraces are suspected. Increased left lung base opacity is nonspecific but may represent atelectasis   and a pleural effusion. XR CHEST PORTABLE   Final Result   1. Bilateral chest tubes in place, as described.   Persistent small pneumothoraces, left greater than right. 2.  Subcutaneous emphysema has increased on the right side. 3.  Endotracheal tube remains in appropriate position. The enteric tube   courses off the field of view in the upper abdomen. CT CHEST ABDOMEN PELVIS WO CONTRAST   Final Result   1. Bilateral chest tubes in place, abutting the mediastinal pleura. Persistent small pneumothoraces. 2.  Nonspecific patchy airspace disease in the upper lobes may represent   multifocal inflammatory process or infection. Trace pleural effusions. 3.  Multiple old left rib fractures and old left scapular body fracture. No   acute fracture identified. 4.  Sequela of old granulomatous disease in the chest and noncalcified   pulmonary nodules measuring up to 7 mm. While this may represent sequela of   old infection, metastatic disease is not excluded. Follow-up imaging in 6-12   months is recommended (in the absence of prior imaging for comparison). 5.  Cholelithiasis. Mild stranding around the gallbladder may represent   cholecystitis in the appropriate clinical setting. No biliary dilatation. 6.  Localized skin thickening in the left mid abdomen may represent soft   tissue contusion. 7.  Mildly prominent scattered mediastinal lymph nodes for which attention to   on follow-up is recommended. CT HEAD WO CONTRAST   Final Result   No convincing evidence for acute intracranial pathology. Intracranial   hemorrhage, mass effect or midline shift. XR CHEST PORTABLE   Final Result   Interval advancement of right thoracostomy tube. The tip projects over the   left of midline. Trace right apical pneumothorax. 2.4 cm radiopaque density projecting over the right lung base has move   medially in likely is external to the patient. XR CHEST PORTABLE   Final Result   1. Endotracheal tube tip is 8.2 cm above the annie. 2. Bilateral thoracostomy tubes are noted. The right thoracostomy tube   proximal side-port is just external to the ribcage. Subcutaneous emphysema   is noted with a trace right lateral pneumothorax. There is also a 2.4 cm   radiopaque density projecting over the right lower lung which has the   appearance of a fractured thoracostomy tube. Recommend correlation if this   is external to the patient. 3. Patchy left opacity in the left lung base. 4. Questionable acute 7th right rib fracture. Age-indeterminate left 2nd   through 6th rib fractures are noted favoring subacute to chronic etiology. XR CHEST PORTABLE    (Results Pending)         Labs and Images reviewed with:  [] Dr. Shivani Linares. Aure    [x] Dr. Mele Pruett  [] Dr. Katlyn Ervin  [] There are no new interval images to review. PHYSICAL EXAM       CONSTITUTIONAL:  Intubated. Does not open eyes or follow commands. Comatose exam.    HEAD:  normocephalic, atraumatic    EYES:  PERRL, roving eye movements   ENT:  moist mucous membranes   NECK:  supple, symmetric   LUNGS:  Equal air entry bilaterally   CARDIOVASCULAR:  normal s1 / s2, RRR, distal pulses intact   ABDOMEN:  Soft, no rigidity   NEUROLOGIC:  Mental Status:  Intubated, Comatose             Cranial Nerves:    III: Pupils:  equal, round, reactive to light  III,IV,VI: Extra Ocular Movements: intact  V: Corneal reflex:  completed. abnormal weak bilaterally  Absent cough/gag    Motor Exam:    No movement to noxious stimulation centrally or peripherally. DRAINS:  [x] There are no drains for Neuro Critical Care to monitor at this time.      ASSESSMENT AND PLAN: 65 y.o. male with history of COPD, CAD s/p AICD placement (not MRI compatible), CHF with EF 25% who presents with cardiac arrest.    PEA arrest, ROSC was achieved after 15 minutes, bilateral chest tubes placed for pneumothoraces.  Patient was admitted to the medical ICU, underwent targeted temperature management and was rewarmed.  Neuro critical Care was consulted on 2/8 for prognostic patient status post cardiac arrest.       Severe encephalopathy secondary to anoxic brain injury in setting of cardiac arrest  LTME suggestive of severe encephalopathy, no abnormal epileptiform activity  Monitor off AEDs  Pacemaker not MRI compatible  NSE x2 pending  CT Head 2/9 (72h) with some subtle blurring of gray/white differentiation suggesting possible anoxic injury but no significant cerebral edema   No motor response on clinical exam as above  Monitor neurological exam for next 24-48h off sedation  Prognosis guarded  Palliative Care following; working on establishing POA/next of kin  Family meeting held 2/10, plan to reconvene later this week on Friday or Saturday     PEA arrest  Acute hypoxic respiratory failure requiring mechanical ventilation  Bilateral pneumothorax with bilateral chest tubes  Cardiomyopathy (EF 30%)  Acute kidney injury likely ATN secondary to hypoperfusion  Hyperkalemia  Management per primary team    We will continue to follow along. For any changes in exam or patient status please contact Neuro Critical Care.       SANDRA Mejia - CNP  Neuro Critical Care  Pager [phone]  2/11/2021     6:37 PM

## 2021-02-11 NOTE — PROGRESS NOTES
Critical Care Team - Daily Progress Note      Date and time: 2/11/2021 1:00 PM  Patient's name:  Randall Ellington Record Number: [id number]  Patient's account/billing number: [de-identified]  Patient's YOB: 1950  Age: 65 y.o. Date of Admission: 2/6/2021  1:04 PM  Length of stay during current admission: 5      Primary Care Physician: Venita Bojorquez MD  ICU Attending Physician: Dr. Vides [place]    Code Status: Full Code    Reason for ICU admission: PEA arrest      SUBJECTIVE:     OVERNIGHT EVENTS:     Patient spiked fevers overnight. Patient not on any antibiotics. Blood pressure is elevated, tachypneic. Labs reviewed. Potassium 5.6, creatinine worsening. Patient received a dose of Lasix yesterday, no improvement. Patient's urine output improved after Lasix.     LTME severe encephalopathy, no epileptiform activity or clinical seizures      AWAKE & FOLLOWING COMMANDS:  [x] No   [] Yes    CURRENT VENTILATION STATUS:     [x] Ventilator  [] BIPAP  [] Nasal Cannula [] Room Air        SECRETIONS Amount:  [] Small [x] Moderate  [] Large  [x] None  Color:     [] White [] Colored  [] Bloody    SEDATION:  RAAS Score:  [] Propofol gtt  [] Versed gtt  [] Fentanyl gtt   [x] No Sedation    PARALYZED:  [x] No    [] Yes    DIARRHEA:                [x] No                [] Yes  (C. Difficile status: [] positive                                                                                                                       [] negative                                                                                                                     [] pending)    VASOPRESSORS:  [x] No    [] Yes    If yes -   [] Levophed       [] Dopamine     [] Vasopressin       [] Dobutamine  [] Phenylephrine         [] Epinephrine    CENTRAL LINES:     [] No   [x] Yes   (Date of Insertion:   )           If yes -     [] Right IJ     [] Left IJ [] Right Femoral [] Left Femoral [] Right Subclavian [] Left Subclavian       VELIZ'S CATHETER:   [] No   [x] Yes  (Date of Insertion:   )     URINE OUTPUT:            [] Good   [x] Low              [] Anuric      OBJECTIVE:     VITAL SIGNS:  BP (!) 152/67   Pulse 98   Temp 101.3 °F (38.5 °C) (Core)   Resp 28   Ht 5' 9\" (1.753 m)   Wt 211 lb 6.7 oz (95.9 kg)   SpO2 94%   BMI 31.22 kg/m²   Tmax over 24 hours:  Temp (24hrs), Av.7 °F (38.2 °C), Min:99.9 °F (37.7 °C), Max:101.3 °F (38.5 °C)      Patient Vitals for the past 6 hrs:   BP Temp Temp src Pulse Resp SpO2 Weight   21 1230    98 28 94 %    21 1200 (!) 152/67   98 27 92 %    21 1130    96 26 92 %    21 1115    98 27 92 %    21 1100    97 27 92 %    21 1045    98 27 94 %    21 1030    99 27 93 %    21 1015    98 27 93 %    21 1000    98 26 90 % 211 lb 6.7 oz (95.9 kg)   21 0945    99 26 93 %    21 0930    99 25 94 %    21 0915    99 (!) 34 93 %    21 0900    97 29 93 %    21 0845    98 (!) 36 93 %    21 0830    98 (!) 32 93 %    21 0815    97 26 92 %    21 0800 (!) 142/71 101.3 °F (38.5 °C) CORE 96 26 92 %    21 0745 (!) 152/74   96 26 95 %    21 0730 127/70   96 25 94 %    21 0715 (!) 145/66   95 25 94 %          Intake/Output Summary (Last 24 hours) at 2021 1300  Last data filed at 2021 1200  Gross per 24 hour   Intake 2745 ml   Output 1805 ml   Net 940 ml     Wt Readings from Last 2 Encounters:   21 211 lb 6.7 oz (95.9 kg)     Body mass index is 31.22 kg/m². PHYSICAL EXAMINATION:    Constitutional: intubated, off sedation, no response. HEENT: Pupils equal and reactive. Respiratory:  Decreased breath sounds, right-sided crepitus secondary to chest tubes. No wheezing, no rales. End Tidal CO2: 35  Position: Semi-Ko's  Humidification Source: HME  Oral Care Completed?: Yes  Oral Care: Mouthwash, Lip moisturizer applied, Mouth swabbed, Mouth moisturizer, Mouth suctioned  Subglottic Suction Done?: Yes  Cuff Pressure (cm H2O): (MLT)    ABGs:     Laboratory findings:    Complete Blood Count:   Recent Labs     02/09/21  0434 02/10/21  0451 02/11/21  0434   WBC 17.3* 10.8 9.6   HGB 8.6* 7.8* 8.0*   HCT 27.4* 26.2* 26.1*    See Reflexed IPF Result 139        Last 3 Blood Glucose:   Recent Labs     02/09/21  0434 02/10/21  0451 02/11/21  0434   GLUCOSE 207* 233* 400*        PT/INR:    Lab Results   Component Value Date    PROTIME 10.8 02/06/2021    INR 1.0 02/06/2021     PTT:    Lab Results   Component Value Date    APTT 26.0 02/06/2021       Comprehensive Metabolic Profile:   Recent Labs     02/09/21  0434 02/10/21  0451 02/10/21  0936 02/11/21 0434    141  --  144   K 4.5 5.4* 4.7 5.6*    105  --  111*   CO2 23 23  --  21   BUN 41* 64*  --  86*   CREATININE 2.05* 2.86*  --  3.15*   GLUCOSE 207* 233*  --  400*   CALCIUM 7.8* 7.5*  --  8.4*      Magnesium:   Lab Results   Component Value Date    MG 2.0 02/08/2021     Phosphorus:   Lab Results   Component Value Date    PHOS 3.5 02/08/2021     Ionized Calcium:   Lab Results   Component Value Date    CAION 0.99 02/09/2021        Urinalysis:     Troponin: No results for input(s): TROPONINI in the last 72 hours.     Microbiology:    Cultures during this admission:     Blood cultures:                 [] None drawn      [x] Negative             []  Positive (Details:  )  Urine Culture:                   [] None drawn      [x] Negative             []  Positive (Details:  )  Sputum Culture:               [] None drawn       [] Negative             []  Positive (Details:  )   Endotracheal aspirate:     [x] None drawn       [] Negative             []  Positive (Details:  )     Other pertinent Labs:       Radiology/Imaging: Chest Xray (2/11/2021):    ASSESSMENT:     Active Problems:    Cardiac arrest (HCC)    Pneumothorax    COPD (chronic obstructive pulmonary disease) (HCC)    CAD (coronary artery disease)    HFrEF (heart failure with reduced ejection fraction) ([facility])    AICD (automatic cardioverter/defibrillator) present    CKD (chronic kidney disease) stage 3, GFR 30-59 ml/min    Respiratory arrest ([address]    Encounter for palliative care  Resolved Problems:    * No resolved hospital problems. *    ·         PLAN:     WEAN PER PROTOCOL:  [] No   [x] Yes  [] N/A    DISCONTINUE ANY LABS:   [x] No   [] Yes    ICU PROPHYLAXIS:  Stress ulcer:  [x] PPI Agent  [] [id number] [] Sucralfate  [] Other:  VTE:   [] Enoxaparin  [x] Unfract. Heparin Subcut  [] EPC Cuffs    NUTRITION:  [x] NPO [] Tube Feeding (Specify: ) [] TPN  [] PO (Diet: DIET TUBE FEED CONTINUOUS/CYCLIC NPO; Renal Formula; Continuous; 10; 40; 24)    INSULIN DRIP:   [x] No   [] Yes    CONSULTATION NEEDED:  [] No   [x] Yes    FAMILY UPDATED:    [] No   [x] Yes    TRANSFER OUT OF ICU:   [x] No   [] Yes     Impression:     PEA s/p Cardiac arrest:  Therapeutic rewarming completed. Cardiology following. AICD interrogated, shown some NSVT. Cardiology planning for ischemic work-up once neurological status improved. Encephalopathy s/p cardiac arrest:  Off sedation, not following commands even to pain. Neuro critical care evaluated  Currently getting EEG, showing severe encephalopathy. CT head - no acute abnormality  Tentative plan of family meeting along with palliative care. Ok for fentanyl prn for pain control     Bilateral pneumothorax  - b/l Chest tubes in place.  - CT surgery contacted with concerns of air leaks and right-sided subcutaneous emphysema. CT surgery recommended keeping chest tubes on suction.   -CT surgery following stable.     Acute respiratory failure s/p mechanical ventilation:  PRVC mode  Concern for ARDS type picturr on CXR this AM. P/f ratio 190 Will continue     Multivessel CAD:  CAD s/p 3 stents. Not amenable for PCI or CABG on cardiac catheterization March 2019.       Ischemic cardiomyopathy with LVEF 25%  s/p AICD:  AICD interrogation showed no sensitivities. Cardiology following. Replace electrolytes. Monitor QTC. Chronic kidney disease stage 3.   - Baseline likely 1.7-2. Worsening.  nephro on board  -hyperkalemia secondary to yoni on ckd    DVT ppx: nhep SQ  GI ppx: Pepcid  Diet: start TFs today  Isolation: Not indicated      Deny Wells MD  Internal Medicine Resident  [facility]  2/11/2021 4:13 PM

## 2021-02-12 NOTE — PROGRESS NOTES
Nephrology Progress Note      SUBJECTIVE       Patient is seen and examined on the ventilator. Intake has been 3,499 ml last 24 hours with output 1,690 ml last 24 hours. He is sedated. LVEF by last cardiac echo was 30% with normal RV function and Pacemaker/ICD lead seen in RV. Patient is on 0.9 NS IV fluids at 75 ml/hr and is not receiving any loop diuretic. Creatinine was 3.15 yesterday and is down to 2.72 mg/dl today, although the BUN  Is higher at 102 this morning from 86 yesterday. Serum sodium is 144 with a potassium level of 5.5. Positive atrial fibrillation, on amiodarone.   OBJECTIVE      CURRENT TEMPERATURE:  Temp: 102 °F (38.9 °C)  MAXIMUM TEMPERATURE OVER 24HRS:  Temp (24hrs), Av.3 °F (38.5 °C), Min:100.9 °F (38.3 °C), Max:102 °F (38.9 °C)    CURRENT RESPIRATORY RATE:  Resp: 23  CURRENT PULSE:  Pulse: 98  CURRENT BLOOD PRESSURE:  BP: (!) 155/60  24HR BLOOD PRESSURE RANGE:  Systolic (33RAY), PGV:089 , Min:134 , ERT:487   ; Diastolic (91DJT), DWJ:27, Min:47, Max:98    24HR INTAKE/OUTPUT:      Intake/Output Summary (Last 24 hours) at 2021 1001  Last data filed at 2021 1000  Gross per 24 hour   Intake 3519 ml   Output 2200 ml   Net 1319 ml       PHYSICAL EXAM      GENERAL APPEARANCE: Sedated on the ventilator  SKIN: warm and dry   ENT: oral endotracheal tube is n place   NECK:  Mild JVD, trachea is midline  PULMONARY: good bilateral air entry and clear to auscultation bilaterally without wheezes or rales or rhonchi  CARDIOVASCULAR: Irregularly irregular rhythm, no apparent rubs  ABDOMEN: soft and mildly distended, active bowel sounds   EXTREMITIES: positive 1+ peripheral edema    CURRENT MEDICATIONS          amiodarone (CORDARONE) 450 mg in dextrose 5 % 250 mL infusion, Continuous      insulin glargine (LANTUS) injection vial 20 Units, BID      insulin lispro (HUMALOG) injection vial 0-18 Units, Q4H      docusate (COLACE) 50 MG/5ML liquid 100 mg, BID   acetaminophen (TYLENOL) 160 MG/5ML solution 1,000 mg, Q6H PRN      furosemide (LASIX) injection 80 mg, Once      fentaNYL (SUBLIMAZE) injection 25 mcg, Q1H PRN      sodium zirconium cyclosilicate (LOKELMA) oral suspension 10 g, BID      ampicillin-sulbactam (UNASYN) 1500 mg IVPB minibag, Q12H      glucose (GLUTOSE) 40 % oral gel 15 g, PRN      dextrose 50 % IV solution, PRN      glucagon (rDNA) injection 1 mg, PRN      dextrose 5 % solution, PRN      heparin (porcine) injection 5,000 Units, 3 times per day      famotidine (PEPCID) injection 20 mg, Daily      ipratropium-albuterol (DUONEB) nebulizer solution 1 ampule, 4x daily      albuterol (PROVENTIL) nebulizer solution 2.5 mg, BID      labetalol (NORMODYNE;TRANDATE) injection 10 mg, Q3H PRN      sodium phosphate 10 mmol in dextrose 5 % 250 mL IVPB, PRN      hydroxypropyl methylcellulose (GONIOSOL) 2.5 % ophthalmic solution 1 drop, 4x Daily PRN      lubrifresh P.M. (artificial tears) ophthalmic ointment, 4x Daily      0.9 % sodium chloride infusion, Continuous      promethazine (PHENERGAN) tablet 12.5 mg, Q6H PRN    Or      ondansetron (ZOFRAN) injection 4 mg, Q6H PRN      chlorhexidine (PERIDEX) 0.12 % solution 15 mL, BID      albuterol (PROVENTIL) nebulizer solution 2.5 mg, As Directed RT PRN      methylPREDNISolone sodium (SOLU-MEDROL) injection 40 mg, Q12H          LABS      CBC:   Recent Labs     02/10/21  0451 02/11/21  0434 02/12/21  0528   WBC 10.8 9.6 10.1   RBC 2.90* 2.95* 3.00*   HGB 7.8* 8.0* 8.0*   HCT 26.2* 26.1* 27.0*   MCV 90.3 88.5 90.0   MCH 26.9 27.1 26.7   MCHC 29.8 30.7 29.6   RDW 13.4 13.6 13.5   PLT See Reflexed IPF Result 139 146   MPV NOT REPORTED 11.6 11.9      BMP:   Recent Labs     02/11/21  1247 02/11/21  1247 02/12/21  0149 02/12/21  0528 02/12/21  0911     --  144 146*  --    K 6.0*   < > 5.5* 5.4* 5.1   *  --  112* 115*  --    CO2 20  --  21 22  --    BUN 95*  --  101* 102*  -- ANTIGBM:No results found for: [place] [address] as available. ASSESSMENT      1. Hyperkalemia  2. Apparent acute on chronic kidney injury likely secondary to ischemic acute tubular necrosis  3. Cardiomyopathy with EF 30% on last check with AICD/PPM   4. COPD  5. Atrial fibrillation with RVR, on IV amiodarone  6. Bilateral pneumothorax, status post chest tube placement  7. Renal ultrasound within normal limits  8. Significant azotemia    PLAN      1. KVO maintenance IV fluids   2. Lasix 80 mg IV x 1 today and watch urine output, likely will need more diuresis in the setting of the cardiomyopathy  3. Follow repeat labs this afternoon  4. At high risk of needing dialysis on this admission      Please do not hesitate to call with questions.     Electronically signed by Erik Corbin MD on 2/12/2021 at 10:01 AM

## 2021-02-12 NOTE — PROGRESS NOTES
Critical Care Team - Daily Progress Note      Date and time: 2/12/2021 9:54 AM  Patient's name:  Jovana Sherwood Record Number: [id number]  Patient's account/billing number: [de-identified]  Patient's YOB: 1950  Age: 65 y.o. Date of Admission: 2/6/2021  1:04 PM  Length of stay during current admission: 6      Primary Care Physician: Hannah Rodarte MD  ICU Attending Physician: Dr. Keegan Freeman    Code Status: DNR-CCA    Reason for ICU admission: PEA arrest      SUBJECTIVE:     OVERNIGHT EVENTS:     Patient continues to spike fevers overnight. Will increase Tylenol to 1 g. Patient not on any antibiotics. Blood pressure is elevated, tachypneic. Labs reviewed. Potassium 5.4, creatinine mildly improved, still elevated. Patient's urine output improved after Lasix. Glucose remains elevated in low 400s. Increase Lantus to 20 twice daily.     LTME severe encephalopathy, no epileptiform activity or clinical seizures      AWAKE & FOLLOWING COMMANDS:  [x] No   [] Yes    CURRENT VENTILATION STATUS:     [x] Ventilator  [] BIPAP  [] Nasal Cannula [] Room Air        SECRETIONS Amount:  [] Small [x] Moderate  [] Large  [x] None  Color:     [] White [] Colored  [] Bloody    SEDATION:  RAAS Score:  [] Propofol gtt  [] Versed gtt  [] Fentanyl gtt   [x] No Sedation    PARALYZED:  [x] No    [] Yes    DIARRHEA:                [x] No                [] Yes  (C. Difficile status: [] positive                                                                                                                       [] negative                                                                                                                     [] pending)    VASOPRESSORS:  [x] No    [] Yes    If yes -   [] Levophed       [] Dopamine     [] Vasopressin       [] Dobutamine  [] Phenylephrine         [] Epinephrine    CENTRAL LINES:     [] No   [x] Yes   (Date of Insertion:   ) If yes -     [] Right IJ     [] Left IJ [] Right Femoral [] Left Femoral                   [] Right Subclavian [] Left Subclavian       VELIZ'S CATHETER:   [] No   [x] Yes  (Date of Insertion:   )     URINE OUTPUT:            [] Good   [x] Low              [] Anuric      OBJECTIVE:     VITAL SIGNS:  BP (!) 155/60   Pulse 98   Temp 102 °F (38.9 °C) (Core)   Resp 23   Ht 5' 9\" (1.753 m)   Wt 214 lb 8.1 oz (97.3 kg)   SpO2 98%   BMI 31.68 kg/m²   Tmax over 24 hours:  Temp (24hrs), Av.3 °F (38.5 °C), Min:100.9 °F (38.3 °C), Max:102 °F (38.9 °C)      Patient Vitals for the past 6 hrs:   BP Temp Temp src Pulse Resp SpO2 Weight   21 0755    98 23 98 %    21 0700 (!) 155/60   96 18 97 % 214 lb 8.1 oz (97.3 kg)   21 0600 (!) 149/65   99 20 97 %    21 0500 (!) 134/58   101 20 98 %    21 0400 (!) 147/59 102 °F (38.9 °C) CORE 104 27 96 %          Intake/Output Summary (Last 24 hours) at 2021 0954  Last data filed at 2021 0900  Gross per 24 hour   Intake 3519 ml   Output 2100 ml   Net 1419 ml     Wt Readings from Last 2 Encounters:   21 214 lb 8.1 oz (97.3 kg)     Body mass index is 31.68 kg/m². PHYSICAL EXAMINATION:    Constitutional: intubated, off sedation, no response. HEENT: Pupils equal and reactive. Respiratory:  Decreased breath sounds, right-sided crepitus secondary to chest tubes. No wheezing, no rales. Cardiovascular:  Regular rate and rhythm, normal S1, S2, and 2+ pulses throughout  Abdomen:  Soft, nondistended, BS hypoactive. Neurologic:  No change in mentation. . Pupils 3 mm, minimally reactive. Extremities:  peripheral pulses normal, no pedal edema.     MEDICATIONS:    Scheduled Meds:   insulin glargine  20 Units Subcutaneous BID    insulin lispro  0-18 Units Subcutaneous Q4H    docusate  100 mg Oral BID    sodium zirconium cyclosilicate  10 g Oral BID    ampicillin-sulbactam  1,500 mg Intravenous Q12H  heparin (porcine)  5,000 Units Subcutaneous 3 times per day    famotidine (PEPCID) injection  20 mg Intravenous Daily    ipratropium-albuterol  1 ampule Inhalation 4x daily    albuterol  2.5 mg Nebulization BID    artificial tears   Both Eyes 4x Daily    chlorhexidine  15 mL Mouth/Throat BID    methylPREDNISolone  40 mg Intravenous Q12H     Continuous Infusions:   amiodarone      dextrose      sodium chloride 75 mL/hr (02/12/21 1235)     PRN Meds:       acetaminophen, 1,000 mg, Q6H PRN      fentanNYL, 25 mcg, Q1H PRN      glucose, 15 g, PRN      dextrose, 12.5 g, PRN      glucagon (rDNA), 1 mg, PRN      dextrose, 100 mL/hr, PRN      labetalol, 10 mg, Q3H PRN      sodium phosphate IVPB, 10 mmol, PRN      hydroxypropyl methylcellulose, 1 drop, 4x Daily PRN      promethazine, 12.5 mg, Q6H PRN    Or      ondansetron, 4 mg, Q6H PRN      albuterol, 2.5 mg, As Directed RT PRN          VENT SETTINGS (Comprehensive) (if applicable):  Vent Information  $Ventilation: $Subsequent Day  Skin Assessment: Clean, dry, & intact  Suction Catheter Diameter: 14  Equipment ID: [id number]  Equipment Changed: Suction catheter  Vent Type: Servo i  Vent Mode: PRVC  Vt Ordered: 500 mL  Rate Set: 22 bmp  FiO2 : 45 %  SpO2: 98 %  SpO2/FiO2 ratio: 217.78  Sensitivity: 1  PEEP/CPAP: 5  I Time/ I Time %: 0.75 s  Humidification Source: HME  Nitric Oxide/Epoprostenol In Use?: No  Additional Respiratory  Assessments  Pulse: 98  Resp: 23  SpO2: 98 %  End Tidal CO2: 39  Position: Right Side  Humidification Source: HME  Oral Care Completed?: Yes  Oral Care: Teeth brushed  Subglottic Suction Done?: Yes  Cuff Pressure (cm H2O): (MLT)    ABGs:     Laboratory findings:    Complete Blood Count:   Recent Labs     02/10/21  0451 02/11/21  0434 02/12/21  0528   WBC 10.8 9.6 10.1   HGB 7.8* 8.0* 8.0*   HCT 26.2* 26.1* 27.0*   PLT See Reflexed IPF Result 139 146        Last 3 Blood Glucose:   Recent Labs     02/11/21  1247 02/12/21 0149 02/12/21  0528   GLUCOSE 414* 422* 478*        PT/INR:    Lab Results   Component Value Date    PROTIME 10.8 02/06/2021    INR 1.0 02/06/2021     PTT:    Lab Results   Component Value Date    APTT 26.0 02/06/2021       Comprehensive Metabolic Profile:   Recent Labs     02/11/21  1247 02/11/21  1247 02/12/21  0149 02/12/21  0528 02/12/21  0911     --  144 146*  --    K 6.0*   < > 5.5* 5.4* 5.1   *  --  112* 115*  --    CO2 20  --  21 22  --    BUN 95*  --  101* 102*  --    CREATININE 2.86*  --  2.77* 2.72*  --    GLUCOSE 414*  --  422* 478*  --    CALCIUM 8.8  --  8.5* 8.5*  --     < > = values in this interval not displayed. Magnesium:   Lab Results   Component Value Date    MG 2.9 02/12/2021     Phosphorus:   Lab Results   Component Value Date    PHOS 3.4 02/12/2021     Ionized Calcium:   Lab Results   Component Value Date    CAION 1.24 02/12/2021        Urinalysis:     Troponin: No results for input(s): TROPONINI in the last 72 hours.     Microbiology:    Cultures during this admission:     Blood cultures:                 [] None drawn      [x] Negative             []  Positive (Details:  )  Urine Culture:                   [] None drawn      [x] Negative             []  Positive (Details:  )  Sputum Culture:               [] None drawn       [] Negative             []  Positive (Details:  )   Endotracheal aspirate:     [x] None drawn       [] Negative             []  Positive (Details:  )     Other pertinent Labs:       Radiology/Imaging:     Chest Xray (2/12/2021):    ASSESSMENT:     Active Problems:    Cardiac arrest ([facility] [address]    Pneumothorax    COPD (chronic obstructive pulmonary disease) (HCC)    CAD (coronary artery disease)    HFrEF (heart failure with reduced ejection fraction) ([facility] [address]    AICD (automatic cardioverter/defibrillator) present    CKD (chronic kidney disease) stage 3, GFR 30-59 ml/min    Respiratory arrest ([facility] [address]    Encounter for palliative care  Resolved Problems: Labs: BMP   Monitor potassium status   Blood glucose monitoring    Consider insulin drip if glucose not improving  Dietary consultation   Maximize glucose control    Othella Gaucher, MD  Internal Medicine Resident  [address]  2/12/2021 9:54 AM      Critical Care Attending Physician Addendum (Late Entry):    I have personally seen and examined Misael Moss with the resident and the key elements of all parts of the encounter were performed by me. Patient was reassessed on more than one occasion, when required. I reviewed the interval history, interpreted all available radiographic, laboratory and physiologic data at the time of service. I agree with the assessment and plan as documented by resident. Critical care time (excluding procedures) of greater than 30 minutes was spent in coordination of care during bedside rounds and discussion of patient care in detail. Pedro Luis Cobb M.D.   Pulmonary and Critical Care Medicine

## 2021-02-12 NOTE — PROGRESS NOTES
Comprehensive Nutrition Assessment    Type and Reason for Visit:  Reassess    Nutrition Recommendations/Plan: Continue current tube feeding, Nepro with Carb Steady at 40 mL/hr. Will continue to monitor TF tolerance/adequacy. Nutrition Assessment:  Pt remains on vent. Renal TF currently at 40 mL/hr and tolerating well per RN. Labs reviewed/include: Na 146 mmol/L, K 5.1 mmol/L,  mg/dL, CR 2.72 mg/dL, Glu 478,415, 382 mg/dL. Meds include: Lantus, Humalog SS, Lokelma, Colace. Malnutrition Assessment:  Malnutrition Status:  Insufficient data    Context:  Acute Illness     Findings of the 6 clinical characteristics of malnutrition:  Energy Intake:  Mild decrease in energy intake   Weight Loss:  Unable to assess     Body Fat Loss:  Unable to assess     Muscle Mass Loss:  Unable to assess    Fluid Accumulation:  1 - Mild Extremities, Generalized   Strength:  Not Performed    Estimated Daily Nutrient Needs:  Energy (kcal):  1700 kcal/day; Weight Used for Energy Requirements:  Admission     Protein (g):  110 g pro/day; Weight Used for Protein Requirements:  Ideal(1.5)            Current Nutrition Therapies:    DIET TUBE FEED CONTINUOUS/CYCLIC NPO; Renal Formula; Continuous; 10; 40; 24  Current Tube Feeding (TF) Orders:  · Formula: Renal (this formula is also carb controlled)  · Schedule: Continuous at 40 mL/hr   · Current TF & Flush Orders Provides: 1728 kcal and 78 g pro/day    Anthropometric Measures:  · Height: 5' 9\" (175.3 cm)  · Current Body Weight: 214 lb 8.1 oz (97.3 kg)   · Admission Body Weight: 270 lb (122.5 kg)(estimated)    · Ideal Body Weight: 160 lbs; % Ideal Body Weight 168.8 %   · BMI: 31.7  · BMI Categories: Obese Class 1 (BMI 30.0-34. 9)       Nutrition Diagnosis:   · Inadequate oral intake related to impaired respiratory function as evidenced by NPO or clear liquid status due to medical condition, nutrition support - enteral nutrition    Nutrition Interventions: Food and/or Nutrient Delivery:  Continue Current Tube Feeding. Note: this formula is also low in carbohydrate  Nutrition Education/Counseling:  No recommendation at this time   Coordination of Nutrition Care:  Continue to monitor while inpatient    Goals:  meet % of estimated nutrient needs       Nutrition Monitoring and Evaluation:   Food/Nutrient Intake Outcomes:  Enteral Nutrition Intake/Tolerance  Physical Signs/Symptoms Outcomes:  Biochemical Data, Nutrition Focused Physical Findings, Weight, Skin, Fluid Status or Edema     Discharge Planning:     Too soon to determine     Electronically signed by Leighton Jones RD, KEELEY on 2/12/21 at 12:51 PM EST    Contact: [phone]

## 2021-02-12 NOTE — PLAN OF CARE
Problem: OXYGENATION/RESPIRATORY FUNCTION  Goal: Patient will maintain patent airway  2/12/2021 0808 by Howard Perry RCP  Outcome: Ongoing  2/12/2021 0255 by Khris Beckwith RN  Outcome: Ongoing  Goal: Patient will achieve/maintain normal respiratory rate/effort  Description: Respiratory rate and effort will be within normal limits for the patient  2/12/2021 0808 by Howard Perry RCP  Outcome: Ongoing  2/12/2021 0255 by Khris Beckwith RN  Outcome: Ongoing     Problem: MECHANICAL VENTILATION  Goal: Patient will maintain patent airway  2/12/2021 1037 by Patricia Badillo RN  Outcome: Ongoing  2/12/2021 0808 by Howard Perry RCP  Outcome: Ongoing  2/12/2021 0255 by Khris Beckwith RN  Outcome: Ongoing  Goal: Oral health is maintained or improved  2/12/2021 0808 by Howard Perry RCP  Outcome: Ongoing  2/12/2021 0255 by Khris Beckwith RN  Outcome: Ongoing  Goal: ET tube will be managed safely  2/12/2021 1037 by Patricia Badillo RN  Outcome: Ongoing  2/12/2021 0808 by Howard Perry RCP  Outcome: Ongoing  2/12/2021 0255 by Khris Beckwith RN  Outcome: Ongoing  Goal: Ability to express needs and understand communication  2/12/2021 1037 by Patricia Badillo RN  Outcome: Ongoing  2/12/2021 0808 by Howard Perry RCP  Outcome: Ongoing  2/12/2021 0255 by Khris Beckwith RN  Outcome: Ongoing  Goal: Mobility/activity is maintained at optimum level for patient  2/12/2021 0808 by Howard Perry RCP  Outcome: Ongoing  2/12/2021 0255 by Khris Beckwith RN  Outcome: Ongoing     Problem: SKIN INTEGRITY  Goal: Skin integrity is maintained or improved  2/12/2021 1037 by Patricia Badillo RN  Outcome: Ongoing  2/12/2021 0808 by Howard Perry RCP  Outcome: Ongoing     Problem: NUTRITION  Goal: Nutritional status is improving  2/12/2021 1037 by Patricia Badillo RN  Outcome: Ongoing  2/12/2021 0808 by Howard Perry RCP  Outcome: Ongoing     Problem: Discharge Planning: Goal: Participates in care planning  Description: Participates in care planning  2/12/2021 0808 by Alhaji Shetty RCP  Outcome: Ongoing  Goal: Discharged to appropriate level of care  Description: Discharged to appropriate level of care  2/12/2021 0808 by Alhaji Shetty RCP  Outcome: Ongoing  Goal: Ability to perform activities of daily living will improve  Description: Ability to perform activities of daily living will improve  2/12/2021 0808 by Alhaji Shetty RCP  Outcome: Ongoing     Problem: Airway Clearance - Ineffective:  Goal: Ability to maintain a clear airway will improve  Description: Ability to maintain a clear airway will improve  2/12/2021 1037 by Amarjit Barba RN  Outcome: Ongoing  2/12/2021 0808 by Alhaji Shetty RCP  Outcome: Ongoing  2/11/2021 2121 by Jesus Springer RCP  Outcome: Ongoing     Problem: Aspiration:  Goal: Absence of aspiration  Description: Absence of aspiration  2/12/2021 3957 by Alhaji Shetty RCP  Outcome: Ongoing  2/11/2021 2121 by Jesus Springer RCP  Outcome: Ongoing     Problem: Cardiac Output - Decreased:  Goal: Hemodynamic stability will improve  Description: Hemodynamic stability will improve  2/12/2021 0808 by Alhaji Shetty RCP  Outcome: Ongoing     Problem: Fluid Volume - Imbalance:  Goal: Absence of imbalanced fluid volume signs and symptoms  Description: Absence of imbalanced fluid volume signs and symptoms  2/12/2021 1037 by Amarjit Barba RN  Outcome: Ongoing  2/12/2021 0808 by Alhaji Shetty RCP  Outcome: Ongoing     Problem: Gas Exchange - Impaired:  Goal: Levels of oxygenation will improve  Description: Levels of oxygenation will improve  2/12/2021 1037 by Amarjit Barba RN  Outcome: Ongoing  2/12/2021 0808 by Alhaji Shetty RCP  Outcome: Ongoing  2/11/2021 2121 by Jesus Springer RCP  Outcome: Ongoing     Problem: Pain:  Goal: Pain level will decrease  Description: Pain level will decrease 2/12/2021 1065 by Christopher Guzman RCP  Outcome: Ongoing  Goal: Recognizes and communicates pain  Description: Recognizes and communicates pain  2/12/2021 0808 by Christopher Guzman RCP  Outcome: Ongoing  Goal: Control of acute pain  Description: Control of acute pain  2/12/2021 0808 by Christopher Guzman RCP  Outcome: Ongoing  Goal: Control of chronic pain  Description: Control of chronic pain  2/12/2021 0808 by Christopher Guzman RCP  Outcome: Ongoing     Problem: Serum Glucose Level - Abnormal:  Goal: Ability to maintain appropriate glucose levels will improve to within specified parameters  Description: Ability to maintain appropriate glucose levels will improve to within specified parameters  2/12/2021 1037 by Isreal Bernabe RN  Outcome: Ongoing  2/12/2021 0808 by Christopher Guzman RCP  Outcome: Ongoing     Problem: Skin Integrity - Impaired:  Goal: Will show no infection signs and symptoms  Description: Will show no infection signs and symptoms  2/12/2021 1037 by Isreal Bernabe RN  Outcome: Ongoing  2/12/2021 0808 by Christopher Guzman RCP  Outcome: Ongoing  2/11/2021 2121 by Rosalino Cash RCP  Outcome: Ongoing  Goal: Absence of new skin breakdown  Description: Absence of new skin breakdown  2/12/2021 1037 by Isreal Bernabe RN  Outcome: Ongoing  2/12/2021 0808 by Christopher Guzman RCP  Outcome: Ongoing  2/11/2021 2121 by Rosalino Cash RCP  Outcome: Ongoing     Problem: Tissue Perfusion, Altered:  Goal: Circulatory function within specified parameters  Description: Circulatory function within specified parameters  2/12/2021 1037 by Isreal Bernabe RN  Outcome: Ongoing  2/12/2021 0808 by Christopher Guzman RCP  Outcome: Ongoing     Problem: Falls - Risk of:  Goal: Will remain free from falls  Description: Will remain free from falls  2/12/2021 0808 by Christopher Guzman RCP  Outcome: Ongoing  Goal: Absence of physical injury  Description: Absence of physical injury 2/12/2021 2618 by Bruce Muñoz RCP  Outcome: Ongoing     Problem: Skin Integrity:  Goal: Will show no infection signs and symptoms  Description: Will show no infection signs and symptoms  2/12/2021 1037 by Whitney Malik RN  Outcome: Ongoing  2/12/2021 0808 by Bruce Muñoz RCP  Outcome: Ongoing  Goal: Absence of new skin breakdown  Description: Absence of new skin breakdown  2/12/2021 1037 by Whitney Malik RN  Outcome: Ongoing  2/12/2021 0808 by Bruce Muñoz RCP  Outcome: Ongoing     Problem: Nutrition  Goal: Optimal nutrition therapy  Description: Nutrition Problem #1: Inadequate oral intake  Intervention: Food and/or Nutrient Delivery: Continue NPO(Start diet vs nutrition support as able)  Nutritional Goals: Start diet vs nutrition support within 24-72 hrs     2/12/2021 1037 by Whitney Malik RN  Outcome: Ongoing  2/12/2021 0808 by Bruce Muñoz RCP  Outcome: Ongoing     Problem: Confusion - Acute:  Goal: Absence of continued neurological deterioration signs and symptoms  Description: Absence of continued neurological deterioration signs and symptoms  2/12/2021 0808 by Bruce Muñoz RCP  Outcome: Ongoing  Goal: Mental status will be restored to baseline  Description: Mental status will be restored to baseline  2/12/2021 0808 by Bruce Muñoz RCP  Outcome: Ongoing     Problem: Injury - Risk of, Physical Injury:  Goal: Will remain free from falls  Description: Will remain free from falls  2/12/2021 0808 by Bruce Muñoz RCP  Outcome: Ongoing  Goal: Absence of physical injury  Description: Absence of physical injury  2/12/2021 0808 by Bruce Muñoz RCP  Outcome: Ongoing     Problem: Mood - Altered:  Goal: Mood stable  Description: Mood stable  2/12/2021 0808 by Bruce Muñoz RCP  Outcome: Ongoing  Goal: Absence of abusive behavior  Description: Absence of abusive behavior  2/12/2021 0808 by Bruce Muñoz RCP  Outcome: Ongoing Goal: Verbalizations of feeling emotionally comfortable while being cared for will increase  Description: Verbalizations of feeling emotionally comfortable while being cared for will increase  2/12/2021 0808 by Josselin Casanova RCP  Outcome: Ongoing     Problem: Psychomotor Activity - Altered:  Goal: Absence of psychomotor disturbance signs and symptoms  Description: Absence of psychomotor disturbance signs and symptoms  2/12/2021 0808 by Josselin Casanova RCP  Outcome: Ongoing     Problem: Sensory Perception - Impaired:  Goal: Demonstrations of improved sensory functioning will increase  Description: Demonstrations of improved sensory functioning will increase  2/12/2021 0808 by Josselin Casanova RCP  Outcome: Ongoing  Goal: Decrease in sensory misperception frequency  Description: Decrease in sensory misperception frequency  2/12/2021 0808 by Josselin Casanova RCP  Outcome: Ongoing  Goal: Able to refrain from responding to false sensory perceptions  Description: Able to refrain from responding to false sensory perceptions  2/12/2021 0808 by Josselin Casanova RCP  Outcome: Ongoing  Goal: Demonstrates accurate environmental perceptions  Description: Demonstrates accurate environmental perceptions  2/12/2021 0808 by Josselin Casanova RCP  Outcome: Ongoing  Goal: Able to distinguish between reality-based and nonreality-based thinking  Description: Able to distinguish between reality-based and nonreality-based thinking  2/12/2021 0808 by Josselin Casanova RCP  Outcome: Ongoing  Goal: Able to interrupt nonreality-based thinking  Description: Able to interrupt nonreality-based thinking  2/12/2021 0808 by Josselin Casanova RCP  Outcome: Ongoing     Problem: Sleep Pattern Disturbance:  Goal: Appears well-rested  Description: Appears well-rested  2/12/2021 0808 by Josselin Casanova RCP  Outcome: Ongoing

## 2021-02-12 NOTE — PROGRESS NOTES
Physician Progress Note      Naz Don  CSN #:                  [id number]  :                       1950  ADMIT DATE:       2021 1:04 PM  [address] [place] DATE:  RESPONDING  PROVIDER #:        Jeff Toledo MD          QUERY TEXT:    Pt admitted with cardiac arrest and has encephalopathy documented. If   possible, please document in progress notes and discharge summary further   specificity regarding the type of encephalopathy:    The medical record reflects the following:  Risk Factors: s/p cardiac arrest  Clinical Indicators: Critical Care progress notes on 2/10; Patient seems to   have sustained significant anoxic insult. [place] progress note on 2/10;  EEG,   which showed suppression and diffuse delta consistent with severe   encephalopathy. PC02 117.2 and BNP 5615 Creatine 1.58 up to 3.15 Glucose 369  Treatment: ICU, LTME, CTH, NCC consult, labs/monitoring    Thank-you,  Gertrude Madrigal RN, CDS  [email or web address]  Options provided:  -- Anoxic encephalopathy  -- Metabolic encephalopathy  -- Toxic metabolic encephalopathy  -- Other - I will add my own diagnosis  -- Disagree - Not applicable / Not valid  -- Disagree - Clinically unable to determine / Unknown  -- Refer to Clinical Documentation Reviewer    PROVIDER RESPONSE TEXT:    This patient has anoxic encephalopathy.     Query created by: Angel Bright on 2021 11:36 AM      Electronically signed by:  Jeff Toledo MD 2021 11:40 AM

## 2021-02-12 NOTE — PROGRESS NOTES
02/12/21 1548   Non-Surgical Airway Endo Tracheal Tube   Placement Date/Time: 02/06/21 1313   Timeout: Patient;Procedure;Site/Side;Appropriate Equipment  Placed By: In ED  Inserted by: Brody Rivera  Insertion attempts: 1  Airway Device: Endo Tracheal Tube  Size: 7.5   Secured at 26 cm   Measured From Teeth       Advanced ETT down by 2 cm and secured at the 26 cm elaina at the teeth per CXR.

## 2021-02-12 NOTE — PLAN OF CARE
Problem: OXYGENATION/RESPIRATORY FUNCTION  Goal: Patient will maintain patent airway  2/12/2021 0808 by Lino Swartz RCP  Outcome: Ongoing  2/12/2021 0255 by Jorge Luis Balderas RN  Outcome: Ongoing  Goal: Patient will achieve/maintain normal respiratory rate/effort  Description: Respiratory rate and effort will be within normal limits for the patient  2/12/2021 0808 by Lino Swartz RCP  Outcome: Ongoing  2/12/2021 0255 by Jorge Luis Balderas RN  Outcome: Ongoing     Problem: MECHANICAL VENTILATION  Goal: Patient will maintain patent airway  2/12/2021 0808 by Lino Swartz RCP  Outcome: Ongoing  2/12/2021 0255 by Jorge Luis Bladeras RN  Outcome: Ongoing  Goal: Oral health is maintained or improved  2/12/2021 0808 by Lino Swartz RCP  Outcome: Ongoing  2/12/2021 0255 by Jorge Luis Balderas RN  Outcome: Ongoing  Goal: ET tube will be managed safely  2/12/2021 0808 by Lino Swartz RCP  Outcome: Ongoing  2/12/2021 0255 by Jorge Luis Balderas RN  Outcome: Ongoing  Goal: Ability to express needs and understand communication  2/12/2021 0808 by Lino Swartz RCP  Outcome: Ongoing  2/12/2021 0255 by Jorge Luis Balderas RN  Outcome: Ongoing  Goal: Mobility/activity is maintained at optimum level for patient  2/12/2021 0808 by Lino Swartz RCP  Outcome: Ongoing  2/12/2021 0255 by Jorge Luis Balderas RN  Outcome: Ongoing     Problem: SKIN INTEGRITY  Goal: Skin integrity is maintained or improved  Outcome: Ongoing

## 2021-02-12 NOTE — PROGRESS NOTES
Occupational Therapy Not Seen Note    DATE: 2021  Name: Ernestine Wu  : 1950  MRN: [id number]    Patient not available for Occupational Therapy due to:     Other: bedrest, intubated- not appropriate for OT evaluation at this time     Next Scheduled Treatment: 21    Electronically signed by Francine Briseno OT on 2021 at 9:33 AM

## 2021-02-12 NOTE — PLAN OF CARE
Problem: Airway Clearance - Ineffective:  Goal: Ability to maintain a clear airway will improve  Description: Ability to maintain a clear airway will improve  2/11/2021 2121 by Jamila Oliver RCP  Outcome: Ongoing     Problem: Aspiration:  Goal: Absence of aspiration  Description: Absence of aspiration  2/11/2021 2121 by Jamila Oliver RCP  Outcome: Ongoing     Problem: Gas Exchange - Impaired:  Goal: Levels of oxygenation will improve  Description: Levels of oxygenation will improve  2/11/2021 2121 by Jamila Oliver RCP  Outcome: Ongoing     Problem: Skin Integrity - Impaired:  Goal: Will show no infection signs and symptoms  Description: Will show no infection signs and symptoms  2/11/2021 2121 by Jamila Oliver RCP  Outcome: Ongoing     Problem: Skin Integrity - Impaired:  Goal: Absence of new skin breakdown  Description: Absence of new skin breakdown  Outcome: Ongoing

## 2021-02-12 NOTE — ACP (ADVANCE CARE PLANNING)
Advance Care Planning     Advance Care Planning (ACP) Physician/NP/PA Conversation    Date of Conversation: 2/6/2021  Conducted with: Patient does not have decision-making capacity    Healthcare Decision Maker:    Patient's brothlisa Scott sides - [phone] is patient's POA for health as per POA paperwork      Care Preferences:    Hospitalization: \"If your health worsens and it becomes clear that your chance of recovery is unlikely, what would be your preference regarding hospitalization? \"  Currently hospitalized    Ventilation: \"If you were unable to breath on your own and your chance of recovery was unlikely, what would be your preference about the use of a ventilator (breathing machine) if it was available to you? \"  DNR CCA with intubation and treatments to continue    Resuscitation: \"In the event your heart stopped as a result of an underlying serious health condition, would you want attempts made to restart your heart, or would you prefer a natural death? \"  DNR CCA with intubation and treatments to continue    Conversation Outcomes / Follow-Up Plan:    Patient's POA for health is patient's brother Riley Rangel ([phone]) as per patient's POA paperwork for [facility]      Cam Art MD

## 2021-02-12 NOTE — FLOWSHEET NOTE
Assessment.  responded to a page from the nurse. Juventino's sister and mother greeted the . Sister shared the story of how Danae Martin got here to the ICU. She said that \"it doesn't look good. \" She asked the  to say a prayer \"because God isn't listening to mine. \" She asked God for wisdom for what to do next. Intervention.  prayed for wisdom, guidance, and comfort.  told God that we feel as if he is not answering our prayers. Outcome. Care. Connection. Comfort. Plan. Chaplains will remain available to offer spiritual and emotional support as needed. 02/12/21 1343   Encounter Summary   Services provided to: Patient and family together   Referral/Consult From: Nurse   Support System Family members   Continue Visiting   (2/12/21)   Complexity of Encounter Moderate   Length of Encounter 30 minutes   Spiritual Assessment Completed Yes   Routine   Type Follow up   Assessment Tearful;Fearful;Grieving   Intervention Explored feelings, thoughts, concerns; Active listening;Sustaining presence/ Ministry of presence;Prayer   Outcome Tearful;Expressed gratitude

## 2021-02-12 NOTE — PROGRESS NOTES
Daily Progress Note  Neuro Critical Care    Patient Name: Katherine Espinal  Patient : 1950  Room/Bed: 0128/0128-01  Code Status: DNR-CCA  Allergies: Not on File    CHIEF COMPLAINT:      Cardiac arrest     INTERVAL HISTORY    Initial Presentation (Admitted 21): The patient is a 64 y. o. male with history of COPD, CAD s/p AICD placement, CHF with EF 25% who presents with cardiac arrest.  Patient was having shortness of breath for a few days prior to presentation, had a witnessed by family member, EMS was notified. Bre Offer arrival, patient was in PEA, ACLS was started, ROSC was achieved after 15 minutes after receiving epinephrine x2. Yulisa Flowers was [facility] flighted to [facility] [place]. Ced's, patient was found to have bilateral pneumothoraces, received bilateral chest tubes.  Patient was admitted to the medical ICU, therapeutic cooling was started, sedated on propofol and paralyzed on Nimbex.  Echocardiogram was conducted on 2021, significant for EF 30%.  Patient was rewarmed, Nimbex discontinued, continued sedation with propofol.  Neuro critical care was consulted for prognostic patient status post cardiac arrest.    Hospital Course:   : Exam remained unchanged, continued propofol for overbreathing the vent. Started LTME.  :  No change in exam.  Remains on Fentanyl infusion for sedation due to tachypnea/hypertension. 2/10: LTME abnormal due to diffuse polymorphic delta slowing suggesting severe encephalopathy. CT Head reviewed; some subtle blurring of gray white differentiation but no significant cerebral edema. Family updated, plan for follow up family meeting on Saturday, .  : Nephrology consulted for worsening LILIANA. Renal ultrasound ordered, [facility] started for hyperkalemia. Neuro exam stable. Family updated, will plan for meeting Saturday or sooner if needed.     Last 24h: No acute events overnight. Patient developed new onset atrial fibrillation, started on amiodarone drip. Neuro exam stable. Continue plan for family meeting on Saturday or sooner if needed.     CURRENT MEDICATIONS:  SCHEDULED MEDICATIONS:   insulin glargine  10 Units Subcutaneous BID    sodium zirconium cyclosilicate  10 g Oral BID    ampicillin-sulbactam  1,500 mg Intravenous Q12H    insulin lispro  0-18 Units Subcutaneous TID WC    insulin lispro  0-9 Units Subcutaneous Nightly    heparin (porcine)  5,000 Units Subcutaneous 3 times per day    famotidine (PEPCID) injection  20 mg Intravenous Daily    ipratropium-albuterol  1 ampule Inhalation 4x daily    albuterol  2.5 mg Nebulization BID    artificial tears   Both Eyes 4x Daily    chlorhexidine  15 mL Mouth/Throat BID    methylPREDNISolone  40 mg Intravenous Q12H     CONTINUOUS INFUSIONS:   amiodarone 1 mg/min (21 0221)    Followed by   [facility] amiodarone      dextrose      sodium chloride 75 mL/hr at 21 1703     PRN MEDICATIONS:   Acetaminophen, fentanNYL, glucose, dextrose, glucagon (rDNA), dextrose, labetalol, sodium phosphate IVPB, hydroxypropyl methylcellulose, promethazine **OR** ondansetron, albuterol    VITALS:  Temperature Range: Temp: 102 °F (38.9 °C) Temp  Av.3 °F (38.5 °C)  Min: 100.9 °F (38.3 °C)  Max: 102 °F (38.9 °C)  BP Range: Systolic (93RFB), ZJE:134 , Min:127 , KIO:816     Diastolic (48ZYA), UNV:29, Min:47, Max:98    Pulse Range: Pulse  Av.8  Min: 78  Max: 113  Respiration Range: Resp  Av.8  Min: 13  Max: 36  Current Pulse Ox: SpO2: 98 %  24HR Pulse Ox Range: SpO2  Av.5 %  Min: 90 %  Max: 98 %  Patient Vitals for the past 12 hrs:   BP Temp Temp src Pulse Resp SpO2   21 0500 (!) 134/58   101 20 98 %   21 0400 (!) 147/59 102 °F (38.9 °C) CORE 104 27 96 %   21 0343     25 95 %   21 0330     25    21 0300 (!) 144/64   101 26 94 % 02/12/21 0200 (!) 162/98   113 28 94 %   02/12/21 0100 (!) 152/65   107 27 94 %   02/12/21 0000 (!) 156/62 101.5 °F (38.6 °C) CORE 107 28 92 %   02/11/21 2338    108 27 91 %   02/11/21 2300 (!) 160/68   104 13 95 %   02/11/21 2200 (!) 152/66   106 28 94 %   02/11/21 2100 (!) 147/58   92 28 94 %   02/11/21 2000 (!) 144/66 101.3 °F (38.5 °C) CORE 90 23 93 %   02/11/21 1949    89 18 94 %   02/11/21 1930    85 27 95 %   02/11/21 1900    85 26 95 %   02/11/21 1830    79 27 94 %   02/11/21 1800    89 27 94 %     Estimated body mass index is 31.22 kg/m² as calculated from the following:    Height as of this encounter: 5' 9\" (1.753 m). Weight as of this encounter: 211 lb 6.7 oz (95.9 kg).  []<16 Severe malnutrition  []1616.99 Moderate malnutrition  []1718.49 Mild malnutrition  []18.524.9 Normal  []2529.9 Overweight (not obese)  [x]3034.9 Obese class 1 (Low Risk)  []3539.9 Obese class 2 (Moderate Risk)  []?40 Obese class 3 (High Risk)    RECENT LABS:   Lab Results   Component Value Date    WBC 9.6 02/11/2021    HGB 8.0 (L) 02/11/2021    HCT 26.1 (L) 02/11/2021     02/11/2021    TRIG 436 (H) 02/09/2021    ALT 32 02/06/2021    AST 35 02/06/2021     02/12/2021    K 5.5 (H) 02/12/2021     (H) 02/12/2021    CREATININE 2.77 (H) 02/12/2021     (HH) 02/12/2021    CO2 21 02/12/2021    TSH 4.91 02/06/2021    INR 1.0 02/06/2021     24 HOUR INTAKE/OUTPUT:    Intake/Output Summary (Last 24 hours) at 2/12/2021 0534  Last data filed at 2/12/2021 0500  Gross per 24 hour   Intake 3499 ml   Output 2080 ml   Net 1419 ml       IMAGING:   US RENAL COMPLETE   Final Result   Essentially unremarkable renal ultrasound         XR CHEST PORTABLE   Final Result   Worsening right perihilar, mid and lower lung field airspace disease which   could represent increasing atelectasis. No pneumothorax is seen.   Otherwise   similar appearing chest.         XR CHEST PORTABLE   Final Result 1. Tip of the right chest tube overlying the right parahilar region. No   definite pneumothorax. 2. Small pleural effusions and bibasilar opacities without significant change. XR CHEST PORTABLE   Final Result   1. Increasing opacification of the lower half right hemithorax possibly   combination of effusion and infiltrate. New left basal/retrocardiac   infiltrate obscures the hemidiaphragm. These infiltrates probably   atelectasis. 2. Support tubes and line in place as noted above. CT HEAD WO CONTRAST   Final Result   No acute intracranial abnormality. XR CHEST PORTABLE   Final Result   1. Similar position of right chest tube. The side hole again appears   external to the pleural space. 2.  Unchanged appearance of left chest tube. 3.  Similar appearance of small bilateral pneumothoraces and subcutaneous   emphysema. 4.  No appreciable change in basilar subsegmental atelectasis and small   effusions. XR CHEST PORTABLE   Final Result   Right chest tube has been retracted. Side hole projects over the soft   tissues. Minimal pneumothoraces are suspected. Increased left lung base opacity is nonspecific but may represent atelectasis   and a pleural effusion. XR CHEST PORTABLE   Final Result   1. Bilateral chest tubes in place, as described. Persistent small   pneumothoraces, left greater than right. 2.  Subcutaneous emphysema has increased on the right side. 3.  Endotracheal tube remains in appropriate position. The enteric tube   courses off the field of view in the upper abdomen. CT CHEST ABDOMEN PELVIS WO CONTRAST   Final Result   1. Bilateral chest tubes in place, abutting the mediastinal pleura. Persistent small pneumothoraces. 2.  Nonspecific patchy airspace disease in the upper lobes may represent   multifocal inflammatory process or infection. Trace pleural effusions. 3.  Multiple old left rib fractures and old left scapular body fracture. No   acute fracture identified. 4.  Sequela of old granulomatous disease in the chest and noncalcified   pulmonary nodules measuring up to 7 mm. While this may represent sequela of   old infection, metastatic disease is not excluded. Follow-up imaging in 6-12   months is recommended (in the absence of prior imaging for comparison). 5.  Cholelithiasis. Mild stranding around the gallbladder may represent   cholecystitis in the appropriate clinical setting. No biliary dilatation. 6.  Localized skin thickening in the left mid abdomen may represent soft   tissue contusion. 7.  Mildly prominent scattered mediastinal lymph nodes for which attention to   on follow-up is recommended. CT HEAD WO CONTRAST   Final Result   No convincing evidence for acute intracranial pathology. Intracranial   hemorrhage, mass effect or midline shift. XR CHEST PORTABLE   Final Result   Interval advancement of right thoracostomy tube. The tip projects over the   left of midline. Trace right apical pneumothorax. 2.4 cm radiopaque density projecting over the right lung base has move   medially in likely is external to the patient. XR CHEST PORTABLE   Final Result   1. Endotracheal tube tip is 8.2 cm above the annie. 2. Bilateral thoracostomy tubes are noted. The right thoracostomy tube   proximal side-port is just external to the ribcage. Subcutaneous emphysema   is noted with a trace right lateral pneumothorax. There is also a 2.4 cm   radiopaque density projecting over the right lower lung which has the   appearance of a fractured thoracostomy tube. Recommend correlation if this   is external to the patient. 3. Patchy left opacity in the left lung base. 4. Questionable acute 7th right rib fracture. Age-indeterminate left 2nd   through 6th rib fractures are noted favoring subacute to chronic etiology. XR CHEST PORTABLE    (Results Pending)   XR CHEST PORTABLE    (Results Pending)         Labs and Images reviewed with:  [] Dr. Tanya Looney    [x] Dr. Rubén Villarreal  [] Dr. Cher Gilmore  [] There are no new interval images to review. PHYSICAL EXAM       CONSTITUTIONAL:  Intubated. Does not open eyes or follow commands. Comatose exam.    HEAD:  normocephalic, atraumatic    EYES:  PERRL, roving eye movements   ENT:  moist mucous membranes   NECK:  supple, symmetric   LUNGS:  Equal air entry bilaterally   CARDIOVASCULAR:  normal s1 / s2, RRR, distal pulses intact   ABDOMEN:  Soft, no rigidity   NEUROLOGIC:  Mental Status:  Intubated, Comatose             Cranial Nerves:    III: Pupils:  equal, round, reactive to light  III,IV,VI: Extra Ocular Movements: intact  V: Corneal reflex:  completed. abnormal weak bilaterally  Absent cough/gag    Motor Exam:    No movement to noxious stimulation centrally or peripherally. DRAINS:  [x] There are no drains for Neuro Critical Care to monitor at this time. ASSESSMENT AND PLAN:       65 y. o. male with history of COPD, CAD s/p AICD placement (not MRI compatible), CHF with EF 25% who presents with cardiac arrest.    PEA arrest, ROSC was achieved after 15 minutes, bilateral chest tubes placed for pneumothoraces.  Patient was admitted to the medical ICU, underwent targeted temperature management and was rewarmed.  Neuro critical Care was consulted on 2/8 for prognostic patient status post cardiac arrest.       Severe encephalopathy secondary to anoxic brain injury in setting of cardiac arrest  LTME suggestive of severe encephalopathy, no abnormal epileptiform activity  Monitor off AEDs  Pacemaker not MRI compatible  NSE x2 pending  CT Head 2/9 (72h) with some subtle blurring of gray/white differentiation suggesting possible anoxic injury but no significant cerebral edema   No motor response on clinical exam as above Monitor neurological exam for next 24-48h off sedation  Prognosis guarded  Palliative Care following; working on establishing POA/next of kin  Family meeting held 2/10, plan to reconvene 2/13     PEA arrest  Acute hypoxic respiratory failure requiring mechanical ventilation  Bilateral pneumothorax with bilateral chest tubes  Cardiomyopathy (EF 30%)  Acute kidney injury likely ATN secondary to hypoperfusion  Hyperkalemia  Management per primary team    We will continue to follow along. For any changes in exam or patient status please contact Neuro Critical Care.       Chip Kendall MD  Neuro Critical Care  Pager [phone]  2/12/2021     5:34 AM

## 2021-02-12 NOTE — PLAN OF CARE
Problem: OXYGENATION/RESPIRATORY FUNCTION  Goal: Patient will maintain patent airway  Outcome: Ongoing  Goal: Patient will achieve/maintain normal respiratory rate/effort  Description: Respiratory rate and effort will be within normal limits for the patient  Outcome: Ongoing     Problem: MECHANICAL VENTILATION  Goal: Patient will maintain patent airway  2/12/2021 0255 by Leah Kathleen RN  Outcome: Ongoing  2/11/2021 1552 by Anya Meyers RN  Outcome: Ongoing  Goal: Oral health is maintained or improved  2/12/2021 0255 by Leah Kathleen RN  Outcome: Ongoing  2/11/2021 1552 by Anya Meyers RN  Outcome: Ongoing  Goal: ET tube will be managed safely  2/12/2021 0255 by Leah Kathleen RN  Outcome: Ongoing  2/11/2021 1552 by Anya Meyers RN  Outcome: Ongoing  Goal: Ability to express needs and understand communication  Outcome: Ongoing  Goal: Mobility/activity is maintained at optimum level for patient  Outcome: Ongoing     Problem: SKIN INTEGRITY  Goal: Skin integrity is maintained or improved  2/11/2021 1552 by Anya Meyers RN  Outcome: Ongoing     Problem: NUTRITION  Goal: Nutritional status is improving  2/11/2021 1552 by Anya Meyers RN  Outcome: Ongoing     Problem: Airway Clearance - Ineffective:  Goal: Ability to maintain a clear airway will improve  Description: Ability to maintain a clear airway will improve  2/11/2021 2121 by James Sanchez RCP  Outcome: Ongoing     Problem: Aspiration:  Goal: Absence of aspiration  Description: Absence of aspiration  2/11/2021 2121 by James Sanchez RCP  Outcome: Ongoing  2/11/2021 1552 by Anya Meyers RN  Outcome: Ongoing     Problem: Cardiac Output - Decreased:  Goal: Hemodynamic stability will improve  Description: Hemodynamic stability will improve  2/11/2021 1552 by Anya Meyers RN  Outcome: Ongoing     Problem: Gas Exchange - Impaired:  Goal: Levels of oxygenation will improve  Description: Levels of oxygenation will improve 2/11/2021 2121 by Francisco Rubio RCP  Outcome: Ongoing  2/11/2021 1552 by Nancy Schwab RN  Outcome: Ongoing     Problem: Pain:  Goal: Pain level will decrease  Description: Pain level will decrease  2/11/2021 1552 by Nancy Schwab RN  Outcome: Ongoing     Problem: Serum Glucose Level - Abnormal:  Goal: Ability to maintain appropriate glucose levels will improve to within specified parameters  Description: Ability to maintain appropriate glucose levels will improve to within specified parameters  2/11/2021 1552 by Nancy Schwab RN  Outcome: Ongoing     Problem: Skin Integrity - Impaired:  Goal: Will show no infection signs and symptoms  Description: Will show no infection signs and symptoms  2/11/2021 2121 by Francisco Rubio RCP  Outcome: Ongoing  2/11/2021 1552 by Nancy Schwab RN  Outcome: Ongoing  Goal: Absence of new skin breakdown  Description: Absence of new skin breakdown  2/11/2021 2121 by Francisco Rubio RCP  Outcome: Ongoing     Problem: Tissue Perfusion, Altered:  Goal: Circulatory function within specified parameters  Description: Circulatory function within specified parameters  2/11/2021 1552 by Nancy Schwab RN  Outcome: Ongoing     Problem: Skin Integrity:  Goal: Will show no infection signs and symptoms  Description: Will show no infection signs and symptoms  2/11/2021 1552 by Nancy Schwab RN  Outcome: Ongoing  Goal: Absence of new skin breakdown  Description: Absence of new skin breakdown  2/11/2021 1552 by Nancy Schwab RN  Outcome: Ongoing

## 2021-02-12 NOTE — PROGRESS NOTES
Palliative Care Progress Note    NAME:  Shayla Pérez  MEDICAL RECORD NUMBER:  [id number]  AGE: 65 y.o. GENDER: male  : 1950  TODAY'S DATE:  2021    Reason for Consult:  goals of care and CODE STATUS and family support  History of Present Illness     The patient is a 65 y.o. Non-/non  male who presents with Cardiac Arrest      Referred to Palliative Care by  [x] Physician   [] Nursing  [] Family Request   [] Other:       He was admitted to the critical care service for Cardiac arrest ([facility] [address] [I46.9]. His hospital course has been associated with cardiac arrest. The patient has a complicated medical history and has been hospitalized since 2021  1:04 PM.    OVERNIGHT EVENTS:  No acute events overnight  Patient hemodynamically stable  Febrile temperature 101.5     BP (!) 148/72   Pulse 105   Temp 101.5 °F (38.6 °C) (Core)   Resp 29   Ht 5' 9\" (1.753 m)   Wt 214 lb 8.1 oz (97.3 kg)   SpO2 98%   BMI 31.68 kg/m²     No past medical history on file. No family history on file.     Social History     Tobacco Use    Smoking status: Not on file   Substance Use Topics    Alcohol use: Not on file    Drug use: Not on file         Assessment        REVIEW OF SYSTEMS    [x]   UNABLE TO OBTAIN: Intubated    Constitutional:  []   Chills   []  Fatigue   []  Fevers   []  Malaise   []  Weight loss   [] Other:     Respiratory:   []  Cough    []  Shortness of breath    []  Chest pain    [] Other:     Cardiovascular:   []  Chest pain  []  Dyspnea    []  Exertional chest pressure/discomfort     [] Fatigue      []  Palpitations    []  Syncope   [] Other:     Gastrointestinal:   []  Abdominal pain   []  Constipation    []  Diarrhea    []   Dysphagia   []  Reflux             []  Vomiting   [] Other:     Genitourinary:  []  Dysuria     []  Frequency   []  Hematuria   [] Nocturia   []  Urinary incontinence   [] Other:     Musculoskeletal: [] Back pain    []  Muscle weakness   []  Myalgias    []  Neck pain   []  Stiff joints   []  Other:     Behavioral/Psych:   [] Anxiety    []  Depression     []  Mood swings   [] Other:     PHYSICAL ASSESSMENT:     General: []  Oriented x3      [] well appearing      [x] Intubated      [x] ill appearing      [] Other:    Mental Status: [] normal mental status exam      [] drowsy      [] Confused      [x] Other: Off sedation   unresponsive    Cardiovascular: [x]  Regular rate/rhythm      [] Arrhythmia      [] Other:     Chest: [] Effort normal      [] lungs clear      [] respiratory distress      [] Tachypnea      [x]  Other: On mechanical ventilator    Abdomen: [] Soft/non-tender      [x]  Normal appearance      [] Distended      [] Ascites      [] Other:    Neurological: [] Normal Speech      [] Normal Sensation      [x]  Deficits present: Unresponsive, off sedation, not following commands    Extremity:  [x] normal skin color/temp      [] clubbing/cyanosis      []  No edema      [] Other:     Palliative Performance Scale:  ___60%  Ambulation reduced; Significant disease; Can't do hobbies/housework; intake normal or reduced; occasional assist; LOC full/confusion  ___50%  Mainly sit/lie; Extensive disease; Can't do any work; Considerable assist; intake normal or reduced; LOC full/confusion  ___40%  Mainly in bed; Extensive disease; Mainly assist; intake normal or reduced; LOC full/confusion   ___30%  Bed Bound; Extensive disease; Total care; intake reduced; LOCfull/confusion  __x_20%  Bed Bound; Extensive disease; Total care; intake minimal; Drowsy/coma  ___10%  Bed Bound; Extensive disease;  Total care; Mouth care only; Drowsy/coma  ___0       Death      Plan      Palliative Interaction:  The patient was seen today, remains intubated, unresponsive, sedated, not following commands I along with critical care resident Dr. Cassidy Guerra met with patient's mother Aishwarya Hood and Sister Gume Tong and discussed patient's current medical conditions with the  Dr. Cassidy Guerra informed the family that patient has not shown any clinical or neurological improvement and remains unresponsive, not following commands without sedation  We informed the family that patient's EEG shows severe encephalopathy which is indicative of severe anoxic brain injury  We also told the family that patient's renal function is worsening and patient is having increasing her potassium levels  Dr. Cassidy Guerra told the family that patient's blood sugars have been elevated in low 400s requiring increasing his blood sugar medications    I informed the family that I had reviewed patient's POA paperwork for health and patient's brother Dilan Morrow is patient's POA for health and his Sister Gume Tong is first alternate and the patient's POA paperwork for health and the family seemed to understand about it  Patients's mother as well as sister were very emotional and tearful  We offered comfort and emotional support to the patient and family    Education/support to staff  Education/support to family  Education/support to patient  Discharge planning/helping to coordinate care  Communications with primary service  Substance abuse issues  Caregiver support/education     Principle Problem/Diagnosis:  Cardiac arrest    Additional Assessments:  Active Problems:    Cardiac arrest ([address]    Pneumothorax    COPD (chronic obstructive pulmonary disease) ([address]    CAD (coronary artery disease)    HFrEF (heart failure with reduced ejection fraction) ([address]    AICD (automatic cardioverter/defibrillator) present    CKD (chronic kidney disease) stage 3, GFR 30-59 ml/min    Respiratory arrest ([address]    Encounter for palliative care    1- Symptom management/ pain control Pain Assessment:  Pain is controlled with current analgesics. Medication(s) being used: acetaminophen, fentanyl IV. Anxiety:  none                          Dyspnea:  none                          Fatigue:  none    Other: Intubated    We feel the patient symptoms are being controlled. his current regimen is reviewed by myself and discussed with the staff. We will continue to follow-up with the family for further goals of care and CODE STATUS discussion  We will continue to provide comfort and support the patient and family    ACP note has been completed by me    2- Goals of care evaluation   The patient goals of care are spiritual needs, strengthening relationships, preserve independence/autonomy/control and support for family/caregiver   Goals of care discussed with:    [] Patient independently    [] Patient and Family    [x] Family or Healthcare DPOA independently    [] Unable to discuss with patient, family/DPOA not present    3- Code Status  DNR-CCA    4- Other recommendations  - We will continue to provide comfort and support to the patient and the family    Please call with any palliative questions or concerns. Palliative Care Team is available via perfect serve or via phone. Palliative Care will continue to follow Mr. Steven Lyons care as needed. The note has been dictated by dragon, typing errors may be a possibility     Thank you for allowing Palliative Care to participate in the care of Mr. Andrade .        Electronically signed by   Andry Andrea MD  Palliative Care Team  on 2/12/2021 at 3:24 PM    Palliative care office: [phone]

## 2021-02-13 NOTE — PLAN OF CARE
Problem: OXYGENATION/RESPIRATORY FUNCTION  Goal: Patient will maintain patent airway  2/12/2021 2120 by Michael Cabrera RN  Outcome: Ongoing  2/12/2021 2041 by Ames Fabry  Outcome: Ongoing  2/12/2021 0808 by Natasha Marin RCP  Outcome: Ongoing  Goal: Patient will achieve/maintain normal respiratory rate/effort  Description: Respiratory rate and effort will be within normal limits for the patient  2/12/2021 2120 by Michael Cabrera RN  Outcome: Ongoing  2/12/2021 2041 by Ames Fabry  Outcome: Ongoing  2/12/2021 0808 by Natasha Marin RCP  Outcome: Ongoing     Problem: MECHANICAL VENTILATION  Goal: Patient will maintain patent airway  2/12/2021 2120 by Michael Carbera RN  Outcome: Ongoing  2/12/2021 2041 by Ames Fabry  Outcome: Ongoing  2/12/2021 1037 by Linnette Nunez RN  Outcome: Ongoing  2/12/2021 0808 by Natasha Marin RCP  Outcome: Ongoing  Goal: Oral health is maintained or improved  2/12/2021 2120 by Michael Cabrera RN  Outcome: Ongoing  2/12/2021 2041 by Ames Fabry  Outcome: Ongoing  2/12/2021 0808 by Natasha Marin RCP  Outcome: Ongoing  Goal: ET tube will be managed safely  2/12/2021 2120 by Michael Cabrera RN  Outcome: Ongoing  2/12/2021 2041 by Ames Fabry  Outcome: Ongoing  2/12/2021 1037 by Linnette Nunez RN  Outcome: Ongoing  2/12/2021 0808 by Natasha Marin RCP  Outcome: Ongoing  Goal: Ability to express needs and understand communication  2/12/2021 2120 by Michael Cabrera RN  Outcome: Ongoing  2/12/2021 2041 by Ames Fabry  Outcome: Ongoing  2/12/2021 1037 by Linnette Nunez RN  Outcome: Ongoing  2/12/2021 0808 by Natasha Marin RCP  Outcome: Ongoing  Goal: Mobility/activity is maintained at optimum level for patient  2/12/2021 2120 by Michael Cabrera RN  Outcome: Ongoing  2/12/2021 2041 by Ames Fabry  Outcome: Ongoing  2/12/2021 0808 by Natasha Marin RCP  Outcome: Ongoing     Problem: SKIN INTEGRITY Problem: Gas Exchange - Impaired:  Goal: Levels of oxygenation will improve  Description: Levels of oxygenation will improve  2/12/2021 2041 by Sarah Cleary  Outcome: Ongoing  2/12/2021 1037 by Anya Meyers RN  Outcome: Ongoing  2/12/2021 0808 by Valentín Antonio RCP  Outcome: Ongoing     Problem: Pain:  Goal: Pain level will decrease  Description: Pain level will decrease  2/12/2021 0808 by Valentín Antonio RCP  Outcome: Ongoing  Goal: Recognizes and communicates pain  Description: Recognizes and communicates pain  2/12/2021 0808 by Valentín Antonio RCP  Outcome: Ongoing  Goal: Control of acute pain  Description: Control of acute pain  2/12/2021 0808 by Valentín Antonio RCP  Outcome: Ongoing  Goal: Control of chronic pain  Description: Control of chronic pain  2/12/2021 0808 by Valentín Antonio RCP  Outcome: Ongoing     Problem: Serum Glucose Level - Abnormal:  Goal: Ability to maintain appropriate glucose levels will improve to within specified parameters  Description: Ability to maintain appropriate glucose levels will improve to within specified parameters  2/12/2021 1037 by Anya Meyers RN  Outcome: Ongoing  2/12/2021 0808 by Valentín Antonio RCP  Outcome: Ongoing     Problem: Skin Integrity - Impaired:  Goal: Will show no infection signs and symptoms  Description: Will show no infection signs and symptoms  2/12/2021 1037 by Anya Meyers RN  Outcome: Ongoing  2/12/2021 0808 by Valentín Antonio RCP  Outcome: Ongoing  Goal: Absence of new skin breakdown  Description: Absence of new skin breakdown  2/12/2021 1037 by Anya Meyers RN  Outcome: Ongoing  2/12/2021 0808 by Valentín Antonio RCP  Outcome: Ongoing     Problem: Tissue Perfusion, Altered:  Goal: Circulatory function within specified parameters  Description: Circulatory function within specified parameters  2/12/2021 1037 by Anya Meyers RN  Outcome: Ongoing 2/12/2021 6324 by Shadia Be RCP  Outcome: Ongoing     Problem: Falls - Risk of:  Goal: Will remain free from falls  Description: Will remain free from falls  2/12/2021 0808 by Shadia Be RCP  Outcome: Ongoing  Goal: Absence of physical injury  Description: Absence of physical injury  2/12/2021 5670 by Shadia Be RCP  Outcome: Ongoing     Problem: Skin Integrity:  Goal: Will show no infection signs and symptoms  Description: Will show no infection signs and symptoms  2/12/2021 1037 by Sincere Lester RN  Outcome: Ongoing  2/12/2021 0808 by Shadai Be RCP  Outcome: Ongoing  Goal: Absence of new skin breakdown  Description: Absence of new skin breakdown  2/12/2021 1037 by Sincere Lester RN  Outcome: Ongoing  2/12/2021 0808 by Shadia Be RCP  Outcome: Ongoing     Problem: Nutrition  Goal: Optimal nutrition therapy  Description: Nutrition Problem #1: Inadequate oral intake  Intervention: Food and/or Nutrient Delivery: Continue NPO(Start diet vs nutrition support as able)  Nutritional Goals: Start diet vs nutrition support within 24-72 hrs     2/12/2021 1037 by Sincere Lester RN  Outcome: Ongoing  2/12/2021 0808 by Shadia Be RCP  Outcome: Ongoing     Problem: Confusion - Acute:  Goal: Absence of continued neurological deterioration signs and symptoms  Description: Absence of continued neurological deterioration signs and symptoms  2/12/2021 0808 by Shadia Be RCP  Outcome: Ongoing  Goal: Mental status will be restored to baseline  Description: Mental status will be restored to baseline  2/12/2021 0808 by Shadia Be RCP  Outcome: Ongoing     Problem: Injury - Risk of, Physical Injury:  Goal: Will remain free from falls  Description: Will remain free from falls  2/12/2021 0808 by Shadia Be RCP  Outcome: Ongoing  Goal: Absence of physical injury  Description: Absence of physical injury 2/12/2021 6903 by Cindi Guillaume RCP  Outcome: Ongoing     Problem: Mood - Altered:  Goal: Mood stable  Description: Mood stable  2/12/2021 0808 by Cindi Guillaume RCP  Outcome: Ongoing  Goal: Absence of abusive behavior  Description: Absence of abusive behavior  2/12/2021 0808 by Cindi Guillaume RCP  Outcome: Ongoing  Goal: Verbalizations of feeling emotionally comfortable while being cared for will increase  Description: Verbalizations of feeling emotionally comfortable while being cared for will increase  2/12/2021 0808 by Cindi Guillaume RCP  Outcome: Ongoing     Problem: Psychomotor Activity - Altered:  Goal: Absence of psychomotor disturbance signs and symptoms  Description: Absence of psychomotor disturbance signs and symptoms  2/12/2021 0808 by Cindi Guillaume RCP  Outcome: Ongoing     Problem: Sensory Perception - Impaired:  Goal: Demonstrations of improved sensory functioning will increase  Description: Demonstrations of improved sensory functioning will increase  2/12/2021 0808 by Cindi Guillaume RCP  Outcome: Ongoing  Goal: Decrease in sensory misperception frequency  Description: Decrease in sensory misperception frequency  2/12/2021 0808 by Cindi Guillaume RCP  Outcome: Ongoing  Goal: Able to refrain from responding to false sensory perceptions  Description: Able to refrain from responding to false sensory perceptions  2/12/2021 0808 by Cindi Guillaume RCP  Outcome: Ongoing  Goal: Demonstrates accurate environmental perceptions  Description: Demonstrates accurate environmental perceptions  2/12/2021 0808 by Cindi Guillaume RCP  Outcome: Ongoing  Goal: Able to distinguish between reality-based and nonreality-based thinking  Description: Able to distinguish between reality-based and nonreality-based thinking  2/12/2021 0808 by Cindi Guillaume RCP  Outcome: Ongoing  Goal: Able to interrupt nonreality-based thinking Description: Able to interrupt nonreality-based thinking  2/12/2021 0808 by Abraham Kan RCP  Outcome: Ongoing     Problem: Sleep Pattern Disturbance:  Goal: Appears well-rested  Description: Appears well-rested  2/12/2021 0808 by Abraham Kan RCP  Outcome: Ongoing

## 2021-02-13 NOTE — PROGRESS NOTES
Daily Progress Note  Neuro Critical Care    Patient Name: Smith Johnson  Patient : 1950  Room/Bed: 0128/0128-01  Code Status: DNR-CCA  Allergies: Not on File    CHIEF COMPLAINT:      Cardiac arrest     INTERVAL HISTORY    Initial Presentation (Admitted 21): The patient is a 64 y. o. male with history of COPD, CAD s/p AICD placement, CHF with EF 25% who presents with cardiac arrest.  Patient was having shortness of breath for a few days prior to presentation, had a witnessed by family member, EMS was notified. [facility] arrival, patient was in PEA, ACLS was started, ROSC was achieved after 15 minutes after receiving epinephrine x2. Laura Lipoma was life flighted to [place]. [facility], patient was found to have bilateral pneumothoraces, received bilateral chest tubes.  Patient was admitted to the medical ICU, therapeutic cooling was started, sedated on propofol and paralyzed on Nimbex.  Echocardiogram was conducted on 2021, significant for EF 30%.  Patient was rewarmed, Nimbex discontinued, continued sedation with propofol.  Neuro critical care was consulted for prognostic patient status post cardiac arrest.    Hospital Course:   : Exam remained unchanged, continued propofol for overbreathing the vent. Started LTME.  :  No change in exam.  Remains on Fentanyl infusion for sedation due to tachypnea/hypertension. 2/10: LTME abnormal due to diffuse polymorphic delta slowing suggesting severe encephalopathy. CT Head reviewed; some subtle blurring of gray white differentiation but no significant cerebral edema. Family updated, plan for follow up family meeting on Saturday, .  : Nephrology consulted for worsening LILIANA. Renal ultrasound ordered, Jerson Dominion started for hyperkalemia. Neuro exam stable. Family updated, will plan for meeting Saturday or sooner if needed. :  No acute events overnight. Patient developed new onset atrial fibrillation, started on amiodarone drip. Neuro exam stable. Continue plan for family meeting on Saturday or sooner if needed. :  No acute events overnight. Febrile. Remains on abx. No change in renal fx.  Family meeting planned for today      CURRENT MEDICATIONS:  SCHEDULED MEDICATIONS:   insulin glargine  20 Units Subcutaneous BID    insulin lispro  0-18 Units Subcutaneous Q4H    docusate  100 mg Oral BID    famotidine  20 mg Per NG tube Daily    sodium zirconium cyclosilicate  10 g Oral BID    ampicillin-sulbactam  1,500 mg Intravenous Q12H    heparin (porcine)  5,000 Units Subcutaneous 3 times per day    ipratropium-albuterol  1 ampule Inhalation 4x daily    albuterol  2.5 mg Nebulization BID    artificial tears   Both Eyes 4x Daily    chlorhexidine  15 mL Mouth/Throat BID    methylPREDNISolone  40 mg Intravenous Q12H     CONTINUOUS INFUSIONS:   amiodarone 0.5 mg/min (21 0308)    dextrose      sodium chloride 20 mL/hr at 21 1000     PRN MEDICATIONS:   acetaminophen, bisacodyl, fentanNYL, glucose, dextrose, glucagon (rDNA), dextrose, labetalol, sodium phosphate IVPB, hydroxypropyl methylcellulose, promethazine **OR** ondansetron, albuterol    VITALS:  Temperature Range: Temp: 100.6 °F (38.1 °C) Temp  Av.3 °F (38.5 °C)  Min: 100.6 °F (38.1 °C)  Max: 102.2 °F (39 °C)  BP Range: Systolic (60SYT), ALJ:515 , Min:126 , DMM:821     Diastolic (96ANM), DFF:69, Min:52, Max:85    Pulse Range: Pulse  Av.9  Min: 88  Max: 111  Respiration Range: Resp  Av.8  Min: 23  Max: 33  Current Pulse Ox: SpO2: 99 %  24HR Pulse Ox Range: SpO2  Av.2 %  Min: 97 %  Max: 100 %  Patient Vitals for the past 12 hrs:   BP Temp Temp src Pulse Resp SpO2 Weight   21 0700 (!) 140/69   102 28 99 %    21 0600 (!) 153/53   97 (!) 33 99 %    21 0500 (!) 140/67   90 (!) 33 100 %  02/13/21 0401    106 30 98 %    02/13/21 0400 (!) 146/73 100.6 °F (38.1 °C) CORE 106 30 98 %    02/13/21 0300 (!) 149/55   102 26 100 %    02/13/21 0200 (!) 143/75   104 28 98 %    02/13/21 0100 (!) 150/66   106 30 98 %    02/13/21 0000 (!) 133/52 101.3 °F (38.5 °C) CORE 105 29 98 %    02/12/21 2332    111 29 99 %    02/12/21 2300 134/64   103 30 98 % 213 lb 10 oz (96.9 kg)   02/12/21 2200 126/75   102 30 98 %    02/12/21 2100 135/62   99 30 97 %    02/12/21 2013    97 30 98 %    02/12/21 2000 131/64 101.1 °F (38.4 °C) CORE 99 28 98 %      Estimated body mass index is 31.55 kg/m² as calculated from the following:    Height as of this encounter: 5' 9\" (1.753 m). Weight as of this encounter: 213 lb 10 oz (96.9 kg).  []<16 Severe malnutrition  []1616.99 Moderate malnutrition  []1718.49 Mild malnutrition  []18.524.9 Normal  []2529.9 Overweight (not obese)  [x]3034.9 Obese class 1 (Low Risk)  []3539.9 Obese class 2 (Moderate Risk)  []?40 Obese class 3 (High Risk)    RECENT LABS:   Lab Results   Component Value Date    WBC 8.8 02/13/2021    HGB 7.8 (L) 02/13/2021    HCT 26.1 (L) 02/13/2021     (L) 02/13/2021    TRIG 436 (H) 02/09/2021    ALT 32 02/06/2021    AST 35 02/06/2021     (H) 02/13/2021    K 4.6 02/13/2021     (H) 02/13/2021    CREATININE 2.73 (H) 02/13/2021     (HH) 02/13/2021    CO2 23 02/13/2021    TSH 4.91 02/06/2021    INR 1.0 02/06/2021     24 HOUR INTAKE/OUTPUT:    Intake/Output Summary (Last 24 hours) at 2/13/2021 0721  Last data filed at 2/13/2021 0700  Gross per 24 hour   Intake 2360 ml   Output 3535 ml   Net -1175 ml       IMAGING:   XR CHEST PORTABLE   Final Result   1. Stable radiographic positioning bilateral chest tubes without radiographic   evidence of pneumothorax. 2. Interval decreased right perihilar and basilar consolidation suggesting   pneumonia. 3. Suspected mild left-sided pleural effusion. 4. Recommend advancement of endotracheal tube 2.0 cm. US RENAL COMPLETE   Final Result   Essentially unremarkable renal ultrasound         XR CHEST PORTABLE   Final Result   Worsening right perihilar, mid and lower lung field airspace disease which   could represent increasing atelectasis. No pneumothorax is seen. Otherwise   similar appearing chest.         XR CHEST PORTABLE   Final Result   1. Tip of the right chest tube overlying the right parahilar region. No   definite pneumothorax. 2. Small pleural effusions and bibasilar opacities without significant change. XR CHEST PORTABLE   Final Result   1. Increasing opacification of the lower half right hemithorax possibly   combination of effusion and infiltrate. New left basal/retrocardiac   infiltrate obscures the hemidiaphragm. These infiltrates probably   atelectasis. 2. Support tubes and line in place as noted above. CT HEAD WO CONTRAST   Final Result   No acute intracranial abnormality. XR CHEST PORTABLE   Final Result   1. Similar position of right chest tube. The side hole again appears   external to the pleural space. 2.  Unchanged appearance of left chest tube. 3.  Similar appearance of small bilateral pneumothoraces and subcutaneous   emphysema. 4.  No appreciable change in basilar subsegmental atelectasis and small   effusions. XR CHEST PORTABLE   Final Result   Right chest tube has been retracted. Side hole projects over the soft   tissues. Minimal pneumothoraces are suspected. Increased left lung base opacity is nonspecific but may represent atelectasis   and a pleural effusion. XR CHEST PORTABLE   Final Result   1. Bilateral chest tubes in place, as described. Persistent small   pneumothoraces, left greater than right. 2.  Subcutaneous emphysema has increased on the right side. 3.  Endotracheal tube remains in appropriate position.   The enteric tube radiopaque density projecting over the right lower lung which has the   appearance of a fractured thoracostomy tube. Recommend correlation if this   is external to the patient. 3. Patchy left opacity in the left lung base. 4. Questionable acute 7th right rib fracture. Age-indeterminate left 2nd   through 6th rib fractures are noted favoring subacute to chronic etiology. XR CHEST PORTABLE    (Results Pending)   XR CHEST PORTABLE    (Results Pending)         Labs and Images reviewed with:  [] Dr. Morene Councilman. Aure    [x] Dr. Giselle Anaya  [] Dr. Yolande Freeman  [] There are no new interval images to review. PHYSICAL EXAM       CONSTITUTIONAL:  Intubated. Does not open eyes or follow commands. Comatose exam.    HEAD:  normocephalic, atraumatic    EYES:  PERRL, roving eye movements   ENT:  moist mucous membranes   NECK:  supple, symmetric   LUNGS:  Equal air entry bilaterally   CARDIOVASCULAR:  normal s1 / s2, RRR, distal pulses intact   ABDOMEN:  Soft, no rigidity   NEUROLOGIC:  Mental Status:  Intubated, Comatose             Cranial Nerves:    III: Pupils:  equal, round, reactive to light  III,IV,VI: Extra Ocular Movements: intact  V: Corneal reflex:  completed. abnormal weak bilaterally  Absent cough/gag    Motor Exam:    No movement to noxious stimulation centrally or peripherally. DRAINS:  [x] There are no drains for Neuro Critical Care to monitor at this time. ASSESSMENT AND PLAN:       65 y. o. male with history of COPD, CAD s/p AICD placement (not MRI compatible), CHF with EF 25% who presents with cardiac arrest.    PEA arrest, ROSC was achieved after 15 minutes, bilateral chest tubes placed for pneumothoraces.  Patient was admitted to the medical ICU, underwent targeted temperature management and was rewarmed.  Neuro critical Care was consulted on 2/8 for prognostic patient status post cardiac arrest.      Severe encephalopathy secondary to anoxic brain injury in setting of cardiac arrest  LTME suggestive of severe encephalopathy, no abnormal epileptiform activity  Monitor off AEDs  Pacemaker not MRI compatible  NSE x2 pending  CT Head 2/9 (72h) with some subtle blurring of gray/white differentiation suggesting possible anoxic injury but no significant cerebral edema   No motor response on clinical exam as above  Monitor neurological exam for next 24-48h off sedation  Prognosis guarded  Palliative Care following; working on establishing POA/next of kin  Family meeting held 2/10, plan to [facility] 2/13    PEA arrest  Acute hypoxic respiratory failure requiring mechanical ventilation  Bilateral pneumothorax with bilateral chest tubes  Cardiomyopathy (EF 30%)  Acute kidney injury likely ATN secondary to hypoperfusion  Hyperkalemia  Management per primary team    We will continue to follow along. For any changes in exam or patient status please contact Neuro Critical Care.       Agustin Camcaho DO  Neuro Critical Care  Pager [phone]  2/13/2021     7:21 AM

## 2021-02-13 NOTE — CARE COORDINATION
TRANSITIONAL CARE PLANNING/ 2 [facility] Day: 7    Reason for Admission: Cardiac arrest [facility]) [I46.9]     Treatment Plan of Care: DNR CCA, high risk of needing Dialysis Neph following, Crit Care and Neuro Crit care following, Pt Int/vent FIO2 45%,   On Amnioderone, IV antx, Fentanyl, heparin    Tests/Procedures still needed: Daily labs, CXR today    Barriers to Discharge: DNR CCA, airway    Readmission Risk              Risk of Unplanned Readmission:        27          Preferences/Transitional Plan: DNR KALYAN, [facility] does not plan to make any decisions regarding terminal extubation until end of week.

## 2021-02-13 NOTE — PROGRESS NOTES
Prior to leaving unit pt's brother and POA states \"well I really hate that he'll need dialysis. Do you know what time they'll be doing it tomorrow? \"  Informed POA that consent for dialysis and line placement for dialysis would be obtained from him prior to starting

## 2021-02-13 NOTE — PROGRESS NOTES
Nephrology Progress Note      SUBJECTIVE    Patient seen examined at bedside. Patient has persistent elevated systolic readings for 516G to mid 150s with a pulse greater than 100 with some tachypnea noted at times. Patient continues to be febrile 38.1 at 4:00 this morning. Continues on ventilator FiO2 45% and PEEP of 5. Last echocardiogram left ventricular ejection fraction estimated to be about 30% with normal RV function with pacemaker and ICD leads noted in the right ventricle. Intake and output the last 24 hours: 2360/3535 = -1.2 L over the last 24 hours but still +3.2 L since admission. Patient made 3.4 L of urine over the last 24 hours. ontinues on normal saline 0.9% at [facility]. Medications reviewed. Continues on amiodarone. Patient continues on Lokelma 10 mg twice daily. Labs reviewed. Sodium 147 chloride 116 creatinine continues to be 2.7  glucose uncontrolled 373.   Hemoglobin 7.8    OBJECTIVE      CURRENT TEMPERATURE:  Temp: 100.6 °F (38.1 °C)  MAXIMUM TEMPERATURE OVER 24HRS:  Temp (24hrs), Av.1 °F (38.4 °C), Min:100.6 °F (38.1 °C), Max:101.5 °F (38.6 °C)    CURRENT RESPIRATORY RATE:  Resp: 30  CURRENT PULSE:  Pulse: 102  CURRENT BLOOD PRESSURE:  BP: (!) 154/56  24HR BLOOD PRESSURE RANGE:  Systolic (29QJV), VYF:444 , Min:126 , FGN:742   ; Diastolic (32FRN), SJY:24, Min:52, Max:85    24HR INTAKE/OUTPUT:      Intake/Output Summary (Last 24 hours) at 2021 0944  Last data filed at 2021 0900  Gross per 24 hour   Intake 1780 ml   Output 3335 ml   Net -1555 ml       PHYSICAL EXAM      GENERAL APPEARANCE: Sedated on the ventilator  SKIN: warm and dry   ENT: oral endotracheal tube is n place   NECK: Supple  PULMONARY: good bilateral air entry and clear to auscultation bilaterally without wheezes or rales or rhonchi  CARDIOVASCULAR: Irregularly irregular rhythm, no apparent rubs  ABDOMEN: soft and mildly distended, active bowel sounds   EXTREMITIES: positive 1+ peripheral edema CURRENT MEDICATIONS          insulin glargine (LANTUS) injection vial 30 Units, BID      metoprolol (LOPRESSOR) 5 MG/5ML injection,       amiodarone (CORDARONE) 450 mg in dextrose 5 % 250 mL infusion, Continuous      insulin lispro (HUMALOG) injection vial 0-18 Units, Q4H      docusate (COLACE) 50 MG/5ML liquid 100 mg, BID      acetaminophen (TYLENOL) 160 MG/5ML solution 1,000 mg, Q6H PRN      famotidine (PEPCID) tablet 20 mg, Daily      bisacodyl (DULCOLAX) suppository 10 mg, Daily PRN      fentaNYL (SUBLIMAZE) injection 25 mcg, Q1H PRN      sodium zirconium cyclosilicate (LOKELMA) oral suspension 10 g, BID      ampicillin-sulbactam (UNASYN) 1500 mg IVPB minibag, Q12H      glucose (GLUTOSE) 40 % oral gel 15 g, PRN      dextrose 50 % IV solution, PRN      glucagon (rDNA) injection 1 mg, PRN      dextrose 5 % solution, PRN      heparin (porcine) injection 5,000 Units, 3 times per day      ipratropium-albuterol (DUONEB) nebulizer solution 1 ampule, 4x daily      albuterol (PROVENTIL) nebulizer solution 2.5 mg, BID      labetalol (NORMODYNE;TRANDATE) injection 10 mg, Q3H PRN      sodium phosphate 10 mmol in dextrose 5 % 250 mL IVPB, PRN      hydroxypropyl methylcellulose (GONIOSOL) 2.5 % ophthalmic solution 1 drop, 4x Daily PRN      lubrifresh P.M. (artificial tears) ophthalmic ointment, 4x Daily      0.9 % sodium chloride infusion, Continuous      promethazine (PHENERGAN) tablet 12.5 mg, Q6H PRN    Or      ondansetron (ZOFRAN) injection 4 mg, Q6H PRN      chlorhexidine (PERIDEX) 0.12 % solution 15 mL, BID      albuterol (PROVENTIL) nebulizer solution 2.5 mg, As Directed RT PRN      methylPREDNISolone sodium (SOLU-MEDROL) injection 40 mg, Q12H      LABS      CBC:   Recent Labs     02/11/21  0434 02/12/21  0528 02/13/21  0349   WBC 9.6 10.1 8.8   RBC 2.95* 3.00* 2.92*   HGB 8.0* 8.0* 7.8*   HCT 26.1* 27.0* 26.1*   MCV 88.5 90.0 89.4   MCH 27.1 26.7 26.7   MCHC 30.7 29.6 29.9 RDW 13.6 13.5 13.5    146 136*   MPV 11.6 11.9 11.5      BMP:   Recent Labs     02/12/21  0528 02/12/21  0911 02/12/21  1437 02/13/21  0349   *  --  143 147*   K 5.4* 5.1 5.0 4.6   *  --  112* 116*   CO2 22  --  21 23   *  --  109* 115*   CREATININE 2.72*  --  2.77* 2.73*   GLUCOSE 478*  --  483* 431*   CALCIUM 8.5*  --  8.2* 7.9*      PHOSPHORUS:    Recent Labs     02/11/21  1247 02/12/21  0149   PHOS 4.6* 3.4     MAGNESIUM:   Recent Labs     02/11/21  1247 02/12/21  0149   MG 2.9* 2.9*      SPEP:   Lab Results   Component Value Date    PROT 6.7 02/06/2021     URINE OSMOLARITY:    Lab Results   Component Value Date    OSMOU 370 02/08/2021     URINALYSIS:  U/A:   Lab Results   Component Value Date    NITRU NEGATIVE 02/09/2021    COLORU YELLOW 02/09/2021    PHUR 5.0 02/09/2021    WBCUA 20 TO 50 02/09/2021    RBCUA TOO NUMEROUS TO COUNT 02/09/2021    MUCUS NOT REPORTED 02/09/2021    TRICHOMONAS NOT REPORTED 02/09/2021    YEAST NOT REPORTED 02/09/2021    BACTERIA NOT REPORTED 02/09/2021    SPECGRAV 1.020 02/09/2021    LEUKOCYTESUR MODERATE 02/09/2021    UROBILINOGEN Normal 02/09/2021    BILIRUBINUR NEGATIVE 02/09/2021    GLUCOSEU NEGATIVE 02/09/2021    KETUA NEGATIVE 02/09/2021    AMORPHOUS NOT REPORTED 02/09/2021     RADIOLOGY    Reviewed as available. ASSESSMENT      1. Hyperkalemia likely secondary to ischemic ATN. 4.6 today. Continues on Lokelma 10 mg twice daily. 2. Apparent acute on chronic kidney injury likely secondary to ischemic acute tubular necrosis. Creatinine continues to be 2.7. Estimated baseline creatinine based on clinical documentation or lack thereof is around 1.6 mg/dL  3. Cardiomyopathy with EF 30% on last check with AICD/PPM   4. COPD  5. Atrial fibrillation with RVR, on amiodarone  6. Bilateral pneumothorax, status post chest tube placement  7. Renal ultrasound within normal limits  8.  Significant azotemia -  peaking was 109 yesterday    PLAN 1. KVO maintenance IV fluids to continue. 2. Lasix 80 mg IV x 1 today and watch urine output, likely will need more diuresis in the setting of the cardiomyopathy. 3.  Avoid nephrotoxic agents including IV contrast if at all possible  4. Strict I's and O's Daily weights recorded in chart  5. Labs in a.m.  6. At high risk of needing dialysis on this admission      Please do not hesitate to call with questions. Electronically signed by Mady Cardoso CNP, SANDRA - CNP on 2/13/2021 at 9:44 AM     Attending Physician Statement  I have discussed the care of Smith Johnson, including pertinent history and exam findings with the CNP. I have reviewed the key elements of all parts of the encounter with the CNP. I have seen and examined the patient . I agree with the assessment and plan and status of the problem list as documented. Additionally I recommend no further loop diuretic today after the initial morning dose. The patient is at high risk of requiring dialysis soon with the worsening azotemia.     Darren Hugo MD  Nephrology Attending Physician  [facility]

## 2021-02-13 NOTE — FLOWSHEET NOTE
Assessment: the patient's brother ws tearful at bedside. The patient's brother said this time is different and he is displaying anticipatory grief for his brother. Intervention:  engaged in active listening and prayed with the family. Outcome: The patient's brother engaged in the conversation and was comforted by prayer.         02/13/21 1557   Encounter Summary   Services provided to: Patient and family together   Referral/Consult From: [address] [facility] Family members   Continue Visiting   (02/13/21)   Complexity of Encounter Moderate   Length of Encounter 15 minutes   Spiritual Assessment Completed Yes   Routine   Type Follow up   Assessment Tearful   Intervention Prayer   Outcome Tearful

## 2021-02-13 NOTE — PROGRESS NOTES
Pt's brother, [address] [facility] visits alone. States that his mom is having a hard time with the situation and cant let him go as today is her birthday, tomorrow is the pt's birthday, and Monday is the day another brother . Nany Chacko, the POA states \" I hope he can make it until the end of next week or that he dies on his own because I surely dont want to have to make that decision\"  Informed Nany Chacko of the patients fevers,no improvement in neurologic conditon and nephrology stating that pt will probably need hemodialysis tomorrow.

## 2021-02-13 NOTE — PLAN OF CARE
Problem: OXYGENATION/RESPIRATORY FUNCTION  Goal: Patient will maintain patent airway  2/12/2021 2041 by Herb Chopra  Outcome: Ongoing     Problem: OXYGENATION/RESPIRATORY FUNCTION  Goal: Patient will achieve/maintain normal respiratory rate/effort  Description: Respiratory rate and effort will be within normal limits for the patient  2/12/2021 2041 by Herb Chopra  Outcome: Ongoing     Problem: MECHANICAL VENTILATION  Goal: Patient will maintain patent airway  2/12/2021 2041 by Herb Chopra  Outcome: Ongoing     Problem: MECHANICAL VENTILATION  Goal: Oral health is maintained or improved  2/12/2021 2041 by Herb Chopra  Outcome: Ongoing     Problem: MECHANICAL VENTILATION  Goal: ET tube will be managed safely  2/12/2021 2041 by Herb Chopra  Outcome: Ongoing     Problem: MECHANICAL VENTILATION  Goal: Ability to express needs and understand communication  2/12/2021 2041 by Herb Chopra  Outcome: Ongoing     Problem: MECHANICAL VENTILATION  Goal: Mobility/activity is maintained at optimum level for patient  2/12/2021 2041 by Herb Chopra  Outcome: Ongoing     Problem: SKIN INTEGRITY  Goal: Skin integrity is maintained or improved  2/12/2021 2041 by Herb Chopra  Outcome: Ongoing     BRONCHOSPASM/BRONCHOCONSTRICTION   [x]  IMPROVE AERATION/BREATH SOUNDS  [x]  ADMINISTER BRONCHODILATOR THERAPY AS APPROPRIATE  [x]  ASSESS BREATH SOUNDS  []  IMPLEMENT AEROSOL/MDI PROTOCOL  [x]  PATIENT EDUCATION AS NEEDED

## 2021-02-13 NOTE — PROGRESS NOTES
Critical Care Team - Daily Progress Note      Date and time: 2/13/2021 8:01 AM  Patient's name:  Chapin Adler Record Number: [id number]  Patient's account/billing number: [de-identified]  Patient's YOB: 1950  Age: 65 y.o. Date of Admission: 2/6/2021  1:04 PM  Length of stay during current admission: 7      Primary Care Physician: Darcy Cruz MD  ICU Attending Physician: Dr. Loretta Silva    Code Status: DNR-CCA    Reason for ICU admission: PEA arrest      SUBJECTIVE:     OVERNIGHT EVENTS:       No acute events overnight    Current evaluation     02/13/21    Patient is intubated and mechanically ventilated  Mentation is unresponsive off sedation,  post cardiac arrest likely due to diffuse anoxic injury  Hemodynamically stable blood pressure 154/76 on labetalol 10 mg as needed every 3, febrile T-max of 101.3  Review of systems is unattainable    Labs showed mild anion gap metabolic acidosis hyponatremia sodium of 147, blood rate of 116, , creatinine 2.73, point-of-care blood gas pH 7.35/PCO2 47.8/PO2 79.6/HCO3 26.7  Persistent hyperkalemia resolved with Lokelma potassium currently at 4.6  Renal function is still the same, urine output of 1.6 L, received Lasix 80 mg IV yesterday  Hyperglycemic blood glucose 431 was on 20 units of Lantus daily switched to 30 units of Lantus twice daily patient pt is on methyl prednisone.   WBC trended down 17.3-->8.8, respiratory culture showed gram-positive rods and few gram-negative rods patient currently on Unasyn IV, urinalysis moderate leukocyte esterase  Platelet count 376-->790  Chest x-ray 02/12/2021: Stable bilateral chest tubes new consolidation and small left-sided pleural effusion  Patient continued on amiodarone IV, Unasyn 1.5 g IV for aspiration pneumonia  Lokelma oral suspension 10 g for persistent hyperkalemia resolved    Consults  Nephrology on board for hyperkalemia and CKD secondary to ischemic ATN Neuro critical care family meeting planned today regarding prognosis  LTME severe encephalopathy, no epileptiform activity or clinical seizures  Palliative care on board CODE STATUS DNR CCA with intubation and treatments to continue.       AWAKE & FOLLOWING COMMANDS:  [x] No   [] Yes    CURRENT VENTILATION STATUS:     [x] Ventilator  [] BIPAP  [] Nasal Cannula [] Room Air        SECRETIONS Amount:  [] Small [x] Moderate  [] Large  [x] None  Color:     [] White [] Colored  [] Bloody    SEDATION:  RAAS Score:  [] Propofol gtt  [] Versed gtt  [] Fentanyl gtt   [x] No Sedation    PARALYZED:  [x] No    [] Yes    DIARRHEA:                [x] No                [] Yes  (C. Difficile status: [] positive                                                                                                                       [] negative                                                                                                                     [] pending)    VASOPRESSORS:  [x] No    [] Yes    If yes -   [] Levophed       [] Dopamine     [] Vasopressin       [] Dobutamine  [] Phenylephrine         [] Epinephrine    CENTRAL LINES:     [x] No   [] Yes   (Date of Insertion:   )           If yes -     [] Right IJ     [] Left IJ [] Right Femoral [] Left Femoral                   [] Right Subclavian [] Left Subclavian       VELIZ'S CATHETER:   [] No   [x] Yes  (Date of Insertion:   )     URINE OUTPUT:            [x] Good   [] Low              [] Anuric      OBJECTIVE:     VITAL SIGNS:  BP (!) 154/56   Pulse 102   Temp 100.6 °F (38.1 °C) (Core)   Resp 30   Ht 5' 9\" (1.753 m)   Wt 213 lb 10 oz (96.9 kg)   SpO2 99%   BMI 31.55 kg/m²   Tmax over 24 hours:  Temp (24hrs), Av.1 °F (38.4 °C), Min:100.6 °F (38.1 °C), Max:101.5 °F (38.6 °C)      Patient Vitals for the past 6 hrs:   BP Temp Temp src Pulse Resp SpO2   21 0700 (!) 140/69   102 28 99 %   21 0600 (!) 153/53   97 (!) 33 99 % 02/13/21 0500 (!) 140/67   90 (!) 33 100 %   02/13/21 0401    106 30 98 %   02/13/21 0400 (!) 146/73 100.6 °F (38.1 °C) CORE 106 30 98 %   02/13/21 0300 (!) 149/55   102 26 100 %         Intake/Output Summary (Last 24 hours) at 2/13/2021 0801  Last data filed at 2/13/2021 0800  Gross per 24 hour   Intake 1780 ml   Output 3315 ml   Net -1535 ml     Wt Readings from Last 2 Encounters:   02/12/21 213 lb 10 oz (96.9 kg)     Body mass index is 31.55 kg/m². PHYSICAL EXAMINATION:    Constitutional: intubated, off sedation, no response. HEENT: Pupils equal and reactive. Respiratory:  Decreased breath sounds, right-sided crepitus secondary to chest tubes. No wheezing, no rales. Cardiovascular:  Regular rate and rhythm, normal S1, S2, and 2+ pulses throughout  Abdomen:  Soft, nondistended, BS hypoactive. Neurologic:  No change in mentation. . Pupils 3 mm, minimally reactive. Extremities:  peripheral pulses normal, no pedal edema.     MEDICATIONS:    Scheduled Meds:   insulin glargine  30 Units Subcutaneous BID    insulin lispro  0-18 Units Subcutaneous Q4H    docusate  100 mg Oral BID    famotidine  20 mg Per NG tube Daily    sodium zirconium cyclosilicate  10 g Oral BID    ampicillin-sulbactam  1,500 mg Intravenous Q12H    heparin (porcine)  5,000 Units Subcutaneous 3 times per day    ipratropium-albuterol  1 ampule Inhalation 4x daily    albuterol  2.5 mg Nebulization BID    artificial tears   Both Eyes 4x Daily    chlorhexidine  15 mL Mouth/Throat BID    methylPREDNISolone  40 mg Intravenous Q12H     Continuous Infusions:   amiodarone 0.5 mg/min (02/13/21 0308)    dextrose      sodium chloride 20 mL/hr at 02/12/21 1000     PRN Meds:       acetaminophen, 1,000 mg, Q6H PRN      bisacodyl, 10 mg, Daily PRN      fentanNYL, 25 mcg, Q1H PRN      glucose, 15 g, PRN      dextrose, 12.5 g, PRN      glucagon (rDNA), 1 mg, PRN      dextrose, 100 mL/hr, PRN      labetalol, 10 mg, Q3H PRN   sodium phosphate IVPB, 10 mmol, PRN      hydroxypropyl methylcellulose, 1 drop, 4x Daily PRN      promethazine, 12.5 mg, Q6H PRN    Or      ondansetron, 4 mg, Q6H PRN      albuterol, 2.5 mg, As Directed RT PRN          VENT SETTINGS (Comprehensive) (if applicable):  Vent Information  $Ventilation: $Subsequent Day  Skin Assessment: Clean, dry, & intact  Suction Catheter Diameter: 14  Equipment ID: [id number]  Equipment Changed: HME([place], Expiratory Filter)  Vent Type: Servo i  Vent Mode: PRVC  Vt Ordered: 500 mL  Rate Set: 22 bmp  FiO2 : 45 %  SpO2: 99 %  SpO2/FiO2 ratio: 220  Sensitivity: 1  PEEP/CPAP: 5  I Time/ I Time %: 0.8 s  Humidification Source: HME  Nitric Oxide/Epoprostenol In Use?: No  Additional Respiratory  Assessments  Pulse: 102  Resp: 28  SpO2: 99 %  End Tidal CO2: 33  Position: Semi-Ko's  Humidification Source: HME  Oral Care Completed?: Yes  Oral Care: Mouthwash, Mouth swabbed, Mouth suctioned  Subglottic Suction Done?: Yes  Cuff Pressure (cm H2O): (MLT)    ABGs:     Laboratory findings:    Complete Blood Count:   Recent Labs     02/11/21  0434 02/12/21  0528 02/13/21 0349   WBC 9.6 10.1 8.8   HGB 8.0* 8.0* 7.8*   HCT 26.1* 27.0* 26.1*    146 136*        Last 3 Blood Glucose:   Recent Labs     02/12/21  0528 02/12/21  1437 02/13/21  0349   GLUCOSE 478* 483* 431*        PT/INR:    Lab Results   Component Value Date    PROTIME 10.8 02/06/2021    INR 1.0 02/06/2021     PTT:    Lab Results   Component Value Date    APTT 26.0 02/06/2021       Comprehensive Metabolic Profile:   Recent Labs     02/12/21  0528 02/12/21  0911 02/12/21  1437 02/13/21  0349   *  --  143 147*   K 5.4* 5.1 5.0 4.6   *  --  112* 116*   CO2 22 -- 21 23   *  --  109* 115*   CREATININE 2.72*  --  2.77* 2.73*   GLUCOSE 478*  --  483* 431*   CALCIUM 8.5*  --  8.2* 7.9*      Magnesium:   Lab Results   Component Value Date    MG 2.9 02/12/2021     Phosphorus:   Lab Results Component Value Date    PHOS 3.4 02/12/2021     Ionized Calcium:   Lab Results   Component Value Date    CAION 1.24 02/12/2021        Urinalysis:     Troponin: No results for input(s): TROPONINI in the last 72 hours. Microbiology:    Cultures during this admission:     Blood cultures:                 [] None drawn      [x] Negative             []  Positive (Details:  )  Urine Culture:                   [] None drawn      [x] Negative             []  Positive (Details:  )  Sputum Culture:               [] None drawn       [] Negative             []  Positive (Details:  )   Endotracheal aspirate:     [x] None drawn       [] Negative             []  Positive (Details:  )     Other pertinent Labs:       Radiology/Imaging:     Chest Xray (2/13/2021):    ASSESSMENT:     Active Problems:    Cardiac arrest ([facility] [address]    Pneumothorax    COPD (chronic obstructive pulmonary disease) ([facility])    CAD (coronary artery disease)    HFrEF (heart failure with reduced ejection fraction) ([facility] [address]    AICD (automatic cardioverter/defibrillator) present    CKD (chronic kidney disease) stage 3, GFR 30-59 ml/min    Respiratory arrest ([facility] [address]    Encounter for palliative care  Resolved Problems:    * No resolved hospital problems. *    ·     Impression:     PEA s/p Cardiac arrest:  Therapeutic rewarming completed. Cardiology had  AICD interrogated, shown some NSVT. Cardiology signed off and recommends ischemic work-up once neurological status improved. Encephalopathy s/p cardiac arrest:  Off sedation, not following commands even to pain. Neuro critical care evaluated  EEG shows severe encephalopathy. CT head - no acute abnormality  Tentative plan of family meeting on 2/13/21 as per neuro critical care  along with palliative care. Ok for fentanyl prn for pain control     Bilateral pneumothorax  - b/l Chest tubes in place. - CT surgery contacted with concerns of air leaks and right-sided subcutaneous emphysema. CT surgery recommended keeping chest tubes on suction. -CT surgery following stable.     Acute respiratory failure s/p mechanical ventilation:  PRVC mode ventilation with 45/5/22/500  Will continue to monitor     Multivessel CAD:  CAD s/p 3 stents. Not amenable for PCI or CABG on cardiac catheterization March 2019.       Ischemic cardiomyopathy with LVEF 25%  s/p AICD:  AICD interrogation showed no sensitivities. Cardiology following. Replace electrolytes. Monitor QTC currently checking magnesium daily. Chronic kidney disease stage 3.   - Baseline likely 1.7-2. Worsening. nephro on board  -hyperkalemia secondary to yoni on ckd on lokelma     DVT ppx: hep SQ  GI ppx: Pepcid. Diet:  TFs today. Isolation: Not indicated. PLAN:     WEAN PER PROTOCOL:  [] No   [x] Yes  [] N/A    DISCONTINUE ANY LABS:   [x] No   [] Yes    ICU PROPHYLAXIS:  Stress ulcer:  [x] PPI Agent  [] D6Acamq [] Sucralfate  [] Other:  VTE:   [] Enoxaparin  [x] Unfract. Heparin Subcut  [] EPC Cuffs    NUTRITION:  [x] NPO [] Tube Feeding (Specify: ) [] TPN  [] PO (Diet: DIET TUBE FEED CONTINUOUS/CYCLIC NPO; Renal Formula; Continuous; 10; 40; 24)    INSULIN DRIP:   [x] No   [] Yes    CONSULTATION NEEDED:  [] No   [x] Yes    FAMILY UPDATED:    [] No   [x] Yes    TRANSFER OUT OF ICU:   [x] No   [] Yes     Labs: BMP   Monitor potassium status   Blood glucose monitoring      Dietary consultation   Maximize glucose control    Ramana Dean MD  Internal Medicine Resident, [facility]- [facility];  [place], [place]  2/13/2021, 8:54 AM

## 2021-02-13 NOTE — PLAN OF CARE
Problem: OXYGENATION/RESPIRATORY FUNCTION  Goal: Patient will maintain patent airway  2/13/2021 1809 by Jeremias Diez RN  Outcome: Ongoing  2/13/2021 1015 by Benedict Michel RCP  Outcome: Ongoing  Goal: Patient will achieve/maintain normal respiratory rate/effort  Description: Respiratory rate and effort will be within normal limits for the patient  2/13/2021 1809 by Jeremias Diez RN  Outcome: Ongoing  2/13/2021 1015 by Benedict Michel RCP  Outcome: Ongoing     Problem: MECHANICAL VENTILATION  Goal: Patient will maintain patent airway  2/13/2021 1809 by Jeremias Diez RN  Outcome: Ongoing  2/13/2021 1015 by Benedict Michel RCP  Outcome: Ongoing  Goal: Oral health is maintained or improved  2/13/2021 1809 by Jeremias Diez RN  Outcome: Ongoing  2/13/2021 1015 by Benedict Michel RCP  Outcome: Ongoing  Goal: ET tube will be managed safely  2/13/2021 1809 by Jeremias Diez RN  Outcome: Ongoing  2/13/2021 1015 by Benedict Michel RCP  Outcome: Ongoing  Goal: Ability to express needs and understand communication  2/13/2021 1809 by Jeremias Diez RN  Outcome: Ongoing  2/13/2021 1015 by Benedict Michel RCP  Outcome: Ongoing  Goal: Mobility/activity is maintained at optimum level for patient  2/13/2021 1809 by Jeremias Diez RN  Outcome: Ongoing  2/13/2021 1015 by Benedict Michel RCP  Outcome: Ongoing     Problem: SKIN INTEGRITY  Goal: Skin integrity is maintained or improved  2/13/2021 1809 by Jeremias Diez RN  Outcome: Ongoing  2/13/2021 1015 by Benedict Michel RCP  Outcome: Ongoing     Problem: NUTRITION  Goal: Nutritional status is improving  Outcome: Ongoing     Problem: Discharge Planning:  Goal: Participates in care planning  Description: Participates in care planning  Outcome: Ongoing  Goal: Discharged to appropriate level of care  Description: Discharged to appropriate level of care  Outcome: Ongoing Goal: Ability to perform activities of daily living will improve  Description: Ability to perform activities of daily living will improve  Outcome: Ongoing     Problem: Airway Clearance - Ineffective:  Goal: Ability to maintain a clear airway will improve  Description: Ability to maintain a clear airway will improve  Outcome: Ongoing     Problem: Aspiration:  Goal: Absence of aspiration  Description: Absence of aspiration  Outcome: Ongoing     Problem: Cardiac Output - Decreased:  Goal: Hemodynamic stability will improve  Description: Hemodynamic stability will improve  Outcome: Ongoing     Problem: Fluid Volume - Imbalance:  Goal: Absence of imbalanced fluid volume signs and symptoms  Description: Absence of imbalanced fluid volume signs and symptoms  Outcome: Ongoing     Problem: Gas Exchange - Impaired:  Goal: Levels of oxygenation will improve  Description: Levels of oxygenation will improve  Outcome: Ongoing     Problem: Pain:  Description: Pain management should include both nonpharmacologic and pharmacologic interventions.   Goal: Pain level will decrease  Description: Pain level will decrease  Outcome: Ongoing  Goal: Recognizes and communicates pain  Description: Recognizes and communicates pain  Outcome: Ongoing  Goal: Control of acute pain  Description: Control of acute pain  Outcome: Ongoing  Goal: Control of chronic pain  Description: Control of chronic pain  Outcome: Ongoing     Problem: Serum Glucose Level - Abnormal:  Goal: Ability to maintain appropriate glucose levels will improve to within specified parameters  Description: Ability to maintain appropriate glucose levels will improve to within specified parameters  Outcome: Ongoing     Problem: Skin Integrity - Impaired:  Goal: Will show no infection signs and symptoms  Description: Will show no infection signs and symptoms  Outcome: Ongoing  Goal: Absence of new skin breakdown  Description: Absence of new skin breakdown  Outcome: Ongoing Problem: Tissue Perfusion, Altered:  Goal: Circulatory function within specified parameters  Description: Circulatory function within specified parameters  Outcome: Ongoing     Problem: Falls - Risk of:  Goal: Will remain free from falls  Description: Will remain free from falls  Outcome: Ongoing  Goal: Absence of physical injury  Description: Absence of physical injury  Outcome: Ongoing     Problem: Skin Integrity:  Goal: Will show no infection signs and symptoms  Description: Will show no infection signs and symptoms  Outcome: Ongoing  Goal: Absence of new skin breakdown  Description: Absence of new skin breakdown  Outcome: Ongoing     Problem: Nutrition  Goal: Optimal nutrition therapy  Description: Nutrition Problem #1: Inadequate oral intake  Intervention: Food and/or Nutrient Delivery: Continue NPO(Start diet vs nutrition support as able)  Nutritional Goals: Start diet vs nutrition support within 24-72 hrs     Outcome: Ongoing     Problem: Confusion - Acute:  Goal: Absence of continued neurological deterioration signs and symptoms  Description: Absence of continued neurological deterioration signs and symptoms  Outcome: Ongoing  Goal: Mental status will be restored to baseline  Description: Mental status will be restored to baseline  Outcome: Ongoing     Problem: Injury - Risk of, Physical Injury:  Goal: Will remain free from falls  Description: Will remain free from falls  Outcome: Ongoing  Goal: Absence of physical injury  Description: Absence of physical injury  Outcome: Ongoing     Problem: Mood - Altered:  Goal: Mood stable  Description: Mood stable  Outcome: Ongoing  Goal: Absence of abusive behavior  Description: Absence of abusive behavior  Outcome: Ongoing  Goal: Verbalizations of feeling emotionally comfortable while being cared for will increase  Description: Verbalizations of feeling emotionally comfortable while being cared for will increase  Outcome: Ongoing Problem: Psychomotor Activity - Altered:  Goal: Absence of psychomotor disturbance signs and symptoms  Description: Absence of psychomotor disturbance signs and symptoms  Outcome: Ongoing     Problem: Sensory Perception - Impaired:  Goal: Demonstrations of improved sensory functioning will increase  Description: Demonstrations of improved sensory functioning will increase  Outcome: Ongoing  Goal: Decrease in sensory misperception frequency  Description: Decrease in sensory misperception frequency  Outcome: Ongoing  Goal: Able to refrain from responding to false sensory perceptions  Description: Able to refrain from responding to false sensory perceptions  Outcome: Ongoing  Goal: Demonstrates accurate environmental perceptions  Description: Demonstrates accurate environmental perceptions  Outcome: Ongoing  Goal: Able to distinguish between reality-based and nonreality-based thinking  Description: Able to distinguish between reality-based and nonreality-based thinking  Outcome: Ongoing  Goal: Able to interrupt nonreality-based thinking  Description: Able to interrupt nonreality-based thinking  Outcome: Ongoing

## 2021-02-14 NOTE — PLAN OF CARE
Problem: OXYGENATION/RESPIRATORY FUNCTION  Goal: Patient will maintain patent airway  2/14/2021 1558 by Nicole Pod, RCP  Outcome: Ongoing     Problem: OXYGENATION/RESPIRATORY FUNCTION  Goal: Patient will achieve/maintain normal respiratory rate/effort  Description: Respiratory rate and effort will be within normal limits for the patient  2/14/2021 1558 by Nicole Pod, RCP  Outcome: Ongoing     Problem: MECHANICAL VENTILATION  Goal: Patient will maintain patent airway  2/14/2021 1558 by Nicole Pod, RCP  Outcome: Ongoing     Problem: MECHANICAL VENTILATION  Goal: Oral health is maintained or improved  2/14/2021 1558 by Nicole Pod, RCP  Outcome: Ongoing     Problem: MECHANICAL VENTILATION  Goal: ET tube will be managed safely  2/14/2021 1558 by Nicole Pod, RCP  Outcome: Ongoing     Problem: MECHANICAL VENTILATION  Goal: Ability to express needs and understand communication  2/14/2021 1558 by Nicole Pod, RCP  Outcome: Ongoing     Problem: MECHANICAL VENTILATION  Goal: Mobility/activity is maintained at optimum level for patient  2/14/2021 1558 by Nicole Pod, RCP  Outcome: Ongoing     Problem: SKIN INTEGRITY  Goal: Skin integrity is maintained or improved  2/14/2021 1558 by Nicole Pod, RCP  Outcome: Ongoing

## 2021-02-14 NOTE — PROGRESS NOTES
PULMONARY: good bilateral air entry and clear to auscultation bilaterally without wheezes or rales or rhonchi  CARDIOVASCULAR: Irregularly irregular rhythm, no apparent rubs  ABDOMEN: soft and mildly distended, active bowel sounds   EXTREMITIES: positive 1+ peripheral edema    CURRENT MEDICATIONS          insulin glargine (LANTUS) injection vial 30 Units, BID      LORazepam (ATIVAN) injection 2 mg, Once      amiodarone (CORDARONE) 450 mg in dextrose 5 % 250 mL infusion, Continuous      insulin lispro (HUMALOG) injection vial 0-18 Units, Q4H      docusate (COLACE) 50 MG/5ML liquid 100 mg, BID      acetaminophen (TYLENOL) 160 MG/5ML solution 1,000 mg, Q6H PRN      famotidine (PEPCID) tablet 20 mg, Daily      bisacodyl (DULCOLAX) suppository 10 mg, Daily PRN      fentaNYL (SUBLIMAZE) injection 25 mcg, Q1H PRN      sodium zirconium cyclosilicate (LOKELMA) oral suspension 10 g, BID      ampicillin-sulbactam (UNASYN) 1500 mg IVPB minibag, Q12H      glucose (GLUTOSE) 40 % oral gel 15 g, PRN      dextrose 50 % IV solution, PRN      glucagon (rDNA) injection 1 mg, PRN      dextrose 5 % solution, PRN      heparin (porcine) injection 5,000 Units, 3 times per day      ipratropium-albuterol (DUONEB) nebulizer solution 1 ampule, 4x daily      albuterol (PROVENTIL) nebulizer solution 2.5 mg, BID      labetalol (NORMODYNE;TRANDATE) injection 10 mg, Q3H PRN      sodium phosphate 10 mmol in dextrose 5 % 250 mL IVPB, PRN      hydroxypropyl methylcellulose (GONIOSOL) 2.5 % ophthalmic solution 1 drop, 4x Daily PRN      lubrifresh P.M. (artificial tears) ophthalmic ointment, 4x Daily      0.9 % sodium chloride infusion, Continuous      promethazine (PHENERGAN) tablet 12.5 mg, Q6H PRN    Or      ondansetron (ZOFRAN) injection 4 mg, Q6H PRN      chlorhexidine (PERIDEX) 0.12 % solution 15 mL, BID      albuterol (PROVENTIL) nebulizer solution 2.5 mg, As Directed RT PRN      LABS      CBC:   Recent Labs

## 2021-02-14 NOTE — PROGRESS NOTES
Ventilator Bronchodilator assessment    Breath sounds: decreased/occasional exp wheeze/crackles  Inspiratory Pressure: 25  Plateau Pressure: 21    Patient assessed at level 3          [x]    Bronchodilator Assessment    BRONCHODILATOR ASSESSMENT SCORE  Score 0 (Home) 1 2 3 4   Breath Sounds   []  Chronic Ventilator: Patient at baseline []  Mild Wheezes/ Clear []  Intermittent wheezes with good air entry [x]  Bilateral/unilateral wheezing with diminished air entry []  Insp/Exp wheeze and/or poor aeration   Ventilator Pressures   []  Chronic Ventilator []  Insp. Pressure less than 25 cm H20 []  Insp. Pressure less than 25 cm H20 [x]  Insp. Pressure exceeds 25 cm H20  (25) []  Insp.  Pressure exceeds 30 cm H20   Plateau Pressure []  NA   []  Plateau Pressure less than 4  [x]  Plateau Pressure less than or equal to 5 []  Plateau Pressure greater than or equal to 6 []  Plateau Pressure greater than or equal to 8       kay malagon  3:59 PM

## 2021-02-14 NOTE — PLAN OF CARE
Problem: Falls - Risk of:  Goal: Will remain free from falls  Description: Will remain free from falls  2/14/2021 0529 by Jen Cintron RN  Outcome: Ongoing  2/13/2021 1809 by Libby Iverson RN  Outcome: Ongoing  Goal: Absence of physical injury  Description: Absence of physical injury  2/14/2021 0529 by Jen Cintron RN  Outcome: Ongoing  2/13/2021 1809 by Libby Iverson RN  Outcome: Ongoing     Problem: Skin Integrity:  Goal: Will show no infection signs and symptoms  Description: Will show no infection signs and symptoms  2/14/2021 0529 by Jen Cintron RN  Outcome: Ongoing  2/13/2021 1809 by Libby Iverson RN  Outcome: Ongoing  Goal: Absence of new skin breakdown  Description: Absence of new skin breakdown  2/14/2021 0529 by Jen Cintron RN  Outcome: Ongoing  2/13/2021 1809 by Libby Iverson RN  Outcome: Ongoing

## 2021-02-14 NOTE — PROGRESS NOTES
Critical Care Team - Daily Progress Note      Date and time: 2/14/2021 7:30 AM  Patient's name:  Di Reeder Record Number: [id number]  Patient's account/billing number: [de-identified]  Patient's YOB: 1950  Age: 66 y.o. Date of Admission: 2/6/2021  1:04 PM  Length of stay during current admission: 8      Primary Care Physician: Wen Harrell MD  ICU Attending Physician: Dr. Dionna Mosher    Code Status: DNR-CCA    Reason for ICU admission: PEA arrest      SUBJECTIVE:     OVERNIGHT EVENTS:       No acute events overnight    Current evaluation     02/14/21    Pt examined at bedside, vitals and chart reviewed. No acute events overnight  Hemodynamically stable blood pressure 145/55 on amlodipine as needed, core body temperature 100.5 8  Continued on mechanical ventilation PRVC 45/5/22/500, off sedation  Patient is unresponsive post cardiac arrest,  anoxic brain injury  Review of systems is unobtainable   Hyponatremic sodium 154, chloride 124, BUN worsening 115-->124, creatinine 2.73--> 2.42  Blood glucose 305, continued on Lantus 50 units twice daily  and sliding scale. Leukocytosis WBC 8.8--> 13.5, hemoglobin 7.8--> 7.3  Chest x-ray this morning shows no significant change  Continued on  Amiodarone, Lokelma for hyperkalemia which is resolved    Consults    Nephrology on board  Neuro critical care indicates poor prognosis. LTME severe encephalopathy, no epileptiform activity or clinical seizures. Palliative care on board CODE STATUS DNR CCA with intubation and treatments to continue.     AWAKE & FOLLOWING COMMANDS:  [x] No   [] Yes    CURRENT VENTILATION STATUS:     [x] Ventilator  [] BIPAP  [] Nasal Cannula [] Room Air        SECRETIONS Amount:  [] Small [x] Moderate  [] Large  [x] None  Color:     [] White [] Colored  [] Bloody    SEDATION:  RAAS Score:  [] Propofol gtt  [] Versed gtt  [] Fentanyl gtt   [x] No Sedation    PARALYZED:  [x] No    [] Yes DIARRHEA:                [x] No                [] Yes  (C. Difficile status: [] positive                                                                                                                       [] negative                                                                                                                     [] pending)    VASOPRESSORS:  [x] No    [] Yes    If yes -   [] Levophed       [] Dopamine     [] Vasopressin       [] Dobutamine  [] Phenylephrine         [] Epinephrine    CENTRAL LINES:     [x] No   [] Yes   (Date of Insertion:   )           If yes -     [] Right IJ     [] Left IJ [] Right Femoral [] Left Femoral                   [] Right Subclavian [] Left Subclavian       VELIZ'S CATHETER:   [] No   [x] Yes  (Date of Insertion:   )     URINE OUTPUT:            [x] Good   [] Low              [] Anuric      OBJECTIVE:     VITAL SIGNS:  /69   Pulse 106   Temp 100.9 °F (38.3 °C) (Core)   Resp 14   Ht 5' 9\" (1.753 m)   Wt 212 lb 4.9 oz (96.3 kg)   SpO2 99%   BMI 31.35 kg/m²   Tmax over 24 hours:  Temp (24hrs), Av.5 °F (38.6 °C), Min:100.9 °F (38.3 °C), Max:102 °F (38.9 °C)      Patient Vitals for the past 6 hrs:   BP Temp Temp src Pulse Resp SpO2 Weight   21 0615    110 27 100 %    21 0600 125/69   108 28 100 %    21 0545    110 26 100 %    21 0531       212 lb 4.9 oz (96.3 kg)   21 0530    111 27 100 %    21 0515    109 27 99 %    21 0500 135/68   108 28 99 %    21 0445    112 28 99 %    21 0430    108 24 100 %    21 0415    111 26 100 %    21 0400 139/72 100.9 °F (38.3 °C) CORE 111 27 99 %    21 0345    109 28 100 %    21 0330    108 20 99 %    21 0328    109 23 99 %    21 0315    108 21 99 %    21 0300 127/75   107 21 98 %    21 0245    105 26 99 %    21 0230    110 21 99 %  02/14/21 0215    108 19 99 %    02/14/21 0200 (!) 134/58   109 23 99 %    02/14/21 0145    110 24 99 %          Intake/Output Summary (Last 24 hours) at 2/14/2021 0831  Last data filed at 2/14/2021 0400  Gross per 24 hour   Intake 2868 ml   Output 2240 ml   Net 628 ml     Wt Readings from Last 2 Encounters:   02/14/21 212 lb 4.9 oz (96.3 kg)     Body mass index is 31.35 kg/m². PHYSICAL EXAMINATION:    Constitutional: intubated, off sedation. HEENT: Pupils equal and reactive. Respiratory:  Decreased breath sounds, right-sided crepitus secondary to chest tubes. No wheezing, no rales. Cardiovascular:  Regular rate and rhythm, normal S1, S2, and 2+ pulses throughout  Abdomen:  Soft, nondistended, BS hypoactive. Neurologic:  No change in mentation. Pupils 3 mm, minimally reactive. Extremities:  peripheral pulses normal, no pedal edema.     MEDICATIONS:    Scheduled Meds:   insulin glargine  30 Units Subcutaneous BID    LORazepam  2 mg Intravenous Once    insulin lispro  0-18 Units Subcutaneous Q4H    docusate  100 mg Oral BID    famotidine  20 mg Per NG tube Daily    sodium zirconium cyclosilicate  10 g Oral BID    ampicillin-sulbactam  1,500 mg Intravenous Q12H    heparin (porcine)  5,000 Units Subcutaneous 3 times per day    ipratropium-albuterol  1 ampule Inhalation 4x daily    albuterol  2.5 mg Nebulization BID    artificial tears   Both Eyes 4x Daily    chlorhexidine  15 mL Mouth/Throat BID     Continuous Infusions:   amiodarone 0.5 mg/min (02/13/21 1950)    dextrose      sodium chloride 20 mL/hr at 02/12/21 1000     PRN Meds:       acetaminophen, 1,000 mg, Q6H PRN      bisacodyl, 10 mg, Daily PRN      fentanNYL, 25 mcg, Q1H PRN      glucose, 15 g, PRN      dextrose, 12.5 g, PRN      glucagon (rDNA), 1 mg, PRN      dextrose, 100 mL/hr, PRN      labetalol, 10 mg, Q3H PRN      sodium phosphate IVPB, 10 mmol, PRN      hydroxypropyl methylcellulose, 1 drop, 4x Daily PRN   promethazine, 12.5 mg, Q6H PRN    Or      ondansetron, 4 mg, Q6H PRN      albuterol, 2.5 mg, As Directed RT PRN          VENT SETTINGS (Comprehensive) (if applicable):  Vent Information  $Ventilation: $Subsequent Day  Skin Assessment: Clean, dry, & intact  Suction Catheter Diameter: 14  Equipment ID: [id number]  Equipment Changed: HME(Expiratory Filter)  Vent Type: Servo i  Vent Mode: PRVC  Vt Ordered: 500 mL  Rate Set: 22 bmp  FiO2 : 45 %  SpO2: 100 %  SpO2/FiO2 ratio: 222.22  Sensitivity: 0  PEEP/CPAP: 5  I Time/ I Time %: 0.8 s  Humidification Source: HME  Nitric Oxide/Epoprostenol In Use?: No  Additional Respiratory  Assessments  Pulse: 110  Resp: 27  SpO2: 100 %  End Tidal CO2: 38  Position: Semi-Ko's  Humidification Source: HME  Oral Care Completed?: Yes  Oral Care: Mouthwash, Mouth swabbed, Mouth suctioned  Subglottic Suction Done?: Yes  Cuff Pressure (cm H2O): (MLT)    ABGs:     Laboratory findings:    Complete Blood Count:   Recent Labs     02/12/21  0528 02/13/21  0349 02/14/21  0551   WBC 10.1 8.8 13.5*   HGB 8.0* 7.8* 7.3*   HCT 27.0* 26.1* 24.9*    136* 135*        Last 3 Blood Glucose:   Recent Labs     02/12/21  1437 02/13/21  0349 02/14/21  0551   GLUCOSE 483* 431* 304*        PT/INR:    Lab Results   Component Value Date    PROTIME 10.8 02/06/2021    INR 1.0 02/06/2021     PTT:    Lab Results   Component Value Date    APTT 26.0 02/06/2021       Comprehensive Metabolic Profile:   Recent Labs     02/12/21  1437 02/13/21  0349 02/14/21  0551    147* 154*   K 5.0 4.6 4.2   * 116* 124*   CO2 21 23 21   * 115* 124*   CREATININE 2.77* 2.73* 2.42*   GLUCOSE 483* 431* 304*   CALCIUM 8.2* 7.9* 7.1*      Magnesium:   Lab Results   Component Value Date    MG 2.9 02/12/2021     Phosphorus:   Lab Results   Component Value Date    PHOS 3.4 02/12/2021     Ionized Calcium:   Lab Results   Component Value Date    CAION 1.24 02/12/2021        Urinalysis: Troponin: No results for input(s): TROPONINI in the last 72 hours. Microbiology:    Cultures during this admission:     Blood cultures:                 [] None drawn      [x] Negative             []  Positive (Details:  )  Urine Culture:                   [] None drawn      [x] Negative             []  Positive (Details:  )  Sputum Culture:               [] None drawn       [] Negative             []  Positive (Details:  )   Endotracheal aspirate:     [x] None drawn       [] Negative             []  Positive (Details:  )     Other pertinent Labs:       Radiology/Imaging:     Chest Xray (2/14/2021):    ASSESSMENT:     Active Problems:    Cardiac arrest ([facility] [address]    Pneumothorax    COPD (chronic obstructive pulmonary disease) ([facility])    CAD (coronary artery disease)    HFrEF (heart failure with reduced ejection fraction) ([facility] [address]    AICD (automatic cardioverter/defibrillator) present    CKD (chronic kidney disease) stage 3, GFR 30-59 ml/min    Respiratory arrest ([facility] [address]    Encounter for palliative care  Resolved Problems:    * No resolved hospital problems. *    ·     Impression:     PEA s/p Cardiac arrest:  Therapeutic rewarming completed. Cardiology had  AICD interrogated, shown some NSVT. Cardiology signed off and recommends ischemic work-up once neurological status improved. Encephalopathy s/p cardiac arrest:  Off sedation, not following commands even to pain. Neuro critical care evaluated. EEG shows severe encephalopathy. CT head - no acute abnormality  Ok for fentanyl prn for pain control.     Bilateral pneumothorax  - b/l Chest tubes in place.  - CT surgery contacted with concerns of air leaks and right-sided subcutaneous emphysema. -CT surgery recommended keeping chest tubes on suction. -CT surgery following stable.     Acute respiratory failure s/p mechanical ventilation:  PRVC mode ventilation with 45/5/22/500  Will continue to monitor     Chronic kidney disease stage 3. -hyperkalemia secondary to yoni on ckd on [facility]   - nephrology on board and will follow up on their recommendations. DVT ppx: hep SQ  GI ppx: Pepcid. Diet:  TFs today. Isolation: Not indicated. PLAN:     WEAN PER PROTOCOL:  [] No   [x] Yes  [] N/A    DISCONTINUE ANY LABS:   [x] No   [] Yes    ICU PROPHYLAXIS:  Stress ulcer:  [x] PPI Agent  [] [id number] [] Sucralfate  [] Other:  VTE:   [] Enoxaparin  [x] Unfract. Heparin Subcut  [] EPC Cuffs    NUTRITION:  [x] NPO [] Tube Feeding (Specify: ) [] TPN  [] PO (Diet: DIET TUBE FEED CONTINUOUS/CYCLIC NPO; Renal Formula; Continuous; 10; 40; 24)    INSULIN DRIP:   [x] No   [] Yes    CONSULTATION NEEDED:  [] No   [x] Yes    FAMILY UPDATED:    [] No   [x] Yes    TRANSFER OUT OF ICU:   [x] No   [] Yes      Monalisa Deng MD  Internal Medicine Resident, PGY- [address], [place]  2/14/2021, 7:30 AM

## 2021-02-14 NOTE — PLAN OF CARE
Problem: OXYGENATION/RESPIRATORY FUNCTION  Goal: Patient will maintain patent airway  2/14/2021 0250 by Ames Fabry  Outcome: Ongoing     Problem: OXYGENATION/RESPIRATORY FUNCTION  Goal: Patient will achieve/maintain normal respiratory rate/effort  Description: Respiratory rate and effort will be within normal limits for the patient  2/14/2021 0250 by Ames Fabry  Outcome: Ongoing     Problem: MECHANICAL VENTILATION  Goal: Patient will maintain patent airway  2/14/2021 0250 by Ames Fabry  Outcome: Ongoing     Problem: MECHANICAL VENTILATION  Goal: Oral health is maintained or improved  2/14/2021 0250 by Ames Fabry  Outcome: Ongoing     Problem: MECHANICAL VENTILATION  Goal: ET tube will be managed safely  2/14/2021 0250 by Ames Fabry  Outcome: Ongoing     Problem: MECHANICAL VENTILATION  Goal: Ability to express needs and understand communication  2/14/2021 0250 by Ames Fabry  Outcome: Ongoing     Problem: MECHANICAL VENTILATION  Goal: Mobility/activity is maintained at optimum level for patient  2/14/2021 0250 by Ames Fabry  Outcome: Ongoing     Problem: SKIN INTEGRITY  Goal: Skin integrity is maintained or improved  2/14/2021 0250 by Ames Fabry  Outcome: Ongoing     BRONCHOSPASM/BRONCHOCONSTRICTION   [x]  IMPROVE AERATION/BREATH SOUNDS  [x]  ADMINISTER BRONCHODILATOR THERAPY AS APPROPRIATE  [x]  ASSESS BREATH SOUNDS  [x]  IMPLEMENT AEROSOL/MDI PROTOCOL  [x]  PATIENT EDUCATION AS NEEDED

## 2021-02-14 NOTE — PLAN OF CARE
Problem: OXYGENATION/RESPIRATORY FUNCTION  Goal: Patient will maintain patent airway  2/14/2021 0843 by Gonsalo Anna RN  Outcome: Ongoing     Problem: OXYGENATION/RESPIRATORY FUNCTION  Goal: Patient will achieve/maintain normal respiratory rate/effort  Description: Respiratory rate and effort will be within normal limits for the patient  2/14/2021 0843 by Gonsalo Anna RN  Outcome: Ongoing     Problem: MECHANICAL VENTILATION  Goal: Patient will maintain patent airway  2/14/2021 0843 by Gonsalo Anna RN  Outcome: Ongoing     Problem: MECHANICAL VENTILATION  Goal: Oral health is maintained or improved  2/14/2021 0843 by Gonsalo Anna RN  Outcome: Ongoing     Problem: MECHANICAL VENTILATION  Goal: ET tube will be managed safely  2/14/2021 0843 by Gonsalo Anna RN  Outcome: Ongoing     Problem: MECHANICAL VENTILATION  Goal: Ability to express needs and understand communication  2/14/2021 0843 by Gonsalo Anna RN  Outcome: Ongoing     Problem: MECHANICAL VENTILATION  Goal: Mobility/activity is maintained at optimum level for patient  2/14/2021 0843 by Gonsalo Anna RN  Outcome: Ongoing     Problem: SKIN INTEGRITY  Goal: Skin integrity is maintained or improved  2/14/2021 0843 by Gonsalo Anna RN  Outcome: Ongoing     Problem: NUTRITION  Goal: Nutritional status is improving  Outcome: Ongoing     Problem: Discharge Planning:  Goal: Participates in care planning  Description: Participates in care planning  Outcome: Ongoing     Problem: Discharge Planning:  Goal: Discharged to appropriate level of care  Description: Discharged to appropriate level of care  Outcome: Ongoing     Problem: Discharge Planning:  Goal: Ability to perform activities of daily living will improve  Description: Ability to perform activities of daily living will improve  Outcome: Ongoing     Problem: Airway Clearance - Ineffective:  Goal: Ability to maintain a clear airway will improve Problem: Falls - Risk of:  Goal: Will remain free from falls  Description: Will remain free from falls  2/14/2021 0843 by Kay Liu RN  Outcome: Ongoing     Problem: Falls - Risk of:  Goal: Absence of physical injury  Description: Absence of physical injury  2/14/2021 0843 by Kay Liu RN  Outcome: Ongoing     Problem: Skin Integrity:  Goal: Will show no infection signs and symptoms  Description: Will show no infection signs and symptoms  2/14/2021 0843 by Kay Liu RN  Outcome: Ongoing     Problem: Skin Integrity:  Goal: Absence of new skin breakdown  Description: Absence of new skin breakdown  2/14/2021 0843 by Kay Liu RN  Outcome: Ongoing     Problem: Nutrition  Goal: Optimal nutrition therapy  Description: Nutrition Problem #1: Inadequate oral intake  Intervention: Food and/or Nutrient Delivery: Continue NPO(Start diet vs nutrition support as able)  Nutritional Goals: Start diet vs nutrition support within 24-72 hrs     Outcome: Ongoing     Problem: Confusion - Acute:  Goal: Absence of continued neurological deterioration signs and symptoms  Description: Absence of continued neurological deterioration signs and symptoms  Outcome: Ongoing     Problem: Confusion - Acute:  Goal: Mental status will be restored to baseline  Description: Mental status will be restored to baseline  Outcome: Ongoing     Problem: Injury - Risk of, Physical Injury:  Goal: Will remain free from falls  Description: Will remain free from falls  2/14/2021 0843 by Kay Liu RN  Outcome: Ongoing     Problem: Injury - Risk of, Physical Injury:  Goal: Absence of physical injury  Description: Absence of physical injury  2/14/2021 0843 by Kay Liu RN  Outcome: Ongoing     Problem: Mood - Altered:  Goal: Mood stable  Description: Mood stable  Outcome: Ongoing     Problem: Mood - Altered:  Goal: Absence of abusive behavior  Description: Absence of abusive behavior  Outcome: Ongoing Problem: Mood - Altered:  Goal: Verbalizations of feeling emotionally comfortable while being cared for will increase  Description: Verbalizations of feeling emotionally comfortable while being cared for will increase  Outcome: Ongoing     Problem: Psychomotor Activity - Altered:  Goal: Absence of psychomotor disturbance signs and symptoms  Description: Absence of psychomotor disturbance signs and symptoms  Outcome: Ongoing     Problem: Sensory Perception - Impaired:  Goal: Demonstrations of improved sensory functioning will increase  Description: Demonstrations of improved sensory functioning will increase  Outcome: Ongoing     Problem: Sensory Perception - Impaired:  Goal: Decrease in sensory misperception frequency  Description: Decrease in sensory misperception frequency  Outcome: Ongoing     Problem: Sensory Perception - Impaired:  Goal: Able to refrain from responding to false sensory perceptions  Description: Able to refrain from responding to false sensory perceptions  Outcome: Ongoing     Problem: Sensory Perception - Impaired:  Goal: Demonstrates accurate environmental perceptions  Description: Demonstrates accurate environmental perceptions  Outcome: Ongoing     Problem: Sensory Perception - Impaired:  Goal: Able to distinguish between reality-based and nonreality-based thinking  Description: Able to distinguish between reality-based and nonreality-based thinking  Outcome: Ongoing     Problem: Sensory Perception - Impaired:  Goal: Able to interrupt nonreality-based thinking  Description: Able to interrupt nonreality-based thinking  Outcome: Ongoing     Problem: Sleep Pattern Disturbance:  Goal: Appears well-rested  Description: Appears well-rested  Outcome: Ongoing

## 2021-02-14 NOTE — PROGRESS NOTES
Daily Progress Note  Neuro Critical Care    Patient Name: Dulce Maria Sparks  Patient : 1950  Room/Bed: 0128/0128-01  Code Status: DNR-CCA  Allergies: Not on File    CHIEF COMPLAINT:      Cardiac arrest     INTERVAL HISTORY    Initial Presentation (Admitted 21): The patient is a 64 y. o. male with history of COPD, CAD s/p AICD placement, CHF with EF 25% who presents with cardiac arrest.  Patient was having shortness of breath for a few days prior to presentation, had a witnessed by family member, EMS was notified. Fransico Swampscott arrival, patient was in PEA, ACLS was started, ROSC was achieved after 15 minutes after receiving epinephrine x2. Bettina Berg was life flighted to [facility]. Ced's, patient was found to have bilateral pneumothoraces, received bilateral chest tubes.  Patient was admitted to the medical ICU, therapeutic cooling was started, sedated on propofol and paralyzed on Nimbex.  Echocardiogram was conducted on 2021, significant for EF 30%.  Patient was rewarmed, Nimbex discontinued, continued sedation with propofol.  Neuro critical care was consulted for prognostic patient status post cardiac arrest.    Hospital Course:   : Exam remained unchanged, continued propofol for overbreathing the vent. Started LTME.  :  No change in exam.  Remains on Fentanyl infusion for sedation due to tachypnea/hypertension. 2/10: LTME abnormal due to diffuse polymorphic delta slowing suggesting severe encephalopathy. CT Head reviewed; some subtle blurring of gray white differentiation but no significant cerebral edema. Family updated, plan for follow up family meeting on Saturday, .  : Nephrology consulted for worsening LILIANA. Renal ultrasound ordered, Junita Brittany started for hyperkalemia. Neuro exam stable. Family updated, will plan for meeting Saturday or sooner if needed. 02/14/21 1000 (!) 79/46   94 22 98 %    02/14/21 0900 (!) 144/75   115 29 99 %    02/14/21 0819     14 99 %    02/14/21 0818    106 16 99 %    02/14/21 0800 138/81 100.8 °F (38.2 °C) CORE 108 25 99 %    02/14/21 0700 127/62   109 27 100 %    02/14/21 0615    110 27 100 %    02/14/21 0600 125/69   108 28 100 %    02/14/21 0545    110 26 100 %    02/14/21 0531       212 lb 4.9 oz (96.3 kg)   02/14/21 0530    111 27 100 %    02/14/21 0515    109 27 99 %    02/14/21 0500 135/68   108 28 99 %    02/14/21 0445    112 28 99 %    02/14/21 0430    108 24 100 %    02/14/21 0415    111 26 100 %    02/14/21 0400 139/72 100.9 °F (38.3 °C) CORE 111 27 99 %    02/14/21 0345    109 28 100 %    02/14/21 0330    108 20 99 %    02/14/21 0328    109 23 99 %    02/14/21 0315    108 21 99 %    02/14/21 0300 127/75   107 21 98 %    02/14/21 0245    105 26 99 %    02/14/21 0230    110 21 99 %    02/14/21 0215    108 19 99 %    02/14/21 0200 (!) 134/58   109 23 99 %    02/14/21 0145    110 24 99 %    02/14/21 0130    109 20 99 %    02/14/21 0115    113 24 99 %    02/14/21 0100 135/77   110 22 99 %    02/14/21 0045    111 19 99 %    02/14/21 0030    114 16 99 %    02/14/21 0015    108 15 99 %    02/14/21 0000 113/64 101.8 °F (38.8 °C) CORE 110 15 99 %    02/13/21 2345    109 15 99 %    02/13/21 2330    108 27 99 %    02/13/21 2315    109 28 99 %    02/13/21 2314    111 27 99 %    02/13/21 2300 134/64   109 26 99 %      Estimated body mass index is 31.35 kg/m² as calculated from the following:    Height as of this encounter: 5' 9\" (1.753 m).     Weight as of this encounter: 212 lb 4.9 oz (96.3 kg).  []<16 Severe malnutrition  []1616.99 Moderate malnutrition  []1718.49 Mild malnutrition  []18.524.9 Normal  []2529.9 Overweight (not obese)  [x]3034.9 Obese class 1 (Low Risk) 1. Increasing opacification of the lower half right hemithorax possibly   combination of effusion and infiltrate. New left basal/retrocardiac   infiltrate obscures the hemidiaphragm. These infiltrates probably   atelectasis. 2. Support tubes and line in place as noted above. CT HEAD WO CONTRAST   Final Result   No acute intracranial abnormality. XR CHEST PORTABLE   Final Result   1. Similar position of right chest tube. The side hole again appears   external to the pleural space. 2.  Unchanged appearance of left chest tube. 3.  Similar appearance of small bilateral pneumothoraces and subcutaneous   emphysema. 4.  No appreciable change in basilar subsegmental atelectasis and small   effusions. XR CHEST PORTABLE   Final Result   Right chest tube has been retracted. Side hole projects over the soft   tissues. Minimal pneumothoraces are suspected. Increased left lung base opacity is nonspecific but may represent atelectasis   and a pleural effusion. XR CHEST PORTABLE   Final Result   1. Bilateral chest tubes in place, as described. Persistent small   pneumothoraces, left greater than right. 2.  Subcutaneous emphysema has increased on the right side. 3.  Endotracheal tube remains in appropriate position. The enteric tube   courses off the field of view in the upper abdomen. CT CHEST ABDOMEN PELVIS WO CONTRAST   Final Result   1. Bilateral chest tubes in place, abutting the mediastinal pleura. Persistent small pneumothoraces. 2.  Nonspecific patchy airspace disease in the upper lobes may represent   multifocal inflammatory process or infection. Trace pleural effusions. 3.  Multiple old left rib fractures and old left scapular body fracture. No   acute fracture identified.       4.  Sequela of old granulomatous disease in the chest and noncalcified pulmonary nodules measuring up to 7 mm. While this may represent sequela of   old infection, metastatic disease is not excluded. Follow-up imaging in 6-12   months is recommended (in the absence of prior imaging for comparison). 5.  Cholelithiasis. Mild stranding around the gallbladder may represent   cholecystitis in the appropriate clinical setting. No biliary dilatation. 6.  Localized skin thickening in the left mid abdomen may represent soft   tissue contusion. 7.  Mildly prominent scattered mediastinal lymph nodes for which attention to   on follow-up is recommended. CT HEAD WO CONTRAST   Final Result   No convincing evidence for acute intracranial pathology. Intracranial   hemorrhage, mass effect or midline shift. XR CHEST PORTABLE   Final Result   Interval advancement of right thoracostomy tube. The tip projects over the   left of midline. Trace right apical pneumothorax. 2.4 cm radiopaque density projecting over the right lung base has move   medially in likely is external to the patient. XR CHEST PORTABLE   Final Result   1. Endotracheal tube tip is 8.2 cm above the annie. 2. Bilateral thoracostomy tubes are noted. The right thoracostomy tube   proximal side-port is just external to the ribcage. Subcutaneous emphysema   is noted with a trace right lateral pneumothorax. There is also a 2.4 cm   radiopaque density projecting over the right lower lung which has the   appearance of a fractured thoracostomy tube. Recommend correlation if this   is external to the patient. 3. Patchy left opacity in the left lung base. 4. Questionable acute 7th right rib fracture. Age-indeterminate left 2nd   through 6th rib fractures are noted favoring subacute to chronic etiology. Labs and Images reviewed with:  [] Dr. Shalom Maria. Aure    [x] Dr. Verenice Horton  [] Dr. Ruth Fields  [] There are no new interval images to review.      PHYSICAL EXAM Cardiomyopathy (EF 30%)  Acute kidney injury likely ATN secondary to hypoperfusion  Hyperkalemia  Management per primary team          Neuro crit care will sign off at this time.  Available if needed          Juan Blank DO  Neuro Critical Care  Pager [phone]  2/14/2021     10:49 AM

## 2021-02-15 NOTE — PLAN OF CARE
Problem: Discharge Planning:  Goal: Participates in care planning  Description: Participates in care planning  Outcome: Ongoing  Goal: Discharged to appropriate level of care  Description: Discharged to appropriate level of care  Outcome: Ongoing  Goal: Ability to perform activities of daily living will improve  Description: Ability to perform activities of daily living will improve  Outcome: Ongoing     Problem: Airway Clearance - Ineffective:  Goal: Ability to maintain a clear airway will improve  Description: Ability to maintain a clear airway will improve  Outcome: Ongoing     Problem: Aspiration:  Goal: Absence of aspiration  Description: Absence of aspiration  Outcome: Ongoing     Problem: Cardiac Output - Decreased:  Goal: Hemodynamic stability will improve  Description: Hemodynamic stability will improve  Outcome: Ongoing     Problem: Fluid Volume - Imbalance:  Goal: Absence of imbalanced fluid volume signs and symptoms  Description: Absence of imbalanced fluid volume signs and symptoms  Outcome: Ongoing     Problem: Gas Exchange - Impaired:  Goal: Levels of oxygenation will improve  Description: Levels of oxygenation will improve  Outcome: Ongoing     Problem: Pain:  Goal: Pain level will decrease  Description: Pain level will decrease  Outcome: Ongoing  Goal: Recognizes and communicates pain  Description: Recognizes and communicates pain  Outcome: Ongoing  Goal: Control of acute pain  Description: Control of acute pain  Outcome: Ongoing  Goal: Control of chronic pain  Description: Control of chronic pain  Outcome: Ongoing     Problem: Serum Glucose Level - Abnormal:  Goal: Ability to maintain appropriate glucose levels will improve to within specified parameters  Description: Ability to maintain appropriate glucose levels will improve to within specified parameters  Outcome: Ongoing     Problem: Skin Integrity - Impaired:  Goal: Will show no infection signs and symptoms Description: Will show no infection signs and symptoms  Outcome: Ongoing  Goal: Absence of new skin breakdown  Description: Absence of new skin breakdown  Outcome: Ongoing     Problem: Falls - Risk of:  Goal: Will remain free from falls  Description: Will remain free from falls  Outcome: Ongoing  Goal: Absence of physical injury  Description: Absence of physical injury  Outcome: Ongoing     Problem: Injury - Risk of, Physical Injury:  Goal: Will remain free from falls  Description: Will remain free from falls  Outcome: Ongoing  Goal: Absence of physical injury  Description: Absence of physical injury  Outcome: Ongoing

## 2021-02-15 NOTE — PLAN OF CARE
Problem: OXYGENATION/RESPIRATORY FUNCTION  Goal: Patient will achieve/maintain normal respiratory rate/effort  Description: Respiratory rate and effort will be within normal limits for the patient  2/14/2021 2017 by Jorge Luis Rodriguez RCP  Outcome: Ongoing     Problem: MECHANICAL VENTILATION  Goal: Patient will maintain patent airway  2/14/2021 2017 by Jorge Luis Rodriguez RCP  Outcome: Ongoing     Problem: MECHANICAL VENTILATION  Goal: Oral health is maintained or improved  2/14/2021 2017 by Jorge Luis Rodriguez RCP  Outcome: Ongoing     Problem: MECHANICAL VENTILATION  Goal: Oral health is maintained or improved  2/14/2021 2017 by Jorge Luis Rodriguez RCP  Outcome: Ongoing     Problem: MECHANICAL VENTILATION  Goal: ET tube will be managed safely  2/14/2021 2017 by Jorge Luis Rodriguez RCP  Outcome: Ongoing     Problem: MECHANICAL VENTILATION  Goal: ET tube will be managed safely  2/14/2021 2017 by Jorge Luis Rodriguez RCP  Outcome: Ongoing     Problem: MECHANICAL VENTILATION  Goal: Ability to express needs and understand communication  2/14/2021 2017 by Jorge Luis Rodriguez RCP  Outcome: Ongoing     Problem: MECHANICAL VENTILATION  Goal: Mobility/activity is maintained at optimum level for patient  2/14/2021 2017 by Jorge Luis Rodriguez RCP  Outcome: Ongoing     Problem: MECHANICAL VENTILATION  Goal: Mobility/activity is maintained at optimum level for patient  2/14/2021 2017 by Jorge Luis Rodriguez RCP  Outcome: Ongoing

## 2021-02-15 NOTE — PROGRESS NOTES
Occupational Therapy Not Seen Note    DATE: 2/15/2021  Name: Mariya Plata  : 1950  MRN: [id number]    Patient not available for Occupational Therapy due to:     Other: pt intubated with bedrest orders    Next Scheduled Treatment: check back 2021    Electronically signed by JACOBO Scott on 2/15/2021 at 10:09 AM

## 2021-02-15 NOTE — PLAN OF CARE
Problem: OXYGENATION/RESPIRATORY FUNCTION  Goal: Patient will maintain patent airway  Outcome: Ongoing     Problem: OXYGENATION/RESPIRATORY FUNCTION  Goal: Patient will maintain patent airway  Intervention: COLLABORATE WITH RT TO ADMINISTER MEDICATIONS/TREATMENTS  Note: BRONCHOSPASM/BRONCHOCONSTRICTION     [x]         IMPROVE AERATION/BREATH SOUNDS  [x]   ADMINISTER BRONCHODILATOR THERAPY AS APPROPRIATE  [x]   ASSESS BREATH SOUNDS  [x]   IMPLEMENT AEROSOL/MDI PROTOCOL  [x]   PATIENT EDUCATION AS NEEDED        Problem: OXYGENATION/RESPIRATORY FUNCTION  Goal: Patient will achieve/maintain normal respiratory rate/effort  Description: Respiratory rate and effort will be within normal limits for the patient  2/15/2021 0755 by Kaylie Garcia RCP  Outcome: Ongoing     Problem: MECHANICAL VENTILATION  Goal: Patient will maintain patent airway  2/15/2021 0755 by Kaylie Garcia RCP  Outcome: Ongoing     Problem: MECHANICAL VENTILATION  Goal: Oral health is maintained or improved  2/15/2021 0755 by Kaylie Garcia RCP  Outcome: Ongoing     Problem: MECHANICAL VENTILATION  Goal: ET tube will be managed safely  2/15/2021 0755 by Kaylie Garcia RCP  Outcome: Ongoing     Problem: MECHANICAL VENTILATION  Goal: Ability to express needs and understand communication  2/15/2021 0755 by Kaylie Garcia RCP  Outcome: Ongoing     Problem: MECHANICAL VENTILATION  Goal: Mobility/activity is maintained at optimum level for patient  2/15/2021 0755 by Kaylie Garcai RCP  Outcome: Ongoing

## 2021-02-15 NOTE — PROGRESS NOTES
NEPHROLOGY PROGRESS NOTE      SUBJECTIVE     Renal function continues to decline. Received Lasix yesterday. Urine output decent. Still lagging in clearances. Blood pressure stable. Not on any pressors. Tolerating tube feedings well. Neurology input noted. Not much neurological response. OBJECTIVE     Vitals:    02/15/21 0800 02/15/21 0900 02/15/21 1000 02/15/21 1100   BP: 114/64 (!) 121/55 (!) 93/56 (!) 98/56   Pulse: 102 110 105 108   Resp: (!) 31 (!) 34 (!) 33 (!) 31   Temp: 100.4 °F (38 °C)      TempSrc: Oral      SpO2: 97% 93% 96% 97%   Weight:       Height:         24HR INTAKE/OUTPUT:      Intake/Output Summary (Last 24 hours) at 2/15/2021 1121  Last data filed at 2/15/2021 1100  Gross per 24 hour   Intake 3388 ml   Output 855 ml   Net 2533 ml       General appearance: Mechanical ventilator  Respiratory::vesicular breath sounds,no wheeze/crackles  Cardiovascular:S1 S2 normal,no gallop or organic murmur. Abdomen:Non tender/non distended. Bowel sounds present  Extremities: No Cyanosis or Clubbing, present lower extremity edema  Neurological: Mechanical ventilation      MEDICATIONS     Scheduled Meds:    insulin glargine  50 Units Subcutaneous BID    amiodarone  200 mg Oral BID    LORazepam  2 mg Intravenous Once    insulin lispro  0-18 Units Subcutaneous Q4H    docusate  100 mg Oral BID    famotidine  20 mg Per NG tube Daily    sodium zirconium cyclosilicate  10 g Oral BID    ampicillin-sulbactam  1,500 mg Intravenous Q12H    heparin (porcine)  5,000 Units Subcutaneous 3 times per day    ipratropium-albuterol  1 ampule Inhalation 4x daily    albuterol  2.5 mg Nebulization BID    artificial tears   Both Eyes 4x Daily    chlorhexidine  15 mL Mouth/Throat BID     Continuous Infusions:    dextrose PRN Meds:  acetaminophen, bisacodyl, fentanNYL, glucose, dextrose, glucagon (rDNA), dextrose, labetalol, sodium phosphate IVPB, hydroxypropyl methylcellulose, promethazine **OR** ondansetron, albuterol  Home Meds:                Medications Prior to Admission: sacubitril-valsartan (ENTRESTO) 24-26 MG per tablet, Take 1 tablet by mouth 2 times daily  azithromycin (ZITHROMAX) 250 MG tablet, Take 250 mg by mouth daily  furosemide (LASIX) 40 MG tablet, Take 40 mg by mouth daily  alogliptin (NESINA) 12.5 MG TABS tablet, Take 25 mg by mouth daily  busPIRone (BUSPAR) 5 MG tablet, Take 5 mg by mouth 3 times daily  clopidogrel (PLAVIX) 75 MG tablet, Take 75 mg by mouth daily  mometasone-formoterol (DULERA) 100-5 MCG/ACT inhaler, Inhale 2 puffs into the lungs 2 times daily  ipratropium-albuterol (DUONEB) 0.5-2.5 (3) MG/3ML SOLN nebulizer solution, Inhale 1 vial into the lungs every 4 hours as needed  glipiZIDE (GLUCOTROL) 10 MG tablet, Take 10 mg by mouth 2 times daily  insulin glargine (LANTUS SOLOSTAR) 100 UNIT/ML injection pen, Inject 30 Units into the skin daily  omeprazole (PRILOSEC) 20 MG delayed release capsule, Take 20 mg by mouth daily  QUEtiapine (SEROQUEL) 25 MG tablet, Take 50 mg by mouth 2 times daily  simvastatin (ZOCOR) 10 MG tablet, Take 10 mg by mouth daily  aspirin 81 MG EC tablet, Take 81 mg by mouth daily  tamsulosin (FLOMAX) 0.4 MG capsule, Take 0.4 mg by mouth daily  gabapentin (NEURONTIN) 400 MG capsule, Take 400 mg by mouth 2 times daily.   isosorbide mononitrate (IMDUR) 30 MG extended release tablet, Take 30 mg by mouth daily  primidone (MYSOLINE) 250 MG tablet, Take 250 mg by mouth 3 times daily  albuterol sulfate HFA (VENTOLIN HFA) 108 (90 Base) MCG/ACT inhaler, Inhale 2 puffs into the lungs 4 times daily  Cholecalciferol (VITAMIN D) 50 MCG (2000 UT) CAPS capsule, Take 1 capsule by mouth    INVESTIGATIONS     Last 3 CMP:    Recent Labs     02/13/21  0349 02/14/21  0551 02/15/21  0329 * 154* 154*   K 4.6 4.2 4.3   * 124* 120*   CO2 23 21 23   * 124* 145*   CREATININE 2.73* 2.42* 3.06*   CALCIUM 7.9* 7.1* 8.2*       Last 3 CBC:  Recent Labs     02/13/21  0349 02/14/21  0551 02/15/21  0329   WBC 8.8 13.5* 24.6*   RBC 2.92* 2.74* 3.19*   HGB 7.8* 7.3* 8.6*   HCT 26.1* 24.9* 29.3*   MCV 89.4 90.9 91.8   MCH 26.7 26.6 27.0   MCHC 29.9 29.3 29.4   RDW 13.5 14.0 13.8   * 135* 143   MPV 11.5 12.2 12.2       ASSESSMENT     1. Acute kidney injury nonoliguric secondary to ischemic ATN from hypoperfusion related to cardiac arrest -evolving  Baseline creatinine normal  2. Chronic kidney disease stage III with baseline creatinine 1.5-1.7.  3.  Status post cardiac arrest  4. Anoxic encephalopathy  5. Ventilatory dependent respiratory failure  6. Bilateral pneumothorax with bilateral chest tubes in place  7. Anemia    PLAN     #1 discussed with the family again. They were agreeable for dialysis. #2 hemodialysis for session today. #3 avoid nephrotoxins  #4 poor prognosis.   This was explained to the family      Please do not hesitate to call with questions    This note is created with the assistance of a speech-recognition program. While intending to generate a document that actually reflects the content of the visit, no guarantees can be provided that every mistake has been identified and corrected by editing    Dao Mcneal MD, [facility]   2/15/2021 11:21 AM  [facility]

## 2021-02-15 NOTE — PLAN OF CARE
Chest x-ray reviewed for Vahe line position. Currently lying in superior vena cava with satisfactory position. Okay to use the line for IV access.     Ligia Hopkins MD, PGY-2  Internal Medicine Residency Program  [facility]  2/15/2021 5:42 PM

## 2021-02-15 NOTE — PROCEDURES
PROCEDURE NOTE - CENTRAL BRADFORD  LINE PLACEMENT    PATIENT NAME: Misael Moss  MEDICAL RECORD NO. [id number]  DATE: 2/15/2021  ATTENDING PHYSICIAN: Dr. Azucena Martinez DIAGNOSIS:  vascular access  POSTOPERATIVE DIAGNOSIS:  Same  PROCEDURE PERFORMED:  Right Internal Jugular Vein Bradford Catheter Insertion  PERFORMING PHYSICIAN: Margaret Davidson MD  ANESTHESIA:  Local utilizing 1% lidocaine  ESTIMATED BLOOD LOSS:  Less than 25 ml  COMPLICATIONS:  None immediately appreciated. DISCUSSION:  Misael Moss is a 66y.o.-year-old male who requires  Bradford catheter for  vascular access. The history and physical examination were reviewed and confirmed. CONSENT: Obtained from family. PROCEDURE:  A timeout was initiated by the bedside nurse and was confirmed by those present. The patient was placed in a supine position. The skin overlying the Right Internal Jugular Vein was prepped with chlorhexadine and draped in sterile fashion. The skin was infiltrated with local anesthetic. The vessel and surrounding anatomy was visualized using ultrasound. Through the anesthetized region, the introducer needle was inserted into the right internal jugular vein returning dark red non pulsatile blood. A guidewire was placed through the center of the needle with no resistance. Ultrasound confirmed presence of wire in the vein. A small incision made in the skin with a #11 scalpel blade. The dilator was inserted into the skin and vein over guidewire using Seldinger technique. The dilator was then removed and the bradford catheter was placed in the vein over the guidewire using Seldinger technique. The guidewire was then removed and all ports aspirated and flushed appropriately. The catheter then secured using silk suture and a temporary sterile dressing was applied. No immediate complication was evident. All sponge, instrument and needle counts were correct at the completion of the procedure. Postprocedural chest x-ray pending. Misael Syed MD  Internal Medicine Resident, PGY- [address] [facility];  [place], [place]  2/15/2021, 5:04 PM

## 2021-02-15 NOTE — PROGRESS NOTES
OK TO USE RIGHT [facility] Service FOR DIALYSIS    Shahbaz Kirk DO  PGY 3  Resident Physician Emergency Medicine  02/15/21 6:45 PM

## 2021-02-15 NOTE — PROGRESS NOTES
Palliative Care Progress Note    NAME:  Cathy Ritter  MEDICAL RECORD NUMBER:  [id number]  AGE: 66 y.o. GENDER: male  : 1950  TODAY'S DATE:  2/15/2021    Reason for Consult:  goals of care and family support  History of Present Illness     The patient is a 66 y.o. Non-/non  male who presents with Cardiac Arrest      Referred to Palliative Care by  [x] Physician   [] Nursing  [] Family Request   [] Other:       He was admitted to the critical care service for Cardiac arrest ([facility] [address] [I46.9]. His hospital course has been associated with cardiac arrest The patient has a complicated medical history and has been hospitalized since 2021  1:04 PM.    OVERNIGHT EVENTS:  No acute events overnight  Afebrile   On pressor support     BP (!) 98/56   Pulse 105   Temp 99.8 °F (37.7 °C) (Axillary)   Resp (!) 31   Ht 5' 9\" (1.753 m)   Wt 212 lb 4.9 oz (96.3 kg)   SpO2 97%   BMI 31.35 kg/m²     No past medical history on file. No family history on file.     Social History     Tobacco Use    Smoking status: Not on file   Substance Use Topics    Alcohol use: Not on file    Drug use: Not on file         Assessment        REVIEW OF SYSTEMS    [x]   UNABLE TO OBTAIN: Intubated    Constitutional:  []   Chills   []  Fatigue   []  Fevers   []  Malaise   []  Weight loss   [] Other:     Respiratory:   []  Cough    []  Shortness of breath    []  Chest pain    [] Other:     Cardiovascular:   []  Chest pain  []  Dyspnea    []  Exertional chest pressure/discomfort     [] Fatigue      []  Palpitations    []  Syncope   [] Other:     Gastrointestinal:   []  Abdominal pain   []  Constipation    []  Diarrhea    []   Dysphagia   []  Reflux             []  Vomiting   [] Other:     Genitourinary:  []  Dysuria     []  Frequency   []  Hematuria   [] Nocturia   []  Urinary incontinence   [] Other:     Musculoskeletal:   [] Back pain    []  Muscle weakness   []  Myalgias    []  Neck pain   []  Stiff joints   []  Other: Behavioral/Psych:   [] Anxiety    []  Depression     []  Mood swings   [] Other:     PHYSICAL ASSESSMENT:     General: []  Oriented x3      [] well appearing      [x] Intubated      [x] ill appearing      [] Other:    Mental Status: [] normal mental status exam      [] drowsy      [] Confused      [x] Other: Unresponsive    Cardiovascular: []  Regular rate/rhythm      [] Arrhythmia      [] Other:     Chest: [] Effort normal      [] lungs clear      [] respiratory distress      [] Tachypnea      [x]  Other: On mechanical ventilator    Abdomen: [] Soft/non-tender      []  Normal appearance      [] Distended      [] Ascites      [] Other:    Neurological: [] Normal Speech      [] Normal Sensation      [x]  Deficits present: Intubated, not following commands    Extremity:  [x] normal skin color/temp      [] clubbing/cyanosis      []  No edema      [] Other:     Palliative Performance Scale:  ___60%  Ambulation reduced; Significant disease; Can't do hobbies/housework; intake normal or reduced; occasional assist; LOC full/confusion  ___50%  Mainly sit/lie; Extensive disease; Can't do any work; Considerable assist; intake normal or reduced; LOC full/confusion  ___40%  Mainly in bed; Extensive disease; Mainly assist; intake normal or reduced; LOC full/confusion   ___30%  Bed Bound; Extensive disease; Total care; intake reduced; LOCfull/confusion  __x_20%  Bed Bound; Extensive disease; Total care; intake minimal; Drowsy/coma  ___10%  Bed Bound; Extensive disease;  Total care; Mouth care only; Drowsy/coma  ___0       Death      Plan      Palliative Interaction:  The patient was seen today, remains intubated, unresponsive, not following commands  I met with patient's critical care team and discussed patient's current medical conditions with them  The critical care team informed me that patient's family has been updated regarding patient's current medical conditions Critical care team told that the patient continues to remain unresponsive, not following commands off sedation and has not shown much neurological improvement/change  Critical care team further told that patient's renal function has been worsening and his creatinine is rising  Critical care team said that nephrology has seen the patient and are advising that the patient will be getting hemodialysis    I met with patient's sister Ruthann Han who was present in patient's room and discussed patient's current medical conditions with her  Ruthann Han informed that they will be making their decision for further goals of care for the patient on Friday  Omayra stated that she does understand that the patient remains critically ill and will not be benefiting much from dialysis but still they want the toxins to be removed from patient's blood and then see if patient shows any signs of improvement or not  I offered comfort and emotional support to the patient    Education/support to staff  Education/support to family  Education/support to patient  Discharge planning/helping to coordinate care  Communications with primary service  Caregiver support/education     Principle Problem/Diagnosis:  Cardiac arrest    Additional Assessments:  Active Problems:    Cardiac arrest ([facility] [address]    Pneumothorax    COPD (chronic obstructive pulmonary disease) ([address]    CAD (coronary artery disease)    HFrEF (heart failure with reduced ejection fraction) ([address]    AICD (automatic cardioverter/defibrillator) present    CKD (chronic kidney disease) stage 3, GFR 30-59 ml/min    Respiratory arrest ([facility] [address]    Encounter for palliative care    1- Symptom management/ pain control     Pain Assessment:  Pain is controlled with current analgesics. Medication(s) being used: acetaminophen, narcotic analgesics including fentanyl IV.                Anxiety:  none                          Dyspnea:  none                          Fatigue:  none    Other: Intubated We feel the patient symptoms are being controlled. his current regimen is reviewed by myself and discussed with the staff. 2- Goals of care evaluation   The patient goals of care are spiritual needs, strengthening relationships, preserve independence/autonomy/control and support for family/caregiver   Goals of care discussed with:    [] Patient independently    [] Patient and Family    [x] Family or Healthcare DPOA independently    [] Unable to discuss with patient, family/DPOA not present    3- Code Status  DNR-CCA    4- Other recommendations  - We will continue to provide comfort and support to the patient and the family    Please call with any palliative questions or concerns. Palliative Care Team is available via perfect serve or via phone. Palliative Care will continue to follow Mr. Moody Quinones care as needed. The note has been dictated by dragon, typing errors may be a possibility     Thank you for allowing Palliative Care to participate in the care of Mr. Andrade .        Electronically signed by   Shirley Bernabe MD  Palliative Care Team  on 2/15/2021 at 1:48 PM    Palliative care office: [phone]

## 2021-02-15 NOTE — PROGRESS NOTES
Comprehensive Nutrition Assessment    Type and Reason for Visit:  Reassess    Nutrition Recommendations/Plan: Continue current tube feeding. Will continue to monitor TF tolerance/adequacy. Nutrition Assessment:  Chart reviewed. Pt remains on vent. Noted plan for dialysis. Renal TF continues at 40 mL/hr and tolerating well per RN. Meds/labs reviewed. Estimated Daily Nutrient Needs:  Energy (kcal):  1700 kcal/day  Protein (g):  110 g pro/day     Current Nutrition Therapies:    DIET TUBE FEED CONTINUOUS/CYCLIC NPO; Renal Formula; Continuous; 10; 40; 24  Current Tube Feeding (TF) Orders:  · Formula: Renal at 40 mL/hr   · Schedule: Continuous  · Current TF & Flush Orders Provides: 1728 kcal and 78 g pro/day    Anthropometric Measures:  · Height: 5' 9\" (175.3 cm)  · Current Body Weight: 212 lb 4.9 oz (96.3 kg)   · Admission Body Weight: 270 lb (122.5 kg)(estimated)    · Ideal Body Weight: 160 lbs; % Ideal Body Weight 168.8 %   · BMI: 31.3  · BMI Categories: Obese Class 1 (BMI 30.0-34. 9)       Nutrition Diagnosis:   · Inadequate oral intake related to impaired respiratory function as evidenced by NPO or clear liquid status due to medical condition, nutrition support - enteral nutrition    Nutrition Interventions:   Food and/or Nutrient Delivery:  Continue Current Tube Feeding  Nutrition Education/Counseling:  No recommendation at this time   Coordination of Nutrition Care:  Continue to monitor while inpatient    Goals:  meet % of estimated nutrient needs -goal achieved       Nutrition Monitoring and Evaluation:   Food/Nutrient Intake Outcomes:  Enteral Nutrition Intake/Tolerance  Physical Signs/Symptoms Outcomes:  Biochemical Data, Nutrition Focused Physical Findings, Skin, Weight, Fluid Status or Edema     Discharge Planning:     Too soon to determine     Electronically signed by Bud Balbuena RD, LD on 2/15/21 at 1:27 PM EST    Contact: [phone]

## 2021-02-15 NOTE — PROGRESS NOTES
Critical Care Team - Daily Progress Note      Date and time: 2/15/2021 8:14 AM  Patient's name:  Karen Tatum  Medical Record Number: [id number]  Patient's account/billing number: [de-identified]  Patient's YOB: 1950  Age: 66 y.o. Date of Admission: 2/6/2021  1:04 PM  Length of stay during current admission: 9      Primary Care Physician: Joselin William MD  ICU Attending Physician: Dr. Paola Steward    Code Status: DNR-CCA    Reason for ICU admission: PEA arrest      SUBJECTIVE:     OVERNIGHT EVENTS:       No acute events overnight    Current evaluation     02/15/2021     Patient examined at bedside vitals and chart reviewed  Hemodynamically stable blood pressure of 114/64, continued on intubation and mechanical ventilation 30/5/22/500  Febrile T-max 101.3, tachycardic 102 with leukocytosis sepsis likely secondary to pneumonia now. Patient unresponsive secondary to anoxic brain injury  Renal function --> 145 creatinine 2.42--> 3.06  Patient received 1 dose IV Lasix 80 mg  rounds this morning  Continued on amiodarone,   Unasyn IV to be continued for Gram positive rods and few gram-negative rods  WBC 13.5--> 24.6, repeat blood cultures and lactic acid for sepsis sent  Glucose trending down POC glucose 263 controlled on 40 units of Lantus twice daily, high-dose sliding scale 7 units lispro with every meal  Nephrology and neuro critical care are following the patient  Plans for dialysis from nephrology.     AWAKE & FOLLOWING COMMANDS:  [x] No   [] Yes    CURRENT VENTILATION STATUS:     [x] Ventilator  [] BIPAP  [] Nasal Cannula [] Room Air        SECRETIONS Amount:  [] Small [x] Moderate  [] Large  [x] None  Color:     [] White [] Colored  [] Bloody    SEDATION:  RAAS Score:  [] Propofol gtt  [] Versed gtt  [] Fentanyl gtt   [x] No Sedation    PARALYZED:  [x] No    [] Yes    DIARRHEA:                [x] No                [] Yes  (C. Difficile status: [] positive [] negative                                                                                                                     [] pending)    VASOPRESSORS:  [x] No    [] Yes    If yes -   [] Levophed       [] Dopamine     [] Vasopressin       [] Dobutamine  [] Phenylephrine         [] Epinephrine    CENTRAL LINES:     [x] No   [] Yes   (Date of Insertion:   )           If yes -     [] Right IJ     [] Left IJ [] Right Femoral [] Left Femoral                   [] Right Subclavian [] Left Subclavian       VELIZ'S CATHETER:   [] No   [x] Yes  (Date of Insertion:   )     URINE OUTPUT:            [x] Good   [] Low              [] Anuric      OBJECTIVE:     VITAL SIGNS:  BP (!) 87/49   Pulse 112   Temp 99.8 °F (37.7 °C) (Axillary)   Resp (!) 34   Ht 5' 9\" (1.753 m)   Wt 212 lb 4.9 oz (96.3 kg)   SpO2 100%   BMI 31.35 kg/m²   Tmax over 24 hours:  Temp (24hrs), Av.9 °F (38.3 °C), Min:100.4 °F (38 °C), Max:101.3 °F (38.5 °C)      Patient Vitals for the past 6 hrs:   BP Temp Temp src Pulse Resp SpO2   02/15/21 0750    102 28    02/15/21 0715    102 29 97 %   02/15/21 0700 (!) 121/58   101 26 97 %   02/15/21 0645    102 17 97 %   02/15/21 0630    101 16 97 %   02/15/21 0615    105 24 98 %   02/15/21 0600 (!) 121/59   105 29 98 %   02/15/21 0545    103 30 98 %   02/15/21 0530    101 27 98 %   02/15/21 0515    100 17 98 %   02/15/21 0500 125/66   101 30 98 %   02/15/21 0445    100 (!) 31 98 %   02/15/21 0430    104 15 98 %   02/15/21 0415    100  98 %   02/15/21 0400 116/66 101.3 °F (38.5 °C) Oral 102 13 98 %   02/15/21 0345    102 14 97 %   02/15/21 0330    100 14 100 %   02/15/21 0325     29 100 %   02/15/21 0321     28    02/15/21 0315    101 27 100 %   02/15/21 0300 119/62   104 20 100 %   02/15/21 0245    101 29 100 % 02/15/21 0230    101 29 100 %   02/15/21 0215    100 30 100 %         Intake/Output Summary (Last 24 hours) at 2/15/2021 0814  Last data filed at 2/15/2021 0400  Gross per 24 hour   Intake 2456 ml   Output 855 ml   Net 1601 ml     Wt Readings from Last 2 Encounters:   02/14/21 212 lb 4.9 oz (96.3 kg)     Body mass index is 31.35 kg/m². PHYSICAL EXAMINATION:    Constitutional: intubated, off sedation. HEENT: Pupils equal and reactive. Respiratory:  Decreased breath sounds, right-sided crepitus secondary to chest tubes. No wheezing, no rales. Cardiovascular:  Regular rate and rhythm, normal S1, S2, and 2+ pulses throughout  Abdomen:  Soft, nondistended, BS hypoactive. Neurologic:  No change in mentation. Pupils 3 mm, minimally reactive. Extremities:  Redness on his back , cool peripheral extremities. peripheral pulses normal, no pedal edema.     MEDICATIONS:    Scheduled Meds:   insulin glargine  40 Units Subcutaneous BID    insulin lispro prot & lispro  7 Units Subcutaneous TID WC    LORazepam  2 mg Intravenous Once    insulin lispro  0-18 Units Subcutaneous Q4H    docusate  100 mg Oral BID    famotidine  20 mg Per NG tube Daily    sodium zirconium cyclosilicate  10 g Oral BID    ampicillin-sulbactam  1,500 mg Intravenous Q12H    heparin (porcine)  5,000 Units Subcutaneous 3 times per day    ipratropium-albuterol  1 ampule Inhalation 4x daily    albuterol  2.5 mg Nebulization BID    artificial tears   Both Eyes 4x Daily    chlorhexidine  15 mL Mouth/Throat BID     Continuous Infusions:   sodium chloride 20 mL/hr at 02/15/21 0702    amiodarone 0.5 mg/min (02/15/21 0523)    dextrose       PRN Meds:       acetaminophen, 1,000 mg, Q6H PRN      bisacodyl, 10 mg, Daily PRN      fentanNYL, 25 mcg, Q1H PRN      glucose, 15 g, PRN      dextrose, 12.5 g, PRN      glucagon (rDNA), 1 mg, PRN      dextrose, 100 mL/hr, PRN      labetalol, 10 mg, Q3H PRN   sodium phosphate IVPB, 10 mmol, PRN      hydroxypropyl methylcellulose, 1 drop, 4x Daily PRN      promethazine, 12.5 mg, Q6H PRN    Or      ondansetron, 4 mg, Q6H PRN      albuterol, 2.5 mg, As Directed RT PRN          VENT SETTINGS (Comprehensive) (if applicable):  Vent Information  $Ventilation: $Subsequent Day  Skin Assessment: Clean, dry, & intact  Suction Catheter Diameter: 14  Equipment ID: [id number]  Equipment Changed: Suction catheter  Vent Type: Servo i  Vent Mode: PRVC  Vt Ordered: 500 mL  Rate Set: 22 bmp  FiO2 : 30 %  SpO2: 97 %  SpO2/FiO2 ratio: 323.33  Sensitivity: 0  PEEP/CPAP: 5  I Time/ I Time %: 0.8 s  Humidification Source: HME  Nitric Oxide/Epoprostenol In Use?: No  Additional Respiratory  Assessments  Pulse: 102  Resp: 28  SpO2: 97 %  End Tidal CO2: 31  Position: Semi-Ko's  Humidification Source: HME  Oral Care Completed?: Yes  Oral Care: Mouth moisturizer, Mouth suctioned, Suction toothette  Subglottic Suction Done?: Yes  Cuff Pressure (cm H2O): (MLT)    ABGs:     Laboratory findings:    Complete Blood Count:   Recent Labs     02/13/21 0349 02/14/21  0551 02/15/21  0329   WBC 8.8 13.5* 24.6*   HGB 7.8* 7.3* 8.6*   HCT 26.1* 24.9* 29.3*   * 135* 143        Last 3 Blood Glucose:   Recent Labs     02/13/21 0349 02/14/21  0551 02/15/21  0329   GLUCOSE 431* 304* 294*        PT/INR:    Lab Results   Component Value Date    PROTIME 10.8 02/06/2021    INR 1.0 02/06/2021     PTT:    Lab Results   Component Value Date    APTT 26.0 02/06/2021       Comprehensive Metabolic Profile:   Recent Labs     02/13/21 0349 02/14/21  0551 02/15/21  0329   * 154* 154*   K 4.6 4.2 4.3   * 124* 120*   CO2 23 21 23   * 124* 145*   CREATININE 2.73* 2.42* 3.06*   GLUCOSE 431* 304* 294*   CALCIUM 7.9* 7.1* 8.2*      Magnesium:   Lab Results   Component Value Date    MG 2.9 02/12/2021     Phosphorus:   Lab Results   Component Value Date    PHOS 3.4 02/12/2021     Ionized Calcium: Lab Results   Component Value Date    CAION 1.24 02/12/2021        Urinalysis:     Troponin: No results for input(s): TROPONINI in the last 72 hours. Microbiology:    Cultures during this admission:     Blood cultures:                 [] None drawn      [x] Negative             []  Positive (Details:  )  Urine Culture:                   [] None drawn      [x] Negative             []  Positive (Details:  )  Sputum Culture:               [] None drawn       [] Negative             []  Positive (Details:  )   Endotracheal aspirate:     [x] None drawn       [] Negative             []  Positive (Details:  )     Other pertinent Labs:       Radiology/Imaging:     Chest Xray (2/15/2021):    ASSESSMENT:     Active Problems:    Cardiac arrest ([facility] [address]    Pneumothorax    COPD (chronic obstructive pulmonary disease) ([facility])    CAD (coronary artery disease)    HFrEF (heart failure with reduced ejection fraction) ([facility] [address]    AICD (automatic cardioverter/defibrillator) present    CKD (chronic kidney disease) stage 3, GFR 30-59 ml/min    Respiratory arrest ([facility] [address]    Encounter for palliative care  Resolved Problems:    * No resolved hospital problems. *        PLAN:     WEAN PER PROTOCOL:  [] No   [x] Yes  [] N/A    DISCONTINUE ANY LABS:   [x] No   [] Yes    ICU PROPHYLAXIS:  Stress ulcer:  [x] PPI Agent  [] [id number] [] Sucralfate  [] Other:  VTE:   [] Enoxaparin  [x] Unfract. Heparin Subcut  [] EPC Cuffs    NUTRITION:  [x] NPO [] Tube Feeding (Specify: ) [] TPN  [] PO (Diet: DIET TUBE FEED CONTINUOUS/CYCLIC NPO; Renal Formula; Continuous; 10; 40; 24)    INSULIN DRIP:   [x] No   [] Yes    CONSULTATION NEEDED:  [] No   [x] Yes    FAMILY UPDATED:    [] No   [x] Yes    TRANSFER OUT OF ICU:   [x] No   [] Yes    Impression:     PEA s/p Cardiac arrest  Therapeutic rewarming completed. Cardiology had  AICD interrogated, shown some NSVT. Cardiology signed off and recommends ischemic work-up once neurological status improved. Encephalopathy s/p cardiac arrest:  Off sedation, not following commands even to pain. Neuro critical care evaluated. EEG shows severe encephalopathy. CT head - no acute abnormality. Ok for fentanyl prn for pain control.     Bilateral pneumothorax. - B/L Chest tubes in place.  - CT surgery contacted with concerns of air leaks and right-sided subcutaneous emphysema. -CT surgery recommended keeping chest tubes on suction. -CT surgery following stable.     Acute respiratory failure s/p mechanical ventilation:  PRVC mode ventilation with 30/5/22/500. Will continue to monitor. Chronic kidney disease stage 3. Hemodialysis today and nephrology on board and will follow up on their recommendations. Resistant hyperkalemia - Continue on lokelma oral 2 times daily , nephrology on board , dialysis today , will monitor BMP. Diabetes Mellitus type 2 :  Continue on lantus 50 Units and high dose sliding scale, monitor POC glucose and adjust insulin accordingly. DVT ppx: hep SQ. GI ppx: Pepcid. Diet:  TFs today. Isolation: Not indicated. Kiarra Kirk MD  Internal Medicine Resident, PGY- [facility];  [place], [place]  2/15/2021, 2:23 PM

## 2021-02-16 NOTE — FLOWSHEET NOTE
9313: Dr. Tucker Letters at bedside to pronounce patient. Lachelle Frazierw, sister, at bedside. Mer Sotot, brother notified.  at bedside. Family wants patient to go to [place]  home. Not a coroners case per Dr. Tucker Letters. Death paperwork filled out. 1110: [place] [facility]

## 2021-02-16 NOTE — PLAN OF CARE
Problem: OXYGENATION/RESPIRATORY FUNCTION  Goal: Patient will maintain patent airway  2/15/2021 1945 by Boby Duval RN  Outcome: Ongoing     Problem: OXYGENATION/RESPIRATORY FUNCTION  Goal: Patient will achieve/maintain normal respiratory rate/effort  Description: Respiratory rate and effort will be within normal limits for the patient  2/15/2021 1945 by Boby Duval RN  Outcome: Ongoing     Problem: MECHANICAL VENTILATION  Goal: Patient will maintain patent airway  2/15/2021 1945 by Boby Duval RN  Outcome: Ongoing     Problem: MECHANICAL VENTILATION  Goal: Oral health is maintained or improved  2/15/2021 1945 by Boby Duval RN  Outcome: Ongoing     Problem: MECHANICAL VENTILATION  Goal: ET tube will be managed safely  2/15/2021 1945 by Boby Duval RN  Outcome: Ongoing     Problem: MECHANICAL VENTILATION  Goal: Ability to express needs and understand communication  2/15/2021 1945 by Boby Duval RN  Outcome: Ongoing     Problem: MECHANICAL VENTILATION  Goal: Mobility/activity is maintained at optimum level for patient  2/15/2021 1945 by Boby Duval RN  Outcome: Ongoing     Problem: SKIN INTEGRITY  Goal: Skin integrity is maintained or improved  Outcome: Ongoing     Problem: NUTRITION  Goal: Nutritional status is improving  Outcome: Ongoing     Problem: Discharge Planning:  Goal: Participates in care planning  Description: Participates in care planning  Outcome: Ongoing     Problem: Discharge Planning:  Goal: Discharged to appropriate level of care  Description: Discharged to appropriate level of care  Outcome: Ongoing     Problem: Discharge Planning:  Goal: Ability to perform activities of daily living will improve  Description: Ability to perform activities of daily living will improve  Outcome: Ongoing     Problem: Airway Clearance - Ineffective:  Goal: Ability to maintain a clear airway will improve  Description: Ability to maintain a clear airway will improve Outcome: Ongoing     Problem: Aspiration:  Goal: Absence of aspiration  Description: Absence of aspiration  Outcome: Ongoing     Problem: Cardiac Output - Decreased:  Goal: Hemodynamic stability will improve  Description: Hemodynamic stability will improve  Outcome: Ongoing     Problem: Fluid Volume - Imbalance:  Goal: Absence of imbalanced fluid volume signs and symptoms  Description: Absence of imbalanced fluid volume signs and symptoms  Outcome: Ongoing     Problem: Gas Exchange - Impaired:  Goal: Levels of oxygenation will improve  Description: Levels of oxygenation will improve  Outcome: Ongoing     Problem: Pain:  Goal: Pain level will decrease  Description: Pain level will decrease  Outcome: Ongoing     Problem: Pain:  Goal: Recognizes and communicates pain  Description: Recognizes and communicates pain  Outcome: Ongoing     Problem: Pain:  Goal: Control of acute pain  Description: Control of acute pain  Outcome: Ongoing     Problem: Pain:  Goal: Control of chronic pain  Description: Control of chronic pain  Outcome: Ongoing     Problem: Serum Glucose Level - Abnormal:  Goal: Ability to maintain appropriate glucose levels will improve to within specified parameters  Description: Ability to maintain appropriate glucose levels will improve to within specified parameters  Outcome: Ongoing     Problem: Skin Integrity - Impaired:  Goal: Will show no infection signs and symptoms  Description: Will show no infection signs and symptoms  Outcome: Ongoing     Problem: Skin Integrity - Impaired:  Goal: Absence of new skin breakdown  Description: Absence of new skin breakdown  Outcome: Ongoing     Problem: Tissue Perfusion, Altered:  Goal: Circulatory function within specified parameters  Description: Circulatory function within specified parameters  Outcome: Ongoing     Problem: Falls - Risk of:  Goal: Will remain free from falls  Description: Will remain free from falls  Outcome: Ongoing     Problem: Falls - Risk of: Goal: Absence of physical injury  Description: Absence of physical injury  Outcome: Ongoing     Problem: Skin Integrity:  Goal: Will show no infection signs and symptoms  Description: Will show no infection signs and symptoms  Outcome: Ongoing     Problem: Skin Integrity:  Goal: Absence of new skin breakdown  Description: Absence of new skin breakdown  Outcome: Ongoing     Problem: Nutrition  Goal: Optimal nutrition therapy  Description: Nutrition Problem #1: Inadequate oral intake  Intervention: Food and/or Nutrient Delivery: Continue NPO(Start diet vs nutrition support as able)  Nutritional Goals: Start diet vs nutrition support within 24-72 hrs     Outcome: Ongoing     Problem: Confusion - Acute:  Goal: Absence of continued neurological deterioration signs and symptoms  Description: Absence of continued neurological deterioration signs and symptoms  Outcome: Ongoing     Problem: Confusion - Acute:  Goal: Mental status will be restored to baseline  Description: Mental status will be restored to baseline  Outcome: Ongoing     Problem: Injury - Risk of, Physical Injury:  Goal: Will remain free from falls  Description: Will remain free from falls  Outcome: Ongoing     Problem: Injury - Risk of, Physical Injury:  Goal: Absence of physical injury  Description: Absence of physical injury  Outcome: Ongoing     Problem: Mood - Altered:  Goal: Mood stable  Description: Mood stable  Outcome: Ongoing     Problem: Mood - Altered:  Goal: Absence of abusive behavior  Description: Absence of abusive behavior  Outcome: Ongoing     Problem: Mood - Altered:  Goal: Verbalizations of feeling emotionally comfortable while being cared for will increase  Description: Verbalizations of feeling emotionally comfortable while being cared for will increase  Outcome: Ongoing     Problem: Psychomotor Activity - Altered:  Goal: Absence of psychomotor disturbance signs and symptoms Description: Absence of psychomotor disturbance signs and symptoms  Outcome: Ongoing     Problem: Sensory Perception - Impaired:  Goal: Demonstrations of improved sensory functioning will increase  Description: Demonstrations of improved sensory functioning will increase  Outcome: Ongoing     Problem: Sensory Perception - Impaired:  Goal: Decrease in sensory misperception frequency  Description: Decrease in sensory misperception frequency  Outcome: Ongoing     Problem: Sensory Perception - Impaired:  Goal: Able to refrain from responding to false sensory perceptions  Description: Able to refrain from responding to false sensory perceptions  Outcome: Ongoing     Problem: Sensory Perception - Impaired:  Goal: Able to interrupt nonreality-based thinking  Description: Able to interrupt nonreality-based thinking  Outcome: Ongoing     Problem: Sleep Pattern Disturbance:  Goal: Appears well-rested  Description: Appears well-rested  Outcome: Ongoing

## 2021-02-16 NOTE — PLAN OF CARE
Problem: OXYGENATION/RESPIRATORY FUNCTION  Goal: Patient will maintain patent airway  2/15/2021 2002 by Echo Veras RCP  Outcome: Ongoing     Problem: OXYGENATION/RESPIRATORY FUNCTION  Goal: Patient will achieve/maintain normal respiratory rate/effort  Description: Respiratory rate and effort will be within normal limits for the patient  2/15/2021 2002 by Echo Veras RCP  Outcome: Ongoing     Problem: MECHANICAL VENTILATION  Goal: Patient will maintain patent airway  2/15/2021 2002 by Echo Veras RCP  Outcome: Ongoing     Problem: MECHANICAL VENTILATION  Goal: Oral health is maintained or improved  2/15/2021 2002 by Echo Veras RCP  Outcome: Ongoing     Problem: MECHANICAL VENTILATION  Goal: ET tube will be managed safely  2/15/2021 2002 by Echo Veras RCP  Outcome: Ongoing     Problem: MECHANICAL VENTILATION  Goal: Ability to express needs and understand communication  2/15/2021 2002 by Echo Veras RCP  Outcome: Ongoing     Problem: MECHANICAL VENTILATION  Goal: Mobility/activity is maintained at optimum level for patient  2/15/2021 2002 by Echo Veras RCP  Outcome: Ongoing

## 2021-02-16 NOTE — PROGRESS NOTES
Dialysis Post Treatment Note  V/S:  B/P 124/70  Resp 39  Pulse 148  SpO2 98%    Pre-Weight = 95.6 kg  Post-weight = Weight: 211 lb 13.8 oz (96.1 kg)  Total Liters Processed = Total Liters Processed (l/min): 17.8 l/min  Rinseback Volume (mL) = Rinseback Volume (ml): 300 ml  Net Removal (mL) = NET Removed (ml): 0 ml  Patient's dry weight= n/a  Type of access used= R IJ Temp Cath  Length of treatment= 96 minutes    Pt had 1st HD tx foll placement of temp cath,  tx ended 24 minutes early d/t pt desatting, constantly, pt converted back to a fib rvr, pt became even more hypotensive despite pt given 500 ml NS, Midodrine & Levo max, Nephrologist Dr. Dmitri Drummond notified

## 2021-02-16 NOTE — FLOWSHEET NOTE
[facility]   Patient Death Note  DEATH   Shift date: 21      Shift day: Tuesday  Shift #: 1                 Room # 0128/0128-01   Name: Zane Fry            Age: 66 y.o. Gender: male          Alevism: [place]: [place]  Admit Date & Time: 2021  1:04 PM     Referral: RN   Actual date of death: 21   TOD: 09:16       SITUATION AT DEATH:  Patient  peacefully with his daughter at his bedside. IS THIS A 'S CASE? No    SPIRITUAL/EMOTIONAL INTERVENTION:     21 1315   Encounter Summary   Services provided to: Family   Referral/Consult From: Multi-disciplinary team   Support System Family members   [facility] of [place] [address], [facility]   Continue Visiting   (21)   Complexity of Encounter Moderate   Length of Encounter 30 minutes   Spiritual Assessment Completed Yes   Grief and Life Adjustment   Type End of life;Grief and loss;Death   Assessment Calm; Approachable;Grieving;Coping   Intervention Active listening;Explored feelings, thoughts, concerns;Nurtured hope;Prayer;Sustaining presence/ Ministry of presence; Discussed belief system/Cheondoism practices/davy   Outcome Comfort;Expressed gratitude     I met the patient's sister in the room as she was sitting at his bedside. She was alone. I provided emotional and spiritual support. I listened as she spoke about her brother and their family and how much they will miss the patient. Sister, Eleanor Kerr welcomed spiritual care and my offer of prayer which seemed to comfort her. Family Received Grief Packet? No     NAME AND PHONE NUMBER OF DOCTOR SIGNING DEATH CERTIFICATE:  Dr. Haider Daily    [phone]    I contacted the  home. Copy of COMPLETED Release of Body Form Received?   Yes     HOME:  Name: [facility]: [place]  Phone Number: [phone]    NEXT OF KIN:  Name: Yue Andrade  Relationship: Brother [address] Address: [address]: [address]: [place]  Zip code:    Phone Number: [address]?   No      Electronically signed by Iesha Echols on 2/16/2021 at 1:35 PM.  [facility]  [phone]

## 2021-02-16 NOTE — PROCEDURES
Arterial Line Placement Procedure Note          Performed by: Joo Barrow DO               Indication: Need for serial blood work, metabolic derangement, arterial blood gases, mechanical ventilation, severe hypotension and shock    Consent: Unable to be obtained due to the emergent nature of this procedure. Rito's Test: Not indicated in this particular procedure    Time out performed: Immediately prior to the procedure a \"time out\" was called to verify the correct patient, the correct procedure, equipment, support staff and site/side marked as required. All elements of maximal sterile barrier techniques were followed. Procedure: The skin over the right femoral artery was prepped with betadine. Local anesthesia was not performed due to the emergent nature of this procedure. A 20 gauge arterial line catheter was then inserted, using a modified Seldinger technique, into the vessel. The transducer set was then attached and securely fastened to the skin with sutures. Waveforms on the monitor were observed and found to be adequate. The patient had good distal perfusion after the procedure. The site was then dressed in a sterile fashion. The patient tolerated the procedure well.      Complications: None

## 2021-02-16 NOTE — PROCEDURES
Central Line Placement Procedure Note    Performed by: Sia Lua MD    Indication: hypovolemia    Consent: Unable to be obtained due to patient's condition. Time out performed: Immediately prior to the procedure a \"time out\" was called to verify the correct patient, the correct procedure, equipment, support staff and site/side marked as required. All elements of maximal sterile barrier techniques were followed. Procedure: The patient was positioned appropriately and the skin over the right femoral vein was draped in a sterile fashion. Local anesthesia was obtained by infiltration using 1% Lidocaine without epinephrine. A large bore needle was used to identify the vein. A guide wire was then inserted into the vein through the needle. A triple lumen catheter was then inserted into the vessel over the guide wire using the Seldinger technique. All ports showed good, free flowing blood return and were flushed with saline solution. The catheter was then securely fastened to the skin with sutures and covered with a sterile dressing. A post procedure X-ray was not indicated. The patient tolerated the procedure well.     Complications: None

## 2021-02-16 NOTE — DEATH NOTES
Death Pronouncement Note  Patient's Name: Baird July   Patient's YOB: 1950  MRN Number: [id number]    Admitting Provider: Mayito Figueroa MD  Attending Provider: Mayito Figueroa MD    Patient was examined and the following were absent: Pulses, Blood Pressure and Respiratory effort    I declared the patient dead on 2/16/2021 at 0916     Preliminary Cause of Death: Cardiac Arrest    Electronically signed by Keren Marin DO on 2/16/21 at 9:16 AM EST

## 2021-02-16 NOTE — PROGRESS NOTES
Critical Care Team - Daily Progress Note      Date and time: 2/16/2021 8:48 AM  Patient's name:  Vibha Hampton Record Number: [id number]  Patient's account/billing number: [de-identified]  Patient's YOB: 1950  Age: 66 y.o. Date of Admission: 2/6/2021  1:04 PM  Length of stay during current admission: 10      Primary Care Physician: Ida Echols MD  ICU Attending Physician: Dr. Herberth Veras. Code Status: DNR-CCA    Reason for ICU admission: PEA arrest      SUBJECTIVE:     OVERNIGHT EVENTS:       Overnight he had dialysis which he didn't tolerate well and and his dialysis was stopped in between  He was hypotensive requiring pressors  Overall prognosis was poor  Monitored him overnight  In the morning he was hyperkalemic received insulin, dextrose & bicarb  At around 9:16 am , on bedside assessment he was found to be having significant hypotension and had a cardiac arrest and was declared dead at 09: 16 by Dr. Tiffany Painting.         AWAKE & FOLLOWING COMMANDS:  [x] No   [] Yes    CURRENT VENTILATION STATUS:     [x] Ventilator  [] BIPAP  [] Nasal Cannula [] Room Air        SECRETIONS Amount:  [] Small [x] Moderate  [] Large  [x] None  Color:     [] White [] Colored  [] Bloody    SEDATION:  RAAS Score:  [] Propofol gtt  [] Versed gtt  [] Fentanyl gtt   [x] No Sedation    PARALYZED:  [x] No    [] Yes    DIARRHEA:                [x] No                [] Yes  (C. Difficile status: [] positive                                                                                                                       [] negative                                                                                                                     [] pending)    VASOPRESSORS:  [] No    [x] Yes    If yes -   [x] Levophed       [] Dopamine     [] Vasopressin       [] Dobutamine  [] Phenylephrine         [] Epinephrine    CENTRAL LINES:     [] No   [x] Yes   (Date of Insertion:   ) If yes -     [x] Right IJ     [] Left IJ [] Right Femoral [] Left Femoral                   [] Right Subclavian [] Left Subclavian       VELIZ'S CATHETER:   [] No   [x] Yes  (Date of Insertion:   )     URINE OUTPUT:            [x] Good   [] Low              [] Anuric      OBJECTIVE:     VITAL SIGNS:  BP (!) 45/17   Pulse (!) 10   Temp 100.2 °F (37.9 °C) (Core)   Resp 22   Ht 5' 9\" (1.753 m)   Wt 210 lb 8.6 oz (95.5 kg)   SpO2 (!) 80%   BMI 31.09 kg/m²   Tmax over 24 hours:  Temp (24hrs), Av °F (37.2 °C), Min:98.4 °F (36.9 °C), Max:99.8 °F (37.7 °C)      Patient Vitals for the past 6 hrs:   BP Pulse Resp SpO2 Weight   21 0800 138/63 (!) 31 22     21 0745  74 22 100 %    21 0743  76 22 100 %    21 0730  60 22     21 0715  84 22     21 0700 (!) 93/59 80 22     21 0600     210 lb 8.6 oz (95.5 kg)   21 0545  77 22 100 %    21 0530  71 22 100 %    21 0515  74 22 100 %    21 0500 127/69 85 22 96 %    21 0445  75 22 97 %    21 0430  89 22 98 %    21 0421   22     21 0415  87 22 99 %    21 0400 105/62 77 22 97 %    21 0345  92 22 98 %    21 0330  92 21 97 %    21 0326   26     21 0325   22     21 0315  95 22 98 %    21 0300 (!) 113/58 94 24 100 %          Intake/Output Summary (Last 24 hours) at 2021 0848  Last data filed at 2021 0729  Gross per 24 hour   Intake 4284 ml   Output 2067 ml   Net 2217 ml     Wt Readings from Last 2 Encounters:   21 210 lb 8.6 oz (95.5 kg)     Body mass index is 31.09 kg/m². PHYSICAL EXAMINATION    Physical Exam  Constitutional:       Comments: Intubated with  mechanical ventilation and off sedation. HENT:      Head: Normocephalic and atraumatic. Cardiovascular:      Comments: No signs of pulse and breathing  Abdominal:      General: Abdomen is flat. Palpations: Abdomen is soft. Skin:     General: Skin is warm and dry.    Neurological:      Comments: Unresponsive secondary to anoxic brain injury and intubation         MEDICATIONS:    Scheduled Meds:   calcium chloride        insulin glargine  50 Units Subcutaneous BID    midodrine  10 mg Oral TID WC    LORazepam  2 mg Intravenous Once    insulin lispro  0-18 Units Subcutaneous Q4H    docusate  100 mg Oral BID    famotidine  20 mg Per NG tube Daily    sodium zirconium cyclosilicate  10 g Oral BID    ampicillin-sulbactam  1,500 mg Intravenous Q12H    heparin (porcine)  5,000 Units Subcutaneous 3 times per day    ipratropium-albuterol  1 ampule Inhalation 4x daily    albuterol  2.5 mg Nebulization BID    artificial tears   Both Eyes 4x Daily    chlorhexidine  15 mL Mouth/Throat BID     Continuous Infusions:   EPINEPHrine infusion 60 mcg/min (02/16/21 0846)    sodium bicarbonate infusion 100 mL/hr at 02/16/21 0742    norepinephrine 100 mcg/min (02/16/21 0734)    amiodarone Stopped (02/16/21 0732)    vasopressin (Septic Shock) infusion 0.04 Units/min (02/16/21 0248)    dextrose 100 mL/hr (02/16/21 0149)     PRN Meds:       sodium chloride, 250 mL, PRN      sodium chloride, 150 mL, PRN      heparin (porcine), 1,400 Units, PRN      heparin (porcine), 1,300 Units, PRN      acetaminophen, 1,000 mg, Q6H PRN      bisacodyl, 10 mg, Daily PRN      fentanNYL, 25 mcg, Q1H PRN      glucose, 15 g, PRN      dextrose, 12.5 g, PRN      glucagon (rDNA), 1 mg, PRN      dextrose, 100 mL/hr, PRN      labetalol, 10 mg, Q3H PRN      sodium phosphate IVPB, 10 mmol, PRN      hydroxypropyl methylcellulose, 1 drop, 4x Daily PRN      promethazine, 12.5 mg, Q6H PRN    Or      ondansetron, 4 mg, Q6H PRN      albuterol, 2.5 mg, As Directed RT PRN          VENT SETTINGS (Comprehensive) (if applicable):  Vent Information  $Ventilation: $Subsequent Day  Skin Assessment: Clean, dry, & intact Suction Catheter Diameter: 14  Equipment ID: [id number]  Equipment Changed: Expiratory Filter  Vent Type: Servo i  Vent Mode: PRVC  Vt Ordered: 500 mL  Rate Set: 22 bmp  FiO2 : 80 %  SpO2: 100 %  SpO2/FiO2 ratio: 125  Sensitivity: 0  PEEP/CPAP: 5  I Time/ I Time %: 0.8 s  Humidification Source: HME  Nitric Oxide/Epoprostenol In Use?: No  Additional Respiratory  Assessments  Pulse: (!) 31  Resp: 22  SpO2: 100 %  End Tidal CO2: 29  Position: Semi-Ko's  Humidification Source: HME  Oral Care Completed?: Yes  Oral Care: Mouth moisturizer, Mouth swabbed  Subglottic Suction Done?: Yes  Cuff Pressure (cm H2O): (MLT)    ABGs:     Laboratory findings:    Complete Blood Count:   Recent Labs     02/14/21  0551 02/15/21  0329 02/16/21  0326   WBC 13.5* 24.6* 52.6*   HGB 7.3* 8.6* 8.2*   HCT 24.9* 29.3* 30.1*   * 143 169        Last 3 Blood Glucose:   Recent Labs     02/14/21  0551 02/15/21  0329 02/16/21  0326   GLUCOSE 304* 294* 141*        PT/INR:    Lab Results   Component Value Date    PROTIME 10.8 02/06/2021    INR 1.0 02/06/2021     PTT:    Lab Results   Component Value Date    APTT 26.0 02/06/2021       Comprehensive Metabolic Profile:   Recent Labs     02/14/21  0551 02/15/21  0329 02/15/21  1136 02/16/21  0326   * 154*  --  150*   K 4.2 4.3  --  7.9*   * 120*  --  111*   CO2 21 23  --  13*   * 145*  --  145*   CREATININE 2.42* 3.06*  --  4.86*   GLUCOSE 304* 294*  --  141*   CALCIUM 7.1* 8.2*  --  7.2*   LABALBU  --   --  2.6*  --       Magnesium:   Lab Results   Component Value Date    MG 2.9 02/12/2021     Phosphorus:   Lab Results   Component Value Date    PHOS 3.4 02/12/2021     Ionized Calcium:   Lab Results   Component Value Date    CAION 1.24 02/12/2021        Urinalysis:     Troponin: No results for input(s): TROPONINI in the last 72 hours.     Microbiology:    Cultures during this admission: Blood cultures:                 [] None drawn      [x] Negative             []  Positive (Details:  )  Urine Culture:                   [] None drawn      [x] Negative             []  Positive (Details:  )  Sputum Culture:               [] None drawn       [] Negative             []  Positive (Details:  )   Endotracheal aspirate:     [x] None drawn       [] Negative             []  Positive (Details:  )     Other pertinent Labs:       Radiology/Imaging:     Chest Xray (2/16/2021):    ASSESSMENT:     Active Problems:    Cardiac arrest ([facility] [address]    Pneumothorax    COPD (chronic obstructive pulmonary disease) ([facility])    CAD (coronary artery disease)    HFrEF (heart failure with reduced ejection fraction) ([facility] [address]    AICD (automatic cardioverter/defibrillator) present    CKD (chronic kidney disease) stage 3, GFR 30-59 ml/min    Respiratory arrest ([facility] [address]    Encounter for palliative care  Resolved Problems:    * No resolved hospital problems. *        PLAN:     WEAN PER PROTOCOL:  [] No   [x] Yes  [] N/A    DISCONTINUE ANY LABS:   [] No   [x] Yes    ICU PROPHYLAXIS:  Stress ulcer:  [x] PPI Agent  [] [id number] [] Sucralfate  [] Other:  VTE:   [] Enoxaparin  [x] Unfract. Heparin Subcut  [] EPC Cuffs    NUTRITION:  [x] NPO [] Tube Feeding (Specify: ) [] TPN  [] PO (Diet: DIET TUBE FEED CONTINUOUS/CYCLIC NPO; Renal Formula; Continuous; 10; 40; 24)    INSULIN DRIP:   [x] No   [] Yes    CONSULTATION NEEDED:  [] No   [x] Yes    FAMILY UPDATED:    [] No   [x] Yes    TRANSFER OUT OF ICU:   [x] No   [] Yes    Impression:     PEA s/p Cardiac arrest  Therapeutic rewarming completed. Cardiology had  AICD interrogated, shown some NSVT. Cardiology signed off and recommends ischemic work-up once neurological status improved. Encephalopathy s/p cardiac arrest:  Off sedation, not following commands even to pain. Neuro critical care evaluated. EEG shows severe encephalopathy. CT head - no acute abnormality. Ok for fentanyl prn for pain control.     Bilateral pneumothorax. - B/L Chest tubes in place.  - CT surgery contacted with concerns of air leaks and right-sided subcutaneous emphysema. -CT surgery recommended keeping chest tubes on suction. -CT surgery following stable.     Acute respiratory failure s/p mechanical ventilation:  PRVC mode ventilation with 30/5/22/500. Will continue to monitor. Chronic kidney disease stage 3. Hemodialysis today and nephrology on board and will follow up on their recommendations. Resistant hyperkalemia - Continue on lokelma oral 2 times daily , nephrology on board , dialysis today , will monitor BMP. Diabetes Mellitus type 2 :  Continue on lantus 50 Units and high dose sliding scale, monitor POC glucose and adjust insulin accordingly. He was treated for the above conditions, at around 9:16 AM he was found to be asystole with loss of spontaneous respiratory movements and loss of pulse and was declared dead. DVT ppx: hep SQ. GI ppx: Pepcid. Diet:  TFs today. Isolation: Not indicated. Shelby Dejesus MD  Internal Medicine Resident, PGY- [facility];  [place], [place]  2/16/2021, 4:46 PM

## 2021-02-17 ENCOUNTER — TELEPHONE (OUTPATIENT)
Dept: PULMONOLOGY | Age: 71
End: 2021-02-17

## 2021-02-17 LAB
ACTION: NORMAL
ALLEN TEST: ABNORMAL
DATE AND TIME: NORMAL
FIO2: 80
GLUCOSE BLD-MCNC: 104 MG/DL (ref 74–100)
MODE: ABNORMAL
NEGATIVE BASE EXCESS, ART: 16 (ref 0–2)
NOTIFY: NORMAL
O2 DEVICE/FLOW/%: ABNORMAL
PATIENT TEMP: 38.9
POC HCO3: 14.4 MMOL/L (ref 21–28)
POC LACTIC ACID: 15.96 MMOL/L (ref 0.56–1.39)
POC O2 SATURATION: 97 % (ref 94–98)
POC PCO2 TEMP: 60 MM HG
POC PCO2: 55.2 MM HG (ref 35–48)
POC PH TEMP: 7
POC PH: 7.03 (ref 7.35–7.45)
POC PO2 TEMP: 140 MM HG
POC PO2: 127.4 MM HG (ref 83–108)
POC POTASSIUM: 8 MMOL/L (ref 3.5–4.5)
POSITIVE BASE EXCESS, ART: ABNORMAL (ref 0–3)
READ BACK: YES
SAMPLE SITE: ABNORMAL
TCO2 (CALC), ART: 16 MMOL/L (ref 22–29)

## 2021-02-21 LAB
CULTURE: NORMAL
CULTURE: NORMAL
Lab: NORMAL
Lab: NORMAL
SPECIMEN DESCRIPTION: NORMAL
SPECIMEN DESCRIPTION: NORMAL

## 2021-02-22 NOTE — DISCHARGE SUMMARY
[address]     Department of Internal Medicine - Staff Internal Medicine Teaching Service    INPATIENT DEATH SUMMARY      Patient Identification:  Carola Turner is a 66 y.o. male. :  1950  MRN: [id number]     Acct: [de-identified]   PCP: Madeline Beverly MD  Admit Date:  2021  Discharge date and time: 2021  9:16 AM   Attending Provider: No att. providers found                                     [address] Problem Lists:  Active Problems:    Cardiac arrest ([facility] [address]    Pneumothorax    COPD (chronic obstructive pulmonary disease) ([facility])    CAD (coronary artery disease)    HFrEF (heart failure with reduced ejection fraction) ([facility] [address]    AICD (automatic cardioverter/defibrillator) present    CKD (chronic kidney disease) stage 3, GFR 30-59 ml/min    Respiratory arrest ([facility] [address]    Encounter for palliative care  Resolved Problems:    * No resolved hospital problems. *      HOSPITAL STAY     Brief Inpatient course:   Carola Turner is a 66 y.o. male who was admitted for the management of Cardiac arrest [facility]), presented to the emergency department with after collapsing. Per family member at bedside, patient has been having shortness of breath.  witnessed falling down by family who called EMS. On arrival by EMS, patient was noticed to be in PEA and CPR was started. ROSC was achieved after 15 minutes. 15 minutes with patient maintaining PEA on cardiac, ROSC was achieved. Pt received epi x2, 100 mcg fentanyl, and 4 mg versed. Patient was intubated with Nessa Reza airway and lifeflighted to [facility]. On arrival, patient was sedated on Versed, was found to have bilateral silent lungs, needle thoracostomy was done and chest tubes were placed to evacuate bilateral pneumothorax. Remigio airway was exchanged with endotracheal intubation.   Patient is being admitted to medical ICU for further management of post cardiac arrest. He was treated for post cardiac arrest with therapeutic rewarming and AICD interrogated cardiology was on board and recommends ischemic work-up once neurological status improved. He had severe Encephalopathy s/p cardiac arrest:- Initially on sedation later Off sedation, not following commands even to pain. Neuro critical care evaluated. EEG shows severe encephalopathy, significant anoxic injury. Bilateral pneumothorax. B/L Chest tubes in place. CT surgery contacted with concerns of air leaks and right-sided subcutaneous emphysema. CT surgery recommended keeping chest tubes on suction. Chronic kidney disease stage 3.   Hemodialysis today and nephrology on board Resistant hyperkalemia - Continue on lokelma oral 2 times daily. Had a short run of dialysis. His code status changed to DNR -CCA    He was treated for the above conditions, at around 9:16 AM he was found to be asystole with loss of spontaneous respiratory movements and loss of pulse and was declared dead.       Procedures/ Significant Interventions:        Chest Tube, A line, EEG,Vahe     Consults:     Consults:     Final Specialist Recommendations/Findings:   IP CONSULT TO DIETITIAN  IP CONSULT TO CARDIOLOGY  IP CONSULT TO CRITICAL CARE  IP CONSULT TO CARDIOTHORACIC SURGERY  IP CONSULT TO NEUROCRITICAL CARE  IP CONSULT TO PALLIATIVE CARE  IP CONSULT TO DIETITIAN  IP CONSULT TO NEPHROLOGY          DISCHARGE PLAN     Disposition: Sylvia Perry MD, MD  Internal Medicine Resident, PGY-1  [facility];  [place], [place]  2/22/2021, 3:24 PM

## 2024-05-16 NOTE — CONSULTS
Palliative Care Inpatient Consult    NAME:  Lee Valladares  MEDICAL RECORD NUMBER:  [id number]  AGE: 65 y.o. GENDER: male  : 1950  TODAY'S DATE:  2/10/2021    Reasons for Consultation:    Symptom and/or pain management  Provision of information regarding PC and/or hospice philosophies  Complex, time-intensive communication and interdisciplinary psychosocial support  Clarification of goals of care and/or assistance with difficult decision-making  Guidance in regards to resources and transition(s)    Members of PC team contributing to this consultation are :  Dr. Harshad Bazzi palliative care attending  History of Present Illness     The patient is a 65 y.o. Non-/non  male who presents with Cardiac Arrest      Referred to Palliative Care by   [x] Physician   [] Nursing  [] Family Request   [] Other:       He was admitted to the critical care service for Cardiac arrest ([facility] [address] [I46.9]. His hospital course has been associated with <principal problem not specified>. History has been obtained from patient's records. The patient has a complicated medical history and has been hospitalized since 2021  1:04 PM. The patient was brought in by the EMS after being called by a family member after witnessing cardiac arrest, patient had SOB for few days prior to presentation. Patient has history of COPD, CAD SP AICD placement, CHF with EF 30% on echo done on 2021. Patient was life flighted to [facility] and was found to have bilateral pneumothoraces, therapeutic cooling was started and chest tubes were placed. Patient remains intubated, sedated and paralyzed. Chest x-ray showed B/L ARDS, EEG shows severe encephalopathy. Neuro critical care has been consulted. Palliative care consulted for goals of care, CODE STATUS discussion and family support.     Active Hospital Problems    Diagnosis Date Noted    Respiratory arrest [facility]) [R09.2]     Cardiac arrest ([facility] [address] [I46.9] 2021  Pneumothorax [J93.9] 02/06/2021    COPD (chronic obstructive pulmonary disease) (HCC) [J44.9] 02/06/2021    CAD (coronary artery disease) [I25.10] 02/06/2021    HFrEF (heart failure with reduced ejection fraction) ([facility] [address] [I50.20] 02/06/2021    AICD (automatic cardioverter/defibrillator) present [Z95.810] 02/06/2021    CKD (chronic kidney disease) stage 3, GFR 30-59 ml/min [N18.30] 02/06/2021       PAST MEDICAL HISTORY  No past medical history on file. PAST SURGICAL HISTORY  Past Surgical History:   Procedure Laterality Date    CARDIAC DEFIBRILLATOR PLACEMENT  09/25/2019    [facility], [place], operated by [facility] model # D150, Lead # [id number] - NOT MRI CONDITIONAL       SOCIAL HISTORY  Social History     Tobacco Use    Smoking status: Not on file   Substance Use Topics    Alcohol use: Not on file    Drug use: Not on file       ALLERGIES  Not on File      MEDICATIONS  Current Medications    heparin (porcine)  5,000 Units Subcutaneous 3 times per day    ipratropium-albuterol  1 ampule Inhalation 4x daily    albuterol  2.5 mg Nebulization BID    artificial tears   Both Eyes 4x Daily    insulin glargine  10 Units Subcutaneous Nightly    famotidine (PEPCID) injection  20 mg Intravenous BID    insulin lispro  0-12 Units Subcutaneous Q6H    chlorhexidine  15 mL Mouth/Throat BID    methylPREDNISolone  40 mg Intravenous Q12H     glucose, dextrose, glucagon (rDNA), dextrose, labetalol, sodium phosphate IVPB, hydroxypropyl methylcellulose, glucose, dextrose, glucagon (rDNA), dextrose, promethazine **OR** ondansetron, albuterol  IV Drips/Infusions   dextrose      fentaNYL 125 mcg/hr (02/10/21 0901)    dextrose      sodium chloride 75 mL/hr at 02/09/21 0620     Home Medications  No current facility-administered medications on file prior to encounter.       Current Outpatient Medications on File Prior to Encounter   Medication Sig Dispense Refill  sacubitril-valsartan (ENTRESTO) 24-26 MG per tablet Take 1 tablet by mouth 2 times daily      azithromycin (ZITHROMAX) 250 MG tablet Take 250 mg by mouth daily      furosemide (LASIX) 40 MG tablet Take 40 mg by mouth daily      alogliptin (NESINA) 12.5 MG TABS tablet Take 25 mg by mouth daily      busPIRone (BUSPAR) 5 MG tablet Take 5 mg by mouth 3 times daily      clopidogrel (PLAVIX) 75 MG tablet Take 75 mg by mouth daily      mometasone-formoterol (DULERA) 100-5 MCG/ACT inhaler Inhale 2 puffs into the lungs 2 times daily      ipratropium-albuterol (DUONEB) 0.5-2.5 (3) MG/3ML SOLN nebulizer solution Inhale 1 vial into the lungs every 4 hours as needed      glipiZIDE (GLUCOTROL) 10 MG tablet Take 10 mg by mouth 2 times daily      insulin glargine (LANTUS SOLOSTAR) 100 UNIT/ML injection pen Inject 30 Units into the skin daily      omeprazole (PRILOSEC) 20 MG delayed release capsule Take 20 mg by mouth daily      QUEtiapine (SEROQUEL) 25 MG tablet Take 50 mg by mouth 2 times daily      simvastatin (ZOCOR) 10 MG tablet Take 10 mg by mouth daily      aspirin 81 MG EC tablet Take 81 mg by mouth daily      tamsulosin (FLOMAX) 0.4 MG capsule Take 0.4 mg by mouth daily      gabapentin (NEURONTIN) 400 MG capsule Take 400 mg by mouth 2 times daily.       isosorbide mononitrate (IMDUR) 30 MG extended release tablet Take 30 mg by mouth daily      primidone (MYSOLINE) 250 MG tablet Take 250 mg by mouth 3 times daily      albuterol sulfate HFA (VENTOLIN HFA) 108 (90 Base) MCG/ACT inhaler Inhale 2 puffs into the lungs 4 times daily      Cholecalciferol (VITAMIN D) 50 MCG (2000 UT) CAPS capsule Take 1 capsule by mouth         Data         BP (!) 124/57   Pulse 87   Temp 99.9 °F (37.7 °C) (Bladder)   Resp 24   Ht 5' 9\" (1.753 m)   Wt 270 lb (122.5 kg)   SpO2 98%   BMI 39.87 kg/m²     Wt Readings from Last 3 Encounters:   02/06/21 270 lb (122.5 kg)        Code Status: Full Code     ADVANCED CARE PLANNING: Patient has capacity for medical decisions: no  Health Care Power of : no  Living Will: no     Personal, Social, and Family History  Marital Status:   Living situation: alone  Does patient understand diagnosis/treatment? no  Does caregiver understand diagnosis/treatment? yes    No past medical history on file. No family history on file. Social History     Tobacco Use    Smoking status: Not on file   Substance Use Topics    Alcohol use: Not on file    Drug use: Not on file           Assessment        REVIEW OF SYSTEMS    ROS unable to be, intubated, unresponsive    PHYSICAL ASSESSMENT:  Constitutional: Intubated, unresponsive, not sedated  Head: Normocephalic and atraumatic. Eyes: EOM are normal. Pupils minimally reactive  Neck: Normal range of motion. Neck supple. No tracheal deviation present. Cardiovascular: Normal rate and regular rhythm, S1, S2, no murmur. Pulmonary/Chest: On mechanical ventilator  Abdomen: Soft. No tenderness, not distended, no ascites, no organomegaly. Musculoskeletal: Normal range of motion. No edema lower ext. Neurological: Unresponsive, not following commands  Skin: Normal turgor, no bleeding, no bruising. Palliative Performance Scale:  ___60%  Ambulation reduced; Significant disease; Can't do hobbies/housework; intake normal or reduced; occasional assist; LOC full/confusion  ___50%  Mainly sit/lie; Extensive disease; Can't do any work; Considerable assist; intake normal or reduced; LOC full/confusion  ___40%  Mainly in bed; Extensive disease; Mainly assist; intake normal or reduced; LOC full/confusion   ___30%  Bed Bound; Extensive disease; Total care; intake reduced; LOC full/confusion  __x_20%  Bed Bound; Extensive disease; Total care; intake minimal; Drowsy/coma  ___10%  Bed Bound; Extensive disease;  Total care; Mouth care only; Drowsy/coma  ___0       Death      Plan      Palliative Interaction: The patient was seen today, remains intubated, not sedated and not following commands  I met with critical care team and attending Dr. Krishan Lord and discussed patient's current medical conditions with them  Dr. Krishan Lord informed me that patient had cardiac arrest and underwent hypothermic protocol, remains intubated, off sedation but is not following any commands, neuro critical care team has been consulted and are following the patient  Dr. Krishan Lord further told that patient's EEG is showing severe encephalopathy although CT head showed no acute abnormality   I met with patient's RN Jenelle and she informed me that neuro critical care team may have a family meeting with patient's family today afternoon  I told ARCHANA Almanzar that I will be present for the family meeting along with the critical care team to discuss further goals of care for the patient  I offered comfort and emotional support to the patient    Education/support to staff  Education/support to family  Education/support to patient  Discharge planning/helping to coordinate care  Communications with primary service  Caregiver support/education  Code status clarified: Full Code  Code status clarified: [facility]  Code status clarified: DNRCCA  Other major issues     Principle Problem/Diagnosis:  Cardiac arrest    Additional Assessments:   Active Problems:    Cardiac arrest ([facility] [address]    Pneumothorax    COPD (chronic obstructive pulmonary disease) ([facility] [address]    CAD (coronary artery disease)    HFrEF (heart failure with reduced ejection fraction) ([facility] [address]    AICD (automatic cardioverter/defibrillator) present    CKD (chronic kidney disease) stage 3, GFR 30-59 ml/min    Respiratory arrest ([facility] [address]    1- Symptom management/ pain control     Pain Assessment:  Pain is controlled with current analgesics.   Medication(s) being used: narcotic analgesics including fentanyl IV               Anxiety:  none                          Dyspnea:  none                          Fatigue:  none    Other: Intubated We feel the patient symptoms are being controlled. his current regimen is reviewed by myself and discussed with the staff. We will follow-up with the family meeting along with neuro critical care team today afternoon if the family meeting of course     2- Goals of care evaluation   The patient goals of care are spiritual needs, strengthening relationships, preserve independence/autonomy/control and support for family/caregiver   Goals of care discussed with:    [] Patient independently    [] Patient and Family    [] Family or Healthcare DPOA independently    [x] Unable to discuss with patient, family/DPOA not present    3- Code Status  Full Code    4- Other recommendations   - We will continue to provide comfort and support to the patient and the family  Please call with any palliative questions or concerns. Palliative Care Team is available via perfect serve or via phone. Palliative Care will continue to follow Mr. Bibi Cornell care as needed. Thank you for allowing Palliative Care to participate in the care of Mr. Andrade . This note has been dictated by dragon, typing errors may be a possibility.     The total time I spent in seeing the patient, discussing goals of care, advanced directives, code status, greater than 50% time in counseling and other major issues was more than 55 minutes      Electronically signed by   Danyelle Medel MD  Palliative Care Team  on 2/10/2021 at 9:36 AM    Palliative care office: [phone] no